# Patient Record
Sex: MALE | Race: BLACK OR AFRICAN AMERICAN | Employment: OTHER | URBAN - METROPOLITAN AREA
[De-identification: names, ages, dates, MRNs, and addresses within clinical notes are randomized per-mention and may not be internally consistent; named-entity substitution may affect disease eponyms.]

---

## 2017-03-15 ENCOUNTER — OFFICE VISIT (OUTPATIENT)
Dept: INTERNAL MEDICINE CLINIC | Age: 67
End: 2017-03-15

## 2017-03-15 VITALS
HEIGHT: 66 IN | DIASTOLIC BLOOD PRESSURE: 86 MMHG | RESPIRATION RATE: 20 BRPM | SYSTOLIC BLOOD PRESSURE: 144 MMHG | OXYGEN SATURATION: 97 % | BODY MASS INDEX: 35.36 KG/M2 | WEIGHT: 220 LBS | HEART RATE: 70 BPM | TEMPERATURE: 98.8 F

## 2017-03-15 DIAGNOSIS — Z23 ENCOUNTER FOR IMMUNIZATION: ICD-10-CM

## 2017-03-15 DIAGNOSIS — F17.200 TOBACCO DEPENDENCE: ICD-10-CM

## 2017-03-15 DIAGNOSIS — E78.00 HIGH CHOLESTEROL: ICD-10-CM

## 2017-03-15 DIAGNOSIS — Z11.59 NEED FOR HEPATITIS C SCREENING TEST: ICD-10-CM

## 2017-03-15 DIAGNOSIS — Z12.11 COLON CANCER SCREENING: ICD-10-CM

## 2017-03-15 DIAGNOSIS — Z00.00 ROUTINE GENERAL MEDICAL EXAMINATION AT A HEALTH CARE FACILITY: Primary | ICD-10-CM

## 2017-03-15 DIAGNOSIS — I10 ESSENTIAL HYPERTENSION: ICD-10-CM

## 2017-03-15 DIAGNOSIS — Z13.39 SCREENING FOR ALCOHOLISM: ICD-10-CM

## 2017-03-15 RX ORDER — PRAVASTATIN SODIUM 40 MG/1
40 TABLET ORAL
Qty: 90 TAB | Refills: 3 | Status: SHIPPED | OUTPATIENT
Start: 2017-03-15 | End: 2018-06-26 | Stop reason: SDUPTHER

## 2017-03-15 RX ORDER — PANTOPRAZOLE SODIUM 40 MG/1
40 TABLET, DELAYED RELEASE ORAL DAILY
Qty: 90 TAB | Refills: 3 | Status: SHIPPED | OUTPATIENT
Start: 2017-03-15 | End: 2018-06-26 | Stop reason: SDUPTHER

## 2017-03-15 RX ORDER — LISINOPRIL 10 MG/1
10 TABLET ORAL DAILY
Qty: 90 TAB | Refills: 3 | Status: SHIPPED | OUTPATIENT
Start: 2017-03-15 | End: 2018-06-26 | Stop reason: SDUPTHER

## 2017-03-15 NOTE — MR AVS SNAPSHOT
Visit Information Date & Time Provider Department Dept. Phone Encounter #  
 3/15/2017 10:00 AM Laverne Jung MD Internists at [facility] [id number] Follow-up Instructions Return in about 6 months (around 9/15/2017) for labs 1 week before. Upcoming Health Maintenance Date Due Hepatitis C Screening 1950 EYE EXAM RETINAL OR DILATED Q1 3/11/1960 DTaP/Tdap/Td series (1 - Tdap) 3/11/1971 FOBT Q 1 YEAR AGE 50-75 3/11/2000 GLAUCOMA SCREENING Q2Y 3/11/2015 Pneumococcal 65+ Low/Medium Risk (2 of 2 - PPSV23) 1/8/2017 MEDICARE YEARLY EXAM 1/8/2017 MICROALBUMIN Q1 5/9/2017 HEMOGLOBIN A1C Q6M 9/9/2017 FOOT EXAM Q1 2/15/2018 LIPID PANEL Q1 3/9/2018 Allergies as of 3/15/2017  Review Complete On: 3/15/2017 By: Laverne Jung MD  
  
 Severity Noted Reaction Type Reactions Latex  01/08/2016    Sneezing Neosporin [Hydrocortisone]  01/08/2016    Rash Current Immunizations  Reviewed on 11/9/2016 Name Date Influenza Vaccine 9/12/2016 Influenza Vaccine (Quad) PF 1/8/2016 Pneumococcal Conjugate (PCV-13) 1/8/2016 Zoster Vaccine, Live 9/8/2015 Not reviewed this visit You Were Diagnosed With   
  
 Codes Comments Routine general medical examination at a health care facility    -  Primary ICD-10-CM: Z00.00 ICD-9-CM: V70.0 Screening for alcoholism     ICD-10-CM: Z13.89 ICD-9-CM: V79.1 Colon cancer screening     ICD-10-CM: Z12.11 ICD-9-CM: V76.51 Uncontrolled type 2 diabetes mellitus without complication, without long-term current use of insulin ([facility]     ICD-10-CM: E11.65 ICD-9-CM: 250.02 Essential hypertension     ICD-10-CM: I10 
ICD-9-CM: 401.9 High cholesterol     ICD-10-CM: E78.00 ICD-9-CM: 272.0 Vitals BP Pulse Temp Resp Height(growth percentile) Weight(growth percentile)  144/86 (BP 1 Location: Left arm, BP Patient Position: Sitting) 70 98.8 °F (37.1 °C) (Oral) 20 5' 6\" (1.676 m) 220 lb (99.8 kg) SpO2 BMI Smoking Status 97% 35.51 kg/m2 Current Every Day Smoker Vitals History BMI and BSA Data Body Mass Index Body Surface Area 35.51 kg/m 2 2.16 m 2 Preferred Pharmacy Pharmacy Name Phone [facility] [place] - [address] [phone] Your Updated Medication List  
  
   
This list is accurate as of: 3/15/17 10:27 AM.  Always use your most recent med list.  
  
  
  
  
 alcohol swabs Padm Commonly known as:  BD Single Use Swabs Regular Use daily to check blood sugar Blood Glucose Control, Normal Soln Commonly known as:  ACCU-CHEK SMARTVIEW CONTRL SOL Test blood sugar 1 x daily  DX E11.9        1 year supply  Check controls monthy on glucose meter Blood-Glucose Meter monitoring kit Accu-check Smartview Meter  
  
 clotrimazole-betamethasone topical cream  
Commonly known as:  Oliviaon Adryan Apply dime size to affected area BID for 1 week at a time FLUZONE HIGH-DOSE 2016-17 (PF) Syrg injection Generic drug:  influenza vaccine 2016-17 (65 yr+)(PF)  
inject 0.5 milliliter intramuscularly  
  
 gabapentin 300 mg capsule Commonly known as:  NEURONTIN Take 1 Cap by mouth nightly. glucose blood VI test strips strip Commonly known as:  [address] Test blood sugar 1 times a day. DX E11.65 HYDROcodone-acetaminophen 5-325 mg per tablet Commonly known as:  NORCO  
  
 hydrOXYzine pamoate 25 mg capsule Commonly known as:  VISTARIL Take 1 Cap by mouth three (3) times daily as needed for Itching. ibuprofen 800 mg tablet Commonly known as:  MOTRIN Take 1 Tab by mouth every eight (8) hours as needed for Pain. Lancets Misc Accu-check  Fastclix Lancets                  Test blood sugar daily. DX E11.9  
  
 lisinopril 10 mg tablet Commonly known as:  Shanon Burnett  
 Take 1 Tab by mouth daily. pantoprazole 40 mg tablet Commonly known as:  PROTONIX Take 1 Tab by mouth daily. pravastatin 40 mg tablet Commonly known as:  PRAVACHOL Take 1 Tab by mouth nightly. SITagliptin-metFORMIN 50-1,000 mg Tm24 Commonly known as:  JANUMET XR Take 2 Tabs by mouth daily. tiZANidine 4 mg tablet Commonly known as:  Mely Reagin Take 1 Tab by mouth nightly. Prescriptions Sent to Pharmacy Refills SITagliptin-metFORMIN (JANUMET XR) 50-1,000 mg TM24 3 Sig: Take 2 Tabs by mouth daily. Class: Normal  
 Pharmacy: [address] [place] [address] Ph #: [phone] Route: Oral  
 lisinopril (PRINIVIL, ZESTRIL) 10 mg tablet 3 Sig: Take 1 Tab by mouth daily. Class: Normal  
 Pharmacy: [address] [place] [address] Ph #: [phone] Route: Oral  
 pravastatin (PRAVACHOL) 40 mg tablet 3 Sig: Take 1 Tab by mouth nightly. Class: Normal  
 Pharmacy: [address] [place] [address] Ph #: [phone] Route: Oral  
 pantoprazole (PROTONIX) 40 mg tablet 3 Sig: Take 1 Tab by mouth daily. Class: Normal  
 Pharmacy: [address] [place] [address] Ph #: [phone] Route: Oral  
  
We Performed the Following REFERRAL TO OPHTHALMOLOGY [REF57 Custom] Comments:  
 Refer to Dr Charlotte Ace for DM eye exam  
  
Follow-up Instructions Return in about 6 months (around 9/15/2017) for labs 1 week before. To-Do List   
 03/15/2017 Lab:  OCCULT BLOOD, IMMUNOASSAY (FIT) Referral Information Referral ID Referred By Referred To  
  
 [id number] JAMILAH, 2021 Venkat Holm MD   
   [address] [facility], [address] [facility] [address] Phone: [phone] Fax: [phone] Visits Status Start Date End Date 1 New Request 3/15/17 3/15/18 If your referral has a status of pending review or denied, additional information will be sent to support the outcome of this decision. Patient Instructions Learning About Diabetes Food Guidelines Your Care Instructions Meal planning is important to manage diabetes. It helps keep your blood sugar at a target level (which you set with your doctor). You don't have to eat special foods. You can eat what your family eats, including sweets once in a while. But you do have to pay attention to how often you eat and how much you eat of certain foods. You may want to work with a dietitian or a certified diabetes educator (CDE) to help you plan meals and snacks. A dietitian or CDE can also help you lose weight if that is one of your goals. What should you know about eating carbs? Managing the amount of carbohydrate (carbs) you eat is an important part of healthy meals when you have diabetes. Carbohydrate is found in many foods. · Learn which foods have carbs. And learn the amounts of carbs in different foods. ¨ Bread, cereal, pasta, and rice have about 15 grams of carbs in a serving. A serving is 1 slice of bread (1 ounce), ½ cup of cooked cereal, or 1/3 cup of cooked pasta or rice. ¨ Fruits have 15 grams of carbs in a serving. A serving is 1 small fresh fruit, such as an apple or orange; ½ of a banana; ½ cup of cooked or canned fruit; ½ cup of fruit juice; 1 cup of melon or raspberries; or 2 tablespoons of dried fruit. ¨ Milk and no-sugar-added yogurt have 15 grams of carbs in a serving. A serving is 1 cup of milk or 2/3 cup of no-sugar-added yogurt. ¨ Starchy vegetables have 15 grams of carbs in a serving. A serving is ½ cup of mashed potatoes or sweet potato; 1 cup winter squash; ½ of a small baked potato; ½ cup of cooked beans; or ½ cup cooked corn or green peas.  
· Learn how much carbs to eat each day and at each meal. A dietitian or CDE can teach you how to keep track of the amount of carbs you eat. This is called carbohydrate counting. · If you are not sure how to count carbohydrate grams, use the Plate Method to plan meals. It is a good, quick way to make sure that you have a balanced meal. It also helps you spread carbs throughout the day. ¨ Divide your plate by types of foods. Put non-starchy vegetables on half the plate, meat or other protein food on one-quarter of the plate, and a grain or starchy vegetable in the final quarter of the plate. To this you can add a small piece of fruit and 1 cup of milk or yogurt, depending on how many carbs you are supposed to eat at a meal. 
· Try to eat about the same amount of carbs at each meal. Do not \"save up\" your daily allowance of carbs to eat at one meal. 
· Proteins have very little or no carbs per serving. Examples of proteins are beef, chicken, turkey, fish, eggs, tofu, cheese, cottage cheese, and peanut butter. A serving size of meat is 3 ounces, which is about the size of a deck of cards. Examples of meat substitute serving sizes (equal to 1 ounce of meat) are 1/4 cup of cottage cheese, 1 egg, 1 tablespoon of peanut butter, and ½ cup of tofu. How can you eat out and still eat healthy? · Learn to estimate the serving sizes of foods that have carbohydrate. If you measure food at home, it will be easier to estimate the amount in a serving of restaurant food. · If the meal you order has too much carbohydrate (such as potatoes, corn, or baked beans), ask to have a low-carbohydrate food instead. Ask for a salad or green vegetables. · If you use insulin, check your blood sugar before and after eating out to help you plan how much to eat in the future. · If you eat more carbohydrate at a meal than you had planned, take a walk or do other exercise. This will help lower your blood sugar. What else should you know? · Limit saturated fat, such as the fat from meat and dairy products.  This is a healthy choice because people who have diabetes are at higher risk of heart disease. So choose lean cuts of meat and nonfat or low-fat dairy products. Use olive or canola oil instead of butter or shortening when cooking. · Don't skip meals. Your blood sugar may drop too low if you skip meals and take insulin or certain medicines for diabetes. · Check with your doctor before you drink alcohol. Alcohol can cause your blood sugar to drop too low. Alcohol can also cause a bad reaction if you take certain diabetes medicines. Follow-up care is a key part of your treatment and safety. Be sure to make and go to all appointments, and call your doctor if you are having problems. It's also a good idea to know your test results and keep a list of the medicines you take. Where can you learn more? Go to http://sumit-uli.info/. Enter L463 in the search box to learn more about \"Learning About Diabetes Food Guidelines. \" Current as of: May 23, 2016 Content Version: 11.1 © 5119-3857 [employer]. Care instructions adapted under license by [employer] (which disclaims liability or warranty for this information). If you have questions about a medical condition or this instruction, always ask your healthcare professional. [place] [address] any warranty or liability for your use of this information. Medicare Part B Preventive Services Limitations Recommendation Scheduled Bone Mass Measurement 
(age 72 & older, biennial) Requires diagnosis related to osteoporosis or estrogen deficiency. Biennial benefit unless patient has history of long-term glucocorticoid tx or baseline is needed because initial test was by other method  NA Cardiovascular Screening Blood Tests (every 5 years) Total cholesterol, HDL, Triglycerides Order as a panel if possible  3/2017 Colorectal Cancer Screening 
-Fecal occult blood test (annual) -Flexible sigmoidoscopy (5y) -Screening colonoscopy (10y) -Barium Enema   Order Counseling to Prevent Tobacco Use (up to 8 sessions per year) - Counseling greater than 3 and up to 10 minutes - Counseling greater than 10 minutes Patients must be asymptomatic of tobacco-related conditions to receive as preventive service  NA Diabetes Screening Tests (at least every 3 years, Medicare covers annually or at 6-month intervals for prediabetic patients) Fasting blood sugar (FBS) or glucose tolerance test (GTT) Patient must be diagnosed with one of the following: 
-Hypertension, Dyslipidemia, obesity, previous impaired FBS or GTT 
Or any two of the following: overweight, FH of diabetes, age ? 72, history of gestational diabetes, birth of baby weighing more than 9 pounds  3/2017 Diabetes Self-Management Training (DSMT) (no USPSTF recommendation) Requires referral by treating physician for patient with diabetes or renal disease. 10 hours of initial DSMT session of no less than 30 minutes each in a continuous 12-month period. 2 hours of follow-up DSMT in subsequent years. 3/2017 Glaucoma Screening (no USPSTF recommendation) Diabetes mellitus, family history, , age 48 or over,  American, age 72 or over  Ordered Human Immunodeficiency Virus (HIV) Screening (annually for increased risk patients) HIV-1 and HIV-2 by EIA, MAURICIO, rapid antibody test, or oral mucosa transudate Patient must be at increased risk for HIV infection per USPSTF guidelines or pregnant. Tests covered annually for patients at increased risk. Pregnant patients may receive up to 3 test during pregnancy. NA Medical Nutrition Therapy (MNT) (for diabetes or renal disease not recommended schedule) Requires referral by treating physician for patient with diabetes or renal disease.   Can be provided in same year as diabetes self-management training (DSMT), and CMS recommends medical nutrition therapy take place after DSMT. Up to 3 hours for initial year and 2 hours in subsequent years. NA Prostate Cancer Screening (annually up to age 76) - Digital rectal exam (MIGUEL) - Prostate specific antigen (PSA) Annually (age 48 or over), MIGUEL not paid separately when covered E/M service is provided on same date Men up to age 76 may need a screening blood test for prostate cancer at certain intervals, depending on their personal and family history. This decision is between the patient and his provider. 1/2016 Seasonal Influenza Vaccination (annually)   9/2016 Pneumococcal Vaccination (once after 65)   Prevnar 13 1/2016 Hepatitis B Vaccinations (if medium/high risk) Medium/high risk factors:  End-stage renal disease, Hemophiliacs who received Factor VIII or IX concentrates, Clients of institutions for the mentally retarded, Persons who live in the same house as a HepB virus carrier, Homosexual men, Illicit injectable drug abusers. NA Shingles Vaccination A shingles vaccine is also recommended once in a lifetime after age 61  9/2015 Ultrasound Screening for Abdominal Aortic Aneurysm (AAA) (once) Patient must be referred through IPPE and not have had a screening for abdominal aortic aneurysm before under Medicare. Limited to patients who meet one of the following criteria: 
- Men who are 73-68 years old and have smoked more than 100 cigarettes in their lifetime. 
-Anyone with a FH of AAA 
-Anyone recommended for screening by USPSTF  NA Introducing hospitals & HEALTH SERVICES! Dior Schulte introduces [employer] patient portal. Now you can access parts of your medical record, email your doctor's office, and request medication refills online. 1. In your internet browser, go to [email or web address]. [email or web address] 2. Click on the First Time User? Click Here link in the Sign In box. You will see the New Member Sign Up page. 3. Enter your [employer] Access Code exactly as it appears below.  You will not need to use this code after youve completed the sign-up process. If you do not sign up before the expiration date, you must request a new code. · [employer] Access Code: [email or web address] Expires: 6/13/2017 10:04 AM 
 
4. Enter the last four digits of your Social Security Number (xxxx) and Date of Birth (mm/dd/yyyy) as indicated and click Submit. You will be taken to the next sign-up page. 5. Create a [employer] ID. This will be your [employer] login ID and cannot be changed, so think of one that is secure and easy to remember. 6. Create a [employer] password. You can change your password at any time. 7. Enter your Password Reset Question and Answer. This can be used at a later time if you forget your password. 8. Enter your e-mail address. You will receive e-mail notification when new information is available in [address] 9. Click Sign Up. You can now view and download portions of your medical record. 10. Click the Download Summary menu link to download a portable copy of your medical information. If you have questions, please visit the Frequently Asked Questions section of the [employer] website. Remember, [employer] is NOT to be used for urgent needs. For medical emergencies, dial 911. Now available from your iPhone and Android! Please provide this summary of care documentation to your next provider. Your primary care clinician is listed as CARLOS ASKEW. If you have any questions after today's visit, please call [phone].

## 2017-03-15 NOTE — PROGRESS NOTES
Patient is in the office today for a 4  month follow up, and Medicare Wellness. Patient states Joshua Sevilla cases him to gag and wants to know if it is ok to break pill in half to swallow. Patient states he would like to be checked for Hep C. Do you have an Advance Directive yes - will bring copy    1. Have you been to the ER, urgent care clinic since your last visit? Hospitalized since your last visit? No    2. Have you seen or consulted any other health care providers outside of the [facility] since your last visit? Include any pap smears or colon screening. No        This is a Subsequent Medicare Annual Wellness Visit providing Personalized Prevention Plan Services (PPPS) (Performed 12 months after initial AWV and PPPS )    I have reviewed the patient's medical history in detail and updated the computerized patient record. History     Past Medical History:   Diagnosis Date    Diabetes ([address]     GERD (gastroesophageal reflux disease)     Hx of colonic polyps     Hx of gallstones     Hypercholesterolemia     Hypertension     Restless leg syndrome     Wears dentures       Past Surgical History:   Procedure Laterality Date    HX OTHER SURGICAL      gallstones removed    [place] COLONOSCOPY FLX DX W/COLLJ SPEC WHEN PFRMD  2-17-16    Dr. Karlis Duverney     Current Outpatient Prescriptions   Medication Sig Dispense Refill    hydrOXYzine (VISTARIL) 25 mg capsule Take 1 Cap by mouth three (3) times daily as needed for Itching. 60 Cap 3    pantoprazole (PROTONIX) 40 mg tablet Take 1 Tab by mouth daily. 90 Tab 2    lisinopril (PRINIVIL, ZESTRIL) 10 mg tablet Take 1 Tab by mouth daily. 90 Tab 2    pravastatin (PRAVACHOL) 40 mg tablet Take 1 Tab by mouth nightly. 90 Tab 2    sitaGLIPtin-metFORMIN (JANUMET XR) 50-1,000 mg TM24 Take 2 Tabs by mouth daily.  180 Tab 2    alcohol swabs (BD SINGLE USE SWABS REGULAR) padm Use daily to check blood sugar 100 Pad 4    Blood Glucose Control, Normal (ACCU-CHEK SMARTVIEW CONTRL SOL) soln Test blood sugar 1 x daily  DX E11.9        1 year supply  Check controls monthy on glucose meter 3 mL 4    Blood-Glucose Meter monitoring kit Accu-check Smartview Meter 1 Kit 0    glucose blood VI test strips (ACCU-CHEK SMARTVIEW TEST STRIP) strip Test blood sugar 1 times a day. DX E11.65 100 Strip 4    Lancets misc Accu-check  Fastclix Lancets                  Test blood sugar daily. DX E11.9 100 Each 4    FLUZONE HIGH-DOSE 2016-17, PF, syrg injection inject 0.5 milliliter intramuscularly  0    HYDROcodone-acetaminophen (NORCO) 5-325 mg per tablet   0    gabapentin (NEURONTIN) 300 mg capsule Take 1 Cap by mouth nightly. 30 Cap 1    tiZANidine (ZANAFLEX) 4 mg tablet Take 1 Tab by mouth nightly. 30 Tab 1    ibuprofen (MOTRIN) 800 mg tablet Take 1 Tab by mouth every eight (8) hours as needed for Pain. 90 Tab 1    clotrimazole-betamethasone (LOTRISONE) topical cream Apply dime size to affected area BID for 1 week at a time 45 g 0     Allergies   Allergen Reactions    Latex Sneezing    Neosporin [Hydrocortisone] Rash     Family History   Problem Relation Age of Onset    Heart Attack Mother     Prostate Cancer Father     Colon Cancer Father     Stroke Sister      Social History   Substance Use Topics    Smoking status: Current Every Day Smoker     Packs/day: 0.50    Smokeless tobacco: Never Used    Alcohol use Yes      Comment: occas. Patient Active Problem List   Diagnosis Code    Essential hypertension I10    DM (diabetes mellitus), type 2, uncontrolled ([facility] E11.65    High cholesterol E78.00    ACP (advance care planning) Z71.89       Depression Risk Factor Screening:     PHQ 2 / 9, over the last two weeks 3/15/2017   Little interest or pleasure in doing things Not at all   Feeling down, depressed or hopeless Not at all   Total Score PHQ 2 0     Alcohol Risk Factor Screening:   Patient states he drinks very little alcohol.       Functional Ability and Level of Safety:     Hearing Loss   Patient states he doses not have any hearing loss. Activities of Daily Living   Self-care. Requires assistance with: no ADLs    Fall Risk     Fall Risk Assessment, last 12 mths 3/15/2017   Able to walk? Yes   Fall in past 12 months? No     Abuse Screen   Patient is not abused    Review of Systems   A comprehensive review of systems was negative except for that written in the HPI. Physical Examination     Evaluation of Cognitive Function:  Mood/affect:  neutral  Appearance: age appropriate  Family member/caregiver input: none    Visit Vitals    /86 (BP 1 Location: Left arm, BP Patient Position: Sitting)    Pulse 70    Temp 98.8 °F (37.1 °C) (Oral)    Resp 20    Ht 5' 6\" (1.676 m)    Wt 220 lb (99.8 kg)    SpO2 97%    BMI 35.51 kg/m2       Patient Care Team:  Yudi Abrams MD as PCP - General (Internal Medicine)    Advice/Referrals/Counseling   Education and counseling provided:  Are appropriate based on today's review and evaluation  End-of-Life planning (with patient's consent)  Pneumococcal Vaccine    Glaucoma Screening- Nov 2015 [facility]  Refer today   Pneumonia Vaccine- due for pneumococcal today   Shingles Vaccine- 9/2015   Tdap Vaccine- not UTD   Colonoscopy not sure when last done. Will do FIT test   PSA- 1/2016 will repeat with next blood work  Advance Directive- Does not have. Working on it     Assessment/Plan       ICD-10-CM ICD-9-CM    1. Routine general medical examination at a health care facility Z00.00 V70.0 PROSTATE SPECIFIC AG (PSA)   2. Screening for alcoholism Z13.89 V79.1    3. Colon cancer screening Z12.11 V76.51 OCCULT BLOOD, IMMUNOASSAY (FIT)   4. Uncontrolled type 2 diabetes mellitus without complication, without long-term current use of insulin (HCC) E11.65 250.02 REFERRAL TO OPHTHALMOLOGY      SITagliptin-metFORMIN (JANUMET XR) 50-1,000 mg TM24   5. Essential hypertension I10 401.9    6. High cholesterol E78.00 272.0    7.  Tobacco dependence F17.200 305.1    8. Need for hepatitis C screening test Z11.59 V73.89 HEPATITIS C AB   9. Encounter for immunization Z23 V03.89 PNEUMOCOCCAL POLYSACCHARIDE VACCINE, 23-VALENT, ADULT OR IMMUNOSUPPRESSED PT DOSE,      ADMIN PNEUMOCOCCAL VACCINE   . A comprehensive 5 year plan for medical care and screening exams was reviewed with pt and they received a copy of it.

## 2017-03-15 NOTE — PATIENT INSTRUCTIONS
Learning About Diabetes Food Guidelines  Your Care Instructions  Meal planning is important to manage diabetes. It helps keep your blood sugar at a target level (which you set with your doctor). You don't have to eat special foods. You can eat what your family eats, including sweets once in a while. But you do have to pay attention to how often you eat and how much you eat of certain foods. You may want to work with a dietitian or a certified diabetes educator (CDE) to help you plan meals and snacks. A dietitian or CDE can also help you lose weight if that is one of your goals. What should you know about eating carbs? Managing the amount of carbohydrate (carbs) you eat is an important part of healthy meals when you have diabetes. Carbohydrate is found in many foods. · Learn which foods have carbs. And learn the amounts of carbs in different foods. ¨ Bread, cereal, pasta, and rice have about 15 grams of carbs in a serving. A serving is 1 slice of bread (1 ounce), ½ cup of cooked cereal, or 1/3 cup of cooked pasta or rice. ¨ Fruits have 15 grams of carbs in a serving. A serving is 1 small fresh fruit, such as an apple or orange; ½ of a banana; ½ cup of cooked or canned fruit; ½ cup of fruit juice; 1 cup of melon or raspberries; or 2 tablespoons of dried fruit. ¨ Milk and no-sugar-added yogurt have 15 grams of carbs in a serving. A serving is 1 cup of milk or 2/3 cup of no-sugar-added yogurt. ¨ Starchy vegetables have 15 grams of carbs in a serving. A serving is ½ cup of mashed potatoes or sweet potato; 1 cup winter squash; ½ of a small baked potato; ½ cup of cooked beans; or ½ cup cooked corn or green peas. · Learn how much carbs to eat each day and at each meal. A dietitian or CDE can teach you how to keep track of the amount of carbs you eat. This is called carbohydrate counting. · If you are not sure how to count carbohydrate grams, use the Plate Method to plan meals.  It is a good, quick way to make sure that you have a balanced meal. It also helps you spread carbs throughout the day. ¨ Divide your plate by types of foods. Put non-starchy vegetables on half the plate, meat or other protein food on one-quarter of the plate, and a grain or starchy vegetable in the final quarter of the plate. To this you can add a small piece of fruit and 1 cup of milk or yogurt, depending on how many carbs you are supposed to eat at a meal.  · Try to eat about the same amount of carbs at each meal. Do not \"save up\" your daily allowance of carbs to eat at one meal.  · Proteins have very little or no carbs per serving. Examples of proteins are beef, chicken, turkey, fish, eggs, tofu, cheese, cottage cheese, and peanut butter. A serving size of meat is 3 ounces, which is about the size of a deck of cards. Examples of meat substitute serving sizes (equal to 1 ounce of meat) are 1/4 cup of cottage cheese, 1 egg, 1 tablespoon of peanut butter, and ½ cup of tofu. How can you eat out and still eat healthy? · Learn to estimate the serving sizes of foods that have carbohydrate. If you measure food at home, it will be easier to estimate the amount in a serving of restaurant food. · If the meal you order has too much carbohydrate (such as potatoes, corn, or baked beans), ask to have a low-carbohydrate food instead. Ask for a salad or green vegetables. · If you use insulin, check your blood sugar before and after eating out to help you plan how much to eat in the future. · If you eat more carbohydrate at a meal than you had planned, take a walk or do other exercise. This will help lower your blood sugar. What else should you know? · Limit saturated fat, such as the fat from meat and dairy products. This is a healthy choice because people who have diabetes are at higher risk of heart disease. So choose lean cuts of meat and nonfat or low-fat dairy products. Use olive or canola oil instead of butter or shortening when cooking.   · Don't skip meals. Your blood sugar may drop too low if you skip meals and take insulin or certain medicines for diabetes. · Check with your doctor before you drink alcohol. Alcohol can cause your blood sugar to drop too low. Alcohol can also cause a bad reaction if you take certain diabetes medicines. Follow-up care is a key part of your treatment and safety. Be sure to make and go to all appointments, and call your doctor if you are having problems. It's also a good idea to know your test results and keep a list of the medicines you take. Where can you learn more? Go to http://sumit-uli.info/. Enter C609 in the search box to learn more about \"Learning About Diabetes Food Guidelines. \"  Current as of: May 23, 2016  Content Version: 11.1  © 9401-7341 [employer]. Care instructions adapted under license by [employer] (which disclaims liability or warranty for this information). If you have questions about a medical condition or this instruction, always ask your healthcare professional. [place] [address] any warranty or liability for your use of this information. Medicare Part B Preventive Services Limitations Recommendation Scheduled   Bone Mass Measurement  (age 72 & older, biennial) Requires diagnosis related to osteoporosis or estrogen deficiency.  Biennial benefit unless patient has history of long-term glucocorticoid tx or baseline is needed because initial test was by other method  NA   Cardiovascular Screening Blood Tests (every 5 years)  Total cholesterol, HDL, Triglycerides Order as a panel if possible  3/2017   Colorectal Cancer Screening  -Fecal occult blood test (annual)  -Flexible sigmoidoscopy (5y)  -Screening colonoscopy (10y)  -Barium Enema   Order    Counseling to Prevent Tobacco Use (up to 8 sessions per year)  - Counseling greater than 3 and up to 10 minutes  - Counseling greater than 10 minutes Patients must be asymptomatic of tobacco-related conditions to receive as preventive service  NA   Diabetes Screening Tests (at least every 3 years, Medicare covers annually or at 6-month intervals for prediabetic patients)    Fasting blood sugar (FBS) or glucose tolerance test (GTT) Patient must be diagnosed with one of the following:  -Hypertension, Dyslipidemia, obesity, previous impaired FBS or GTT  Or any two of the following: overweight, FH of diabetes, age ? 72, history of gestational diabetes, birth of baby weighing more than 9 pounds  3/2017   Diabetes Self-Management Training (DSMT) (no USPSTF recommendation) Requires referral by treating physician for patient with diabetes or renal disease. 10 hours of initial DSMT session of no less than 30 minutes each in a continuous 12-month period. 2 hours of follow-up DSMT in subsequent years. 3/2017   Glaucoma Screening (no USPSTF recommendation) Diabetes mellitus, family history, , age 48 or over,  American, age 72 or over  Ordered    Human Immunodeficiency Virus (HIV) Screening (annually for increased risk patients)  HIV-1 and HIV-2 by EIA, MAURICIO, rapid antibody test, or oral mucosa transudate Patient must be at increased risk for HIV infection per USPSTF guidelines or pregnant. Tests covered annually for patients at increased risk. Pregnant patients may receive up to 3 test during pregnancy. NA   Medical Nutrition Therapy (MNT) (for diabetes or renal disease not recommended schedule) Requires referral by treating physician for patient with diabetes or renal disease. Can be provided in same year as diabetes self-management training (DSMT), and CMS recommends medical nutrition therapy take place after DSMT. Up to 3 hours for initial year and 2 hours in subsequent years.   NA   Prostate Cancer Screening (annually up to age 76)  - Digital rectal exam (MIGUEL)  - Prostate specific antigen (PSA) Annually (age 48 or over), MIGUEL not paid separately when covered E/M service is provided on same date  Men up to age 76 may need a screening blood test for prostate cancer at certain intervals, depending on their personal and family history. This decision is between the patient and his provider. 1/2016   Seasonal Influenza Vaccination (annually)   9/2016     Pneumococcal Vaccination (once after 72)   Prevnar 13 1/2016   Hepatitis B Vaccinations (if medium/high risk) Medium/high risk factors:  End-stage renal disease,  Hemophiliacs who received Factor VIII or IX concentrates, Clients of institutions for the mentally retarded, Persons who live in the same house as a HepB virus carrier, Homosexual men, Illicit injectable drug abusers. NA   Shingles Vaccination A shingles vaccine is also recommended once in a lifetime after age 61  9/2015   Ultrasound Screening for Abdominal Aortic Aneurysm (AAA) (once) Patient must be referred through [facility] and not have had a screening for abdominal aortic aneurysm before under Medicare.   Limited to patients who meet one of the following criteria:  - Men who are 73-68 years old and have smoked more than 100 cigarettes in their lifetime.  -Anyone with a FH of AAA  -Anyone recommended for screening by USPSTF  NA

## 2017-03-15 NOTE — PROGRESS NOTES
Verbal order read back per Dr. Laura Neville Pneumococcal vaccine. Patient received Pneumococcal vaccine in left deltoid. Patient tolerated well and left without complaints. Patient received Pneumococcal VIS.

## 2017-03-15 NOTE — PROGRESS NOTES
Subjective:       Chief Complaint  The patient presents for follow up of diabetes, hypertension and high cholesterol. MIRTA Karimi is a 79 y.o. male seen for follow up of diabetes. Healnichol has hypertension and hyperlipidemia. Diabetes improving with weight loss, no significant medication side effects noted, on Janumet, hypertension well controlled, no significant medication side effects noted, on lisinopril, hyperlipidemia well controlled, no significant medication side effects noted, on Pravachol. Diet and Lifestyle: generally follows a low fat low cholesterol diet, follows a diabetic diet regularly, exercises sporadically    Home BP Monitoring: is not measured at home. Diabetic Review of Systems - home glucose monitoring: is performed regularly, 1x/day. Other symptoms and concerns: pt continues to work on trying to stop tobacco use. He is aware of options including Chantix      Current Outpatient Prescriptions   Medication Sig    SITagliptin-metFORMIN (JANUMET XR) 50-1,000 mg TM24 Take 2 Tabs by mouth daily.  lisinopril (PRINIVIL, ZESTRIL) 10 mg tablet Take 1 Tab by mouth daily.  pravastatin (PRAVACHOL) 40 mg tablet Take 1 Tab by mouth nightly.  pantoprazole (PROTONIX) 40 mg tablet Take 1 Tab by mouth daily.  hydrOXYzine (VISTARIL) 25 mg capsule Take 1 Cap by mouth three (3) times daily as needed for Itching.  alcohol swabs (BD SINGLE USE SWABS REGULAR) padm Use daily to check blood sugar    Blood Glucose Control, Normal (ACCU-CHEK SMARTVIEW CONTRL SOL) soln Test blood sugar 1 x daily  DX E11.9        1 year supply  Check controls monthy on glucose meter    Blood-Glucose Meter monitoring kit Accu-check Smartview Meter    glucose blood VI test strips (ACCU-CHEK SMARTVIEW TEST STRIP) strip Test blood sugar 1 times a day. DX E11.65    Lancets misc Accu-check  Fastclix Lancets                  Test blood sugar daily.   DX E11.9    FLUZONE HIGH-DOSE 2016-17, PF, syrg injection inject 0.5 milliliter intramuscularly    HYDROcodone-acetaminophen (NORCO) 5-325 mg per tablet     gabapentin (NEURONTIN) 300 mg capsule Take 1 Cap by mouth nightly.  tiZANidine (ZANAFLEX) 4 mg tablet Take 1 Tab by mouth nightly.  ibuprofen (MOTRIN) 800 mg tablet Take 1 Tab by mouth every eight (8) hours as needed for Pain.  clotrimazole-betamethasone (LOTRISONE) topical cream Apply dime size to affected area BID for 1 week at a time     No current facility-administered medications for this visit. Review of Systems  Respiratory: negative for dyspnea on exertion  Cardiovascular: negative for chest pain    Objective:     Visit Vitals    /86 (BP 1 Location: Left arm, BP Patient Position: Sitting)    Pulse 70    Temp 98.8 °F (37.1 °C) (Oral)    Resp 20    Ht 5' 6\" (1.676 m)    Wt 220 lb (99.8 kg)    SpO2 97%    BMI 35.51 kg/m2        General appearance - alert, well appearing, and in no distress  Chest - clear to auscultation, no wheezes, rales or rhonchi, symmetric air entry  Heart - normal rate, regular rhythm, normal S1, S2, no murmurs, rubs, clicks or gallops  Extremities - peripheral pulses normal, no pedal edema, no clubbing or cyanosis        Labs:   Lab Results   Component Value Date/Time    Hemoglobin A1c 7.4 03/09/2017 10:00 AM    Hemoglobin A1c 7.9 11/02/2016 08:32 AM    Hemoglobin A1c 7.2 05/02/2016 09:33 AM    Glucose 139 03/09/2017 10:00 AM    Glucose (POC) 158 02/17/2016 08:41 AM    LDL, calculated 62 03/09/2017 10:00 AM    Creatinine 1.07 03/09/2017 10:00 AM      Lab Results   Component Value Date/Time    Cholesterol, total 121 03/09/2017 10:00 AM    HDL Cholesterol 36 03/09/2017 10:00 AM    LDL, calculated 62 03/09/2017 10:00 AM    Triglyceride 115 03/09/2017 10:00 AM    CHOL/HDL Ratio 3.3 05/02/2016 09:33 AM       Lab Results   Component Value Date/Time    ALT (SGPT) 10 03/09/2017 10:00 AM    AST (SGOT) 10 03/09/2017 10:00 AM    Alk.  phosphatase 79 03/09/2017 10:00 AM    Bilirubin, total 0.4 03/09/2017 10:00 AM       Lab Results   Component Value Date/Time    GFR est AA 83 03/09/2017 10:00 AM    GFR est non-AA 72 03/09/2017 10:00 AM    Creatinine 1.07 03/09/2017 10:00 AM    BUN 11 03/09/2017 10:00 AM    Sodium 142 03/09/2017 10:00 AM    Potassium 4.4 03/09/2017 10:00 AM    Chloride 104 03/09/2017 10:00 AM    CO2 22 03/09/2017 10:00 AM      Lab Results   Component Value Date/Time    Prostate Specific Ag 1.6 01/08/2016 10:54 AM           Assessment / Plan     Diabetes improving on Janumet with weight loss  Hypertension well controlled, on lisinopril   Hyperlipidemia well controlled, on Pravachol . ICD-10-CM ICD-9-CM    1. Routine general medical examination at a health care facility Z00.00 V70.0 PROSTATE SPECIFIC AG (PSA)   2. Screening for alcoholism Z13.89 V79.1    3. Colon cancer screening Z12.11 V76.51 OCCULT BLOOD, IMMUNOASSAY (FIT)   4. Uncontrolled type 2 diabetes mellitus without complication, without long-term current use of insulin (HCC) E11.65 250.02 REFERRAL TO OPHTHALMOLOGY      SITagliptin-metFORMIN (JANUMET XR) 50-1,000 mg TM24   5. Essential hypertension I10 401.9    6. High cholesterol E78.00 272.0    7. Tobacco dependence F17.200 305.1 Pt encouraged to stop tobacco use. 8. Need for hepatitis C screening test Z11.59 V73.89 HEPATITIS C AB   9. Encounter for immunization Z23 V03.89 PNEUMOCOCCAL POLYSACCHARIDE VACCINE, 23-VALENT, ADULT OR IMMUNOSUPPRESSED PT DOSE,      ADMIN PNEUMOCOCCAL VACCINE              Diabetic issues reviewed with him: diabetic diet discussed in detail, and low cholesterol diet, weight control and daily exercise discussed. Follow-up Disposition:  Return in about 6 months (around 9/15/2017) for labs 1 week before. Reviewed plan of care. Patient has provided input and agrees with goals.

## 2017-03-25 LAB — HEMOCCULT STL QL IA: NEGATIVE

## 2017-09-07 ENCOUNTER — HOSPITAL ENCOUNTER (OUTPATIENT)
Dept: LAB | Age: 67
Discharge: HOME | End: 2017-09-07

## 2017-09-07 ENCOUNTER — LAB ONLY (OUTPATIENT)
Dept: INTERNAL MEDICINE CLINIC | Age: 67
End: 2017-09-07

## 2017-09-07 DIAGNOSIS — E78.00 HIGH CHOLESTEROL: ICD-10-CM

## 2017-09-07 DIAGNOSIS — I10 ESSENTIAL HYPERTENSION: ICD-10-CM

## 2017-09-07 PROCEDURE — 99001 SPECIMEN HANDLING PT-LAB: CPT | Performed by: INTERNAL MEDICINE

## 2017-09-08 LAB
ALBUMIN SERPL-MCNC: 4 G/DL (ref 3.6–4.8)
ALBUMIN/GLOB SERPL: 1.6 {RATIO} (ref 1.2–2.2)
ALP SERPL-CCNC: 75 IU/L (ref 39–117)
ALT SERPL-CCNC: 14 IU/L (ref 0–44)
AST SERPL-CCNC: 17 IU/L (ref 0–40)
BILIRUB SERPL-MCNC: 0.6 MG/DL (ref 0–1.2)
BUN SERPL-MCNC: 14 MG/DL (ref 8–27)
BUN/CREAT SERPL: 14 (ref 10–24)
CALCIUM SERPL-MCNC: 9.3 MG/DL (ref 8.6–10.2)
CHLORIDE SERPL-SCNC: 103 MMOL/L (ref 96–106)
CHOLEST SERPL-MCNC: 137 MG/DL (ref 100–199)
CO2 SERPL-SCNC: 24 MMOL/L (ref 18–29)
CREAT SERPL-MCNC: 1 MG/DL (ref 0.76–1.27)
GLOBULIN SER CALC-MCNC: 2.5 G/DL (ref 1.5–4.5)
GLUCOSE SERPL-MCNC: 136 MG/DL (ref 65–99)
HBA1C MFR BLD: 7.7 % (ref 4.8–5.6)
HCV AB S/CO SERPL IA: <0.1 S/CO RATIO (ref 0–0.9)
HDLC SERPL-MCNC: 42 MG/DL
INTERPRETATION, 910389: NORMAL
LDLC SERPL CALC-MCNC: 78 MG/DL (ref 0–99)
Lab: NORMAL
POTASSIUM SERPL-SCNC: 4.2 MMOL/L (ref 3.5–5.2)
PROT SERPL-MCNC: 6.5 G/DL (ref 6–8.5)
PSA SERPL-MCNC: 2.4 NG/ML (ref 0–4)
SODIUM SERPL-SCNC: 142 MMOL/L (ref 134–144)
TRIGL SERPL-MCNC: 86 MG/DL (ref 0–149)
VLDLC SERPL CALC-MCNC: 17 MG/DL (ref 5–40)

## 2017-10-23 RX ORDER — LANCETS
EACH MISCELLANEOUS
Qty: 100 EACH | Refills: 3 | Status: SHIPPED | OUTPATIENT
Start: 2017-10-23 | End: 2021-09-08

## 2017-11-01 ENCOUNTER — OFFICE VISIT (OUTPATIENT)
Dept: FAMILY MEDICINE CLINIC | Age: 67
End: 2017-11-01

## 2017-11-01 VITALS
HEIGHT: 66 IN | DIASTOLIC BLOOD PRESSURE: 84 MMHG | BODY MASS INDEX: 35.03 KG/M2 | OXYGEN SATURATION: 96 % | WEIGHT: 218 LBS | HEART RATE: 86 BPM | SYSTOLIC BLOOD PRESSURE: 132 MMHG | TEMPERATURE: 97.5 F | RESPIRATION RATE: 18 BRPM

## 2017-11-01 DIAGNOSIS — F17.200 TOBACCO DEPENDENCE: ICD-10-CM

## 2017-11-01 DIAGNOSIS — R05.9 COUGH: ICD-10-CM

## 2017-11-01 DIAGNOSIS — M20.41 HAMMERTOE OF RIGHT FOOT: ICD-10-CM

## 2017-11-01 DIAGNOSIS — I10 ESSENTIAL HYPERTENSION: ICD-10-CM

## 2017-11-01 DIAGNOSIS — M20.42 HAMMERTOE OF LEFT FOOT: ICD-10-CM

## 2017-11-01 DIAGNOSIS — E78.00 HIGH CHOLESTEROL: ICD-10-CM

## 2017-11-01 RX ORDER — VARENICLINE TARTRATE 25 MG
KIT ORAL
Qty: 1 DOSE PACK | Refills: 0 | Status: SHIPPED | OUTPATIENT
Start: 2017-11-01 | End: 2018-06-26

## 2017-11-01 NOTE — PROGRESS NOTES
Subjective:       Chief Complaint  The patient presents for follow up of diabetes, hypertension and high cholesterol. MIRTA Carney is a 79 y.o. male seen for follow up of diabetes. Healso has hypertension and hyperlipidemia. Diabetes borderline controlled on Janumet, pt to try and improve diet, no significant medication side effects noted, on Janumet, hypertension well controlled, no significant medication side effects noted, on lisinopril, hyperlipidemia well controlled, no significant medication side effects noted, on Pravachol. Diet and Lifestyle: generally follows a low fat low cholesterol diet, follows a diabetic diet regularly, exercises sporadically    Home BP Monitoring: is not measured at home. Diabetic Review of Systems - home glucose monitoring: is performed regularly, 1x/day. Other symptoms and concerns: pt continues to work on trying to stop tobacco use. He is aware of options including Chantix. He has occasional cough       Current Outpatient Prescriptions   Medication Sig    varenicline (CHANTIX STARTER DEEPA) 0.5 mg (11)- 1 mg (42) DsPk Take as directed    glucose blood VI test strips (ACCU-CHEK SMARTVIEW TEST STRIP) strip Test blood sugar 1 times a day. DX E11.65    Lancets misc Accu-check  Fastclix Lancets. Test blood sugar daily. DX E11.9    SITagliptin-metFORMIN (JANUMET XR) 50-1,000 mg TM24 Take 2 Tabs by mouth daily.  lisinopril (PRINIVIL, ZESTRIL) 10 mg tablet Take 1 Tab by mouth daily.  pravastatin (PRAVACHOL) 40 mg tablet Take 1 Tab by mouth nightly.  pantoprazole (PROTONIX) 40 mg tablet Take 1 Tab by mouth daily.  hydrOXYzine (VISTARIL) 25 mg capsule Take 1 Cap by mouth three (3) times daily as needed for Itching.  gabapentin (NEURONTIN) 300 mg capsule Take 1 Cap by mouth nightly.     alcohol swabs (BD SINGLE USE SWABS REGULAR) padm Use daily to check blood sugar    Blood Glucose Control, Normal (ACCU-CHEK SMARTVIEW CONTRL SOL) soln Test blood sugar 1 x daily  DX E11.9        1 year supply  Check controls monthy on glucose meter    Blood-Glucose Meter monitoring kit Accu-check Smartview Meter    FLUZONE HIGH-DOSE 2016-17, PF, syrg injection inject 0.5 milliliter intramuscularly    HYDROcodone-acetaminophen (NORCO) 5-325 mg per tablet     tiZANidine (ZANAFLEX) 4 mg tablet Take 1 Tab by mouth nightly.  ibuprofen (MOTRIN) 800 mg tablet Take 1 Tab by mouth every eight (8) hours as needed for Pain.  clotrimazole-betamethasone (LOTRISONE) topical cream Apply dime size to affected area BID for 1 week at a time     No current facility-administered medications for this visit. Review of Systems  Respiratory: negative for dyspnea on exertion  Cardiovascular: negative for chest pain    Objective:     Visit Vitals    /84 (BP 1 Location: Left arm, BP Patient Position: Sitting)    Pulse 86    Temp 97.5 °F (36.4 °C) (Oral)    Resp 18    Ht 5' 6\" (1.676 m)    Wt 218 lb (98.9 kg)    SpO2 96%    BMI 35.19 kg/m2        General appearance - alert, well appearing, and in no distress  Chest - crepitus right upper lung field  Heart - normal rate, regular rhythm, normal S1, S2, no murmurs, rubs, clicks or gallops  Extremities - peripheral pulses normal, no pedal edema, no clubbing or cyanosis. Contracture of the lesser toes is noted on the right foot and left foot.         Labs:   Lab Results   Component Value Date/Time    Hemoglobin A1c 7.7 09/07/2017 09:18 AM    Hemoglobin A1c 7.4 03/09/2017 10:00 AM    Hemoglobin A1c 7.9 11/02/2016 08:32 AM    Glucose 136 09/07/2017 09:18 AM    Glucose (POC) 158 02/17/2016 08:41 AM    LDL, calculated 78 09/07/2017 09:18 AM    Creatinine 1.00 09/07/2017 09:18 AM      Lab Results   Component Value Date/Time    Cholesterol, total 137 09/07/2017 09:18 AM    HDL Cholesterol 42 09/07/2017 09:18 AM    LDL, calculated 78 09/07/2017 09:18 AM    Triglyceride 86 09/07/2017 09:18 AM    CHOL/HDL Ratio 3.3 05/02/2016 09:33 AM       Lab Results   Component Value Date/Time    ALT (SGPT) 14 09/07/2017 09:18 AM    AST (SGOT) 17 09/07/2017 09:18 AM    Alk. phosphatase 75 09/07/2017 09:18 AM    Bilirubin, total 0.6 09/07/2017 09:18 AM       Lab Results   Component Value Date/Time    GFR est AA 90 09/07/2017 09:18 AM    GFR est non-AA 78 09/07/2017 09:18 AM    Creatinine 1.00 09/07/2017 09:18 AM    BUN 14 09/07/2017 09:18 AM    Sodium 142 09/07/2017 09:18 AM    Potassium 4.2 09/07/2017 09:18 AM    Chloride 103 09/07/2017 09:18 AM    CO2 24 09/07/2017 09:18 AM      Lab Results   Component Value Date/Time    Prostate Specific Ag 2.4 09/07/2017 09:18 AM    Prostate Specific Ag 1.6 01/08/2016 10:54 AM           Assessment / Plan     Diabetes borderline controlled on Janumet will try to improve with weight loss  Hypertension well controlled, on lisinopril   Hyperlipidemia well controlled, on Pravachol . ICD-10-CM ICD-9-CM    1. Uncontrolled type 2 diabetes mellitus without complication, without long-term current use of insulin (HCC) E11.65 250.02 MICROALBUMIN, UR, RAND W/ MICROALBUMIN/CREA RATIO      HEMOGLOBIN A1C W/O EAG   2. Essential hypertension C36 636.3 METABOLIC PANEL, COMPREHENSIVE   3. High cholesterol E78.00 272.0 LIPID PANEL   4. Tobacco dependence F17.200 305.1 Will try varenicline (CHANTIX STARTER DEEPA) 0.5 mg (11)- 1 mg (42) DsPk   5. Cough R05 786.2 XR CHEST PA LAT     6. Hammertoe of right foot M20.41 735.4 Pt would benefit from diabetic shoes. 7. Hammertoe of left foot M20.42 735.4             Diabetic issues reviewed with him: diabetic diet discussed in detail, and low cholesterol diet, weight control and daily exercise discussed. Follow-up Disposition:  Return in about 6 months (around 5/1/2018) for OV, and Medicare Wellness Visit, labs 1 week before. Reviewed plan of care. Patient has provided input and agrees with goals.

## 2017-11-01 NOTE — MR AVS SNAPSHOT
Visit Information Date & Time Provider Department Dept. Phone Encounter #  
 11/1/2017  4:30 PM Mara Lara, [address] [place] [address] [phone] [id number] Follow-up Instructions Return in about 6 months (around 5/1/2018) for OV, and Medicare Wellness Visit, labs 1 week before. Upcoming Health Maintenance Date Due DTaP/Tdap/Td series (1 - Tdap) 3/11/1971 MICROALBUMIN Q1 5/9/2017 INFLUENZA AGE 9 TO ADULT 8/1/2017 HEMOGLOBIN A1C Q6M 3/7/2018 FOBT Q 1 YEAR AGE 50-75 3/15/2018 MEDICARE YEARLY EXAM 3/16/2018 EYE EXAM RETINAL OR DILATED Q1 4/27/2018 FOOT EXAM Q1 8/14/2018 LIPID PANEL Q1 9/7/2018 GLAUCOMA SCREENING Q2Y 4/27/2019 Allergies as of 11/1/2017  Review Complete On: 11/1/2017 By: Ronaldo Doty LPN Severity Noted Reaction Type Reactions Latex  01/08/2016    Sneezing Neosporin [Hydrocortisone]  01/08/2016    Rash Current Immunizations  Reviewed on 3/15/2017 Name Date Influenza Vaccine 9/12/2016 Influenza Vaccine (Quad) PF 1/8/2016 Pneumococcal Conjugate (PCV-13) 1/8/2016 Pneumococcal Polysaccharide (PPSV-23) 3/15/2017 Zoster Vaccine, Live 9/8/2015 Not reviewed this visit You Were Diagnosed With   
  
 Codes Comments Uncontrolled type 2 diabetes mellitus without complication, without long-term current use of insulin ([facility]    -  Primary ICD-10-CM: E11.65 ICD-9-CM: 250.02 Essential hypertension     ICD-10-CM: I10 
ICD-9-CM: 401.9 High cholesterol     ICD-10-CM: E78.00 ICD-9-CM: 272.0 Tobacco dependence     ICD-10-CM: Z15.375 ICD-9-CM: 305.1 Cough     ICD-10-CM: R05 ICD-9-CM: 481. 2 Vitals BP Pulse Temp Resp Height(growth percentile) Weight(growth percentile) 132/84 (BP 1 Location: Left arm, BP Patient Position: Sitting) 86 97.5 °F (36.4 °C) (Oral) 18 5' 6\" (1.676 m) 218 lb (98.9 kg) SpO2 BMI Smoking Status 96% 35.19 kg/m2 Current Every Day Smoker BMI and BSA Data Body Mass Index Body Surface Area  
 35.19 kg/m 2 2.15 m 2 Preferred Pharmacy Pharmacy Name Phone [address] [facility] [address], [address] Your Updated Medication List  
  
   
This list is accurate as of: 11/1/17  4:48 PM.  Always use your most recent med list.  
  
  
  
  
 alcohol swabs Padm Commonly known as:  BD Single Use Swabs Regular Use daily to check blood sugar Blood Glucose Control, Normal Soln Commonly known as:  ACCU-CHEK SMARTVIEW CONTRL SOL Test blood sugar 1 x daily  DX E11.9        1 year supply  Check controls monthy on glucose meter Blood-Glucose Meter monitoring kit Accu-check Smartview Meter  
  
 clotrimazole-betamethasone topical cream  
Commonly known as:  Clemente Calender Apply dime size to affected area BID for 1 week at a time FLUZONE HIGH-DOSE 2016-17 (PF) Syrg injection Generic drug:  influenza vaccine 2016-17 (65 yr+)(PF)  
inject 0.5 milliliter intramuscularly  
  
 gabapentin 300 mg capsule Commonly known as:  NEURONTIN Take 1 Cap by mouth nightly. glucose blood VI test strips strip Commonly known as:  [address] Test blood sugar 1 times a day. DX E11.65 HYDROcodone-acetaminophen 5-325 mg per tablet Commonly known as:  NORCO  
  
 hydrOXYzine pamoate 25 mg capsule Commonly known as:  VISTARIL Take 1 Cap by mouth three (3) times daily as needed for Itching. ibuprofen 800 mg tablet Commonly known as:  MOTRIN Take 1 Tab by mouth every eight (8) hours as needed for Pain. Lancets Misc Accu-check  Fastclix Lancets. Test blood sugar daily. DX E11.9  
  
 lisinopril 10 mg tablet Commonly known as:  [place] Take 1 Tab by mouth daily. pantoprazole 40 mg tablet Commonly known as:  PROTONIX Take 1 Tab by mouth daily. pravastatin 40 mg tablet Commonly known as:  PRAVACHOL  
 Take 1 Tab by mouth nightly. SITagliptin-metFORMIN 50-1,000 mg Tm24 Commonly known as:  JANUMET XR Take 2 Tabs by mouth daily. tiZANidine 4 mg tablet Commonly known as:  Silverio Mins Take 1 Tab by mouth nightly. varenicline 0.5 mg (11)- 1 mg (42) Dspk Commonly known as:  CHANTIX STARTER DEEPA Take as directed Prescriptions Sent to Pharmacy Refills  
 varenicline (CHANTIX STARTER DEEPA) 0.5 mg (11)- 1 mg (42) DsPk 0 Sig: Take as directed Class: Normal  
 Pharmacy: [address] [place] [address] #: [phone] Follow-up Instructions Return in about 6 months (around 5/1/2018) for OV, and Medicare Wellness Visit, labs 1 week before. To-Do List   
 11/01/2017 Imaging:  XR CHEST PA LAT Patient Instructions Learning About Meal Planning for Diabetes Why plan your meals? Meal planning can be a key part of managing diabetes. Planning meals and snacks with the right balance of carbohydrate, protein, and fat can help you keep your blood sugar at the target level you set with your doctor. You don't have to eat special foods. You can eat what your family eats, including sweets once in a while. But you do have to pay attention to how often you eat and how much you eat of certain foods. You may want to work with a dietitian or a certified diabetes educator. He or she can give you tips and meal ideas and can answer your questions about meal planning. This health professional can also help you reach a healthy weight if that is one of your goals. What plan is right for you? Your dietitian or diabetes educator may suggest that you start with the plate format or carbohydrate counting. The plate format The plate format is a simple way to help you manage how you eat. You plan meals by learning how much space each food should take on a plate.  Using the plate format helps you spread carbohydrate throughout the day. It can make it easier to keep your blood sugar level within your target range. It also helps you see if you're eating healthy portion sizes. To use the plate format, you put non-starchy vegetables on half your plate. Add meat or meat substitutes on one-quarter of the plate. Put a grain or starchy vegetable (such as brown rice or a potato) on the final quarter of the plate. You can add a small piece of fruit and some low-fat or fat-free milk or yogurt, depending on your carbohydrate goal for each meal. 
Here are some tips for using the plate format: · Make sure that you are not using an oversized plate. A 9-inch plate is best. Many restaurants use larger plates. · Get used to using the plate format at home. Then you can use it when you eat out. · Write down your questions about using the plate format. Talk to your doctor, a dietitian, or a diabetes educator about your concerns. Carbohydrate counting With carbohydrate counting, you plan meals based on the amount of carbohydrate in each food. Carbohydrate raises blood sugar higher and more quickly than any other nutrient. It is found in desserts, breads and cereals, and fruit. It's also found in starchy vegetables such as potatoes and corn, grains such as rice and pasta, and milk and yogurt. Spreading carbohydrate throughout the day helps keep your blood sugar levels within your target range. Your daily amount depends on several things, including your weight, how active you are, which diabetes medicines you take, and what your goals are for your blood sugar levels. A registered dietitian or diabetes educator can help you plan how much carbohydrate to include in each meal and snack. A guideline for your daily amount of carbohydrate is: · 45 to 60 grams at each meal. That's about the same as 3 to 4 carbohydrate servings. · 15 to 20 grams at each snack.  That's about the same as 1 carbohydrate serving. The Nutrition Facts label on packaged foods tells you how much carbohydrate is in a serving of the food. First, look at the serving size on the food label. Is that the amount you eat in a serving? All of the nutrition information on a food label is based on that serving size. So if you eat more or less than that, you'll need to adjust the other numbers. Total carbohydrate is the next thing you need to look for on the label. If you count carbohydrate servings, one serving of carbohydrate is 15 grams. For foods that don't come with labels, such as fresh fruits and vegetables, you'll need a guide that lists carbohydrate in these foods. Ask your doctor, dietitian, or diabetes educator about books or other nutrition guides you can use. If you take insulin, you need to know how many grams of carbohydrate are in a meal. This lets you know how much rapid-acting insulin to take before you eat. If you use an insulin pump, you get a constant rate of insulin during the day. So the pump must be programmed at meals to give you extra insulin to cover the rise in blood sugar after meals. When you know how much carbohydrate you will eat, you can take the right amount of insulin. Or, if you always use the same amount of insulin, you need to make sure that you eat the same amount of carbohydrate at meals. If you need more help to understand carbohydrate counting and food labels, ask your doctor, dietitian, or diabetes educator. How do you get started with meal planning? Here are some tips to get started: 
· Plan your meals a week at a time. Don't forget to include snacks too. · Use cookbooks or online recipes to plan several main meals. Plan some quick meals for busy nights. You also can double some recipes that freeze well. Then you can save half for other busy nights when you don't have time to cook. · Make sure you have the ingredients you need for your recipes.  If you're running low on basic items, put these items on your shopping list too. · List foods that you use to make breakfasts, lunches, and snacks. List plenty of fruits and vegetables. · Post this list on the refrigerator. Add to it as you think of more things you need. · Take the list to the store to do your weekly shopping. Follow-up care is a key part of your treatment and safety. Be sure to make and go to all appointments, and call your doctor if you are having problems. It's also a good idea to know your test results and keep a list of the medicines you take. Where can you learn more? Go to http://sumitNimbic (formerly [employer])uli.info/. Leeann Angry in the search box to learn more about \"Learning About Meal Planning for Diabetes. \" Current as of: March 13, 2017 Content Version: 11.4 © 3147-8805 [employer]. Care instructions adapted under license by [facility] (which disclaims liability or warranty for this information). If you have questions about a medical condition or this instruction, always ask your healthcare professional. [address] any warranty or liability for your use of this information. Introducing [facility]! [employer] introduces [employer] patient portal. Now you can access parts of your medical record, email your doctor's office, and request medication refills online. 1. In your internet browser, go to [email or web address] 2. Click on the First Time User? Click Here link in the Sign In box. You will see the New Member Sign Up page. 3. Enter your [employer] Access Code exactly as it appears below. You will not need to use this code after youve completed the sign-up process. If you do not sign up before the expiration date, you must request a new code. · [employer] Access Code: [email or web address] Expires: 1/30/2018  4:48 PM 
 
4.  Enter the last four digits of your Social Security Number (xxxx) and Date of Birth (mm/dd/yyyy) as indicated and click Submit. You will be taken to the next sign-up page. 5. Create a [employer] ID. This will be your [employer] login ID and cannot be changed, so think of one that is secure and easy to remember. 6. Create a [employer] password. You can change your password at any time. 7. Enter your Password Reset Question and Answer. This can be used at a later time if you forget your password. 8. Enter your e-mail address. You will receive e-mail notification when new information is available in [address] 9. Click Sign Up. You can now view and download portions of your medical record. 10. Click the Download Summary menu link to download a portable copy of your medical information. If you have questions, please visit the Frequently Asked Questions section of the [employer] website. Remember, [employer] is NOT to be used for urgent needs. For medical emergencies, dial 911. Now available from your iPhone and Android! Please provide this summary of care documentation to your next provider. Your primary care clinician is listed as CARLOS ASKEW. If you have any questions after today's visit, please call [phone].

## 2017-11-01 NOTE — PATIENT INSTRUCTIONS

## 2017-11-09 ENCOUNTER — TELEPHONE (OUTPATIENT)
Dept: FAMILY MEDICINE CLINIC | Age: 67
End: 2017-11-09

## 2017-11-09 NOTE — TELEPHONE ENCOUNTER
Cherelle with Dr Jarret Iglesias request updated referral     appt 11/13/2017    Dr Edu Spicer [phone]  [address] [facility], [address] [place] [address] 434,[address]   [phone]  Fax [phone]  E11.65  CAAYCH [id number]

## 2017-11-13 ENCOUNTER — TELEPHONE (OUTPATIENT)
Dept: FAMILY MEDICINE CLINIC | Age: 67
End: 2017-11-13

## 2017-11-13 NOTE — TELEPHONE ENCOUNTER
Pt came in today to drop off a document from Dr. Silvia Turcios about making an addendum to 11/01/2017 office note. Document was placed at [address]. Please call and advise once addendum is created and faxed.

## 2017-12-05 ENCOUNTER — TELEPHONE (OUTPATIENT)
Dept: FAMILY MEDICINE CLINIC | Age: 67
End: 2017-12-05

## 2017-12-05 NOTE — TELEPHONE ENCOUNTER
Faxed over the diabetic shoe form this morning. Stated that the patient waited until the last minute but would need it signed and faxed back today.    Fax [phone]

## 2018-04-24 ENCOUNTER — HOSPITAL ENCOUNTER (OUTPATIENT)
Dept: LAB | Age: 68
Discharge: HOME | End: 2018-04-24
Payer: MEDICARE

## 2018-04-24 LAB
ALBUMIN SERPL-MCNC: 3.9 G/DL (ref 3.4–5)
ALBUMIN/GLOB SERPL: 1.4 {RATIO} (ref 0.8–1.7)
ALP SERPL-CCNC: 80 U/L (ref 45–117)
ALT SERPL-CCNC: 18 U/L (ref 16–61)
ANION GAP SERPL CALC-SCNC: 8 MMOL/L (ref 3–18)
AST SERPL-CCNC: 14 U/L (ref 15–37)
BILIRUB SERPL-MCNC: 0.5 MG/DL (ref 0.2–1)
BUN SERPL-MCNC: 14 MG/DL (ref 7–18)
BUN/CREAT SERPL: 13 (ref 12–20)
CALCIUM SERPL-MCNC: 8.9 MG/DL (ref 8.5–10.1)
CHLORIDE SERPL-SCNC: 106 MMOL/L (ref 100–108)
CHOLEST SERPL-MCNC: 136 MG/DL
CO2 SERPL-SCNC: 27 MMOL/L (ref 21–32)
CREAT SERPL-MCNC: 1.08 MG/DL (ref 0.6–1.3)
GLOBULIN SER CALC-MCNC: 2.8 G/DL (ref 2–4)
GLUCOSE SERPL-MCNC: 142 MG/DL (ref 74–99)
HBA1C MFR BLD: 7.7 % (ref 4.2–5.6)
HDLC SERPL-MCNC: 44 MG/DL (ref 40–60)
HDLC SERPL: 3.1 {RATIO} (ref 0–5)
LDLC SERPL CALC-MCNC: 60.2 MG/DL (ref 0–100)
LIPID PROFILE,FLP: ABNORMAL
POTASSIUM SERPL-SCNC: 4.2 MMOL/L (ref 3.5–5.5)
PROT SERPL-MCNC: 6.7 G/DL (ref 6.4–8.2)
SODIUM SERPL-SCNC: 141 MMOL/L (ref 136–145)
TRIGL SERPL-MCNC: 159 MG/DL (ref ?–150)
VLDLC SERPL CALC-MCNC: 31.8 MG/DL

## 2018-04-24 PROCEDURE — 80061 LIPID PANEL: CPT | Performed by: INTERNAL MEDICINE

## 2018-04-24 PROCEDURE — 36415 COLL VENOUS BLD VENIPUNCTURE: CPT | Performed by: INTERNAL MEDICINE

## 2018-04-24 PROCEDURE — 82043 UR ALBUMIN QUANTITATIVE: CPT | Performed by: INTERNAL MEDICINE

## 2018-04-24 PROCEDURE — 83036 HEMOGLOBIN GLYCOSYLATED A1C: CPT | Performed by: INTERNAL MEDICINE

## 2018-04-24 PROCEDURE — 80053 COMPREHEN METABOLIC PANEL: CPT | Performed by: INTERNAL MEDICINE

## 2018-04-25 LAB
CREAT UR-MCNC: 223.98 MG/DL (ref 30–125)
MICROALBUMIN UR-MCNC: 1.9 MG/DL (ref 0–3)
MICROALBUMIN/CREAT UR-RTO: 8 MG/G (ref 0–30)

## 2018-06-11 ENCOUNTER — OFFICE VISIT (OUTPATIENT)
Dept: FAMILY MEDICINE CLINIC | Age: 68
End: 2018-06-11

## 2018-06-11 VITALS
HEIGHT: 66 IN | BODY MASS INDEX: 33.14 KG/M2 | RESPIRATION RATE: 30 BRPM | SYSTOLIC BLOOD PRESSURE: 140 MMHG | TEMPERATURE: 98 F | HEART RATE: 79 BPM | DIASTOLIC BLOOD PRESSURE: 90 MMHG | OXYGEN SATURATION: 97 % | WEIGHT: 206.2 LBS

## 2018-06-11 DIAGNOSIS — Z00.00 MEDICARE ANNUAL WELLNESS VISIT, SUBSEQUENT: Primary | ICD-10-CM

## 2018-06-11 DIAGNOSIS — Z12.5 SCREENING PSA (PROSTATE SPECIFIC ANTIGEN): ICD-10-CM

## 2018-06-11 DIAGNOSIS — Z12.11 COLON CANCER SCREENING: ICD-10-CM

## 2018-06-11 DIAGNOSIS — E78.00 HIGH CHOLESTEROL: ICD-10-CM

## 2018-06-11 DIAGNOSIS — I10 ESSENTIAL HYPERTENSION: ICD-10-CM

## 2018-06-11 PROBLEM — E11.40 TYPE 2 DIABETES MELLITUS WITH DIABETIC NEUROPATHY (HCC): Status: ACTIVE | Noted: 2018-06-11

## 2018-06-11 RX ORDER — CYCLOBENZAPRINE HCL 10 MG
10 TABLET ORAL
COMMUNITY
Start: 2017-11-23 | End: 2018-06-26

## 2018-06-11 NOTE — PROGRESS NOTES
Chief Complaint   Patient presents with    Annual Wellness Visit    Hypertension    Diabetes     joselyn Lopez next time     Cholesterol Problem     1. Have you been to the ER, urgent care clinic since your last visit? Hospitalized since your last visit? No    2. Have you seen or consulted any other health care providers outside of the [address] [place] [address] since your last visit? Include any pap smears or colon screening. No    Do you have an [address]? No and received information      This is the Subsequent Medicare Annual Wellness Exam, performed 12 months or more after the Initial AWV or the last Subsequent AWV    I have reviewed the patient's medical history in detail and updated the computerized patient record. History     Past Medical History:   Diagnosis Date    Diabetes ([address]     GERD (gastroesophageal reflux disease)     Hx of colonic polyps     Hx of gallstones     Hypercholesterolemia     Hypertension     Restless leg syndrome     Wears dentures       Past Surgical History:   Procedure Laterality Date    HX OTHER SURGICAL      gallstones removed    GA COLONOSCOPY FLX DX W/COLLJ SPEC WHEN PFRMD  2-17-16    Dr. Hilary Hunter     Current Outpatient Prescriptions   Medication Sig Dispense Refill    cyclobenzaprine (FLEXERIL) 10 mg tablet 10 mg.      varenicline (CHANTIX STARTER DEEPA) 0.5 mg (11)- 1 mg (42) DsPk Take as directed 1 Dose Pack 0    glucose blood VI test strips (ACCU-CHEK SMARTVIEW TEST STRIP) strip Test blood sugar 1 times a day. DX E11.65 100 Strip 3    Lancets misc Accu-check  Fastclix Lancets. Test blood sugar daily. DX E11.9 100 Each 3    SITagliptin-metFORMIN (JANUMET XR) 50-1,000 mg TM24 Take 2 Tabs by mouth daily. 180 Tab 3    lisinopril (PRINIVIL, ZESTRIL) 10 mg tablet Take 1 Tab by mouth daily. 90 Tab 3    pravastatin (PRAVACHOL) 40 mg tablet Take 1 Tab by mouth nightly. 90 Tab 3    pantoprazole (PROTONIX) 40 mg tablet Take 1 Tab by mouth daily.  80 Tab 3    hydrOXYzine (VISTARIL) 25 mg capsule Take 1 Cap by mouth three (3) times daily as needed for Itching. 60 Cap 3    gabapentin (NEURONTIN) 300 mg capsule Take 1 Cap by mouth nightly. 30 Cap 1    ibuprofen (MOTRIN) 800 mg tablet Take 1 Tab by mouth every eight (8) hours as needed for Pain. 90 Tab 1    clotrimazole-betamethasone (LOTRISONE) topical cream Apply dime size to affected area BID for 1 week at a time 45 g 0    alcohol swabs (BD SINGLE USE SWABS REGULAR) padm Use daily to check blood sugar 100 Pad 4    Blood Glucose Control, Normal (ACCU-CHEK SMARTVIEW CONTRL SOL) soln Test blood sugar 1 x daily  DX E11.9        1 year supply  Check controls monthy on glucose meter 3 mL 4    Blood-Glucose Meter monitoring kit Accu-check Smartview Meter 1 Kit 0    FLUZONE HIGH-DOSE 2016-17, PF, syrg injection inject 0.5 milliliter intramuscularly  0    HYDROcodone-acetaminophen (NORCO) 5-325 mg per tablet   0    tiZANidine (ZANAFLEX) 4 mg tablet Take 1 Tab by mouth nightly. 30 Tab 1     Allergies   Allergen Reactions    Latex Sneezing    Benzalkonium Chloride Hives    Neosporin [Hydrocortisone] Rash     Family History   Problem Relation Age of Onset    Heart Attack Mother     Prostate Cancer Father     Colon Cancer Father     Stroke Sister      Social History   Substance Use Topics    Smoking status: Current Every Day Smoker     Packs/day: 0.50    Smokeless tobacco: Never Used    Alcohol use Yes      Comment: occas.      Patient Active Problem List   Diagnosis Code    Essential hypertension I10    DM (diabetes mellitus), type 2, uncontrolled ([facility] E11.65    High cholesterol E78.00    ACP (advance care planning) Z71.89    Type 2 diabetes mellitus with diabetic neuropathy ([facility] E11.40       Depression Risk Factor Screening:     PHQ over the last two weeks 6/11/2018   Little interest or pleasure in doing things Not at all   Feeling down, depressed or hopeless Not at all   Total Score PHQ 2 0 Alcohol Risk Factor Screening:   occastionally    Functional Ability and Level of Safety:   Hearing Loss  Hearing is good. Activities of Daily Living  The home contains: no safety equipment. Patient does total self care    Fall Risk  Fall Risk Assessment, last 12 mths 6/11/2018   Able to walk? Yes   Fall in past 12 months? No       Abuse Screen  Patient is not abused    Cognitive Screening   Evaluation of Cognitive Function:  Has your family/caregiver stated any concerns about your memory: no  Normal    Patient Care Team   Patient Care Team:  Sima Gitelman, MD as PCP - General (Internal Medicine)  Dr. Janie Rice as Physician (Ophthalmology)       Glaucoma Screening- UTD  Pneumonia Vaccine- UTD  Shingles Vaccine- 9/2015 pt aware of Shingrinx  Tdap Vaccine- not UTD   Colonoscopy not sure when last done. Will do FIT test   PSA- 9/2017 2.4  Advance Directive- Does not have. Working on it     Assessment/Plan   Education and counseling provided:  Are appropriate based on today's review and evaluation  End-of-Life planning (with patient's consent)    Diagnoses and all orders for this visit:    1. Medicare annual wellness visit, subsequent    2. Uncontrolled type 2 diabetes mellitus without complication, without long-term current use of insulin ([facility]    3. Essential hypertension    4. High cholesterol    5. Colon cancer screening  -     OCCULT BLOOD, IMMUNOASSAY (FIT); Future        Health Maintenance Due   Topic Date Due    COLONOSCOPY  03/11/1968    DTaP/Tdap/Td series (1 - Tdap) 03/11/1971    MEDICARE YEARLY EXAM  03/16/2018     A comprehensive 5 year plan for medical care and screening exams was reviewed with pt and they received a copy of it.

## 2018-06-11 NOTE — MR AVS SNAPSHOT
[address] [facility] 
 
 
 [address] [place] [address] 
[phone] Patient: Phil Whatley MRN: [id number] AMR:[id number] Visit Information Date & Time Provider Department Dept. Phone Encounter #  
 6/11/2018  9:00 AM Olayinka Wright, [address] [place] [address] [phone] [id number] Follow-up Instructions Return in about 6 months (around 12/11/2018) for labs 1 week before. Upcoming Health Maintenance Date Due COLONOSCOPY 3/11/1968 DTaP/Tdap/Td series (1 - Tdap) 3/11/1971 MEDICARE YEARLY EXAM 3/16/2018 Influenza Age 5 to Adult 8/1/2018 HEMOGLOBIN A1C Q6M 10/24/2018 FOOT EXAM Q1 2/12/2019 MICROALBUMIN Q1 4/24/2019 LIPID PANEL Q1 4/24/2019 EYE EXAM RETINAL OR DILATED Q1 4/27/2019 GLAUCOMA SCREENING Q2Y 4/27/2020 Allergies as of 6/11/2018  Review Complete On: 6/11/2018 By: Olayinka Wright MD  
  
 Severity Noted Reaction Type Reactions Latex  01/08/2016    Sneezing Benzalkonium Chloride  06/23/2016    Hives Neosporin [Hydrocortisone]  01/08/2016    Rash Current Immunizations  Reviewed on 3/15/2017 Name Date Influenza Vaccine 9/12/2016 Influenza Vaccine (Quad) PF 1/8/2016 Pneumococcal Conjugate (PCV-13) 1/8/2016 Pneumococcal Polysaccharide (PPSV-23) 3/15/2017 Zoster Vaccine, Live 9/8/2015 Not reviewed this visit You Were Diagnosed With   
  
 Codes Comments Medicare annual wellness visit, subsequent    -  Primary ICD-10-CM: Z00.00 ICD-9-CM: V70.0 Uncontrolled type 2 diabetes mellitus without complication, without long-term current use of insulin ([facility]     ICD-10-CM: E11.65 ICD-9-CM: 250.02 Essential hypertension     ICD-10-CM: I10 
ICD-9-CM: 401.9 High cholesterol     ICD-10-CM: E78.00 ICD-9-CM: 272.0 Colon cancer screening     ICD-10-CM: Z12.11 ICD-9-CM: V76.51 Vitals BP Pulse Temp Resp Height(growth percentile) Weight(growth percentile) 140/90 79 98 °F (36.7 °C) (Oral) 30 5' 6\" (1.676 m) 206 lb 3.2 oz (93.5 kg) SpO2 BMI Smoking Status 97% 33.28 kg/m2 Current Every Day Smoker Vitals History BMI and BSA Data Body Mass Index Body Surface Area  
 33.28 kg/m 2 2.09 m 2 Your Updated Medication List  
  
   
This list is accurate as of 6/11/18  9:49 AM.  Always use your most recent med list.  
  
  
  
  
 alcohol swabs Padm Commonly known as:  BD Single Use Swabs Regular Use daily to check blood sugar Blood Glucose Control, Normal Soln Commonly known as:  ACCU-CHEK SMARTVIEW CONTRL SOL Test blood sugar 1 x daily  DX E11.9        1 year supply  Check controls monthy on glucose meter Blood-Glucose Meter monitoring kit Accu-check Smartview Meter  
  
 clotrimazole-betamethasone topical cream  
Commonly known as:  Headspace Apply dime size to affected area BID for 1 week at a time  
  
 cyclobenzaprine 10 mg tablet Commonly known as:  FLEXERIL  
10 mg. FLUZONE HIGH-DOSE 2016-17 (PF) Syrg injection Generic drug:  influenza vaccine 2016-17 (65 yr+)(PF)  
inject 0.5 milliliter intramuscularly  
  
 gabapentin 300 mg capsule Commonly known as:  NEURONTIN Take 1 Cap by mouth nightly. glucose blood VI test strips strip Commonly known as:  [address] Test blood sugar 1 times a day. DX E11.65 HYDROcodone-acetaminophen 5-325 mg per tablet Commonly known as:  NORCO  
  
 hydrOXYzine pamoate 25 mg capsule Commonly known as:  VISTARIL Take 1 Cap by mouth three (3) times daily as needed for Itching. ibuprofen 800 mg tablet Commonly known as:  MOTRIN Take 1 Tab by mouth every eight (8) hours as needed for Pain. Lancets Misc Accu-check  Fastclix Lancets. Test blood sugar daily. DX E11.9  
  
 lisinopril 10 mg tablet Commonly known as:  Helon Estrin  
 Take 1 Tab by mouth daily. pantoprazole 40 mg tablet Commonly known as:  PROTONIX Take 1 Tab by mouth daily. pravastatin 40 mg tablet Commonly known as:  PRAVACHOL Take 1 Tab by mouth nightly. SITagliptin-metFORMIN 50-1,000 mg Tm24 Commonly known as:  JANUMET XR Take 2 Tabs by mouth daily. tiZANidine 4 mg tablet Commonly known as:  Christy Hush Take 1 Tab by mouth nightly. varenicline 0.5 mg (11)- 1 mg (42) Dspk Commonly known as:  CHANTIX STARTER DEEPA Take as directed Follow-up Instructions Return in about 6 months (around 12/11/2018) for labs 1 week before. To-Do List   
 06/11/2018 Lab:  OCCULT BLOOD, IMMUNOASSAY (FIT) Patient Instructions Medicare Wellness Visit, Male The best way to live healthy is to have a lifestyle where you eat a well-balanced diet, exercise regularly, limit alcohol use, and quit all forms of tobacco/nicotine, if applicable. Regular preventive services are another way to keep healthy. Preventive services (vaccines, screening tests, monitoring & exams) can help personalize your care plan, which helps you manage your own care. Screening tests can find health problems at the earliest stages, when they are easiest to treat. [facility] follows the current, evidence-based guidelines published by the [facility] Quinton Michel (USPSTF) when recommending preventive services for our patients. Because we follow these guidelines, sometimes recommendations change over time as research supports it. (For example, a prostate screening blood test is no longer routinely recommended for men with no symptoms.) Of course, you and your provider may decide to screen more often for some diseases, based on your risk and co-morbidities (chronic disease you are already diagnosed with). Preventive services for you include: - Medicare offers their members a free annual wellness visit, which is time for you and your primary care provider to discuss and plan for your preventive service needs. Take advantage of this benefit every year! 
 
-All people over age 72 should receive the recommended pneumonia vaccines. Current USPSTF guidelines recommend a series of two vaccines for the best pneumonia protection.  
 
-All adults should have a yearly flu vaccine and a tetanus vaccine every 10 years. All adults age 61 years should receive a shingles vaccine once in their lifetime.   
 
-All adults age 38-68 years who are overweight should have a diabetes screening test once every three years.  
 
-Other screening tests & preventive services for persons with diabetes include: an eye exam to screen for diabetic retinopathy, a kidney function test, a foot exam, and stricter control over your cholesterol.  
 
-Cardiovascular screening for adults with routine risk involves an electrocardiogram (ECG) at intervals determined by the provider.  
 
-Colorectal cancer screenings should be done for adults age 54-65 years with normal risk. There are a number of acceptable methods of screening for this type of cancer. Each test has its own benefits and drawbacks. Discuss with your provider what is most appropriate for you during your annual wellness visit. The different tests include: colonoscopy (considered the best screening method), a fecal occult blood test, a fecal DNA test, and sigmoidoscopy. 
 
-All adults born between [facility] should be screened once for Hepatitis C. 
 
-An Abdominal Aortic Aneurysm (AAA) Screening is recommended for men age 73-68 who has ever smoked in their lifetime. Here is a list of your current Health Maintenance items (your personalized list of preventive services) with a due date: 
Health Maintenance Due Topic Date Due  
 DTaP/Tdap/Td  (1 - Tdap) 03/11/1971  Stool testing for trace blood  03/15/2018 [facility] Annual Well Visit  03/16/2018 Medicare Part B Preventive Services Limitations Recommendation Scheduled Bone Mass Measurement 
(age 72 & older, biennial) Requires diagnosis related to osteoporosis or estrogen deficiency. Biennial benefit unless patient has history of long-term glucocorticoid tx or baseline is needed because initial test was by other method  n/a Cardiovascular Screening Blood Tests (every 5 years) Total cholesterol, HDL, Triglycerides Order as a panel if possible  4/18 Colorectal Cancer Screening 
-Fecal occult blood test (annual) -Flexible sigmoidoscopy (5y) 
-Screening colonoscopy (10y) -Barium Enema   ORDER Counseling to Prevent Tobacco Use (up to 8 sessions per year) - Counseling greater than 3 and up to 10 minutes - Counseling greater than 10 minutes Patients must be asymptomatic of tobacco-related conditions to receive as preventive service  N/A Diabetes Screening Tests (at least every 3 years, Medicare covers annually or at 6-month intervals for prediabetic patients) Fasting blood sugar (FBS) or glucose tolerance test (GTT) Patient must be diagnosed with one of the following: 
-Hypertension, Dyslipidemia, obesity, previous impaired FBS or GTT 
Or any two of the following: overweight, FH of diabetes, age ? 72, history of gestational diabetes, birth of baby weighing more than 9 pounds  4/18 Diabetes Self-Management Training (DSMT) (no USPSTF recommendation) Requires referral by treating physician for patient with diabetes or renal disease. 10 hours of initial DSMT session of no less than 30 minutes each in a continuous 12-month period. 2 hours of follow-up DSMT in subsequent years. 3/17 Glaucoma Screening (no USPSTF recommendation) Diabetes mellitus, family history, , age 48 or over,  American, age 72 or over  4/18 Human Immunodeficiency Virus (HIV) Screening (annually for increased risk patients) HIV-1 and HIV-2 by EIA, MAURICIO, rapid antibody test, or oral mucosa transudate Patient must be at increased risk for HIV infection per USPSTF guidelines or pregnant. Tests covered annually for patients at increased risk. Pregnant patients may receive up to 3 test during pregnancy. N/A Medical Nutrition Therapy (MNT) (for diabetes or renal disease not recommended schedule) Requires referral by treating physician for patient with diabetes or renal disease. Can be provided in same year as diabetes self-management training (DSMT), and CMS recommends medical nutrition therapy take place after DSMT. Up to 3 hours for initial year and 2 hours in subsequent years. N/A Prostate Cancer Screening (annually up to age 76) - Digital rectal exam (MIGUEL) - Prostate specific antigen (PSA) Annually (age 48 or over), MIGUEL not paid separately when covered E/M service is provided on same date Men up to age 76 may need a screening blood test for prostate cancer at certain intervals, depending on their personal and family history. This decision is between the patient and his provider. 9/17 Seasonal Influenza Vaccination (annually)   9/16 Pneumococcal Vaccination (once after 65)   3/17 Prevnar 13: 
1/08/16 Hepatitis B Vaccinations (if medium/high risk) Medium/high risk factors:  End-stage renal disease, Hemophiliacs who received Factor VIII or IX concentrates, Clients of institutions for the mentally retarded, Persons who live in the same house as a HepB virus carrier, Homosexual men, Illicit injectable drug abusers. N/A Shingles Vaccination A shingles vaccine is also recommended once in a lifetime after age 61 Ultrasound Screening for Abdominal Aortic Aneurysm (AAA) (once) Patient must be referred through IPPE and not have had a screening for abdominal aortic aneurysm before under Medicare.   Limited to patients who meet one of the following criteria: 
 - Men who are 73-68 years old and have smoked more than 100 cigarettes in their lifetime. 
-Anyone with a FH of AAA 
-Anyone recommended for screening by USPSTF  DONE High Blood Pressure: Care Instructions Your Care Instructions If your blood pressure is usually above 140/90, you have high blood pressure, or hypertension. That means the top number is 140 or higher or the bottom number is 90 or higher, or both. Despite what a lot of people think, high blood pressure usually doesn't cause headaches or make you feel dizzy or lightheaded. It usually has no symptoms. But it does increase your risk for heart attack, stroke, and kidney or eye damage. The higher your blood pressure, the more your risk increases. Your doctor will give you a goal for your blood pressure. Your goal will be based on your health and your age. An example of a goal is to keep your blood pressure below 140/90. Lifestyle changes, such as eating healthy and being active, are always important to help lower blood pressure. You might also take medicine to reach your blood pressure goal. 
Follow-up care is a key part of your treatment and safety. Be sure to make and go to all appointments, and call your doctor if you are having problems. It's also a good idea to know your test results and keep a list of the medicines you take. How can you care for yourself at home? Medical treatment · If you stop taking your medicine, your blood pressure will go back up. You may take one or more types of medicine to lower your blood pressure. Be safe with medicines. Take your medicine exactly as prescribed. Call your doctor if you think you are having a problem with your medicine. · Talk to your doctor before you start taking aspirin every day. Aspirin can help certain people lower their risk of a heart attack or stroke. But taking aspirin isn't right for everyone, because it can cause serious bleeding. · See your doctor regularly. You may need to see the doctor more often at first or until your blood pressure comes down. · If you are taking blood pressure medicine, talk to your doctor before you take decongestants or anti-inflammatory medicine, such as ibuprofen. Some of these medicines can raise blood pressure. · Learn how to check your blood pressure at home. Lifestyle changes · Stay at a healthy weight. This is especially important if you put on weight around the waist. Losing even 10 pounds can help you lower your blood pressure. · If your doctor recommends it, get more exercise. Walking is a good choice. Bit by bit, increase the amount you walk every day. Try for at least 30 minutes on most days of the week. You also may want to swim, bike, or do other activities. · Avoid or limit alcohol. Talk to your doctor about whether you can drink any alcohol. · Try to limit how much sodium you eat to less than 2,300 milligrams (mg) a day. Your doctor may ask you to try to eat less than 1,500 mg a day. · Eat plenty of fruits (such as bananas and oranges), vegetables, legumes, whole grains, and low-fat dairy products. · Lower the amount of saturated fat in your diet. Saturated fat is found in animal products such as milk, cheese, and meat. Limiting these foods may help you lose weight and also lower your risk for heart disease. · Do not smoke. Smoking increases your risk for heart attack and stroke. If you need help quitting, talk to your doctor about stop-smoking programs and medicines. These can increase your chances of quitting for good. When should you call for help? Call 911 anytime you think you may need emergency care. This may mean having symptoms that suggest that your blood pressure is causing a serious heart or blood vessel problem. Your blood pressure may be over 180/110. ? For example, call 911 if: 
? · You have symptoms of a heart attack. These may include: ¨ Chest pain or pressure, or a strange feeling in the chest. 
¨ Sweating. ¨ Shortness of breath. ¨ Nausea or vomiting. ¨ Pain, pressure, or a strange feeling in the back, neck, jaw, or upper belly or in one or both shoulders or arms. ¨ Lightheadedness or sudden weakness. ¨ A fast or irregular heartbeat. ? · You have symptoms of a stroke. These may include: 
¨ Sudden numbness, tingling, weakness, or loss of movement in your face, arm, or leg, especially on only one side of your body. ¨ Sudden vision changes. ¨ Sudden trouble speaking. ¨ Sudden confusion or trouble understanding simple statements. ¨ Sudden problems with walking or balance. ¨ A sudden, severe headache that is different from past headaches. ? · You have severe back or belly pain. ?Do not wait until your blood pressure comes down on its own. Get help right away. ?Call your doctor now or seek immediate care if: 
? · Your blood pressure is much higher than normal (such as 180/110 or higher), but you don't have symptoms. ? · You think high blood pressure is causing symptoms, such as: ¨ Severe headache. ¨ Blurry vision. ? Watch closely for changes in your health, and be sure to contact your doctor if: 
? · Your blood pressure measures 140/90 or higher at least 2 times. That means the top number is 140 or higher or the bottom number is 90 or higher, or both. ? · You think you may be having side effects from your blood pressure medicine. ? · Your blood pressure is usually normal, but it goes above normal at least 2 times. Where can you learn more? Go to http://sumit-uli.info/. Enter N726 in the search box to learn more about \"High Blood Pressure: Care Instructions. \" Current as of: September 21, 2016 Content Version: 11.4 © 3000-0250 [employer].  Care instructions adapted under license by [employer] (which disclaims liability or warranty for this information). If you have questions about a medical condition or this instruction, always ask your healthcare professional. [address] any warranty or liability for your use of this information. Introducing [facility] SERVICES! [employer] introduces [employer] patient portal. Now you can access parts of your medical record, email your doctor's office, and request medication refills online. 1. In your internet browser, go to [email or web address] 2. Click on the First Time User? Click Here link in the Sign In box. You will see the New Member Sign Up page. 3. Enter your [employer] Access Code exactly as it appears below. You will not need to use this code after youve completed the sign-up process. If you do not sign up before the expiration date, you must request a new code. · [employer] Access Code: [email or web address] Expires: 9/9/2018  9:11 AM 
 
4. Enter the last four digits of your Social Security Number (xxxx) and Date of Birth (mm/dd/yyyy) as indicated and click Submit. You will be taken to the next sign-up page. 5. Create a [employer] ID. This will be your [employer] login ID and cannot be changed, so think of one that is secure and easy to remember. 6. Create a [employer] password. You can change your password at any time. 7. Enter your Password Reset Question and Answer. This can be used at a later time if you forget your password. 8. Enter your e-mail address. You will receive e-mail notification when new information is available in [address] 9. Click Sign Up. You can now view and download portions of your medical record. 10. Click the Download Summary menu link to download a portable copy of your medical information. If you have questions, please visit the Frequently Asked Questions section of the [employer] website. Remember, [employer] is NOT to be used for urgent needs. For medical emergencies, dial 911. Now available from your iPhone and Android! Please provide this summary of care documentation to your next provider. Your primary care clinician is listed as CARLOS ASKEW. If you have any questions after today's visit, please call [phone].

## 2018-06-11 NOTE — PATIENT INSTRUCTIONS
Medicare Wellness Visit, Male    The best way to live healthy is to have a lifestyle where you eat a well-balanced diet, exercise regularly, limit alcohol use, and quit all forms of tobacco/nicotine, if applicable. Regular preventive services are another way to keep healthy. Preventive services (vaccines, screening tests, monitoring & exams) can help personalize your care plan, which helps you manage your own care. Screening tests can find health problems at the earliest stages, when they are easiest to treat. [facility] follows the current, evidence-based guidelines published by the [facility] ([facility]) when recommending preventive services for our patients. Because we follow these guidelines, sometimes recommendations change over time as research supports it. (For example, a prostate screening blood test is no longer routinely recommended for men with no symptoms.)    Of course, you and your provider may decide to screen more often for some diseases, based on your risk and co-morbidities (chronic disease you are already diagnosed with). Preventive services for you include:    - Medicare offers their members a free annual wellness visit, which is time for you and your primary care provider to discuss and plan for your preventive service needs. Take advantage of this benefit every year!    -All people over age 72 should receive the recommended pneumonia vaccines. Current USPSTF guidelines recommend a series of two vaccines for the best pneumonia protection.     -All adults should have a yearly flu vaccine and a tetanus vaccine every 10 years.  All adults age 61 years should receive a shingles vaccine once in their lifetime.      -All adults age 38-68 years who are overweight should have a diabetes screening test once every three years.     -Other screening tests & preventive services for persons with diabetes include: an eye exam to screen for diabetic retinopathy, a kidney function test, a foot exam, and stricter control over your cholesterol.     -Cardiovascular screening for adults with routine risk involves an electrocardiogram (ECG) at intervals determined by the provider.     -Colorectal cancer screenings should be done for adults age 54-65 years with normal risk. There are a number of acceptable methods of screening for this type of cancer. Each test has its own benefits and drawbacks. Discuss with your provider what is most appropriate for you during your annual wellness visit. The different tests include: colonoscopy (considered the best screening method), a fecal occult blood test, a fecal DNA test, and sigmoidoscopy.    -All adults born between [facility] should be screened once for Hepatitis C.    -An Abdominal Aortic Aneurysm (AAA) Screening is recommended for men age 73-68 who has ever smoked in their lifetime. Here is a list of your current Health Maintenance items (your personalized list of preventive services) with a due date:  Health Maintenance Due   Topic Date Due    DTaP/Tdap/Td  (1 - Tdap) [birth year]/1971    Stool testing for trace blood  03/15/2018    Annual Well Visit  03/16/2018     Medicare Part B Preventive Services Limitations Recommendation Scheduled   Bone Mass Measurement  (age 72 & older, biennial) Requires diagnosis related to osteoporosis or estrogen deficiency.  Biennial benefit unless patient has history of long-term glucocorticoid tx or baseline is needed because initial test was by other method  n/a   Cardiovascular Screening Blood Tests (every 5 years)  Total cholesterol, HDL, Triglycerides Order as a panel if possible  4/18   Colorectal Cancer Screening  -Fecal occult blood test (annual)  -Flexible sigmoidoscopy (5y)  -Screening colonoscopy (10y)  -Barium Enema   ORDER   Counseling to Prevent Tobacco Use (up to 8 sessions per year)  - Counseling greater than 3 and up to 10 minutes  - Counseling greater than 10 minutes Patients must be asymptomatic of tobacco-related conditions to receive as preventive service  N/A   Diabetes Screening Tests (at least every 3 years, Medicare covers annually or at 6-month intervals for prediabetic patients)    Fasting blood sugar (FBS) or glucose tolerance test (GTT) Patient must be diagnosed with one of the following:  -Hypertension, Dyslipidemia, obesity, previous impaired FBS or GTT  Or any two of the following: overweight, FH of diabetes, age ? 72, history of gestational diabetes, birth of baby weighing more than 9 pounds  4/18   Diabetes Self-Management Training (DSMT) (no USPSTF recommendation) Requires referral by treating physician for patient with diabetes or renal disease. 10 hours of initial DSMT session of no less than 30 minutes each in a continuous 12-month period. 2 hours of follow-up DSMT in subsequent years. 3/17   Glaucoma Screening (no USPSTF recommendation) Diabetes mellitus, family history, , age 48 or over,  American, age 72 or over  4/18   Human Immunodeficiency Virus (HIV) Screening (annually for increased risk patients)  HIV-1 and HIV-2 by EIA, MAURICIO, rapid antibody test, or oral mucosa transudate Patient must be at increased risk for HIV infection per USPSTF guidelines or pregnant. Tests covered annually for patients at increased risk. Pregnant patients may receive up to 3 test during pregnancy. N/A   Medical Nutrition Therapy (MNT) (for diabetes or renal disease not recommended schedule) Requires referral by treating physician for patient with diabetes or renal disease. Can be provided in same year as diabetes self-management training (DSMT), and CMS recommends medical nutrition therapy take place after DSMT. Up to 3 hours for initial year and 2 hours in subsequent years.   N/A   Prostate Cancer Screening (annually up to age 76)  - Digital rectal exam (MIGUEL)  - Prostate specific antigen (PSA) Annually (age 48 or over), MIGUEL not paid separately when covered E/M service is provided on same date  Men up to age 76 may need a screening blood test for prostate cancer at certain intervals, depending on their personal and family history. This decision is between the patient and his provider. 9/17   Seasonal Influenza Vaccination (annually)   9/16     Pneumococcal Vaccination (once after 72)   3/17  Prevnar 13:  1/08/16   Hepatitis B Vaccinations (if medium/high risk) Medium/high risk factors:  End-stage renal disease,  Hemophiliacs who received Factor VIII or IX concentrates, Clients of institutions for the mentally retarded, Persons who live in the same house as a HepB virus carrier, Homosexual men, Illicit injectable drug abusers. N/A   Shingles Vaccination A shingles vaccine is also recommended once in a lifetime after age 61     Ultrasound Screening for Abdominal Aortic Aneurysm (AAA) (once) Patient must be referred through [facility] and not have had a screening for abdominal aortic aneurysm before under Medicare. Limited to patients who meet one of the following criteria:  - Men who are 73-68 years old and have smoked more than 100 cigarettes in their lifetime.  -Anyone with a FH of AAA  -Anyone recommended for screening by USPSTF  DONE          High Blood Pressure: Care Instructions  Your Care Instructions    If your blood pressure is usually above 140/90, you have high blood pressure, or hypertension. That means the top number is 140 or higher or the bottom number is 90 or higher, or both. Despite what a lot of people think, high blood pressure usually doesn't cause headaches or make you feel dizzy or lightheaded. It usually has no symptoms. But it does increase your risk for heart attack, stroke, and kidney or eye damage. The higher your blood pressure, the more your risk increases. Your doctor will give you a goal for your blood pressure. Your goal will be based on your health and your age.  An example of a goal is to keep your blood pressure below 140/90. Lifestyle changes, such as eating healthy and being active, are always important to help lower blood pressure. You might also take medicine to reach your blood pressure goal.  Follow-up care is a key part of your treatment and safety. Be sure to make and go to all appointments, and call your doctor if you are having problems. It's also a good idea to know your test results and keep a list of the medicines you take. How can you care for yourself at home? Medical treatment  · If you stop taking your medicine, your blood pressure will go back up. You may take one or more types of medicine to lower your blood pressure. Be safe with medicines. Take your medicine exactly as prescribed. Call your doctor if you think you are having a problem with your medicine. · Talk to your doctor before you start taking aspirin every day. Aspirin can help certain people lower their risk of a heart attack or stroke. But taking aspirin isn't right for everyone, because it can cause serious bleeding. · See your doctor regularly. You may need to see the doctor more often at first or until your blood pressure comes down. · If you are taking blood pressure medicine, talk to your doctor before you take decongestants or anti-inflammatory medicine, such as ibuprofen. Some of these medicines can raise blood pressure. · Learn how to check your blood pressure at home. Lifestyle changes  · Stay at a healthy weight. This is especially important if you put on weight around the waist. Losing even 10 pounds can help you lower your blood pressure. · If your doctor recommends it, get more exercise. Walking is a good choice. Bit by bit, increase the amount you walk every day. Try for at least 30 minutes on most days of the week. You also may want to swim, bike, or do other activities. · Avoid or limit alcohol. Talk to your doctor about whether you can drink any alcohol.   · Try to limit how much sodium you eat to less than 2,300 milligrams (mg) a day. Your doctor may ask you to try to eat less than 1,500 mg a day. · Eat plenty of fruits (such as bananas and oranges), vegetables, legumes, whole grains, and low-fat dairy products. · Lower the amount of saturated fat in your diet. Saturated fat is found in animal products such as milk, cheese, and meat. Limiting these foods may help you lose weight and also lower your risk for heart disease. · Do not smoke. Smoking increases your risk for heart attack and stroke. If you need help quitting, talk to your doctor about stop-smoking programs and medicines. These can increase your chances of quitting for good. When should you call for help? Call 911 anytime you think you may need emergency care. This may mean having symptoms that suggest that your blood pressure is causing a serious heart or blood vessel problem. Your blood pressure may be over 180/110. ? For example, call 911 if:  ? · You have symptoms of a heart attack. These may include:  ¨ Chest pain or pressure, or a strange feeling in the chest.  ¨ Sweating. ¨ Shortness of breath. ¨ Nausea or vomiting. ¨ Pain, pressure, or a strange feeling in the back, neck, jaw, or upper belly or in one or both shoulders or arms. ¨ Lightheadedness or sudden weakness. ¨ A fast or irregular heartbeat. ? · You have symptoms of a stroke. These may include:  ¨ Sudden numbness, tingling, weakness, or loss of movement in your face, arm, or leg, especially on only one side of your body. ¨ Sudden vision changes. ¨ Sudden trouble speaking. ¨ Sudden confusion or trouble understanding simple statements. ¨ Sudden problems with walking or balance. ¨ A sudden, severe headache that is different from past headaches. ? · You have severe back or belly pain. ?Do not wait until your blood pressure comes down on its own. Get help right away.   ?Call your doctor now or seek immediate care if:  ? · Your blood pressure is much higher than normal (such as 180/110 or higher), but you don't have symptoms. ? · You think high blood pressure is causing symptoms, such as:  ¨ Severe headache. ¨ Blurry vision. ? Watch closely for changes in your health, and be sure to contact your doctor if:  ? · Your blood pressure measures 140/90 or higher at least 2 times. That means the top number is 140 or higher or the bottom number is 90 or higher, or both. ? · You think you may be having side effects from your blood pressure medicine. ? · Your blood pressure is usually normal, but it goes above normal at least 2 times. Where can you learn more? Go to http://sumit-uli.info/. Enter A565 in the search box to learn more about \"High Blood Pressure: Care Instructions. \"  Current as of: September 21, 2016  Content Version: 11.4  © 5000-7946 [employer]. Care instructions adapted under license by [employer] (which disclaims liability or warranty for this information). If you have questions about a medical condition or this instruction, always ask your healthcare professional. [address] any warranty or liability for your use of this information. Body Mass Index: Care Instructions  Your Care Instructions    Body mass index (BMI) can help you see if your weight is raising your risk for health problems. It uses a formula to compare how much you weigh with how tall you are. · A BMI lower than 18.5 is considered underweight. · A BMI between 18.5 and 24.9 is considered healthy. · A BMI between 25 and 29.9 is considered overweight. A BMI of 30 or higher is considered obese. If your BMI is in the normal range, it means that you have a lower risk for weight-related health problems. If your BMI is in the overweight or obese range, you may be at increased risk for weight-related health problems, such as high blood pressure, heart disease, stroke, arthritis or joint pain, and diabetes.  If your BMI is in the underweight range, you may be at increased risk for health problems such as fatigue, lower protection (immunity) against illness, muscle loss, bone loss, hair loss, and hormone problems. BMI is just one measure of your risk for weight-related health problems. You may be at higher risk for health problems if you are not active, you eat an unhealthy diet, or you drink too much alcohol or use tobacco products. Follow-up care is a key part of your treatment and safety. Be sure to make and go to all appointments, and call your doctor if you are having problems. It's also a good idea to know your test results and keep a list of the medicines you take. How can you care for yourself at home? · Practice healthy eating habits. This includes eating plenty of fruits, vegetables, whole grains, lean protein, and low-fat dairy. · If your doctor recommends it, get more exercise. Walking is a good choice. Bit by bit, increase the amount you walk every day. Try for at least 30 minutes on most days of the week. · Do not smoke. Smoking can increase your risk for health problems. If you need help quitting, talk to your doctor about stop-smoking programs and medicines. These can increase your chances of quitting for good. · Limit alcohol to 2 drinks a day for men and 1 drink a day for women. Too much alcohol can cause health problems. If you have a BMI higher than 25  · Your doctor may do other tests to check your risk for weight-related health problems. This may include measuring the distance around your waist. A waist measurement of more than 40 inches in men or 35 inches in women can increase the risk of weight-related health problems. · Talk with your doctor about steps you can take to stay healthy or improve your health. You may need to make lifestyle changes to lose weight and stay healthy, such as changing your diet and getting regular exercise.   If you have a BMI lower than 18.5  · Your doctor may do other tests to check your risk for health problems. · Talk with your doctor about steps you can take to stay healthy or improve your health. You may need to make lifestyle changes to gain or maintain weight and stay healthy, such as getting more healthy foods in your diet and doing exercises to build muscle. Where can you learn more? Go to http://sumit-uli.info/. Enter S176 in the search box to learn more about \"Body Mass Index: Care Instructions. \"  Current as of: October 13, 2016  Content Version: 11.4  © 0304-6700 [employer]. Care instructions adapted under license by [employer] (which disclaims liability or warranty for this information). If you have questions about a medical condition or this instruction, always ask your healthcare professional. [address] any warranty or liability for your use of this information.

## 2018-06-11 NOTE — PROGRESS NOTES
Subjective:       Chief Complaint  The patient presents for follow up of diabetes, hypertension and high cholesterol. MIRTA Gipson is a 76 y.o. male seen for follow up of diabetes. Jonn has hypertension and hyperlipidemia. Diabetes borderline controlled on Janumet, pt to try and improve diet, no significant medication side effects noted, on Janumet, hypertension well controlled, no significant medication side effects noted, on lisinopril, hyperlipidemia well controlled, no significant medication side effects noted, on Pravachol. Diet and Lifestyle: generally follows a low fat low cholesterol diet, follows a diabetic diet regularly, exercises sporadically    Home BP Monitoring: is not measured at home. Diabetic Review of Systems - home glucose monitoring: is performed regularly, 1x/day. -150 mostly but never over 200. Other symptoms and concerns: pt continues to work on diet and exercise to lose weight    Current Outpatient Prescriptions   Medication Sig    cyclobenzaprine (FLEXERIL) 10 mg tablet 10 mg.    varenicline (CHANTIX STARTER DEEPA) 0.5 mg (11)- 1 mg (42) DsPk Take as directed    glucose blood VI test strips (ACCU-CHEK SMARTVIEW TEST STRIP) strip Test blood sugar 1 times a day. DX E11.65    Lancets misc Accu-check  Fastclix Lancets. Test blood sugar daily. DX E11.9    SITagliptin-metFORMIN (JANUMET XR) 50-1,000 mg TM24 Take 2 Tabs by mouth daily.  lisinopril (PRINIVIL, ZESTRIL) 10 mg tablet Take 1 Tab by mouth daily.  pravastatin (PRAVACHOL) 40 mg tablet Take 1 Tab by mouth nightly.  pantoprazole (PROTONIX) 40 mg tablet Take 1 Tab by mouth daily.  hydrOXYzine (VISTARIL) 25 mg capsule Take 1 Cap by mouth three (3) times daily as needed for Itching.  gabapentin (NEURONTIN) 300 mg capsule Take 1 Cap by mouth nightly.  ibuprofen (MOTRIN) 800 mg tablet Take 1 Tab by mouth every eight (8) hours as needed for Pain.     clotrimazole-betamethasone (LOTRISONE) topical cream Apply dime size to affected area BID for 1 week at a time    alcohol swabs (BD SINGLE USE SWABS REGULAR) padm Use daily to check blood sugar    Blood Glucose Control, Normal (ACCU-CHEK SMARTVIEW CONTRL SOL) soln Test blood sugar 1 x daily  DX E11.9        1 year supply  Check controls monthy on glucose meter    Blood-Glucose Meter monitoring kit Accu-check Smartview Meter    FLUZONE HIGH-DOSE 2016-17, PF, syrg injection inject 0.5 milliliter intramuscularly    HYDROcodone-acetaminophen (NORCO) 5-325 mg per tablet     tiZANidine (ZANAFLEX) 4 mg tablet Take 1 Tab by mouth nightly. No current facility-administered medications for this visit.               Review of Systems  Respiratory: negative for dyspnea on exertion  Cardiovascular: negative for chest pain    Objective:     Visit Vitals    /90    Pulse 79    Temp 98 °F (36.7 °C) (Oral)    Resp 30    Ht 5' 6\" (1.676 m)    Wt 206 lb 3.2 oz (93.5 kg)    SpO2 97%    BMI 33.28 kg/m2        General appearance - alert, well appearing, and in no distress  Chest - crepitus right upper lung field  Heart - normal rate, regular rhythm, normal S1, S2, no murmurs, rubs, clicks or gallops  Extremities - no edema         Labs:   Lab Results   Component Value Date/Time    Hemoglobin A1c 7.7 (H) 04/24/2018 07:35 AM    Hemoglobin A1c 7.7 (H) 09/07/2017 09:18 AM    Hemoglobin A1c 7.4 (H) 03/09/2017 10:00 AM    Glucose 142 (H) 04/24/2018 07:35 AM    Glucose (POC) 158 (H) 02/17/2016 08:41 AM    Microalbumin/Creat ratio (mg/g creat) 8 04/24/2018 07:35 AM    Microalbumin,urine random 1.90 04/24/2018 07:35 AM    LDL, calculated 60.2 04/24/2018 07:35 AM    Creatinine 1.08 04/24/2018 07:35 AM      Lab Results   Component Value Date/Time    Cholesterol, total 136 04/24/2018 07:35 AM    HDL Cholesterol 44 04/24/2018 07:35 AM    LDL, calculated 60.2 04/24/2018 07:35 AM    Triglyceride 159 (H) 04/24/2018 07:35 AM    CHOL/HDL Ratio 3.1 04/24/2018 07:35 AM       Lab Results   Component Value Date/Time    ALT (SGPT) 18 04/24/2018 07:35 AM    AST (SGOT) 14 (L) 04/24/2018 07:35 AM    Alk. phosphatase 80 04/24/2018 07:35 AM    Bilirubin, total 0.5 04/24/2018 07:35 AM       Lab Results   Component Value Date/Time    GFR est AA >60 04/24/2018 07:35 AM    GFR est non-AA >60 04/24/2018 07:35 AM    Creatinine 1.08 04/24/2018 07:35 AM    BUN 14 04/24/2018 07:35 AM    Sodium 141 04/24/2018 07:35 AM    Potassium 4.2 04/24/2018 07:35 AM    Chloride 106 04/24/2018 07:35 AM    CO2 27 04/24/2018 07:35 AM      Lab Results   Component Value Date/Time    Prostate Specific Ag 2.4 09/07/2017 09:18 AM    Prostate Specific Ag 1.6 01/08/2016 10:54 AM           Assessment / Plan     Diabetes borderline controlled on Janumet will try to improve with weight loss. If not improving will start on Jardiance. Hypertension borderline controlled, on lisinopril. Patient will try to increase exercise to help improve blood pressure  Hyperlipidemia well controlled, on Pravachol . ICD-10-CM ICD-9-CM    1. Medicare annual wellness visit, subsequent Z00.00 V70.0    2. Uncontrolled type 2 diabetes mellitus without complication, without long-term current use of insulin (HCC) E11.65 250.02 HEMOGLOBIN A1C W/O EAG   3. Essential hypertension Q07 884.3 METABOLIC PANEL, COMPREHENSIVE   4. High cholesterol E78.00 272.0 LIPID PANEL   5. Colon cancer screening Z12.11 V76.51 OCCULT BLOOD, IMMUNOASSAY (FIT)   6. Screening PSA (prostate specific antigen) Z12.5 V76.44 PSA SCREENING (SCREENING)                Diabetic issues reviewed with him: diabetic diet discussed in detail, and low cholesterol diet, weight control and daily exercise discussed. Follow-up Disposition:  Return in about 6 months (around 12/11/2018) for labs 1 week before. Reviewed plan of care. Patient has provided input and agrees with goals. Discussed the patient's BMI with him.   The BMI follow up plan is as follows: dietary management education, guidance, and counseling  encourage exercise  monitor weight  prescribed dietary intake    An After Visit Summary was printed and given to the patient.

## 2018-06-22 LAB — HEMOCCULT STL QL IA: NEGATIVE

## 2018-06-26 ENCOUNTER — OFFICE VISIT (OUTPATIENT)
Dept: FAMILY MEDICINE CLINIC | Age: 68
End: 2018-06-26

## 2018-06-26 VITALS
HEART RATE: 82 BPM | HEIGHT: 66 IN | TEMPERATURE: 98.4 F | BODY MASS INDEX: 34.55 KG/M2 | OXYGEN SATURATION: 95 % | WEIGHT: 215 LBS | RESPIRATION RATE: 20 BRPM | SYSTOLIC BLOOD PRESSURE: 159 MMHG | DIASTOLIC BLOOD PRESSURE: 81 MMHG

## 2018-06-26 DIAGNOSIS — I10 ELEVATED BLOOD PRESSURE READING WITH DIAGNOSIS OF HYPERTENSION: ICD-10-CM

## 2018-06-26 DIAGNOSIS — Z72.0 TOBACCO ABUSE: ICD-10-CM

## 2018-06-26 DIAGNOSIS — L50.9 URTICARIA: ICD-10-CM

## 2018-06-26 DIAGNOSIS — H01.113 CONTACT DERMATITIS OF RIGHT EYELID: Primary | ICD-10-CM

## 2018-06-26 RX ORDER — HYDROXYZINE PAMOATE 25 MG/1
25 CAPSULE ORAL
Qty: 60 CAP | Refills: 3 | Status: SHIPPED | OUTPATIENT
Start: 2018-06-26

## 2018-06-26 RX ORDER — PANTOPRAZOLE SODIUM 40 MG/1
40 TABLET, DELAYED RELEASE ORAL DAILY
Qty: 90 TAB | Refills: 3 | Status: SHIPPED | OUTPATIENT
Start: 2018-06-26 | End: 2019-05-21 | Stop reason: SDUPTHER

## 2018-06-26 RX ORDER — PRAVASTATIN SODIUM 40 MG/1
40 TABLET ORAL
Qty: 90 TAB | Refills: 3 | Status: SHIPPED | OUTPATIENT
Start: 2018-06-26 | End: 2019-05-21 | Stop reason: SDUPTHER

## 2018-06-26 RX ORDER — LISINOPRIL 10 MG/1
10 TABLET ORAL DAILY
Qty: 90 TAB | Refills: 3 | Status: SHIPPED | OUTPATIENT
Start: 2018-06-26 | End: 2019-03-30 | Stop reason: DRUGHIGH

## 2018-06-26 RX ORDER — METHYLPREDNISOLONE 4 MG/1
TABLET ORAL
Qty: 1 DOSE PACK | Refills: 0 | Status: SHIPPED | OUTPATIENT
Start: 2018-06-26 | End: 2018-08-14 | Stop reason: ALTCHOICE

## 2018-06-26 RX ORDER — TRIAMCINOLONE ACETONIDE 1 MG/G
CREAM TOPICAL
Qty: 15 G | Refills: 0 | Status: SHIPPED | OUTPATIENT
Start: 2018-06-26 | End: 2018-12-12 | Stop reason: ALTCHOICE

## 2018-06-26 NOTE — PROGRESS NOTES
Patient is in the office today for right eye swelling. 1. Have you been to the ER, urgent care clinic since your last visit? Hospitalized since your last visit? No    2. Have you seen or consulted any other health care providers outside of the [address] [place] [address] since your last visit? Include any pap smears or colon screening.  No

## 2018-06-26 NOTE — MR AVS SNAPSHOT
[address] [facility] [address] 
 
 
 [address], [place] [address] [address] 
[phone] Patient: Konrad Steele MRN: [id number] AIT:5/04/[birth year]28 Visit Information Date & Time Provider Department Dept. Phone Encounter #  
 6/26/2018  4:45 PM Miky Holt PA-C [facility] Primary Care [phone] [id number] Follow-up Instructions Return if symptoms worsen or fail to improve. Your Appointments 12/5/2018  7:10 AM  
LAB with [facility] (HYUN Lafleur) Appt Note: lab 129 [facility] [address] [address]  
[phone]  
  
   
 [address], [facility]  
  
    
 12/12/2018  8:00 AM  
Office Visit with Darlene Mancini MD  
[address] [id number] [facility]) Appt Note: 6 m fu  
 [address], [facility] [address] [address]  
[phone]  
  
   
 [address],  [address] Upcoming Health Maintenance Date Due COLONOSCOPY 3/11/1968 DTaP/Tdap/Td series (1 - Tdap) 3/11/1971 Influenza Age 5 to Adult 8/1/2018 HEMOGLOBIN A1C Q6M 10/24/2018 FOOT EXAM Q1 2/12/2019 MICROALBUMIN Q1 4/24/2019 LIPID PANEL Q1 4/24/2019 EYE EXAM RETINAL OR DILATED Q1 4/27/2019 GLAUCOMA SCREENING Q2Y 4/27/2020 Allergies as of 6/26/2018  Review Complete On: 6/26/2018 By: Miky Holt PA-C Severity Noted Reaction Type Reactions Latex  01/08/2016    Sneezing Benzalkonium Chloride  06/23/2016    Hives Neosporin [Hydrocortisone]  01/08/2016    Rash Current Immunizations  Reviewed on 3/15/2017 Name Date Influenza Vaccine 9/12/2016 Influenza Vaccine (Quad) PF 1/8/2016 Pneumococcal Conjugate (PCV-13) 1/8/2016 Pneumococcal Polysaccharide (PPSV-23) 3/15/2017 Zoster Vaccine, Live 9/8/2015 Not reviewed this visit You Were Diagnosed With   
  
 Codes Comments Contact dermatitis of right eyelid    -  Primary ICD-10-CM: B40.586 ICD-9-CM: 373.32 Uncontrolled type 2 diabetes mellitus without complication, without long-term current use of insulin ([facility]     ICD-10-CM: E11.65 ICD-9-CM: 250.02 Elevated blood pressure reading with diagnosis of hypertension     ICD-10-CM: I10 
ICD-9-CM: 401.9 Cervical radiculopathy     ICD-10-CM: M54.12 
ICD-9-CM: 723.4 Urticaria     ICD-10-CM: L50.9 ICD-9-CM: 708. 9 Vitals BP Pulse Temp Resp Height(growth percentile) Weight(growth percentile) 159/81 (BP 1 Location: Left arm, BP Patient Position: Sitting) 82 98.4 °F (36.9 °C) (Oral) 20 5' 6\" (1.676 m) 215 lb (97.5 kg) SpO2 BMI Smoking Status 95% 34.7 kg/m2 Current Every Day Smoker BMI and BSA Data Body Mass Index Body Surface Area 34.7 kg/m 2 2.13 m 2 Preferred Pharmacy Pharmacy Name Phone [id number] [place], [place] - [address] @ [facility] [address] AT [facility] [phone] Your Updated Medication List  
  
   
This list is accurate as of 6/26/18  5:52 PM.  Always use your most recent med list.  
  
  
  
  
 alcohol swabs Padm Commonly known as:  BD Single Use Swabs Regular Use daily to check blood sugar Blood Glucose Control, Normal Soln Commonly known as:  ACCU-CHEK SMARTVIEW CONTRL SOL Test blood sugar 1 x daily  DX E11.9        1 year supply  Check controls monthy on glucose meter Blood-Glucose Meter monitoring kit Accu-check Smartview Meter  
  
 glucose blood VI test strips strip Commonly known as:  [address] Test blood sugar 1 times a day. DX E11.65  
  
 hydrOXYzine pamoate 25 mg capsule Commonly known as:  VISTARIL Take 1 Cap by mouth three (3) times daily as needed for Itching. Lancets Misc Accu-check  Fastclix Lancets. Test blood sugar daily. DX E11.9  
  
 lisinopril 10 mg tablet Commonly known as:  Preeti [place] Take 1 Tab by mouth daily. methylPREDNISolone 4 mg tablet Commonly known as:  Allayne Babinski Take as directed. pantoprazole 40 mg tablet Commonly known as:  PROTONIX Take 1 Tab by mouth daily. pravastatin 40 mg tablet Commonly known as:  PRAVACHOL Take 1 Tab by mouth nightly. SITagliptin-metFORMIN 50-1,000 mg Tm24 Commonly known as:  JANUMET XR Take 2 Tabs by mouth daily. triamcinolone acetonide 0.1 % topical cream  
Commonly known as:  KENALOG  
use thin layer on skin around eye Prescriptions Printed Refills  
 glucose blood VI test strips (ACCU-CHEK SMARTVIEW TEST STRIP) strip 3 Sig: Test blood sugar 1 times a day. DX E11.65 Class: Print  
 lisinopril (PRINIVIL, ZESTRIL) 10 mg tablet 3 Sig: Take 1 Tab by mouth daily. Class: Print Route: Oral  
 pravastatin (PRAVACHOL) 40 mg tablet 3 Sig: Take 1 Tab by mouth nightly. Class: Print Route: Oral  
 pantoprazole (PROTONIX) 40 mg tablet 3 Sig: Take 1 Tab by mouth daily. Class: Print Route: Oral  
 SITagliptin-metFORMIN (JANUMET XR) 50-1,000 mg TM24 3 Sig: Take 2 Tabs by mouth daily. Class: Print Route: Oral  
 triamcinolone acetonide (KENALOG) 0.1 % topical cream 0 Sig: use thin layer on skin around eye Class: Print  
 methylPREDNISolone (MEDROL DOSEPACK) 4 mg tablet 0 Sig: Take as directed. Class: Print  
 hydrOXYzine pamoate (VISTARIL) 25 mg capsule 3 Sig: Take 1 Cap by mouth three (3) times daily as needed for Itching. Class: Print Route: Oral  
  
Follow-up Instructions Return if symptoms worsen or fail to improve. Patient Instructions Rash: Care Instructions Your Care Instructions A rash is any irritation or inflammation of the skin. Rashes have many possible causes, including allergy, infection, illness, heat, and emotional stress. Follow-up care is a key part of your treatment and safety. Be sure to make and go to all appointments, and call your doctor if you are having problems. It's also a good idea to know your test results and keep a list of the medicines you take. How can you care for yourself at home? · Wash the area with water only. Soap can make dryness and itching worse. Pat dry. · Put cold, wet cloths on the rash to reduce itching. · Keep cool, and stay out of the sun. · Leave the rash open to the air as much of the time as possible. · Sometimes petroleum jelly (Vaseline) can help relieve the discomfort caused by a rash. A moisturizing lotion, such as Cetaphil, also may help. Calamine lotion may help for rashes caused by contact with something (such as a plant or soap) that irritated the skin. Use it 3 or 4 times a day. · If your doctor prescribed a cream, use it as directed. If your doctor prescribed medicine, take it exactly as directed. · If your rash itches so badly that it interferes with your normal activities, take an over-the-counter antihistamine, such as diphenhydramine (Benadryl) or loratadine (Claritin). Read and follow all instructions on the label. When should you call for help? Call your doctor now or seek immediate medical care if: 
? · You have signs of infection, such as: 
¨ Increased pain, swelling, warmth, or redness. ¨ Red streaks leading from the area. ¨ Pus draining from the area. ¨ A fever. ? · You have joint pain along with the rash. ? Watch closely for changes in your health, and be sure to contact your doctor if: 
? · Your rash is changing or getting worse. For example, call if you have pain along with the rash, the rash is spreading, or you have new blisters. ? · You do not get better after 1 week. Where can you learn more? Go to http://sumit-uli.info/. Enter D292 in the search box to learn more about \"Rash: Care Instructions. \" Current as of: October 13, 2016 Content Version: 11.4 © 9181-4391 Healthwise, [employer]. Care instructions adapted under license by [employer] (which disclaims liability or warranty for this information). If you have questions about a medical condition or this instruction, always ask your healthcare professional. [address] any warranty or liability for your use of this information. Introducing hospitals & HEALTH SERVICES! Kehinde Hoffman introduces "[employer]" patient portal. Now you can access parts of your medical record, email your doctor's office, and request medication refills online. 1. In your internet browser, go to [email or web address] 2. Click on the First Time User? Click Here link in the Sign In box. You will see the New Member Sign Up page. 3. Enter your "[employer]" Access Code exactly as it appears below. You will not need to use this code after youve completed the sign-up process. If you do not sign up before the expiration date, you must request a new code. · "[employer] Access Code: [email or web address] Expires: 9/9/2018  9:11 AM 
 
4. Enter the last four digits of your Social Security Number (xxxx) and Date of Birth (mm/dd/yyyy) as indicated and click Submit. You will be taken to the next sign-up page. 5. Create a "[employer]" ID. This will be your "[employer]" login ID and cannot be changed, so think of one that is secure and easy to remember. 6. Create a "[employer]" password. You can change your password at any time. 7. Enter your Password Reset Question and Answer. This can be used at a later time if you forget your password. 8. Enter your e-mail address. You will receive e-mail notification when new information is available in [address] 9. Click Sign Up. You can now view and download portions of your medical record. 10. Click the Download Summary menu link to download a portable copy of your medical information. If you have questions, please visit the Frequently Asked Questions section of the [employer] website. Remember, [employer] is NOT to be used for urgent needs. For medical emergencies, dial 911. Now available from your iPhone and Android! Please provide this summary of care documentation to your next provider. Your primary care clinician is listed as CARLOS ASKEW. If you have any questions after today's visit, please call [phone].

## 2018-06-26 NOTE — PROGRESS NOTES
HPI:    Geneva Butler  is a 76 y.o.  male  patient who comes in today for acute concern. He went to see eye doctor last week due to an injury of eye when a light fell and hit his eye. His eye became blurry so he went to eye doctor. He was given eye drops that day for Refresh and another medication that the patient is unsure of. Patient states he was told that he had \"dirt in his eye\". He was supposed to use the eyedrops once daily. The next day after starting the medication, he developed a rash on the skin around the right eye. The more he scratched, the more irritated the skin became. Then yesterday, he put hydrogen peroxide on it to help relieve the itching. He woke up today and the eye was swollen shut and the pruritis is severe. He is still using the eyedrops. Patient denies SOB, CP, dizziness, headache. He denies fevers. Current Outpatient Prescriptions   Medication Sig Dispense Refill    glucose blood VI test strips (ACCU-CHEK SMARTVIEW TEST STRIP) strip Test blood sugar 1 times a day. DX E11.65 100 Strip 3    lisinopril (PRINIVIL, ZESTRIL) 10 mg tablet Take 1 Tab by mouth daily. 90 Tab 3    pravastatin (PRAVACHOL) 40 mg tablet Take 1 Tab by mouth nightly. 90 Tab 3    pantoprazole (PROTONIX) 40 mg tablet Take 1 Tab by mouth daily. 90 Tab 3    SITagliptin-metFORMIN (JANUMET XR) 50-1,000 mg TM24 Take 2 Tabs by mouth daily. 180 Tab 3    triamcinolone acetonide (KENALOG) 0.1 % topical cream use thin layer on skin around eye 15 g 0    methylPREDNISolone (MEDROL DOSEPACK) 4 mg tablet Take as directed. 1 Dose Pack 0    hydrOXYzine pamoate (VISTARIL) 25 mg capsule Take 1 Cap by mouth three (3) times daily as needed for Itching. 60 Cap 3    Lancets misc Accu-check  Fastclix Lancets. Test blood sugar daily.   DX E11.9 100 Each 3    alcohol swabs (BD SINGLE USE SWABS REGULAR) padm Use daily to check blood sugar 100 Pad 4    Blood Glucose Control, Normal (ACCU-CHEK SMARTVIEW CONTRL SOL) soln Test blood sugar 1 x daily  DX E11.9        1 year supply  Check controls monthy on glucose meter 3 mL 4    Blood-Glucose Meter monitoring kit Accu-check Smartview Meter 1 Kit 0      Allergies   Allergen Reactions    Latex Sneezing    Benzalkonium Chloride Hives    Neosporin [Hydrocortisone] Rash      Past Medical History:   Diagnosis Date    Diabetes ([facility]     GERD (gastroesophageal reflux disease)     Hx of colonic polyps     Hx of gallstones     Hypercholesterolemia     Hypertension     Restless leg syndrome     Wears dentures       Family History   Problem Relation Age of Onset    Heart Attack Mother     Prostate Cancer Father     Colon Cancer Father     Stroke Sister       Patient Active Problem List   Diagnosis Code    Essential hypertension I10    DM (diabetes mellitus), type 2, uncontrolled ([facility] E11.65    High cholesterol E78.00    ACP (advance care planning) Z71.89    Type 2 diabetes mellitus with diabetic neuropathy ([facility] E11.40            ROS as pertinent in HPI    Visit Vitals    /81 (BP 1 Location: Left arm, BP Patient Position: Sitting)    Pulse 82    Temp 98.4 °F (36.9 °C) (Oral)    Resp 20    Ht 5' 6\" (1.676 m)    Wt 215 lb (97.5 kg)    SpO2 95%    BMI 34.7 kg/m2        Physical Exam   Constitutional: He is oriented to person, place, and time and well-developed, well-nourished, and in no distress. HENT:   Head: Normocephalic and atraumatic. Eyes: Conjunctivae are normal. Pupils are equal, round, and reactive to light. Swelling and maculopapular plaque dermatitis   Cardiovascular: Normal rate and regular rhythm. Pulmonary/Chest: Effort normal and breath sounds normal.   Neurological: He is alert and oriented to person, place, and time. Skin: Skin is warm and dry. Rash noted. Vitals reviewed.       Lab Results   Component Value Date/Time    Hemoglobin A1c 7.7 (H) 04/24/2018 07:35 AM         Assessment/Plan:    Diagnoses and all orders for this visit:    1. Contact dermatitis of right eyelid  -     hydrOXYzine pamoate (VISTARIL) 25 mg capsule; Take 1 Cap by mouth three (3) times daily as needed for Itching.  -     methylPREDNISolone (MEDROL DOSEPACK) 4 mg tablet; Take as directed - watch carb intake  Patient to take pepcid OTC  May use hydroxizine    2. Uncontrolled type 2 diabetes mellitus without complication, without long-term current use of insulin (HCC)  -     SITagliptin-metFORMIN (JANUMET XR) 50-1,000 mg TM24; Take 2 Tabs by mouth daily. 3. Elevated blood pressure reading with diagnosis of hypertension - patient aware of goal blood pressure, educated to take medication in the morning. 4. Urticaria    5. Tobacco abuse - recommend cessation, discussed strategies    Other orders  -     glucose blood VI test strips (ACCU-CHEK SMARTVIEW TEST STRIP) strip; Test blood sugar 1 times a day. DX E11.65  -     lisinopril (PRINIVIL, ZESTRIL) 10 mg tablet; Take 1 Tab by mouth daily. -     pravastatin (PRAVACHOL) 40 mg tablet; Take 1 Tab by mouth nightly. -     pantoprazole (PROTONIX) 40 mg tablet; Take 1 Tab by mouth daily. -     triamcinolone acetonide (KENALOG) 0.1 % topical cream; use thin layer on skin around eye       Follow-up Disposition:  Return if symptoms worsen or fail to improve. Additional Notes: Discussed today's diagnosis, treatment plans. Discussed medication indications and side effects. After Visit Summary: Provided and discussed printed patient instructions. Answered all questions and patient acknowledged understanding. Mr. Kayli Kapoor has a reminder for a \"due or due soon\" health maintenance. I have asked that he contact his primary care provider for follow-up on this health maintenance. Jeison Osborne PA-C     I reviewed the patient's medical history, the physician assistant's findings on physical examination, the patient's diagnoses, and treatment plan as documented in the progress note.  I concur with the treatment plan as documented. There are no additional recommendations at this time.     Henrik Fraire MD

## 2018-06-26 NOTE — PATIENT INSTRUCTIONS
Rash: Care Instructions  Your Care Instructions  A rash is any irritation or inflammation of the skin. Rashes have many possible causes, including allergy, infection, illness, heat, and emotional stress. Follow-up care is a key part of your treatment and safety. Be sure to make and go to all appointments, and call your doctor if you are having problems. It's also a good idea to know your test results and keep a list of the medicines you take. How can you care for yourself at home? · Wash the area with water only. Soap can make dryness and itching worse. Pat dry. · Put cold, wet cloths on the rash to reduce itching. · Keep cool, and stay out of the sun. · Leave the rash open to the air as much of the time as possible. · Sometimes petroleum jelly (Vaseline) can help relieve the discomfort caused by a rash. A moisturizing lotion, such as Cetaphil, also may help. Calamine lotion may help for rashes caused by contact with something (such as a plant or soap) that irritated the skin. Use it 3 or 4 times a day. · If your doctor prescribed a cream, use it as directed. If your doctor prescribed medicine, take it exactly as directed. · If your rash itches so badly that it interferes with your normal activities, take an over-the-counter antihistamine, such as diphenhydramine (Benadryl) or loratadine (Claritin). Read and follow all instructions on the label. When should you call for help? Call your doctor now or seek immediate medical care if:  ? · You have signs of infection, such as:  ¨ Increased pain, swelling, warmth, or redness. ¨ Red streaks leading from the area. ¨ Pus draining from the area. ¨ A fever. ? · You have joint pain along with the rash. ? Watch closely for changes in your health, and be sure to contact your doctor if:  ? · Your rash is changing or getting worse. For example, call if you have pain along with the rash, the rash is spreading, or you have new blisters.    ? · You do not get better after 1 week. Where can you learn more? Go to http://sumit-uli.info/. Enter A604 in the search box to learn more about \"Rash: Care Instructions. \"  Current as of: October 13, 2016  Content Version: 11.4  © 8902-3687 Healthwise, [employer]. Care instructions adapted under license by [employer] (which disclaims liability or warranty for this information). If you have questions about a medical condition or this instruction, always ask your healthcare professional. [address] any warranty or liability for your use of this information.

## 2018-06-27 ENCOUNTER — TELEPHONE (OUTPATIENT)
Dept: FAMILY MEDICINE CLINIC | Age: 68
End: 2018-06-27

## 2018-06-27 NOTE — TELEPHONE ENCOUNTER
[facility] calling patient was in yesterday and they needed to give Patients Eye drops name  Pazeo 1 drop a day for right eye   Also stated patient still has [employer] [facility] still has it

## 2018-07-23 ENCOUNTER — TELEPHONE (OUTPATIENT)
Dept: FAMILY MEDICINE CLINIC | Age: 68
End: 2018-07-23

## 2018-07-23 NOTE — TELEPHONE ENCOUNTER
Patient has a rash on his body that is spreading. He has had it for about 2 weeks. He was given an appointment for 07/24.  He is requesting a call back at [phone]

## 2018-07-23 NOTE — TELEPHONE ENCOUNTER
Left message for patient per Dr. Jeniffer Soliz use Benadryl( follow package instructions) until seen by Dr. Jeniffer Soliz tomorrow. Any questions call the office.

## 2018-07-24 ENCOUNTER — OFFICE VISIT (OUTPATIENT)
Dept: FAMILY MEDICINE CLINIC | Age: 68
End: 2018-07-24

## 2018-07-24 VITALS
HEIGHT: 66 IN | HEART RATE: 84 BPM | SYSTOLIC BLOOD PRESSURE: 147 MMHG | BODY MASS INDEX: 33.75 KG/M2 | TEMPERATURE: 98.9 F | DIASTOLIC BLOOD PRESSURE: 91 MMHG | WEIGHT: 210 LBS | RESPIRATION RATE: 20 BRPM | OXYGEN SATURATION: 95 %

## 2018-07-24 DIAGNOSIS — L20.9 ATOPIC DERMATITIS, UNSPECIFIED TYPE: Primary | ICD-10-CM

## 2018-07-24 DIAGNOSIS — L01.00 IMPETIGO: ICD-10-CM

## 2018-07-24 DIAGNOSIS — L08.89 SECONDARY INFECTION OF SKIN: ICD-10-CM

## 2018-07-24 DIAGNOSIS — L73.0 NUCHAL FOLLICULITIS: ICD-10-CM

## 2018-07-24 DIAGNOSIS — L29.9 ITCHING: ICD-10-CM

## 2018-07-24 RX ORDER — MUPIROCIN 20 MG/G
OINTMENT TOPICAL
Qty: 22 G | Refills: 0 | Status: SHIPPED | OUTPATIENT
Start: 2018-07-24 | End: 2018-07-25

## 2018-07-24 RX ORDER — TRIAMCINOLONE ACETONIDE 0.25 MG/G
CREAM TOPICAL 2 TIMES DAILY
Qty: 15 G | Refills: 0 | Status: CANCELLED | OUTPATIENT
Start: 2018-07-24

## 2018-07-24 NOTE — PATIENT INSTRUCTIONS
1.   Keflex 500 mg twice a day by mouth for ? Secondary infection/impetigo/add probiotic  2. Steroid will be called in to pharmacy  - triamcinolone . 025% apply thin layer to affected areas on arms for one week (do not apply to neck)  3. Follow up in 5-7 days or sooner if needed   4. Follow instructions on keeping area hydrated and moisturized  5. Remember to be careful when using steroid cream-can thin skin especially around neck and other areas  6. Hydroxyzine for itching if needed, may cause drowsiness     High Blood Pressure: Care Instructions  Your Care Instructions    If your blood pressure is usually above 130/80, you have high blood pressure, or hypertension. That means the top number is 130 or higher or the bottom number is 80 or higher, or both. Despite what a lot of people think, high blood pressure usually doesn't cause headaches or make you feel dizzy or lightheaded. It usually has no symptoms. But it does increase your risk for heart attack, stroke, and kidney or eye damage. The higher your blood pressure, the more your risk increases. Your doctor will give you a goal for your blood pressure. Your goal will be based on your health and your age. Lifestyle changes, such as eating healthy and being active, are always important to help lower blood pressure. You might also take medicine to reach your blood pressure goal.  Follow-up care is a key part of your treatment and safety. Be sure to make and go to all appointments, and call your doctor if you are having problems. It's also a good idea to know your test results and keep a list of the medicines you take. How can you care for yourself at home? Medical treatment  · If you stop taking your medicine, your blood pressure will go back up. You may take one or more types of medicine to lower your blood pressure. Be safe with medicines. Take your medicine exactly as prescribed.  Call your doctor if you think you are having a problem with your medicine. · Talk to your doctor before you start taking aspirin every day. Aspirin can help certain people lower their risk of a heart attack or stroke. But taking aspirin isn't right for everyone, because it can cause serious bleeding. · See your doctor regularly. You may need to see the doctor more often at first or until your blood pressure comes down. · If you are taking blood pressure medicine, talk to your doctor before you take decongestants or anti-inflammatory medicine, such as ibuprofen. Some of these medicines can raise blood pressure. · Learn how to check your blood pressure at home. Lifestyle changes  · Stay at a healthy weight. This is especially important if you put on weight around the waist. Losing even 10 pounds can help you lower your blood pressure. · If your doctor recommends it, get more exercise. Walking is a good choice. Bit by bit, increase the amount you walk every day. Try for at least 30 minutes on most days of the week. You also may want to swim, bike, or do other activities. · Avoid or limit alcohol. Talk to your doctor about whether you can drink any alcohol. · Try to limit how much sodium you eat to less than 2,300 milligrams (mg) a day. Your doctor may ask you to try to eat less than 1,500 mg a day. · Eat plenty of fruits (such as bananas and oranges), vegetables, legumes, whole grains, and low-fat dairy products. · Lower the amount of saturated fat in your diet. Saturated fat is found in animal products such as milk, cheese, and meat. Limiting these foods may help you lose weight and also lower your risk for heart disease. · Do not smoke. Smoking increases your risk for heart attack and stroke. If you need help quitting, talk to your doctor about stop-smoking programs and medicines. These can increase your chances of quitting for good. When should you call for help? Call 911 anytime you think you may need emergency care.  This may mean having symptoms that suggest that your blood pressure is causing a serious heart or blood vessel problem. Your blood pressure may be over 180/110.   For example, call 911 if:    · You have symptoms of a heart attack. These may include:  ¨ Chest pain or pressure, or a strange feeling in the chest.  ¨ Sweating. ¨ Shortness of breath. ¨ Nausea or vomiting. ¨ Pain, pressure, or a strange feeling in the back, neck, jaw, or upper belly or in one or both shoulders or arms. ¨ Lightheadedness or sudden weakness. ¨ A fast or irregular heartbeat.     · You have symptoms of a stroke. These may include:  ¨ Sudden numbness, tingling, weakness, or loss of movement in your face, arm, or leg, especially on only one side of your body. ¨ Sudden vision changes. ¨ Sudden trouble speaking. ¨ Sudden confusion or trouble understanding simple statements. ¨ Sudden problems with walking or balance. ¨ A sudden, severe headache that is different from past headaches.     · You have severe back or belly pain.    Do not wait until your blood pressure comes down on its own. Get help right away.   Call your doctor now or seek immediate care if:    · Your blood pressure is much higher than normal (such as 180/110 or higher), but you don't have symptoms.     · You think high blood pressure is causing symptoms, such as:  ¨ Severe headache. ¨ Blurry vision.    Watch closely for changes in your health, and be sure to contact your doctor if:    · Your blood pressure measures 140/90 or higher at least 2 times. That means the top number is 140 or higher or the bottom number is 90 or higher, or both.     · You think you may be having side effects from your blood pressure medicine.     · Your blood pressure is usually normal, but it goes above normal at least 2 times. Where can you learn more? Go to http://sumit-uli.info/. Enter U909 in the search box to learn more about \"High Blood Pressure: Care Instructions. \"  Current as of: December 6, 2017  Content Version: 11.7  © 8745-3526 [employer]. Care instructions adapted under license by [employer] (which disclaims liability or warranty for this information). If you have questions about a medical condition or this instruction, always ask your healthcare professional. [address] any warranty or liability for your use of this information. Atopic Dermatitis: Care Instructions  Your Care Instructions  Atopic dermatitis (also called eczema) is a skin problem that causes intense itching and a red, raised rash. In severe cases, the rash develops clear fluid-filled blisters. The rash is not contagious. People with this condition seem to have very sensitive immune systems that are likely to react to things that cause allergies. The immune system is the body's way of fighting infection. There is no cure for atopic dermatitis, but you may be able to control it with care at home. Follow-up care is a key part of your treatment and safety. Be sure to make and go to all appointments, and call your doctor if you are having problems. It's also a good idea to know your test results and keep a list of the medicines you take. How can you care for yourself at home? · Use moisturizer at least twice a day. · If your doctor prescribes a cream, use it as directed. If your doctor prescribes other medicine, take it exactly as directed. · Wash the affected area with water only. Soap can make dryness and itching worse. Pat dry. · Apply a moisturizer after bathing. Use a cream such as Lubriderm, Moisturel, or Cetaphil that does not irritate the skin or cause a rash. Apply the cream while your skin is still damp after lightly drying with a towel. · Use cold, wet cloths to reduce itching. · Keep cool, and stay out of the sun. · If itching affects your normal activities, an over-the-counter antihistamine, such as diphenhydramine (Benadryl) or loratadine (Claritin) may help.  Read and follow all instructions on the label. When should you call for help? Call your doctor now or seek immediate medical care if:    · Your rash gets worse and you have a fever.     · You have new blisters or bruises, or the rash spreads and looks like a sunburn.     · You have signs of infection, such as:  ¨ Increased pain, swelling, warmth, or redness. ¨ Red streaks leading from the rash. ¨ Pus draining from the rash. ¨ A fever.     · You have crusting or oozing sores.     · You have joint aches or body aches along with your rash.    Watch closely for changes in your health, and be sure to contact your doctor if:    · Your rash does not clear up after 2 to 3 weeks of home treatment.     · Itching interferes with your sleep or daily activities. Where can you learn more? Go to http://sumit-uli.info/. Enter Q651 in the search box to learn more about \"Atopic Dermatitis: Care Instructions. \"  Current as of: October 5, 2017  Content Version: 11.7  © 7280-1806 [employer]. Care instructions adapted under license by [employer] (which disclaims liability or warranty for this information). If you have questions about a medical condition or this instruction, always ask your healthcare professional. [address] any warranty or liability for your use of this information. Impetigo: Care Instructions  Your Care Instructions  Impetigo (say \"cq-jeb-AO-go\") is a skin infection caused by bacteria. It causes blisters that break and become oozing, yellow, crusty sores. Impetigo can be anywhere on the body. Scratching the sores may spread the infection to other parts of the body. You can also spread it to others through close contact or when you share towels, clothing, and other items. Prescription antibiotic ointment or pills can usually cure impetigo.  (After a day of antibiotics, the infection should not spread.)  Follow-up care is a key part of your treatment and safety. Be sure to make and go to all appointments, and call your doctor if you are having problems. It's also a good idea to know your test results and keep a list of the medicines you take. How can you care for yourself at home? · Apply antibiotic ointment exactly as instructed. · If your doctor prescribed antibiotic pills, take them as directed. Do not stop using them just because you feel better. You need to take the full course of antibiotics. · Gently wash the sores with soap and water each day. If crusts form, your doctor may advise you to soften or remove the crusts. You can do this by soaking them in warm water and patting them dry. This can help the cream or ointment treat impetigo. · After you touch the area, wash your hands with soap and water. Or you can use an alcohol-based hand . · Don't share items such as towels, sheets, and clothing until the infection is gone. · Wash anything that may have touched the infected area. · Try to avoid scratching the area. When should you call for help? Call your doctor now or seek immediate medical care if:    · You have symptoms of a worse infection, such as:  ¨ Increased pain, swelling, warmth, or redness. ¨ Red streaks leading from the area. ¨ Pus draining from the area. ¨ A fever.     · Impetigo gets worse or spreads to other areas.    Watch closely for changes in your health, and be sure to contact your doctor if:    · You do not get better as expected. Where can you learn more? Go to http://sumit-uli.info/. Enter I556 in the search box to learn more about \"Impetigo: Care Instructions. \"  Current as of: May 12, 2017  Content Version: 11.7  © 4037-8392 [employer]. Care instructions adapted under license by [employer] (which disclaims liability or warranty for this information).  If you have questions about a medical condition or this instruction, always ask your healthcare professional. [address] any warranty or liability for your use of this information.

## 2018-07-24 NOTE — MR AVS SNAPSHOT
[address] [facility] 
 
 
 [address] [facility] [place], [place] [address] [address] 
[phone] Patient: Jerry De Souza MRN: [id number] NOF:[birth year] Visit Information Date & Time Provider Department Dept. Phone Encounter #  
 7/24/2018  2:00 PM Tee Chou 50 [address] [facility] [id number] Follow-up Instructions Return in about 5 days (around 7/29/2018). Routing History Your Appointments 7/31/2018  8:45 AM  
Office Visit with Gene Phelps MD  
[address] [place] [address]) Appt Note: 1 wk fu rash 129 [facility] [address] [address]  
[phone]  
  
   
 [address] [facility]  
  
    
 12/5/2018  7:10 AM  
LAB with [facility] (HYUN Lafleur) Appt Note: lab [address] [place] [address] [address]  
[phone]  
  
   
 [address] [facility]  
  
    
 12/12/2018  8:00 AM  
Office Visit with Gene Phelps MD  
[address] [place] [address]) Appt Note: 6 m fu  
 [address] [facility] [address] [address]  
[phone] Upcoming Health Maintenance Date Due COLONOSCOPY 3/11/1968 DTaP/Tdap/Td series (1 - Tdap) 3/11/1971 Influenza Age 5 to Adult 8/1/2018 HEMOGLOBIN A1C Q6M 10/24/2018 FOOT EXAM Q1 2/12/2019 MICROALBUMIN Q1 4/24/2019 LIPID PANEL Q1 4/24/2019 EYE EXAM RETINAL OR DILATED Q1 4/27/2019 MEDICARE YEARLY EXAM 6/12/2019 GLAUCOMA SCREENING Q2Y 4/27/2020 Allergies as of 7/24/2018  Review Complete On: 7/24/2018 By: AMY Chou Severity Noted Reaction Type Reactions Latex  01/08/2016    Sneezing Benzalkonium Chloride  06/23/2016    Hives Neosporin [Hydrocortisone]  01/08/2016    Rash Current Immunizations  Reviewed on 3/15/2017 Name Date Influenza Vaccine 9/12/2016 Influenza Vaccine (Quad) PF 1/8/2016 Pneumococcal Conjugate (PCV-13) 1/8/2016 Pneumococcal Polysaccharide (PPSV-23) 3/15/2017 Zoster Vaccine, Live 9/8/2015 Not reviewed this visit You Were Diagnosed With   
  
 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  
 33.89 kg/m 2 2.11 m 2 Preferred Pharmacy Pharmacy Name Phone [id number] [address] Your Updated Medication List  
  
   
This list is accurate as of 7/24/18 11:59 PM.  Always use your most recent med list.  
  
  
  
  
 alcohol swabs Padm Commonly known as:  BD Single Use Swabs Regular Use daily to check blood sugar Blood Glucose Control, Normal Soln Commonly known as:  ACCU-CHEK SMARTVIEW CONTRL SOL Test blood sugar 1 x daily  DX E11.9        1 year supply  Check controls monthy on glucose meter Blood-Glucose Meter monitoring kit Accu-check Smartview Meter  
  
 cephALEXin 500 mg capsule Commonly known as:  Icelandic Rota Take 1 Cap by mouth two (2) times a day for 7 days. Indications: Skin and Skin Structure Infection  
  
 glucose blood VI test strips strip Commonly known as:  [address] Test blood sugar 1 times a day. DX E11.65  
  
 hydrOXYzine HCl 25 mg tablet Commonly known as:  ATARAX Take 1 Tab by mouth three (3) times daily as needed for Itching for up to 10 days. May cause drowsiness  
  
 hydrOXYzine pamoate 25 mg capsule Commonly known as:  VISTARIL Take 1 Cap by mouth three (3) times daily as needed for Itching. Lancets Misc Accu-check  Fastclix Lancets. Test blood sugar daily. DX E11.9  
  
 lisinopril 10 mg tablet Commonly known as:  Arnold Files Take 1 Tab by mouth daily. methylPREDNISolone 4 mg tablet Commonly known as:  Robb Balta Take as directed. pantoprazole 40 mg tablet Commonly known as:  PROTONIX Take 1 Tab by mouth daily. pravastatin 40 mg tablet Commonly known as:  PRAVACHOL Take 1 Tab by mouth nightly. SITagliptin-metFORMIN 50-1,000 mg Tm24 Commonly known as:  JANUMET XR Take 2 Tabs by mouth daily. * triamcinolone acetonide 0.1 % topical cream  
Commonly known as:  KENALOG  
use thin layer on skin around eye * triamcinolone acetonide 0.025 % topical cream  
Commonly known as:  KENALOG Apply  to affected area two (2) times a day. use thin layer for one week to areas on arms * Notice: This list has 2 medication(s) that are the same as other medications prescribed for you. Read the directions carefully, and ask your doctor or other care provider to review them with you. Prescriptions Sent to Pharmacy Refills  
 hydrOXYzine HCl (ATARAX) 25 mg tablet 1 Sig: Take 1 Tab by mouth three (3) times daily as needed for Itching for up to 10 days. May cause drowsiness Class: Normal  
 Pharmacy: [facility] #[id number]- [address] Ph #: [phone] Route: Oral  
 triamcinolone acetonide (KENALOG) 0.025 % topical cream 0 Sig: Apply  to affected area two (2) times a day. use thin layer for one week to areas on arms Class: Normal  
 Pharmacy: [facility]/pharmacy #[id number]- [address] Ph #: [phone]  Route: Topical  
 cephALEXin (KEFLEX) 500 mg capsule 0 Sig: Take 1 Cap by mouth two (2) times a day for 7 days. Indications: Skin and Skin Structure Infection Class: Normal  
 Pharmacy: [facility]/pharmacy #[id number]- Milagros Mady, [address] [facility] #: [phone] Route: Oral  
  
Follow-up Instructions Return in about 5 days (around 7/29/2018). Patient Instructions 1. Keflex 500 mg twice a day by mouth for ? Secondary infection/impetigo/add probiotic 2. Steroid will be called in to pharmacy  - triamcinolone . 025% apply thin layer to affected areas on arms for one week (do not apply to neck) 3. Follow up in 5-7 days or sooner if needed 4. Follow instructions on keeping area hydrated and moisturized 5. Remember to be careful when using steroid cream-can thin skin especially around neck and other areas 6. Hydroxyzine for itching if needed, may cause drowsiness High Blood Pressure: Care Instructions Your Care Instructions If your blood pressure is usually above 130/80, you have high blood pressure, or hypertension. That means the top number is 130 or higher or the bottom number is 80 or higher, or both. Despite what a lot of people think, high blood pressure usually doesn't cause headaches or make you feel dizzy or lightheaded. It usually has no symptoms. But it does increase your risk for heart attack, stroke, and kidney or eye damage. The higher your blood pressure, the more your risk increases. Your doctor will give you a goal for your blood pressure. Your goal will be based on your health and your age. Lifestyle changes, such as eating healthy and being active, are always important to help lower blood pressure. You might also take medicine to reach your blood pressure goal. 
Follow-up care is a key part of your treatment and safety. Be sure to make and go to all appointments, and call your doctor if you are having problems.  It's also a good idea to know your test results and keep a list of the medicines you take. How can you care for yourself at home? Medical treatment · If you stop taking your medicine, your blood pressure will go back up. You may take one or more types of medicine to lower your blood pressure. Be safe with medicines. Take your medicine exactly as prescribed. Call your doctor if you think you are having a problem with your medicine. · Talk to your doctor before you start taking aspirin every day. Aspirin can help certain people lower their risk of a heart attack or stroke. But taking aspirin isn't right for everyone, because it can cause serious bleeding. · See your doctor regularly. You may need to see the doctor more often at first or until your blood pressure comes down. · If you are taking blood pressure medicine, talk to your doctor before you take decongestants or anti-inflammatory medicine, such as ibuprofen. Some of these medicines can raise blood pressure. · Learn how to check your blood pressure at home. Lifestyle changes · Stay at a healthy weight. This is especially important if you put on weight around the waist. Losing even 10 pounds can help you lower your blood pressure. · If your doctor recommends it, get more exercise. Walking is a good choice. Bit by bit, increase the amount you walk every day. Try for at least 30 minutes on most days of the week. You also may want to swim, bike, or do other activities. · Avoid or limit alcohol. Talk to your doctor about whether you can drink any alcohol. · Try to limit how much sodium you eat to less than 2,300 milligrams (mg) a day. Your doctor may ask you to try to eat less than 1,500 mg a day. · Eat plenty of fruits (such as bananas and oranges), vegetables, legumes, whole grains, and low-fat dairy products. · Lower the amount of saturated fat in your diet. Saturated fat is found in animal products such as milk, cheese, and meat.  Limiting these foods may help you lose weight and also lower your risk for heart disease. · Do not smoke. Smoking increases your risk for heart attack and stroke. If you need help quitting, talk to your doctor about stop-smoking programs and medicines. These can increase your chances of quitting for good. When should you call for help? Call 911 anytime you think you may need emergency care. This may mean having symptoms that suggest that your blood pressure is causing a serious heart or blood vessel problem. Your blood pressure may be over 180/110. 
 For example, call 911 if: 
  · You have symptoms of a heart attack. These may include: ¨ Chest pain or pressure, or a strange feeling in the chest. 
¨ Sweating. ¨ Shortness of breath. ¨ Nausea or vomiting. ¨ Pain, pressure, or a strange feeling in the back, neck, jaw, or upper belly or in one or both shoulders or arms. ¨ Lightheadedness or sudden weakness. ¨ A fast or irregular heartbeat.  
  · You have symptoms of a stroke. These may include: 
¨ Sudden numbness, tingling, weakness, or loss of movement in your face, arm, or leg, especially on only one side of your body. ¨ Sudden vision changes. ¨ Sudden trouble speaking. ¨ Sudden confusion or trouble understanding simple statements. ¨ Sudden problems with walking or balance. ¨ A sudden, severe headache that is different from past headaches.  
  · You have severe back or belly pain.  
 Do not wait until your blood pressure comes down on its own. Get help right away. 
 Call your doctor now or seek immediate care if: 
  · Your blood pressure is much higher than normal (such as 180/110 or higher), but you don't have symptoms.  
  · You think high blood pressure is causing symptoms, such as: ¨ Severe headache. ¨ Blurry vision.  
 Watch closely for changes in your health, and be sure to contact your doctor if: 
  · Your blood pressure measures 140/90 or higher at least 2 times.  That means the top number is 140 or higher or the bottom number is 90 or higher, or both.  
  · You think you may be having side effects from your blood pressure medicine.  
  · Your blood pressure is usually normal, but it goes above normal at least 2 times. Where can you learn more? Go to http://sumit-uli.info/. Enter C006 in the search box to learn more about \"High Blood Pressure: Care Instructions. \" Current as of: December 6, 2017 Content Version: 11.7 © 3095-9391 [employer]. Care instructions adapted under license by [employer] (which disclaims liability or warranty for this information). If you have questions about a medical condition or this instruction, always ask your healthcare professional. [address] any warranty or liability for your use of this information. Atopic Dermatitis: Care Instructions Your Care Instructions Atopic dermatitis (also called eczema) is a skin problem that causes intense itching and a red, raised rash. In severe cases, the rash develops clear fluid-filled blisters. The rash is not contagious. People with this condition seem to have very sensitive immune systems that are likely to react to things that cause allergies. The immune system is the body's way of fighting infection. There is no cure for atopic dermatitis, but you may be able to control it with care at home. Follow-up care is a key part of your treatment and safety. Be sure to make and go to all appointments, and call your doctor if you are having problems. It's also a good idea to know your test results and keep a list of the medicines you take. How can you care for yourself at home? · Use moisturizer at least twice a day. · If your doctor prescribes a cream, use it as directed. If your doctor prescribes other medicine, take it exactly as directed. · Wash the affected area with water only.  Soap can make dryness and itching worse. Pat dry. · Apply a moisturizer after bathing. Use a cream such as Lubriderm, Moisturel, or Cetaphil that does not irritate the skin or cause a rash. Apply the cream while your skin is still damp after lightly drying with a towel. · Use cold, wet cloths to reduce itching. · Keep cool, and stay out of the sun. · If itching affects your normal activities, an over-the-counter antihistamine, such as diphenhydramine (Benadryl) or loratadine (Claritin) may help. Read and follow all instructions on the label. When should you call for help? Call your doctor now or seek immediate medical care if: 
  · Your rash gets worse and you have a fever.  
  · You have new blisters or bruises, or the rash spreads and looks like a sunburn.  
  · You have signs of infection, such as: 
¨ Increased pain, swelling, warmth, or redness. ¨ Red streaks leading from the rash. ¨ Pus draining from the rash. ¨ A fever.  
  · You have crusting or oozing sores.  
  · You have joint aches or body aches along with your rash.  
 Watch closely for changes in your health, and be sure to contact your doctor if: 
  · Your rash does not clear up after 2 to 3 weeks of home treatment.  
  · Itching interferes with your sleep or daily activities. Where can you learn more? Go to http://sumit-uli.info/. Enter A127 in the search box to learn more about \"Atopic Dermatitis: Care Instructions. \" Current as of: October 5, 2017 Content Version: 11.7 © 6018-6820 [employer]. Care instructions adapted under license by [employer] (which disclaims liability or warranty for this information). If you have questions about a medical condition or this instruction, always ask your healthcare professional. [address] any warranty or liability for your use of this information. Impetigo: Care Instructions Your Care Instructions Impetigo (say \"rv-mal-YT-go\") is a skin infection caused by bacteria. It causes blisters that break and become oozing, yellow, crusty sores. Impetigo can be anywhere on the body. Scratching the sores may spread the infection to other parts of the body. You can also spread it to others through close contact or when you share towels, clothing, and other items. Prescription antibiotic ointment or pills can usually cure impetigo. (After a day of antibiotics, the infection should not spread.) Follow-up care is a key part of your treatment and safety. Be sure to make and go to all appointments, and call your doctor if you are having problems. It's also a good idea to know your test results and keep a list of the medicines you take. How can you care for yourself at home? · Apply antibiotic ointment exactly as instructed. · If your doctor prescribed antibiotic pills, take them as directed. Do not stop using them just because you feel better. You need to take the full course of antibiotics. · Gently wash the sores with soap and water each day. If crusts form, your doctor may advise you to soften or remove the crusts. You can do this by soaking them in warm water and patting them dry. This can help the cream or ointment treat impetigo. · After you touch the area, wash your hands with soap and water. Or you can use an alcohol-based hand . · Don't share items such as towels, sheets, and clothing until the infection is gone. · Wash anything that may have touched the infected area. · Try to avoid scratching the area. When should you call for help? Call your doctor now or seek immediate medical care if: 
  · You have symptoms of a worse infection, such as: 
¨ Increased pain, swelling, warmth, or redness. ¨ Red streaks leading from the area. ¨ Pus draining from the area.  
¨ A fever.  
  · Impetigo gets worse or spreads to other areas.  
 Watch closely for changes in your health, and be sure to contact your doctor if: 
  · You do not get better as expected. Where can you learn more? Go to http://sumit-uli.info/. Enter V855 in the search box to learn more about \"Impetigo: Care Instructions. \" Current as of: May 12, 2017 Content Version: 11.7 © 9175-2064 [employer]. Care instructions adapted under license by [employer] (which disclaims liability or warranty for this information). If you have questions about a medical condition or this instruction, always ask your healthcare professional. [address] any warranty or liability for your use of this information. Introducing [facility]! Nini Ramirez introduces Hypercontext patient portal. Now you can access parts of your medical record, email your doctor's office, and request medication refills online. 1. In your internet browser, go to [email or web address] 2. Click on the First Time User? Click Here link in the Sign In box. You will see the New Member Sign Up page. 3. Enter your Hypercontext Access Code exactly as it appears below. You will not need to use this code after youve completed the sign-up process. If you do not sign up before the expiration date, you must request a new code. · Hypercontext Access Code: [email or web address] Expires: 9/9/2018  9:11 AM 
 
4. Enter the last four digits of your Social Security Number (xxxx) and Date of Birth (mm/dd/yyyy) as indicated and click Submit. You will be taken to the next sign-up page. 5. Create a Hotelementst ID. This will be your Hypercontext login ID and cannot be changed, so think of one that is secure and easy to remember. 6. Create a Hypercontext password. You can change your password at any time. 7. Enter your Password Reset Question and Answer. This can be used at a later time if you forget your password. 8. Enter your e-mail address. You will receive e-mail notification when new information is available in [address] 9. Click Sign Up. You can now view and download portions of your medical record. 10. Click the Download Summary menu link to download a portable copy of your medical information. If you have questions, please visit the Frequently Asked Questions section of the [employer] website. Remember, [employer] is NOT to be used for urgent needs. For medical emergencies, dial 911. Now available from your iPhone and Android! Please provide this summary of care documentation to your next provider. Your primary care clinician is listed as CARLOS ASKEW. If you have any questions after today's visit, please call [phone].

## 2018-07-24 NOTE — PROGRESS NOTES
Chief Complaint   Patient presents with    Rash         HPI:    Shandra Lucero is a 76 y.o.  male and presents with acute rash. Shandra Lucero is a 76 y.o. male who presents for evaluation of rash. Rash started 7 days ago. Lesions are clear and pink in color, are of flat, raised textrure, and vary in size. Initial distribution: armsleft and central, neck. Rash has changed over time. Discomfort associated with rash: pruritic. Associated symptoms: no associated symptoms. Denies: no associated symptoms. Patient has not had previous evaluation of rash. Patient has had previous treatment. He has a history of eczema. He generally has a couple of episodes per year of eczema. He lived in [place] and moved here after 40 years and that seemed to exacerbate his symptoms moving back. Patient has not had contacts with similar rash. Patient has not identified precipitant. Patient has not had new exposures (soaps, lotions, laundry detergents, foods, medications, plants, insects or animals.)          Past Medical History:   Diagnosis Date    Diabetes ([address]     GERD (gastroesophageal reflux disease)     Hx of colonic polyps     Hx of gallstones     Hypercholesterolemia     Hypertension     Restless leg syndrome     Wears dentures      Past Surgical History:   Procedure Laterality Date    HX OTHER SURGICAL      gallstones removed    VT COLONOSCOPY FLX DX W/COLLJ SPEC WHEN PFRMD  2-17-16    Dr. Liliane Mcburney     Allergies   Allergen Reactions    Latex Sneezing    Benzalkonium Chloride Hives    Neosporin [Hydrocortisone] Rash     Current Outpatient Prescriptions   Medication Sig Dispense Refill    glucose blood VI test strips (ACCU-CHEK SMARTVIEW TEST STRIP) strip Test blood sugar 1 times a day. DX E11.65 100 Strip 3    lisinopril (PRINIVIL, ZESTRIL) 10 mg tablet Take 1 Tab by mouth daily. 90 Tab 3    pravastatin (PRAVACHOL) 40 mg tablet Take 1 Tab by mouth nightly.  90 Tab 3    pantoprazole (PROTONIX) 40 mg tablet Take 1 Tab by mouth daily. 90 Tab 3    SITagliptin-metFORMIN (JANUMET XR) 50-1,000 mg TM24 Take 2 Tabs by mouth daily. 180 Tab 3    triamcinolone acetonide (KENALOG) 0.1 % topical cream use thin layer on skin around eye 15 g 0    methylPREDNISolone (MEDROL DOSEPACK) 4 mg tablet Take as directed. 1 Dose Pack 0    hydrOXYzine pamoate (VISTARIL) 25 mg capsule Take 1 Cap by mouth three (3) times daily as needed for Itching. 60 Cap 3    Lancets misc Accu-check  Fastclix Lancets. Test blood sugar daily. DX E11.9 100 Each 3    alcohol swabs (BD SINGLE USE SWABS REGULAR) padm Use daily to check blood sugar 100 Pad 4    Blood Glucose Control, Normal (ACCU-CHEK SMARTVIEW CONTRL SOL) soln Test blood sugar 1 x daily  DX E11.9        1 year supply  Check controls monthy on glucose meter 3 mL 4    Blood-Glucose Meter monitoring kit Accu-check Smartview Meter 1 Kit 0     Family History   Problem Relation Age of Onset    Heart Attack Mother     Prostate Cancer Father     Colon Cancer Father     Stroke Sister      Social History     Social History    Marital status: LEGALLY      Spouse name: N/A    Number of children: N/A    Years of education: N/A     Social History Main Topics    Smoking status: Current Every Day Smoker     Packs/day: 0.50    Smokeless tobacco: Never Used    Alcohol use Yes      Comment: occas.     Drug use: No    Sexual activity: Not Asked     Other Topics Concern    None     Social History Narrative       ROS   Pertinent in HPI  Physical Exam     Nursing notes and vitals reviewed  Visit Vitals    BP (!) 147/91 (BP 1 Location: Left arm, BP Patient Position: Sitting)    Pulse 84    Temp 98.9 °F (37.2 °C) (Oral)    Resp 20    Ht 5' 6\" (1.676 m)    Wt 210 lb (95.3 kg)    SpO2 95%    BMI 33.89 kg/m2     General:  alert, cooperative, no distress, appears stated age   Primary findings: macules, papules, with left arm area of crust, drainage and excoriation, questionable folliculitis neck   Lesion Size: Various sizing. Color of lesions:  flesh-colored, pink, erythematous, brown, erythematous base   Blanching: not applicable   Configuration: eczematoid   Secondary Features:  crusts: honey-colored and clear, some excoriations left arm ?impetigo, secondary infection   Distribution:  Mostly left forearm and in fold of elbow   Lymph Nodes:  Not noted       LABS   Results for orders placed or performed in visit on 06/11/18   OCCULT BLOOD, IMMUNOASSAY (FIT)   Result Value Ref Range    Occult blood fecal, by IA Negative Negative           Assessment/Plan:      ICD-10-CM ICD-9-CM    1. Atopic dermatitis, unspecified type L20.9 691.8    2. Itching L29.9 698.9 hydrOXYzine HCl (ATARAX) 25 mg tablet   3. Impetigo L01.00 684    4. Secondary infection of skin L08.89 686.8    5. Nuchal folliculitis V81.9 015.8        1. Keflex 500 mg twice a day by mouth for ? Secondary infection/impetigo  2. Steroid will be called in to pharmacy  - triamcinolone . 025% apply thin layer to affected areas on arms for one week (do not apply to neck)  3. Follow up in 5-7 days or sooner if needed   4. Follow instructions on keeping area hydrated and moisturized  5. Remember to be careful when using steroid cream-can thin skin especially around neck and other areas  6. Hydroxyzine for itching if needed, may cause drowsiness    I have discussed the diagnosis with the patient and the intended plan as seen in the above orders. The patient has received an after-visit summary and questions were answered concerning future plans. I have discussed medication side effects and warnings with the patient as well. I have reviewed the plan of care with the patient, accepted their input and they are in agreement with the treatment goals. Follow-up Disposition: follow up in 5-7 days      Mr. Bria Beasley has a reminder for a \"due or due soon\" health maintenance.  I have asked that he contact his primary care provider for follow-up on this health maintenance. AMY Moran PA-C  [facility]      Attempted to call patient with further instructions, but was unable to reach him. I will try again tomorrow. There is also some confusion with his pharmacy information which states [place]. Treatment plan discussed with Dr. Betty Feliciano  Nurse attempted to call patient this morning, no answer, message left. 7/25/18 phoned patient at 7pm  and reported treatment plan with him. He will discontinue bactroban because he already picked it up. He will  AVS tomorrow. I reviewed the patient's medical history, the physician assistant's findings on physical examination, the patient's diagnoses, and treatment plan as documented in the progress note. I concur with the treatment plan as documented. There are no additional recommendations at this time.     Yu Heath MD

## 2018-07-24 NOTE — PROGRESS NOTES
Patient is in the office today for rash x 2 weeks. 1. Have you been to the ER, urgent care clinic since your last visit? Hospitalized since your last visit? No    2. Have you seen or consulted any other health care providers outside of the [address] [place] [address] since your last visit? Include any pap smears or colon screening.  No

## 2018-07-25 ENCOUNTER — TELEPHONE (OUTPATIENT)
Dept: FAMILY MEDICINE CLINIC | Age: 68
End: 2018-07-25

## 2018-07-25 RX ORDER — HYDROXYZINE 25 MG/1
25 TABLET, FILM COATED ORAL
Qty: 30 TAB | Refills: 1 | Status: SHIPPED | OUTPATIENT
Start: 2018-07-25 | End: 2018-08-04

## 2018-07-25 RX ORDER — CEPHALEXIN 500 MG/1
500 CAPSULE ORAL 2 TIMES DAILY
Qty: 14 CAP | Refills: 0 | Status: SHIPPED | OUTPATIENT
Start: 2018-07-25 | End: 2018-08-01

## 2018-07-25 RX ORDER — TRIAMCINOLONE ACETONIDE 0.25 MG/G
CREAM TOPICAL 2 TIMES DAILY
Qty: 15 G | Refills: 0 | Status: SHIPPED | OUTPATIENT
Start: 2018-07-25 | End: 2018-12-12 | Stop reason: ALTCHOICE

## 2018-07-25 NOTE — TELEPHONE ENCOUNTER
LM for Pt that [address] will be contacting Pt sometime today to discuss options for treatment. Also request update on [facility] [place], [place].

## 2018-08-14 ENCOUNTER — OFFICE VISIT (OUTPATIENT)
Dept: FAMILY MEDICINE CLINIC | Age: 68
End: 2018-08-14

## 2018-08-14 VITALS
HEIGHT: 66 IN | BODY MASS INDEX: 33.27 KG/M2 | OXYGEN SATURATION: 98 % | HEART RATE: 83 BPM | DIASTOLIC BLOOD PRESSURE: 78 MMHG | SYSTOLIC BLOOD PRESSURE: 118 MMHG | RESPIRATION RATE: 18 BRPM | WEIGHT: 207 LBS | TEMPERATURE: 98.3 F

## 2018-08-14 DIAGNOSIS — L20.89 FLEXURAL ATOPIC DERMATITIS: Primary | ICD-10-CM

## 2018-08-14 RX ORDER — MUPIROCIN 20 MG/G
OINTMENT TOPICAL
Refills: 0 | COMMUNITY
Start: 2018-07-24 | End: 2018-08-14

## 2018-08-14 RX ORDER — HYDROXYZINE 25 MG/1
TABLET, FILM COATED ORAL
Refills: 1 | COMMUNITY
Start: 2018-07-25 | End: 2019-05-21 | Stop reason: SDUPTHER

## 2018-08-14 RX ORDER — PREDNISONE 10 MG/1
TABLET ORAL
Qty: 21 TAB | Refills: 0 | Status: SHIPPED | OUTPATIENT
Start: 2018-08-14 | End: 2018-12-12 | Stop reason: ALTCHOICE

## 2018-08-14 NOTE — PROGRESS NOTES
Pacheco Karimi is a 76 y.o.  male and presents with Rash (f/u)      SUBJECTIVE:  Pt has h/o skin problems over the years especially in the summer. He has been to [facility] before  about 2 years ago. Pt is currently having a generalized skin rash with cracking and draining of the skin initially on the left forearm. Atarax does not help the itching and Bactroban made rash worst. Rash has been improving with steroid crm. Respiratory ROS: negative for - shortness of breath  Cardiovascular ROS: negative for - chest pain    Current Outpatient Prescriptions   Medication Sig    predniSONE (STERAPRED DS) 10 mg dose pack See administration instruction per 10mg dose pack    glucose blood VI test strips (ACCU-CHEK SMARTVIEW TEST STRIP) strip Test blood sugar 1 times a day. DX E11.65    lisinopril (PRINIVIL, ZESTRIL) 10 mg tablet Take 1 Tab by mouth daily.  pravastatin (PRAVACHOL) 40 mg tablet Take 1 Tab by mouth nightly.  pantoprazole (PROTONIX) 40 mg tablet Take 1 Tab by mouth daily.  SITagliptin-metFORMIN (JANUMET XR) 50-1,000 mg TM24 Take 2 Tabs by mouth daily.  Lancets misc Accu-check  Fastclix Lancets. Test blood sugar daily. DX E11.9    alcohol swabs (BD SINGLE USE SWABS REGULAR) padm Use daily to check blood sugar    Blood Glucose Control, Normal (ACCU-CHEK SMARTVIEW CONTRL SOL) soln Test blood sugar 1 x daily  DX E11.9        1 year supply  Check controls monthy on glucose meter    Blood-Glucose Meter monitoring kit Accu-check Smartview Meter    hydrOXYzine HCl (ATARAX) 25 mg tablet TAKE 1 TABLET BY MOUTH 3 TIMES A DAY AS NEEDED FOR ITCHING FOR UP TO 10 DAYS. MAY CAUSE DROWSINESS    triamcinolone acetonide (KENALOG) 0.025 % topical cream Apply  to affected area two (2) times a day.  use thin layer for one week to areas on arms    triamcinolone acetonide (KENALOG) 0.1 % topical cream use thin layer on skin around eye    hydrOXYzine pamoate (VISTARIL) 25 mg capsule Take 1 Cap by mouth three (3) times daily as needed for Itching. No current facility-administered medications for this visit. OBJECTIVE:  alert, well appearing, and in no distress  Visit Vitals    /78 (BP 1 Location: Left arm, BP Patient Position: Sitting)    Pulse 83    Temp 98.3 °F (36.8 °C) (Oral)    Resp 18    Ht 5' 6\" (1.676 m)    Wt 207 lb (93.9 kg)    SpO2 98%    BMI 33.41 kg/m2      well developed and well nourished  Skin - healing cracked skin on left forearm. Generalized rash on [place]        Assessment/Plan      ICD-10-CM ICD-9-CM    1. Flexural atopic dermatitis L20.89 691.8 REFERRAL TO DERMATOLOGY      Will treat with predniSONE (STERAPRED DS) 10 mg dose pack which should help itching also. Pt to monitor BS while on prednisone and call if they become elevated. ie >250. Lance Crain Follow-up Disposition:  Return if symptoms worsen or fail to improve. Reviewed plan of care. Patient has provided input and agrees with goals.

## 2018-08-14 NOTE — PROGRESS NOTES
Patient is in the office today for a 1 week follow up. Patient states when he uses atarax it makes his itch worse. 1. Have you been to the ER, urgent care clinic since your last visit? Hospitalized since your last visit? No    2. Have you seen or consulted any other health care providers outside of the [address] [place] [address] since your last visit? Include any pap smears or colon screening.  No

## 2018-08-14 NOTE — PATIENT INSTRUCTIONS

## 2018-08-14 NOTE — MR AVS SNAPSHOT
Lucio Leslie 
 
 
 [address], [place] [address] [address] 
[phone] Patient: Ann Armstrong MRN: [id number] XYR:[id number] Visit Information Date & Time Provider Department Dept. Phone Encounter #  
 8/14/2018  4:15 PM Juan C Alexandre, [address] [place] [address] [phone] [id number] Follow-up Instructions Return if symptoms worsen or fail to improve. Your Appointments 12/5/2018  7:10 AM  
LAB with [facility] (HYUN Lafleur) Appt Note: lab [address] [facility] [address] [address]  
[phone]  
  
   
 [address], [facility]  
  
    
 12/12/2018  8:00 AM  
Office Visit with Juan C Alexandre MD  
[address] [id number] [facility]) Appt Note: 6 m fu  
 [address], [facility] [address] [address]  
[phone]  
  
   
 [address],  [address] Upcoming Health Maintenance Date Due COLONOSCOPY 3/11/1968 DTaP/Tdap/Td series (1 - Tdap) 3/11/1971 Influenza Age 5 to Adult 8/1/2018 HEMOGLOBIN A1C Q6M 10/24/2018 FOOT EXAM Q1 2/12/2019 MICROALBUMIN Q1 4/24/2019 LIPID PANEL Q1 4/24/2019 EYE EXAM RETINAL OR DILATED Q1 4/27/2019 MEDICARE YEARLY EXAM 6/12/2019 GLAUCOMA SCREENING Q2Y 4/27/2020 Allergies as of 8/14/2018  Review Complete On: 8/14/2018 By: Juan C Alexandre MD  
  
 Severity Noted Reaction Type Reactions Latex  01/08/2016    Sneezing Benzalkonium Chloride  06/23/2016    Hives Neosporin [Hydrocortisone]  01/08/2016    Rash Current Immunizations  Reviewed on 3/15/2017 Name Date Influenza Vaccine 9/12/2016 Influenza Vaccine (Quad) PF 1/8/2016 Pneumococcal Conjugate (PCV-13) 1/8/2016 Pneumococcal Polysaccharide (PPSV-23) 3/15/2017 Zoster Vaccine, Live 9/8/2015 Not reviewed this visit You Were Diagnosed With   
  
 Codes Comments Flexural atopic dermatitis    -  Primary ICD-10-CM: L20.89 ICD-9-CM: 691.8 Vitals BP Pulse Temp Resp Height(growth percentile) Weight(growth percentile) 118/78 (BP 1 Location: Left arm, BP Patient Position: Sitting) 83 98.3 °F (36.8 °C) (Oral) 18 5' 6\" (1.676 m) 207 lb (93.9 kg) SpO2 BMI Smoking Status 98% 33.41 kg/m2 Current Every Day Smoker BMI and BSA Data Body Mass Index Body Surface Area  
 33.41 kg/m 2 2.09 m 2 Preferred Pharmacy Pharmacy Name Phone [id number] [place], [address] [place] [address] Your Updated Medication List  
  
   
This list is accurate as of 8/14/18  4:51 PM.  Always use your most recent med list.  
  
  
  
  
 alcohol swabs Padm Commonly known as:  BD Single Use Swabs Regular Use daily to check blood sugar Blood Glucose Control, Normal Soln Commonly known as:  ACCU-CHEK SMARTVIEW CONTRL SOL Test blood sugar 1 x daily  DX E11.9        1 year supply  Check controls monthy on glucose meter Blood-Glucose Meter monitoring kit Accu-check Smartview Meter  
  
 glucose blood VI test strips strip Commonly known as:  [address] Test blood sugar 1 times a day. DX E11.65  
  
 hydrOXYzine HCl 25 mg tablet Commonly known as:  ATARAX TAKE 1 TABLET BY MOUTH 3 TIMES A DAY AS NEEDED FOR ITCHING FOR UP TO 10 DAYS. MAY CAUSE DROWSINESS  
  
 hydrOXYzine pamoate 25 mg capsule Commonly known as:  VISTARIL Take 1 Cap by mouth three (3) times daily as needed for Itching. Lancets Misc Accu-check  Fastclix Lancets. Test blood sugar daily. DX E11.9  
  
 lisinopril 10 mg tablet Commonly known as:  Najera Edman Take 1 Tab by mouth daily. mupirocin 2 % ointment Commonly known as:  BACTROBAN  
APPLY TO AFFECTED AREA ON LEFT ELBOW 3 TIMES A DAY pantoprazole 40 mg tablet Commonly known as:  PROTONIX Take 1 Tab by mouth daily. pravastatin 40 mg tablet Commonly known as:  PRAVACHOL Take 1 Tab by mouth nightly. predniSONE 10 mg dose pack Commonly known as:  STERAPRED DS See administration instruction per 10mg dose pack SITagliptin-metFORMIN 50-1,000 mg Tm24 Commonly known as:  JANUMET XR Take 2 Tabs by mouth daily. * triamcinolone acetonide 0.1 % topical cream  
Commonly known as:  KENALOG  
use thin layer on skin around eye * triamcinolone acetonide 0.025 % topical cream  
Commonly known as:  KENALOG Apply  to affected area two (2) times a day. use thin layer for one week to areas on arms * Notice: This list has 2 medication(s) that are the same as other medications prescribed for you. Read the directions carefully, and ask your doctor or other care provider to review them with you. Prescriptions Sent to Pharmacy Refills  
 predniSONE (STERAPRED DS) 10 mg dose pack 0 Sig: See administration instruction per 10mg dose pack Class: Normal  
 Pharmacy: [id number]- [place], [facility] #: [phone] We Performed the Following REFERRAL TO DERMATOLOGY [REF19 Custom] Comments:  
 Refer to Dr Oliverio Love for dermatitis Follow-up Instructions Return if symptoms worsen or fail to improve. Referral Information Referral ID Referred By Referred To  
  
 [id number] JAMILAH [address] [place] [address], MD   
   [address] [place] [address] Phone: [phone] Fax: [phone] Visits Status Start Date End Date 1 New Request 8/14/18 8/14/19 If your referral has a status of pending review or denied, additional information will be sent to support the outcome of this decision. Patient Instructions Rash: Care Instructions Your Care Instructions A rash is any irritation or inflammation of the skin.  Rashes have many possible causes, including allergy, infection, illness, heat, and emotional stress. Follow-up care is a key part of your treatment and safety. Be sure to make and go to all appointments, and call your doctor if you are having problems. It's also a good idea to know your test results and keep a list of the medicines you take. How can you care for yourself at home? · Wash the area with water only. Soap can make dryness and itching worse. Pat dry. · Put cold, wet cloths on the rash to reduce itching. · Keep cool, and stay out of the sun. · Leave the rash open to the air as much of the time as possible. · Sometimes petroleum jelly (Vaseline) can help relieve the discomfort caused by a rash. A moisturizing lotion, such as Cetaphil, also may help. Calamine lotion may help for rashes caused by contact with something (such as a plant or soap) that irritated the skin. Use it 3 or 4 times a day. · If your doctor prescribed a cream, use it as directed. If your doctor prescribed medicine, take it exactly as directed. · If your rash itches so badly that it interferes with your normal activities, take an over-the-counter antihistamine, such as diphenhydramine (Benadryl) or loratadine (Claritin). Read and follow all instructions on the label. When should you call for help? Call your doctor now or seek immediate medical care if: 
  · You have signs of infection, such as: 
¨ Increased pain, swelling, warmth, or redness. ¨ Red streaks leading from the area. ¨ Pus draining from the area. ¨ A fever.  
  · You have joint pain along with the rash.  
 Watch closely for changes in your health, and be sure to contact your doctor if: 
  · Your rash is changing or getting worse. For example, call if you have pain along with the rash, the rash is spreading, or you have new blisters.  
  · You do not get better after 1 week. Where can you learn more? Go to http://avery.info/. Enter Q440 in the search box to learn more about \"Rash: Care Instructions. \" Current as of: October 5, 2017 Content Version: 11.7 © 2929-6762 [employer], [employer]. Care instructions adapted under license by [employer] (which disclaims liability or warranty for this information). If you have questions about a medical condition or this instruction, always ask your healthcare professional. [address] any warranty or liability for your use of this information. Introducing [facility]! [facility] introduces [employer] patient portal. Now you can access parts of your medical record, email your doctor's office, and request medication refills online. 1. In your internet browser, go to [email or web address] 2. Click on the First Time User? Click Here link in the Sign In box. You will see the New Member Sign Up page. 3. Enter your [employer] Access Code exactly as it appears below. You will not need to use this code after youve completed the sign-up process. If you do not sign up before the expiration date, you must request a new code. · [employer] Access Code: [email or web address] Expires: 9/9/2018  9:11 AM 
 
4. Enter the last four digits of your Social Security Number (xxxx) and Date of Birth (mm/dd/yyyy) as indicated and click Submit. You will be taken to the next sign-up page. 5. Create a [employer] ID. This will be your [employer] login ID and cannot be changed, so think of one that is secure and easy to remember. 6. Create a [employer] password. You can change your password at any time. 7. Enter your Password Reset Question and Answer. This can be used at a later time if you forget your password. 8. Enter your e-mail address. You will receive e-mail notification when new information is available in [address] 9. Click Sign Up. You can now view and download portions of your medical record. 10. Click the Download Summary menu link to download a portable copy of your medical information. If you have questions, please visit the Frequently Asked Questions section of the [employer] website. Remember, [employer] is NOT to be used for urgent needs. For medical emergencies, dial 911. Now available from your iPhone and Android! Please provide this summary of care documentation to your next provider. Your primary care clinician is listed as CARLOS ASKEW. If you have any questions after today's visit, please call [phone].

## 2018-12-05 ENCOUNTER — HOSPITAL ENCOUNTER (OUTPATIENT)
Dept: LAB | Age: 68
Discharge: HOME | End: 2018-12-05

## 2018-12-05 PROCEDURE — 99001 SPECIMEN HANDLING PT-LAB: CPT

## 2018-12-06 LAB
ALBUMIN SERPL-MCNC: 4.3 G/DL (ref 3.6–4.8)
ALBUMIN/GLOB SERPL: 1.9 {RATIO} (ref 1.2–2.2)
ALP SERPL-CCNC: 85 IU/L (ref 39–117)
ALT SERPL-CCNC: 8 IU/L (ref 0–44)
AST SERPL-CCNC: 12 IU/L (ref 0–40)
BILIRUB SERPL-MCNC: 0.4 MG/DL (ref 0–1.2)
BUN SERPL-MCNC: 9 MG/DL (ref 8–27)
BUN/CREAT SERPL: 8 (ref 10–24)
CALCIUM SERPL-MCNC: 9.7 MG/DL (ref 8.6–10.2)
CHLORIDE SERPL-SCNC: 104 MMOL/L (ref 96–106)
CHOLEST SERPL-MCNC: 140 MG/DL (ref 100–199)
CO2 SERPL-SCNC: 26 MMOL/L (ref 20–29)
CREAT SERPL-MCNC: 1.11 MG/DL (ref 0.76–1.27)
GLOBULIN SER CALC-MCNC: 2.3 G/DL (ref 1.5–4.5)
GLUCOSE SERPL-MCNC: 150 MG/DL (ref 65–99)
HBA1C MFR BLD: 7.5 % (ref 4.8–5.6)
HDLC SERPL-MCNC: 49 MG/DL
LDLC SERPL CALC-MCNC: 71 MG/DL (ref 0–99)
POTASSIUM SERPL-SCNC: 4.3 MMOL/L (ref 3.5–5.2)
PROT SERPL-MCNC: 6.6 G/DL (ref 6–8.5)
PSA SERPL-MCNC: 4.3 NG/ML (ref 0–4)
SODIUM SERPL-SCNC: 141 MMOL/L (ref 134–144)
TRIGL SERPL-MCNC: 99 MG/DL (ref 0–149)
VLDLC SERPL CALC-MCNC: 20 MG/DL (ref 5–40)

## 2018-12-09 DIAGNOSIS — E78.00 HIGH CHOLESTEROL: ICD-10-CM

## 2018-12-09 DIAGNOSIS — I10 ESSENTIAL HYPERTENSION: ICD-10-CM

## 2018-12-12 ENCOUNTER — OFFICE VISIT (OUTPATIENT)
Dept: FAMILY MEDICINE CLINIC | Age: 68
End: 2018-12-12

## 2018-12-12 VITALS
HEART RATE: 87 BPM | WEIGHT: 208.8 LBS | BODY MASS INDEX: 33.56 KG/M2 | HEIGHT: 66 IN | SYSTOLIC BLOOD PRESSURE: 144 MMHG | TEMPERATURE: 97.8 F | RESPIRATION RATE: 20 BRPM | DIASTOLIC BLOOD PRESSURE: 90 MMHG | OXYGEN SATURATION: 97 %

## 2018-12-12 DIAGNOSIS — I10 ESSENTIAL HYPERTENSION: ICD-10-CM

## 2018-12-12 DIAGNOSIS — E78.00 HIGH CHOLESTEROL: ICD-10-CM

## 2018-12-12 DIAGNOSIS — J40 BRONCHITIS: ICD-10-CM

## 2018-12-12 DIAGNOSIS — M25.521 RIGHT ELBOW PAIN: ICD-10-CM

## 2018-12-12 DIAGNOSIS — Z12.11 COLON CANCER SCREENING: ICD-10-CM

## 2018-12-12 DIAGNOSIS — G44.311 INTRACTABLE ACUTE POST-TRAUMATIC HEADACHE: ICD-10-CM

## 2018-12-12 DIAGNOSIS — M54.9 UPPER BACK PAIN: ICD-10-CM

## 2018-12-12 DIAGNOSIS — R97.20 PSA ELEVATION: ICD-10-CM

## 2018-12-12 DIAGNOSIS — R05.9 COUGH: ICD-10-CM

## 2018-12-12 RX ORDER — HALOBETASOL PROPIONATE 0.5 MG/G
CREAM TOPICAL
Refills: 1 | COMMUNITY
Start: 2018-10-22

## 2018-12-12 RX ORDER — TOBRAMYCIN / DEXAMETHASONE 3; .5 MG/ML; MG/ML
SUSPENSION/ DROPS OPHTHALMIC
Refills: 0 | COMMUNITY
Start: 2018-11-30 | End: 2020-05-19 | Stop reason: ALTCHOICE

## 2018-12-12 RX ORDER — PREDNISONE 10 MG/1
TABLET ORAL
Qty: 25 TAB | Refills: 0 | Status: SHIPPED | OUTPATIENT
Start: 2018-12-12 | End: 2019-04-19 | Stop reason: ALTCHOICE

## 2018-12-12 RX ORDER — AZITHROMYCIN 250 MG/1
TABLET, FILM COATED ORAL
Qty: 6 TAB | Refills: 0 | Status: SHIPPED | OUTPATIENT
Start: 2018-12-12 | End: 2018-12-17

## 2018-12-12 NOTE — PROGRESS NOTES
Patient is in the office today for a 6 month follow up. Patient states he fell last Monday 12/3/2018 on the job and hit his head. Patient states he has a nagging headache, right shoulder and right elbow pain. Patient states he did not go to the hospital because he smokes [place] [employer] and would have gotten fired. Patient states he signed a waiver at work. Do you have an Advance Directive no  Do you want more information :information given    1. Have you been to the ER, urgent care clinic since your last visit? Hospitalized since your last visit? No    2. Have you seen or consulted any other health care providers outside of the [address] [place] [address] since your last visit? Include any pap smears or colon screening. Yes, [address]. Dr. Marc Salvage.

## 2018-12-12 NOTE — PROGRESS NOTES
Subjective:       Chief Complaint  The patient presents for follow up of diabetes, hypertension and high cholesterol. MIRTA Bae is a 76 y.o. male seen for follow up of diabetes. Healso has hypertension and hyperlipidemia. Diabetes borderline controlled on Janumet, pt to try and improve diet and continue to lose weight, no significant medication side effects noted, on Janumet, hypertension well controlled, no significant medication side effects noted, on lisinopril, hyperlipidemia well controlled, no significant medication side effects noted, on Pravachol. Diet and Lifestyle: generally follows a low fat low cholesterol diet, follows a diabetic diet regularly, exercises sporadically    Home BP Monitoring: is not measured at home. Diabetic Review of Systems - home glucose monitoring: is performed regularly, 1x/day. -150      Other symptoms and concerns: pt continues to work on diet and exercise to lose weight    Patient's PSA has risen about 2 points from 6 months ago. We will recheck it in 6 months and see if it continues to trend upwards. At that time will refer to urology if it has not decreased from current value of 4.3. 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 Patient slipped on the ice and hit his right leg elbow and his head at that time. He initially had some numbness tingling in his legs which has improved with taking Motrin over-the-counter. He continues to have some right elbow discomfort as well as right shoulder discomfort especially with sleeping on it at night. Patient also has had a headache that has slowly improved with using Motrin. He is cut back the dose of Motrin that he uses. He denies any loss of consciousness or difficulty with word finding.   Patient also has had a cough which started a few days ago and has been associated with some chest congestion as well as wheezing which his wife has noticed. The patient continues to smoke cigarettes. Of note he also smokes marijuana and this is the reason he did not go to the emergency room after the fall 2 days ago. Current Outpatient Medications   Medication Sig    halobetasol (ULTRAVATE) 0.05 % topical cream APPLY TO AFFECTED AREA TWICE A DAY AS NEEDED    TOBRADEX ST drps ophthalmix suspension INSTILL 1 DROP INTO LEFT EYE TWICE A DAY    azithromycin (ZITHROMAX) 250 mg tablet Take 2 tablets today, then take 1 tablet daily    predniSONE (DELTASONE) 10 mg tablet 5 tabs daily for 5 days    hydrOXYzine HCl (ATARAX) 25 mg tablet TAKE 1 TABLET BY MOUTH 3 TIMES A DAY AS NEEDED FOR ITCHING FOR UP TO 10 DAYS. MAY CAUSE DROWSINESS    glucose blood VI test strips (ACCU-CHEK SMARTVIEW TEST STRIP) strip Test blood sugar 1 times a day. DX E11.65    lisinopril (PRINIVIL, ZESTRIL) 10 mg tablet Take 1 Tab by mouth daily.  pravastatin (PRAVACHOL) 40 mg tablet Take 1 Tab by mouth nightly.  pantoprazole (PROTONIX) 40 mg tablet Take 1 Tab by mouth daily.  SITagliptin-metFORMIN (JANUMET XR) 50-1,000 mg TM24 Take 2 Tabs by mouth daily.  Lancets misc Accu-check  Fastclix Lancets. Test blood sugar daily. DX E11.9    alcohol swabs (BD SINGLE USE SWABS REGULAR) padm Use daily to check blood sugar    Blood Glucose Control, Normal (ACCU-CHEK SMARTVIEW CONTRL SOL) soln Test blood sugar 1 x daily  DX E11.9        1 year supply  Check controls monthy on glucose meter    Blood-Glucose Meter monitoring kit Accu-check Smartview Meter    hydrOXYzine pamoate (VISTARIL) 25 mg capsule Take 1 Cap by mouth three (3) times daily as needed for Itching. No current facility-administered medications for this visit.               Review of Systems  Respiratory: negative for dyspnea on exertion  Cardiovascular: negative for chest pain    Objective:     Visit Vitals  /90 (BP 1 Location: Left arm, BP Patient Position: Sitting)   Pulse 87   Temp 97.8 °F (36.6 °C) (Oral)   Resp 20   Ht 5' 6\" (1.676 m)   Wt 208 lb 12.8 oz (94.7 kg)   SpO2 97%   BMI 33.70 kg/m²        General appearance - alert, well appearing, and in no distress   HEENT tympanic membranes clear bilaterally, pupils equal round reactive to light, extra ocular muscles intact, oropharynx clear, tongue midline. Chest - congestion/crepitus right upper lung field  Heart - normal rate, regular rhythm, normal S1, S2, no murmurs, rubs, clicks or gallops  Extremities - no edema   MS: Right elbow with minimal tenderness to palpation, right shoulder with good range of motion minimal discomfort with internal and external rotation. Labs:   Lab Results   Component Value Date/Time    Hemoglobin A1c 7.5 (H) 12/05/2018 07:34 AM    Hemoglobin A1c 7.7 (H) 04/24/2018 07:35 AM    Hemoglobin A1c 7.7 (H) 09/07/2017 09:18 AM    Glucose 150 (H) 12/05/2018 07:34 AM    Glucose (POC) 158 (H) 02/17/2016 08:41 AM    Microalbumin/Creat ratio (mg/g creat) 8 04/24/2018 07:35 AM    Microalbumin,urine random 1.90 04/24/2018 07:35 AM    LDL, calculated 71 12/05/2018 07:34 AM    Creatinine 1.11 12/05/2018 07:34 AM      Lab Results   Component Value Date/Time    Cholesterol, total 140 12/05/2018 07:34 AM    HDL Cholesterol 49 12/05/2018 07:34 AM    LDL, calculated 71 12/05/2018 07:34 AM    Triglyceride 99 12/05/2018 07:34 AM    CHOL/HDL Ratio 3.1 04/24/2018 07:35 AM       Lab Results   Component Value Date/Time    ALT (SGPT) 8 12/05/2018 07:34 AM    AST (SGOT) 12 12/05/2018 07:34 AM    Alk.  phosphatase 85 12/05/2018 07:34 AM    Bilirubin, total 0.4 12/05/2018 07:34 AM       Lab Results   Component Value Date/Time    GFR est AA 78 12/05/2018 07:34 AM    GFR est non-AA 68 12/05/2018 07:34 AM    Creatinine 1.11 12/05/2018 07:34 AM    BUN 9 12/05/2018 07:34 AM    Sodium 141 12/05/2018 07:34 AM    Potassium 4.3 12/05/2018 07:34 AM    Chloride 104 12/05/2018 07:34 AM    CO2 26 12/05/2018 07:34 AM      Lab Results   Component Value Date/Time    Prostate Specific Ag 4.3 (H) 12/05/2018 07:34 AM    Prostate Specific Ag 2.4 09/07/2017 09:18 AM    Prostate Specific Ag 1.6 01/08/2016 10:54 AM           Assessment / Plan     Diabetes borderline controlled on Janumet will try to improve with weight loss. If not improving will start on Jardiance. Hypertension borderline controlled, on lisinopril. Patient will try to increase exercise to help improve blood pressure  Hyperlipidemia well controlled, on Pravachol . ICD-10-CM ICD-9-CM    1. Uncontrolled type 2 diabetes mellitus without complication, without long-term current use of insulin (HCC) E11.65 250.02 HEMOGLOBIN A1C W/O EAG      MICROALBUMIN, UR, RAND W/ MICROALB/CREAT RATIO   2. Essential hypertension X64 386.8 METABOLIC PANEL, COMPREHENSIVE   3. High cholesterol E78.00 272.0 LIPID PANEL   4. PSA elevation R97.20 790.93 Will recheck PSA, DIAGNOSTIC (PROSTATE SPECIFIC AG) in 6 months    5. Colon cancer screening Z12.11 V76.51 REFERRAL TO GASTROENTEROLOGY   6. Intractable acute post-traumatic headache G44.311 339.21 No signs of concussion or significant head injury. Will hold on CT head at this time  If not improving pt to f/u for further evaluation. 7. Right elbow pain M25.521 719.42  we will continue Motrin and patient is to follow-up if pain not improving within the next week or so   8. Upper back pain M54.9 724.5 Will treat with predniSONE (DELTASONE) 10 mg tablet and refer to a chiropractor   9. Bronchitis J40 490 Will treat with azithromycin (ZITHROMAX) 250 mg tablet      And predniSONE (DELTASONE) 10 mg tablet   10. Cough R05 786.2 XR CHEST PA LAT                Diabetic issues reviewed with him: diabetic diet discussed in detail, and low cholesterol diet, weight control and daily exercise discussed. Follow-up Disposition:  Return in about 6 months (around 6/12/2019) for labs 1 week before. Reviewed plan of care.  Patient has provided input and agrees with goals.        Discussed the patient's BMI with him. The BMI follow up plan is as follows:     dietary management education, guidance, and counseling  encourage exercise  monitor weight  prescribed dietary intake    An After Visit Summary was printed and given to the patient.

## 2018-12-12 NOTE — PATIENT INSTRUCTIONS
Advance Directives: Care Instructions  Your Care Instructions  An advance directive is a legal way to state your wishes at the end of your life. It tells your family and your doctor what to do if you can no longer say what you want. There are two main types of advance directives. You can change them any time that your wishes change. · A living will tells your family and your doctor your wishes about life support and other treatment. · A durable power of  for health care lets you name a person to make treatment decisions for you when you can't speak for yourself. This person is called a health care agent. If you do not have an advance directive, decisions about your medical care may be made by a doctor or a  who doesn't know you. It may help to think of an advance directive as a gift to the people who care for you. If you have one, they won't have to make tough decisions by themselves. Follow-up care is a key part of your treatment and safety. Be sure to make and go to all appointments, and call your doctor if you are having problems. It's also a good idea to know your test results and keep a list of the medicines you take. How can you care for yourself at home? · Discuss your wishes with your loved ones and your doctor. This way, there are no surprises. · Many states have a unique form. Or you might use a universal form that has been approved by many states. This kind of form can sometimes be completed and stored online. Your electronic copy will then be available wherever you have a connection to the Internet. In most cases, doctors will respect your wishes even if you have a form from a different state. · You don't need a  to do an advance directive. But you may want to get legal advice. · Think about these questions when you prepare an advance directive:  ? Who do you want to make decisions about your medical care if you are not able to?  Many people choose a family member or close friend. ? Do you know enough about life support methods that might be used? If not, talk to your doctor so you understand. ? What are you most afraid of that might happen? You might be afraid of having pain, losing your independence, or being kept alive by machines. ? Where would you prefer to die? Choices include your home, a hospital, or a nursing home. ? Would you like to have information about hospice care to support you and your family? ? Do you want to donate organs when you die? ? Do you want certain [employer] practices performed before you die? If so, put your wishes in the advance directive. · Read your advance directive every year, and make changes as needed. When should you call for help? Be sure to contact your doctor if you have any questions. Where can you learn more? Go to http://sumit-uli.info/. Enter R264 in the search box to learn more about \"Advance Directives: Care Instructions. \"  Current as of: April 19, 2018  Content Version: 11.8  © 1607-4714 [employer]. Care instructions adapted under license by [employer] (which disclaims liability or warranty for this information). If you have questions about a medical condition or this instruction, always ask your healthcare professional. [place] [address] any warranty or liability for your use of this information. High Blood Pressure: Care Instructions  Your Care Instructions    If your blood pressure is usually above 130/80, you have high blood pressure, or hypertension. That means the top number is 130 or higher or the bottom number is 80 or higher, or both. Despite what a lot of people think, high blood pressure usually doesn't cause headaches or make you feel dizzy or lightheaded. It usually has no symptoms. But it does increase your risk for heart attack, stroke, and kidney or eye damage. The higher your blood pressure, the more your risk increases.   Your doctor will give you a goal for your blood pressure. Your goal will be based on your health and your age. Lifestyle changes, such as eating healthy and being active, are always important to help lower blood pressure. You might also take medicine to reach your blood pressure goal.  Follow-up care is a key part of your treatment and safety. Be sure to make and go to all appointments, and call your doctor if you are having problems. It's also a good idea to know your test results and keep a list of the medicines you take. How can you care for yourself at home? Medical treatment  · If you stop taking your medicine, your blood pressure will go back up. You may take one or more types of medicine to lower your blood pressure. Be safe with medicines. Take your medicine exactly as prescribed. Call your doctor if you think you are having a problem with your medicine. · Talk to your doctor before you start taking aspirin every day. Aspirin can help certain people lower their risk of a heart attack or stroke. But taking aspirin isn't right for everyone, because it can cause serious bleeding. · See your doctor regularly. You may need to see the doctor more often at first or until your blood pressure comes down. · If you are taking blood pressure medicine, talk to your doctor before you take decongestants or anti-inflammatory medicine, such as ibuprofen. Some of these medicines can raise blood pressure. · Learn how to check your blood pressure at home. Lifestyle changes  · Stay at a healthy weight. This is especially important if you put on weight around the waist. Losing even 10 pounds can help you lower your blood pressure. · If your doctor recommends it, get more exercise. Walking is a good choice. Bit by bit, increase the amount you walk every day. Try for at least 30 minutes on most days of the week. You also may want to swim, bike, or do other activities. · Avoid or limit alcohol.  Talk to your doctor about whether you can drink any alcohol. · Try to limit how much sodium you eat to less than 2,300 milligrams (mg) a day. Your doctor may ask you to try to eat less than 1,500 mg a day. · Eat plenty of fruits (such as bananas and oranges), vegetables, legumes, whole grains, and low-fat dairy products. · Lower the amount of saturated fat in your diet. Saturated fat is found in animal products such as milk, cheese, and meat. Limiting these foods may help you lose weight and also lower your risk for heart disease. · Do not smoke. Smoking increases your risk for heart attack and stroke. If you need help quitting, talk to your doctor about stop-smoking programs and medicines. These can increase your chances of quitting for good. When should you call for help? Call 911 anytime you think you may need emergency care. This may mean having symptoms that suggest that your blood pressure is causing a serious heart or blood vessel problem. Your blood pressure may be over 180/120.   For example, call 911 if:    · You have symptoms of a heart attack. These may include:  ? Chest pain or pressure, or a strange feeling in the chest.  ? Sweating. ? Shortness of breath. ? Nausea or vomiting. ? Pain, pressure, or a strange feeling in the back, neck, jaw, or upper belly or in one or both shoulders or arms. ? Lightheadedness or sudden weakness. ? A fast or irregular heartbeat.     · You have symptoms of a stroke. These may include:  ? Sudden numbness, tingling, weakness, or loss of movement in your face, arm, or leg, especially on only one side of your body. ? Sudden vision changes. ? Sudden trouble speaking. ? Sudden confusion or trouble understanding simple statements. ? Sudden problems with walking or balance. ? A sudden, severe headache that is different from past headaches.     · You have severe back or belly pain.    Do not wait until your blood pressure comes down on its own.  Get help right away.   Call your doctor now or seek immediate care if:    · Your blood pressure is much higher than normal (such as 180/120 or higher), but you don't have symptoms.     · You think high blood pressure is causing symptoms, such as:  ? Severe headache.  ? Blurry vision.    Watch closely for changes in your health, and be sure to contact your doctor if:    · Your blood pressure measures higher than your doctor recommends at least 2 times. That means the top number is higher or the bottom number is higher, or both.     · You think you may be having side effects from your blood pressure medicine. Where can you learn more? Go to http://sumit-uli.info/. Enter T889 in the search box to learn more about \"High Blood Pressure: Care Instructions. \"  Current as of: December 6, 2017  Content Version: 11.8  © 7675-5607 [employer]. Care instructions adapted under license by [employer] (which disclaims liability or warranty for this information). If you have questions about a medical condition or this instruction, always ask your healthcare professional. [place] [address] any warranty or liability for your use of this information.

## 2019-01-02 ENCOUNTER — HOSPITAL ENCOUNTER (OUTPATIENT)
Dept: GENERAL RADIOLOGY | Age: 69
Discharge: HOME | End: 2019-01-02
Payer: MEDICARE

## 2019-01-02 DIAGNOSIS — R05.9 COUGH: ICD-10-CM

## 2019-01-02 PROCEDURE — 71046 X-RAY EXAM CHEST 2 VIEWS: CPT

## 2019-01-09 ENCOUNTER — OFFICE VISIT (OUTPATIENT)
Dept: FAMILY MEDICINE CLINIC | Age: 69
End: 2019-01-09

## 2019-01-09 VITALS
WEIGHT: 208 LBS | SYSTOLIC BLOOD PRESSURE: 158 MMHG | HEART RATE: 89 BPM | OXYGEN SATURATION: 96 % | BODY MASS INDEX: 33.43 KG/M2 | RESPIRATION RATE: 20 BRPM | HEIGHT: 66 IN | TEMPERATURE: 98.2 F | DIASTOLIC BLOOD PRESSURE: 102 MMHG

## 2019-01-09 DIAGNOSIS — G44.311 INTRACTABLE ACUTE POST-TRAUMATIC HEADACHE: Primary | ICD-10-CM

## 2019-01-09 DIAGNOSIS — M54.2 NECK PAIN: ICD-10-CM

## 2019-01-09 DIAGNOSIS — M54.9 ACUTE RIGHT-SIDED BACK PAIN, UNSPECIFIED BACK LOCATION: ICD-10-CM

## 2019-01-09 RX ORDER — BUTALBITAL, ACETAMINOPHEN AND CAFFEINE 50; 325; 40 MG/1; MG/1; MG/1
1 TABLET ORAL
Qty: 60 TAB | Refills: 0 | Status: SHIPPED | OUTPATIENT
Start: 2019-01-09 | End: 2019-07-23

## 2019-01-09 NOTE — PATIENT INSTRUCTIONS

## 2019-01-09 NOTE — PROGRESS NOTES
Patient is in the office today for headache. 1. Have you been to the ER, urgent care clinic since your last visit? Hospitalized since your last visit? No    2. Have you seen or consulted any other health care providers outside of the [address] [place] [address] since your last visit? Include any pap smears or colon screening.  No

## 2019-01-10 ENCOUNTER — HOSPITAL ENCOUNTER (OUTPATIENT)
Dept: CT IMAGING | Age: 69
Discharge: HOME | End: 2019-01-10
Attending: INTERNAL MEDICINE
Payer: MEDICARE

## 2019-01-10 DIAGNOSIS — G44.311 INTRACTABLE ACUTE POST-TRAUMATIC HEADACHE: ICD-10-CM

## 2019-01-10 PROCEDURE — 70450 CT HEAD/BRAIN W/O DYE: CPT

## 2019-01-11 ENCOUNTER — HOSPITAL ENCOUNTER (OUTPATIENT)
Dept: PHYSICAL THERAPY | Age: 69
Discharge: HOME | End: 2019-01-11
Payer: MEDICARE

## 2019-01-11 ENCOUNTER — TELEPHONE (OUTPATIENT)
Dept: FAMILY MEDICINE CLINIC | Age: 69
End: 2019-01-11

## 2019-01-11 PROCEDURE — 97162 PT EVAL MOD COMPLEX 30 MIN: CPT

## 2019-01-11 PROCEDURE — 97110 THERAPEUTIC EXERCISES: CPT

## 2019-01-11 NOTE — PROGRESS NOTES
[address]  [place] [address] [place], [address] 
[phone] fax Plan of Care/ Statement of Necessity for Physical Therapy Services Patient name: Alber Robert Start of Care: 2019 Referral source: Colleen Bergman MD : 1950 Medical Diagnosis: Cervicalgia [M54.2] Dorsalgia, unspecified [M54.9] Acute post-traumatic headache, intractable [G44.311] Payor: [employer] MEDICARE / Plan: [address] [place] [address] HMO / Product Type: Managed Care Medicare /  Onset Date: 2018 Treatment Diagnosis: Lumbar/Cervical pain with HAs. Prior Hospitalization: see medical history Provider#: [id number] Medications: Verified on Patient summary List  
 Comorbidities: Depression, Diabetes, HTN, Visual impairment, Latex Allergy, Tobacco use, left glass eye prosthetic Prior Level of Function: The patient states he was able to perform all job functions without limitations prior to injury. The Plan of Care and following information is based on the information from the initial evaluation. Assessment/ key information: The patient is a 76year old male that suffered a fall while at work at the [employer] when walking and slipped on some ice near the side of the rink, falling backwards striking the posterior head, butt, and elbows. He indicates that he was issued an anti-inflammatory and steroid by mouth which did help with his elbows which does not have pain, but his neck, lumbar, and headaches which have persisted since that time. He denies light sensitivity, room spinning, or increases in headaches with exertion and describes pain through the back of his head which can extend behind his right eye. He does have point tenderness through his posterior cervical spine which provocates some of his chief complaint regarding this head ache. He had a CT scan of his brain taken yesterday.  The patient has signs and symptoms that do appear consistent with mechanical lumbar and cervical pain as well as headaches which may have concussion contribution as well. He has impairments consisting of pain, decreased ROM, decreased flexibility, decreased strength, limited ADL ease, and decreased quality of life. The patient will benefit from skilled PT in order to address the aforementioned impairments. Evaluation Complexity History HIGH Complexity :3+ comorbidities / personal factors will impact the outcome/ POC ; Examination HIGH Complexity : 4+ Standardized tests and measures addressing body structure, function, activity limitation and / or participation in recreation  ;Presentation MEDIUM Complexity : Evolving with changing characteristics  ; Clinical Decision Making MEDIUM Complexity : FOTO score of 26-74 Overall Complexity Rating: MEDIUM Problem List: pain affecting function, decrease ROM, decrease strength, decrease ADL/ functional abilitiies, decrease activity tolerance and decrease flexibility/ joint mobility Treatment Plan may include any combination of the following: Therapeutic exercise, Therapeutic activities, Neuromuscular re-education, Physical agent/modality, Manual therapy, Patient education and Functional mobility training Patient / Family readiness to learn indicated by: asking questions, trying to perform skills and interest 
Persons(s) to be included in education: patient (P) Barriers to Learning/Limitations: None Patient Goal (s): complete relief Patient Self Reported Health Status: fair Rehabilitation Potential: fair Short Term Goals: To be accomplished in 2 weeks: 1. The patient will be independent and compliant with HEP to maximize therapeutic benefit. 2. The patient will improve cervical rotation to 55 to improve ease of driving. Long Term Goals: To be accomplished in 4 weeks: 1. The patient will improve FOTO score to 49 to maximize quality of life. 2. The patient will improve lumbar AROM pain free WNL to improve efficiency of occupational ease. 3. The patient will improve hip ABD/EXT to 4/5 MMT to maximize stability during work related duties. 4. The patient will report 75% improvement in HAs to improve ease of work related tasks. Frequency / Duration: Patient to be seen 2 times per week for 4 weeks. Patient/ Caregiver education and instruction: Diagnosis, prognosis, self care, activity modification and exercises 
 [x]  Plan of care has been reviewed with PTA Certification Period: 1/11/2019 - 3/11/2019 Lizette Mar, PT 1/11/2019 10:50 AM 
 
________________________________________________________________________ I certify that the above Therapy Services are being furnished while the patient is under my care. I agree with the treatment plan and certify that this therapy is necessary. [de-identified] Signature:____________________  Date:____________Time: _________ Please sign and return to In [address] [place] [address] [place], [address] 
[phone] fax

## 2019-01-11 NOTE — PROGRESS NOTES
PT DAILY TREATMENT NOTE 10-18 Patient Name: Devyn De La Rosa Date:2019 : 1950 [x]  Patient  Verified Payor: Claire Galarzain / Plan: [address] [place] [address] HMO / Product Type: Managed Care Medicare / In time:9:35  Out time:10:25 Total Treatment Time (min): 50 Visit #: 1 of 8 Medicare/BCBS Only Total Timed Codes (min):  15 1:1 Treatment Time:  15  
 
 
Treatment Area: Cervicalgia [M54.2] Dorsalgia, unspecified [M54.9] Acute post-traumatic headache, intractable [G44.311] SUBJECTIVE Pain Level (0-10 scale): -8/10 Any medication changes, allergies to medications, adverse drug reactions, diagnosis change, or new procedure performed?: [x] No    [] Yes (see summary sheet for update) Subjective functional status/changes:   [] No changes reported The patient has a chief complaint of cervical, lumbar and HAs since a fall at work when he feel on ice and hit the back of his head, elbows and butt, per patient on 2018. OBJECTIVE 15 min Therapeutic Exercise:  [x] See flow sheet :  
Rationale: increase ROM and increase strength to improve the patients ability to improve ADL ease. With 
 [] TE 
 [] TA 
 [] neuro 
 [] other: Patient Education: [x] Review HEP [] Progressed/Changed HEP based on:  
[] positioning   [] body mechanics   [] transfers   [] heat/ice application   
[] other:   
 
Other Objective/Functional Measures: See IE Pain Level (0-10 scale) post treatment: 7/10 ASSESSMENT/Changes in Function: See POC. Patient will continue to benefit from skilled PT services to modify and progress therapeutic interventions, address functional mobility deficits, address ROM deficits, address strength deficits, analyze and address soft tissue restrictions, analyze and cue movement patterns, analyze and modify body mechanics/ergonomics, assess and modify postural abnormalities and instruct in home and community integration to attain remaining goals. [x]  See Plan of Care 
[]  See progress note/recertification 
[]  See Discharge Summary Progress towards goals / Updated goals: 
Short Term Goals: To be accomplished in 2 weeks: 1. The patient will be independent and compliant with HEP to maximize therapeutic benefit. 2. The patient will improve cervical rotation to 55 to improve ease of driving. Long Term Goals: To be accomplished in 4 weeks: 1. The patient will improve FOTO score to 49 to maximize quality of life. 2. The patient will improve lumbar AROM pain free WNL to improve efficiency of occupational ease. 3. The patient will improve hip ABD/EXT to 4/5 MMT to maximize stability during work related duties. 4. The patient will report 75% improvement in HAs to improve ease of work related tasks. PLAN 
[]  Upgrade activities as tolerated     [x]  Continue plan of care 
[]  Update interventions per flow sheet      
[]  Discharge due to:_ 
[]  Other:_   
 
Evan Lopez, PT 1/11/2019  11:09 AM 
 
Future Appointments Date Time Provider [facility] 2/6/2019  9:15 AM Elda August MD 11 [facility]  
6/5/2019  8:00 AM [facility]  
6/12/2019  8:00 AM Dre Doran MD 11 [facility]

## 2019-01-11 NOTE — TELEPHONE ENCOUNTER
Per pt per [facility] [place] needed for Fioricet    Pt suffering with migraine, please advise

## 2019-01-12 NOTE — PROGRESS NOTES
Krystian Edward is a 76 y.o.  male and presents with Headache; Hypertension; Neck Pain; and Back Pain      SUBJECTIVE:  Pt after falling and hitting at work around 12/12/18 continues to have headaches in the occipital area associated with neck pain and stiffness. He has been using  OTC NSAIDs without any significant improvement. He also has been having low back pain since the fall. His blood pressure remains elevated which could be due to his pain. However will increase lisinopril to 20 mg to see if any improvement in blood pressure as well as headaches. Respiratory ROS: negative for - shortness of breath  Cardiovascular ROS: negative for - chest pain    Current Outpatient Medications   Medication Sig    butalbital-acetaminophen-caffeine (FIORICET, ESGIC) -40 mg per tablet Take 1 Tab by mouth every four (4) hours as needed for Headache.  halobetasol (ULTRAVATE) 0.05 % topical cream APPLY TO AFFECTED AREA TWICE A DAY AS NEEDED    TOBRADEX ST drps ophthalmix suspension INSTILL 1 DROP INTO LEFT EYE TWICE A DAY    predniSONE (DELTASONE) 10 mg tablet 5 tabs daily for 5 days    glucose blood VI test strips (ACCU-CHEK SMARTVIEW TEST STRIP) strip Test blood sugar 1 times a day. DX E11.65    lisinopril (PRINIVIL, ZESTRIL) 10 mg tablet Take 1 Tab by mouth daily.  pravastatin (PRAVACHOL) 40 mg tablet Take 1 Tab by mouth nightly.  SITagliptin-metFORMIN (JANUMET XR) 50-1,000 mg TM24 Take 2 Tabs by mouth daily.  Lancets misc Accu-check  Fastclix Lancets. Test blood sugar daily.   DX E11.9    alcohol swabs (BD SINGLE USE SWABS REGULAR) padm Use daily to check blood sugar    Blood Glucose Control, Normal (ACCU-CHEK SMARTVIEW CONTRL SOL) soln Test blood sugar 1 x daily  DX E11.9        1 year supply  Check controls monthy on glucose meter    Blood-Glucose Meter monitoring kit Accu-check Smartview Meter    hydrOXYzine HCl (ATARAX) 25 mg tablet TAKE 1 TABLET BY MOUTH 3 TIMES A DAY AS NEEDED FOR ITCHING FOR UP TO 10 DAYS. MAY CAUSE DROWSINESS    pantoprazole (PROTONIX) 40 mg tablet Take 1 Tab by mouth daily.  hydrOXYzine pamoate (VISTARIL) 25 mg capsule Take 1 Cap by mouth three (3) times daily as needed for Itching. No current facility-administered medications for this visit. OBJECTIVE:  alert, well appearing, and in no distress  Visit Vitals  BP (!) 158/102 (BP 1 Location: Left arm, BP Patient Position: Sitting)   Pulse 89   Temp 98.2 °F (36.8 °C) (Oral)   Resp 20   Ht 5' 6\" (1.676 m)   Wt 208 lb (94.3 kg)   SpO2 96%   BMI 33.57 kg/m²      well developed and well nourished  Back exam - tenderness noted in the paraspinal muscles in the lumbar area, negative straight-leg raise bilaterally   Musculoskeletal -neck with decreased range of motion with soreness of muscles as they insert into the base of the skull        Assessment/Plan      ICD-10-CM ICD-9-CM    1. Intractable acute post-traumatic headache G44.311 339.21 Will check CT HEAD WO CONT to evaluate for chronic subdural hematoma      REFERRAL TO PHYSICAL THERAPY      Will use butalbital-acetaminophen-caffeine (FIORICET, ESGIC) -40 mg per tablet until patient can connect with physical therapy   2. Acute right-sided back pain, unspecified back location M54.9 724.5 REFERRAL TO PHYSICAL THERAPY   3. Neck pain M54.2 723.1 REFERRAL TO PHYSICAL THERAPY   4 HTN     poorly controlled will increase lisinopril to 20 mg daily     Follow-up Disposition:  Return in about 4 weeks (around 2/6/2019) for headaches . Reviewed plan of care. Patient has provided input and agrees with goals.

## 2019-01-14 ENCOUNTER — TELEPHONE (OUTPATIENT)
Dept: FAMILY MEDICINE CLINIC | Age: 69
End: 2019-01-14

## 2019-01-14 NOTE — LETTER
1/15/2019 7:04 AM 
 
Mr. Juan R Barajas [address] To Whom It May Concern: Mr. Jon Lucero fell at work around 12/12/18 and hit his head. He continues to have headaches, neck pain and back pain for which he is undergoing physical therapy. He will not be able to return to work at least until 2/4/2019 Sincerely, Toshia Kirby MD

## 2019-01-14 NOTE — TELEPHONE ENCOUNTER
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 He stated he has been going to therapy since last week but he still isn't feeling to well to return just yet.  Please advise [phone]

## 2019-01-17 ENCOUNTER — HOSPITAL ENCOUNTER (OUTPATIENT)
Dept: PHYSICAL THERAPY | Age: 69
Discharge: HOME | End: 2019-01-17
Payer: MEDICARE

## 2019-01-17 PROCEDURE — 97110 THERAPEUTIC EXERCISES: CPT

## 2019-01-17 NOTE — PROGRESS NOTES
PT DAILY TREATMENT NOTE 10-18 Patient Name: Tab Trent Date:2019 : 1950 [x]  Patient  Verified Payor: Haris Gunn / Plan: [address] [place], [place] [address] HMO / Product Type: Managed Care Medicare / In time:10:30  Out time:11:14 Total Treatment Time (min): 44 Visit #: 2 of 8 Medicare/BCBS Only Total Timed Codes (min):  44 1:1 Treatment Time:  34 Treatment Area: Cervicalgia [M54.2] Dorsalgia, unspecified [M54.9] Acute post-traumatic headache, intractable [G44.311] SUBJECTIVE Pain Level (0-10 scale): 4-5 back, 0 neck Any medication changes, allergies to medications, adverse drug reactions, diagnosis change, or new procedure performed?: [x] No    [] Yes (see summary sheet for update) Subjective functional status/changes:   [] No changes reported Pt reports not sleeping well because of pain and is very tired today OBJECTIVE 44 min Therapeutic Exercise:  [x] See flow sheet :  
Rationale: increase ROM and increase strength to improve the patients ability to perform ADLs With 
 [] TE 
 [] TA 
 [] neuro 
 [] other: Patient Education: [x] Review HEP [] Progressed/Changed HEP based on:  
[] positioning   [] body mechanics   [] transfers   [] heat/ice application   
[] other:   
 
Other Objective/Functional Measures:  
Initiated treatment program per flow sheet Pain Level (0-10 scale) post treatment: 0 
 
ASSESSMENT/Changes in Function: Initiated treatment program per flow sheet and reviewed HEP for understanding and compliance. Pt reports HA is constant but changes in frequency, CT scan results were normal. Decr'd pain level following treatment today.   
 
Patient will continue to benefit from skilled PT services to modify and progress therapeutic interventions, address functional mobility deficits, address ROM deficits, address strength deficits, analyze and address soft tissue restrictions, analyze and cue movement patterns, analyze and modify body mechanics/ergonomics and assess and modify postural abnormalities to attain remaining goals. []  See Plan of Care 
[]  See progress note/recertification 
[]  See Discharge Summary Progress towards goals / Updated goals: 
Short Term Goals: To be accomplished in 2 weeks: 
            9. The patient will be independent and compliant with HEP to maximize therapeutic benefit. Met- Pt reports compliance with HEP 1/17/19 
            2. The patient will improve cervical rotation to 55 to improve ease of driving. Long Term Goals: To be accomplished in 4 weeks: 
            1. The patient will improve FOTO score to 49 to maximize quality of life. 
            2. The patient will improve lumbar AROM pain free WNL to improve efficiency of occupational ease. 
            3. The patient will improve hip ABD/EXT to 4/5 MMT to maximize stability during work related duties.             4. The patient will report 75% improvement in HAs to improve ease of work related tasks. PLAN [x]  Upgrade activities as tolerated     [x]  Continue plan of care 
[]  Update interventions per flow sheet      
[]  Discharge due to:_ 
[]  Other:_ Dena Valdivia PTA 1/17/2019  10:45 AM 
 
Future Appointments Date Time Provider [facility] 1/22/2019  9:00 AM Clovis Beck PTA [facility]  
2/6/2019  9:15 AM Shabbir Vogel MD 11 [facility]  
6/5/2019  8:00 AM [facility] NURSE [facility]  
6/12/2019  8:00 AM Kimberlyn Doran MD 11 [facility]

## 2019-01-22 ENCOUNTER — HOSPITAL ENCOUNTER (OUTPATIENT)
Dept: PHYSICAL THERAPY | Age: 69
Discharge: HOME | End: 2019-01-22
Payer: MEDICARE

## 2019-01-22 PROCEDURE — 97110 THERAPEUTIC EXERCISES: CPT

## 2019-01-22 PROCEDURE — 97112 NEUROMUSCULAR REEDUCATION: CPT

## 2019-01-22 NOTE — PROGRESS NOTES
PT DAILY TREATMENT NOTE 10-18 Patient Name: Konrad Steele Date:2019 : 1950 [x]  Patient  Verified Payor: Magdalenomagda Garcia / Plan: [address] [place] [address] HMO / Product Type: Managed Care Medicare / In time:9:00  Out time:9:45 Total Treatment Time (min): 45 Visit #: 3 of 8 Medicare/BCBS Only Total Timed Codes (min):  45 1:1 Treatment Time:  45  
 
 
Treatment Area: Cervicalgia [M54.2] Dorsalgia, unspecified [M54.9] Acute post-traumatic headache, intractable [G44.311] SUBJECTIVE Pain Level (0-10 scale): 7-8 head, neck 10 Any medication changes, allergies to medications, adverse drug reactions, diagnosis change, or new procedure performed?: [x] No    [] Yes (see summary sheet for update) Subjective functional status/changes:   [] No changes reported Pt reports sleeping for two days following last appointment and pain level has not improved OBJECTIVE 35 min Therapeutic Exercise:  [x] See flow sheet :  
Rationale: increase ROM and increase strength to improve the patients ability to perform daily tasks 10 min Neuromuscular Re-education:  [x]  See flow sheet :  
Rationale: increase ROM, increase strength and increase proprioception  to improve the patients ability to increase ease with ADLs With 
 [] TE 
 [] TA 
 [] neuro 
 [] other: Patient Education: [x] Review HEP [] Progressed/Changed HEP based on:  
[] positioning   [] body mechanics   [] transfers   [] heat/ice application   
[] other:   
 
Other Objective/Functional Measures:  
vc's for seated posture Pain Level (0-10 scale) post treatment: 8-9 ASSESSMENT/Changes in Function: Pt was challenged with therex due to reports of neck pain. Pt req's vc's for technique and posture throughout treatment. Cont's with reports of constant HA's and occasional dizziness.   
 
Patient will continue to benefit from skilled PT services to modify and progress therapeutic interventions, address functional mobility deficits, address ROM deficits, address strength deficits, analyze and address soft tissue restrictions, analyze and cue movement patterns, analyze and modify body mechanics/ergonomics and assess and modify postural abnormalities to attain remaining goals. []  See Plan of Care 
[]  See progress note/recertification 
[]  See Discharge Summary Progress towards goals / Updated goals: 
Short Term Goals: To be accomplished in 2 weeks: 
            2. The patient will be independent and compliant with HEP to maximize therapeutic benefit. Met- Pt reports compliance with HEP 1/17/19 
            2. The patient will improve cervical rotation to 55 to improve ease of driving. Long Term Goals: To be accomplished in 4 weeks: 
            1. The patient will improve FOTO score to 49 to maximize quality of life. 
            2. The patient will improve lumbar AROM pain free WNL to improve efficiency of occupational ease. 
            3. The patient will improve hip ABD/EXT to 4/5 MMT to maximize stability during work related duties.             4. The patient will report 75% improvement in HAs to improve ease of work related tasks. PLAN 
[]  Upgrade activities as tolerated     []  Continue plan of care 
[]  Update interventions per flow sheet      
[]  Discharge due to:_ 
[]  Other:_ Fred Nunez PTA 1/22/2019  9:14 AM 
 
Future Appointments Date Time Provider Yandel Wakefield 2/6/2019  9:15 AM Amador Garay MD [address] [place] [address]  
6/5/2019  8:00 AM [facility] NURSE [facility]  
6/12/2019  8:00 AM Sylwia Doran MD [address] [place] [address]

## 2019-01-24 ENCOUNTER — HOSPITAL ENCOUNTER (OUTPATIENT)
Dept: PHYSICAL THERAPY | Age: 69
Discharge: HOME | End: 2019-01-24
Payer: MEDICARE

## 2019-01-24 PROCEDURE — 97112 NEUROMUSCULAR REEDUCATION: CPT

## 2019-01-24 PROCEDURE — 97110 THERAPEUTIC EXERCISES: CPT

## 2019-01-24 NOTE — PROGRESS NOTES
PT DAILY TREATMENT NOTE 10-18 Patient Name: Aliyah Lopez Date:2019 : 1950 [x]  Patient  Verified Payor: Reina Vega / Plan: [address] [place] [address] HMO / Product Type: Managed Care Medicare / In time:10:30  Out time:1114 Total Treatment Time (min): 44 Visit #: 4 of 8 Medicare/BCBS Only Total Timed Codes (min):  44 1:1 Treatment Time:  34 Treatment Area: Cervicalgia [M54.2] Dorsalgia, unspecified [M54.9] Acute post-traumatic headache, intractable [G44.311] SUBJECTIVE Pain Level (0-10 scale): 5/10 Any medication changes, allergies to medications, adverse drug reactions, diagnosis change, or new procedure performed?: [x] No    [] Yes (see summary sheet for update) Subjective functional status/changes:   [] No changes reported Pt reports he feels PT is helping. He reports feel pretty good today. He states he continues to have headaches. OBJECTIVE 34 min Therapeutic Exercise:  [x] See flow sheet :  
Rationale: increase ROM and increase strength to improve the patients ability to perform daily tasks 10 min Neuromuscular Re-education:  [x]  See flow sheet :  
Rationale: increase ROM, increase strength and increase proprioception  to improve the patients ability to increase ease with ADLs With 
 [] TE 
 [] TA 
 [] neuro 
 [] other: Patient Education: [x] Review HEP [] Progressed/Changed HEP based on:  
[] positioning   [] body mechanics   [] transfers   [] heat/ice application   
[] other:   
 
Other Objective/Functional Measures: pt had to leave abruptly due to upset stomach Pain Level (0-10 scale) post treatment: 5 
 
ASSESSMENT/Changes in Function: pt tolerated therex well, diminished pain level reported today. Progressing but slowly with PT Patient will continue to benefit from skilled PT services to modify and progress therapeutic interventions, address functional mobility deficits, address ROM deficits, address strength deficits, analyze and address soft tissue restrictions, analyze and cue movement patterns, analyze and modify body mechanics/ergonomics and assess and modify postural abnormalities to attain remaining goals. []  See Plan of Care 
[]  See progress note/recertification 
[]  See Discharge Summary Progress towards goals / Updated goals: 
Short Term Goals: To be accomplished in 2 weeks: 
            4. The patient will be independent and compliant with HEP to maximize therapeutic benefit. Met- Pt reports compliance with HEP 1/17/19 
            2. The patient will improve cervical rotation to 55 to improve ease of driving. Long Term Goals: To be accomplished in 4 weeks: 
            1. The patient will improve FOTO score to 49 to maximize quality of life. 
            2. The patient will improve lumbar AROM pain free WNL to improve efficiency of occupational ease. 
            3. The patient will improve hip ABD/EXT to 4/5 MMT to maximize stability during work related duties.             4. The patient will report 75% improvement in HAs to improve ease of work related tasks. - no change 1-24-19 PLAN 
[]  Upgrade activities as tolerated     []  Continue plan of care 
[]  Update interventions per flow sheet      
[]  Discharge due to:_ 
[]  Other:_ Clarita Martínez, PT 1/24/2019  10:14 AM 
 
Future Appointments Date Time Provider Yandel [facility] 1/29/2019  8:30 AM [facility]  
1/31/2019 10:00 AM [facility]  
2/5/2019 10:00 AM [facility]  
2/6/2019  9:15 AM Larry Theodore MD 11 [facility]  
6/5/2019  8:00 AM [facility] NURSE [facility]  
6/12/2019  8:00 AM Katherine Doran MD 11 [facility]

## 2019-01-29 ENCOUNTER — HOSPITAL ENCOUNTER (OUTPATIENT)
Dept: PHYSICAL THERAPY | Age: 69
Discharge: HOME | End: 2019-01-29
Payer: MEDICARE

## 2019-01-29 PROCEDURE — 97530 THERAPEUTIC ACTIVITIES: CPT

## 2019-01-29 PROCEDURE — 97110 THERAPEUTIC EXERCISES: CPT

## 2019-01-29 NOTE — PROGRESS NOTES
PT DAILY TREATMENT NOTE 10-18 Patient Name: Alber Robert Date:2019 : 1950 [x]  Patient  Verified Payor: [employer] / Plan: [address] [place] [address] HMO / Product Type: Managed Care Medicare / In time: 8:30   Out time: 9:27 Total Treatment Time (min): 57 Visit #: 5 of 8 Medicare/BCBS Only Total Timed Codes (min): 57 1:1 Treatment Time:  28 Treatment Area: Cervicalgia [M54.2] Dorsalgia, unspecified [M54.9] Acute post-traumatic headache, intractable [G44.311] SUBJECTIVE Pain Level (0-10 scale): neck 5/10 Any medication changes, allergies to medications, adverse drug reactions, diagnosis change, or new procedure performed?: [x] No    [] Yes (see summary sheet for update) Subjective functional status/changes:   [] No changes reported Pt reports having a bad day yesterday with pain and states he is getting frustrated with his pain and HAs. Pt states that he is getting depressed and angry at times secondary to pt performing less activity since the fall and he can't do as much as he used to. OBJECTIVE 27 min Therapeutic Exercise:  [x] See flow sheet :  
Rationale: increase ROM and increase strength to improve the patients ability to perform daily tasks 15 min Therapeutic Activity:  [x]  See flow sheet : discussion on pt's subjective comments (see assessment below) Rationale: improve pain  to improve the patients ability to tolerate functional tasks 15 min Neuromuscular Re-education:  [x]  See flow sheet :  
Rationale: increase ROM, increase strength and increase proprioception  to improve the patients ability to increase ease with ADLs With 
 [] TE 
 [x] TA 
 [] neuro 
 [] other: Patient Education: [x] Review HEP [] Progressed/Changed HEP based on:  
[] positioning   [] body mechanics   [] transfers   [] heat/ice application   
[] other:   
 
Other Objective/Functional Measures:  Added QP UE/LE exercise to improve strength and stability. Pain Level (0-10 scale) post treatment: back 5/10, neck 9/10 ASSESSMENT/Changes in Function: Reported increased neck pain post session. While in therapy today pt reported that he was feeling more depressed at times since his injury and was getting frustrated/angry secondary to his pain limiting him from performing the activities he was used to doing without any problems. Educated pt that these feelings may happen after an injury and educated pt to slowly return to PLOF activities when his pain and HAs subside. Educated pt that stress can negatively affect his pain/symptoms as well and educated pt to monitor his stress levels at home. We will continue to monitor the pt's pain and symptoms in the clinic secondary to his subjective comments. Continue POC as tolerated. Patient will continue to benefit from skilled PT services to modify and progress therapeutic interventions, address functional mobility deficits, address ROM deficits, address strength deficits, analyze and address soft tissue restrictions, analyze and cue movement patterns, analyze and modify body mechanics/ergonomics and assess and modify postural abnormalities to attain remaining goals. []  See Plan of Care 
[]  See progress note/recertification 
[]  See Discharge Summary Progress towards goals / Updated goals: 
Short Term Goals: To be accomplished in 2 weeks: 
            3. The patient will be independent and compliant with HEP to maximize therapeutic benefit. Met- Pt reports compliance with HEP 1/17/19 
            2. The patient will improve cervical rotation to 55 to improve ease of driving. Long Term Goals: To be accomplished in 4 weeks: 
            1. The patient will improve FOTO score to 49 to maximize quality of life. 
            2. The patient will improve lumbar AROM pain free WNL to improve efficiency of occupational ease.             3. The patient will improve hip ABD/EXT to 4/5 MMT to maximize stability during work related duties.             4. The patient will report 75% improvement in HAs to improve ease of work related tasks. - no change 1-24-19 PLAN [x]  Upgrade activities as tolerated     [x]  Continue plan of care [x]  Update interventions per flow sheet      
[]  Discharge due to:_ 
[]  Other:_ Neo Jeff, PT 1/29/2019  8:50 AM 
 
Future Appointments Date Time Provider Yandel Wakefield 1/29/2019  8:30 AM [facility]  
1/31/2019 10:00 AM [facility]  
2/5/2019 10:00 AM [facility]  
2/6/2019  9:15 AM Shabbir Vogel MD [address] [place] [address]  
6/5/2019  8:00 AM [facility] NURSE [facility]  
6/12/2019  8:00 AM Kimberlyn Doran MD [address] [place] [address]

## 2019-01-31 ENCOUNTER — HOSPITAL ENCOUNTER (OUTPATIENT)
Dept: PHYSICAL THERAPY | Age: 69
Discharge: HOME | End: 2019-01-31
Payer: MEDICARE

## 2019-01-31 PROCEDURE — 97112 NEUROMUSCULAR REEDUCATION: CPT

## 2019-01-31 PROCEDURE — 97110 THERAPEUTIC EXERCISES: CPT

## 2019-01-31 NOTE — PROGRESS NOTES
PT DAILY TREATMENT NOTE 10-18 Patient Name: Juan R Barajas Date:2019 : 1950 [x]  Patient  Verified Payor: Johnathan Seonegro / Plan: [address] [place] [address] HMO / Product Type: Managed Care Medicare / In time: 957   Out time: 103 Total Treatment Time (min): 42 Visit #: 6 of 8 Medicare/BCBS Only Total Timed Codes (min): 42 1:1 Treatment Time:  42 Treatment Area: Cervicalgia [M54.2] Dorsalgia, unspecified [M54.9] Acute post-traumatic headache, intractable [G44.311] SUBJECTIVE Pain Level (0-10 scale): neck, right UT 5/10 Any medication changes, allergies to medications, adverse drug reactions, diagnosis change, or new procedure performed?: [x] No    [] Yes (see summary sheet for update) Subjective functional status/changes:   [] No changes reported Pt reports he feels like \"everything tightens up at night\". He reports no significant change in headaches. OBJECTIVE 32 min Therapeutic Exercise:  [x] See flow sheet :  
Rationale: increase ROM and increase strength to improve the patients ability to perform daily tasks 10 min Neuromuscular Re-education:  [x]  See flow sheet :  
Rationale: increase ROM, increase strength and increase proprioception  to improve the patients ability to increase ease with ADLs With 
 [] TE 
 [x] TA 
 [] neuro 
 [] other: Patient Education: [x] Review HEP [] Progressed/Changed HEP based on:  
[] positioning   [] body mechanics   [] transfers   [] heat/ice application   
[] other:   
 
Other Objective/Functional Measures: no changes to therex Pain Level (0-10 scale) post treatment: 6 
 
ASSESSMENT/Changes in Function:  Pt with slow progress with PT. He seems to be making progress with LBP but limited change with C/S and headaches.   
 
Patient will continue to benefit from skilled PT services to modify and progress therapeutic interventions, address functional mobility deficits, address ROM deficits, address strength deficits, analyze and address soft tissue restrictions, analyze and cue movement patterns, analyze and modify body mechanics/ergonomics and assess and modify postural abnormalities to attain remaining goals. []  See Plan of Care 
[]  See progress note/recertification 
[]  See Discharge Summary Progress towards goals / Updated goals: 
Short Term Goals: To be accomplished in 2 weeks: 
            4. The patient will be independent and compliant with HEP to maximize therapeutic benefit. - reports compliance 1-31-19 Met- Pt reports compliance with HEP 1/17/19 
            2. The patient will improve cervical rotation to 55 to improve ease of driving. Long Term Goals: To be accomplished in 4 weeks: 
            1. The patient will improve FOTO score to 49 to maximize quality of life. 
            2. The patient will improve lumbar AROM pain free WNL to improve efficiency of occupational ease. 
            3. The patient will improve hip ABD/EXT to 4/5 MMT to maximize stability during work related duties.             4. The patient will report 75% improvement in HAs to improve ease of work related tasks. - no change 1-24-19 PLAN [x]  Upgrade activities as tolerated     [x]  Continue plan of care [x]  Update interventions per flow sheet      
[]  Discharge due to:_ 
[]  Other:_ Jenniffer Walter, PT 1/31/2019  10:02 AM 
 
Future Appointments Date Time Provider Yandel Wakefield 2/5/2019 10:00 AM El Tai PT [id number]  
2/6/2019  9:15 AM Amy Chatterjee MD [address] [place] [address]  
6/5/2019  8:00 AM [facility] NURSE WBJEAN PURVIS [facility]  
6/12/2019  8:00 AM Darrin Doran MD [address] [place] [address]

## 2019-02-05 ENCOUNTER — HOSPITAL ENCOUNTER (OUTPATIENT)
Dept: PHYSICAL THERAPY | Age: 69
Discharge: HOME | End: 2019-02-05
Payer: MEDICAID

## 2019-02-05 PROCEDURE — 97112 NEUROMUSCULAR REEDUCATION: CPT

## 2019-02-05 PROCEDURE — 97110 THERAPEUTIC EXERCISES: CPT

## 2019-02-05 NOTE — PROGRESS NOTES
PT DAILY TREATMENT NOTE 10-18 Patient Name: Juan R Barajas Date:2019 : 1950 [x]  Patient  Verified Payor: Johnathan Olivas / Plan: [address] [place] [address] HMO / Product Type: Managed Care Medicare / In time: 1000   Out time: 6409 Total Treatment Time (min): 42 Visit #: 7 of 8 Medicare/BCBS Only Total Timed Codes (min): 42 1:1 Treatment Time:  40 Treatment Area: Cervicalgia [M54.2] Dorsalgia, unspecified [M54.9] Acute post-traumatic headache, intractable [G44.311] SUBJECTIVE Pain Level (0-10 scale): neck, right UT 6/10, lumbar 5/10 Any medication changes, allergies to medications, adverse drug reactions, diagnosis change, or new procedure performed?: [x] No    [] Yes (see summary sheet for update) Subjective functional status/changes:   [] No changes reported Pt reports no change in headaches. He reports progress with LBP. He states he seems like things are not too bad in the day but get worse at night. He reports he has noticed improved ability for perform functional tasks. OBJECTIVE 32 min Therapeutic Exercise:  [x] See flow sheet :  
Rationale: increase ROM and increase strength to improve the patients ability to perform daily tasks 10 min Neuromuscular Re-education:  [x]  See flow sheet :  
Rationale: increase ROM, increase strength and increase proprioception  to improve the patients ability to increase ease with ADLs With 
 [] TE 
 [x] TA 
 [] neuro 
 [] other: Patient Education: [x] Review HEP [] Progressed/Changed HEP based on:  
[] positioning   [] body mechanics   [] transfers   [] heat/ice application   
[] other:   
 
Other Objective/Functional Measures: FOTO: 49 
 
Pain Level (0-10 scale) post treatment: 6 right UT/rhomboid region, 5 lumbar. ASSESSMENT/Changes in Function:  Pt demonstrates improved ROM and improved FOTO score. He also reports improved ability for daily activities.  His headaches have not improved since start of care, otherwise he is progressing with PT and will benefit from continuation. Patient will continue to benefit from skilled PT services to modify and progress therapeutic interventions, address functional mobility deficits, address ROM deficits, address strength deficits, analyze and address soft tissue restrictions, analyze and cue movement patterns, analyze and modify body mechanics/ergonomics and assess and modify postural abnormalities to attain remaining goals. []  See Plan of Care [x]  See progress note/recertification 
[]  See Discharge Summary Progress towards goals / Updated goals: 
Short Term Goals: To be accomplished in 2 weeks: 
            6. The patient will be independent and compliant with HEP to maximize therapeutic benefit. - reports compliance 1-31-19 Met- Pt reports compliance with HEP 1/17/19 
            2. The patient will improve cervical rotation to 55 to improve ease of driving.- left MET 55 degrees, right progressing 50 degrees Long Term Goals: To be accomplished in 4 weeks: 
            1. The patient will improve FOTO score to 49 to maximize quality of life. - MET 2-5-19 
            2. The patient will improve lumbar AROM pain free WNL to improve efficiency of occupational ease. Progressing limited in left SB and extension 2-5-19 
            3. The patient will improve hip ABD/EXT to 4/5 MMT to maximize stability during work related duties. - Not yet met 4/5 on 2-5-19 
            4. The patient will report 75% improvement in HAs to improve ease of work related tasks. - no change 2-5-19 PLAN 
[]  Upgrade activities as tolerated     [x]  Continue plan of care 
[]  Update interventions per flow sheet      
[]  Discharge due to:_ 
[]  Other:_ Xiomara Maria, PT 2/5/2019  10:02 AM 
 
Future Appointments Date Time Provider Yandel Wakefield 2/5/2019 10:00 AM Jc Gonzales Mag Stairs, PT [facility]  
 2/6/2019  9:15 AM Golden Ortiz MD 11 [facility]  
6/5/2019  8:00 AM [facility] NURSE [facility] HYUN [facility]  
6/12/2019  8:00 AM Minal Doran MD 11 [facility]

## 2019-02-05 NOTE — PROGRESS NOTES
In [address]  L[address] [place], [address] 
[phone] fax Continued Plan of Care/ Re-certification for Physical Therapy Services Patient name: Russel Oliva Start of Care: 2019 Referral source: Colleen Flores MD : 1950 Medical Diagnosis: Cervicalgia [M54.2] Dorsalgia, unspecified [M54.9] Acute post-traumatic headache, intractable [G44.311] Payor: [employer] / Plan: [address] [place] [address] HMO / Product Type: Managed Care Medicare /  Onset Date: 2018 Treatment Diagnosis: Lumbar/Cervical pain with HAs. Prior Hospitalization: see medical history Provider#: [id number] Medications: Verified on Patient summary List  
 Comorbidities: Depression, Diabetes, HTN, Visual impairment, Latex Allergy, Tobacco use, left glass eye prosthetic Prior Level of Function: The patient states he was able to perform all job functions without limitations prior to injury. Visits from Start of Care: 7    Missed Visits: - The Plan of Care and following information is based on the patient's current status: 
 
 
      
Progress towards goals: 
Short Term Goals: To be accomplished in 2 weeks: 
            8. The patient will be independent and compliant with HEP to maximize therapeutic benefit. - reports compliance 19 Met- Pt reports compliance with HEP 19 
            2. The patient will improve cervical rotation to 55 to improve ease of driving.- left MET 55 degrees, right progressing 50 degrees Long Term Goals: To be accomplished in 4 weeks: 
            1. The patient will improve FOTO score to 49 to maximize quality of life. - MET 19 
            2. The patient will improve lumbar AROM pain free WNL to improve efficiency of occupational ease. Progressing limited in left SB and extension 19 
            3.  The patient will improve hip ABD/EXT to 4/5 MMT to maximize stability during work related duties. - Not yet met 4/5 on 2-5-19 
            4. The patient will report 75% improvement in HAs to improve ease of work related tasks. - no change 2-5-19 Key functional changes: Pt reports improved ability for daily activities and show improved function per FOTO increase. Problems/ barriers to goal attainment: none Problem List: pain affecting function, decrease ROM, decrease strength, decrease ADL/ functional abilitiies, decrease activity tolerance and decrease flexibility/ joint mobility Treatment Plan: Therapeutic exercise, Therapeutic activities, Neuromuscular re-education, Physical agent/modality, Manual therapy, Patient education and Self Care training Patient Goal (s) has been updated and includes: \"To help the pain\" Goals for this certification period to be accomplished in 4 weeks: 
    1. The patient will improve cervical rotation to 55 to improve ease of driving.- left MET 55 degrees, right progressing 50 degrees             2. The patient will improve lumbar AROM pain free WNL to improve efficiency of occupational ease. Progressing limited in left SB and extension 2-5-19 
            3. The patient will improve hip ABD/EXT to 4/5 MMT to maximize stability during work related duties. - Not yet met 4/5 on 2-5-19 Frequency / Duration: Patient to be seen 2 times per week for 4 weeks: 
Assessment / Recommendations: Pt demonstrates improved ROM and improved FOTO score. He also reports improved ability for daily activities. His headaches have not improved since start of care, otherwise he is progressing with PT and will benefit from continuation. Certification Period:  2-5-19 to 3-7-19 Brittany Mora, PT 2/5/2019 10:49 AM 
 
________________________________________________________________________ I certify that the above Therapy Services are being furnished while the patient is under my care.  I agree with the treatment plan and certify that this therapy is necessary. [] I have read the above and request that my patient continue as recommended. [] I have read the above report and request that my patient continue therapy with the following changes/special instructions: _____________________________________________ [] I have read the above report and request that my patient be discharged from therapy [de-identified] Signature:____________Date:_________TIME:________ 
 
[employer], Date and Time must be completed for valid certification ** Please sign and return to In [address] [place], [address] 
[phone] fax

## 2019-02-06 ENCOUNTER — OFFICE VISIT (OUTPATIENT)
Dept: FAMILY MEDICINE CLINIC | Age: 69
End: 2019-02-06

## 2019-02-06 VITALS
HEIGHT: 66 IN | HEART RATE: 68 BPM | WEIGHT: 206 LBS | OXYGEN SATURATION: 95 % | SYSTOLIC BLOOD PRESSURE: 169 MMHG | TEMPERATURE: 98.1 F | BODY MASS INDEX: 33.11 KG/M2 | RESPIRATION RATE: 20 BRPM | DIASTOLIC BLOOD PRESSURE: 94 MMHG

## 2019-02-06 DIAGNOSIS — I10 ESSENTIAL HYPERTENSION: ICD-10-CM

## 2019-02-06 DIAGNOSIS — M54.2 NECK PAIN: ICD-10-CM

## 2019-02-06 DIAGNOSIS — G44.311 INTRACTABLE ACUTE POST-TRAUMATIC HEADACHE: Primary | ICD-10-CM

## 2019-02-06 RX ORDER — DOXYCYCLINE 100 MG/1
CAPSULE ORAL
Refills: 0 | COMMUNITY
Start: 2019-01-16 | End: 2019-06-25 | Stop reason: ALTCHOICE

## 2019-02-06 NOTE — PROGRESS NOTES
Patient is in the office today for 1 month follow up for headache. 1. Have you been to the ER, urgent care clinic since your last visit? Hospitalized since your last visit? No    2. Have you seen or consulted any other health care providers outside of the [address] [place] [address] since your last visit? Include any pap smears or colon screening.  No

## 2019-02-06 NOTE — LETTER
1/15/2019 7:04 AM 
 
Mr. Bala Azul [address] To Whom It May Concern: Mr. Lew Metzger fell at work around 12/12/18 and hit his head. He continues to have headaches, neck pain and back pain for which he is undergoing physical therapy. He will not be able to return to work at least until 3/4/19. Sincerely, Feliciano Richards MD

## 2019-02-06 NOTE — PATIENT INSTRUCTIONS

## 2019-02-08 ENCOUNTER — APPOINTMENT (OUTPATIENT)
Dept: PHYSICAL THERAPY | Age: 69
End: 2019-02-08
Payer: MEDICAID

## 2019-02-09 NOTE — PROGRESS NOTES
Jolene Tse is a 76 y.o.  male and presents with Headache (f/u improving); Hypertension; and Neck Pain      SUBJECTIVE:  Pt after falling and hitting head at work around 12/12/18 continues to have headaches in the occipital area associated with neck pain and stiffness. He has been using occasional Fioricet for the headaches. He has not been using any NSAIDs recently for back pain. He is doing PT which has helped to improve some of his back and neck pain. His blood pressure remains elevated on lisinopril 20 mg which could be due to his pain. Will recheck in a few weeks to see if any better. Respiratory ROS: negative for - shortness of breath  Cardiovascular ROS: negative for - chest pain    Current Outpatient Medications   Medication Sig    doxycycline (MONODOX) 100 mg capsule TAKE 1 CAPSULE BY MOUTH EVERY DAY    butalbital-acetaminophen-caffeine (FIORICET, ESGIC) -40 mg per tablet Take 1 Tab by mouth every four (4) hours as needed for Headache.  halobetasol (ULTRAVATE) 0.05 % topical cream APPLY TO AFFECTED AREA TWICE A DAY AS NEEDED    TOBRADEX ST drps ophthalmix suspension INSTILL 1 DROP INTO LEFT EYE TWICE A DAY    predniSONE (DELTASONE) 10 mg tablet 5 tabs daily for 5 days    hydrOXYzine HCl (ATARAX) 25 mg tablet TAKE 1 TABLET BY MOUTH 3 TIMES A DAY AS NEEDED FOR ITCHING FOR UP TO 10 DAYS. MAY CAUSE DROWSINESS    glucose blood VI test strips (ACCU-CHEK SMARTVIEW TEST STRIP) strip Test blood sugar 1 times a day. DX E11.65    lisinopril (PRINIVIL, ZESTRIL) 10 mg tablet Take 1 Tab by mouth daily.  pravastatin (PRAVACHOL) 40 mg tablet Take 1 Tab by mouth nightly.  pantoprazole (PROTONIX) 40 mg tablet Take 1 Tab by mouth daily.  SITagliptin-metFORMIN (JANUMET XR) 50-1,000 mg TM24 Take 2 Tabs by mouth daily.  hydrOXYzine pamoate (VISTARIL) 25 mg capsule Take 1 Cap by mouth three (3) times daily as needed for Itching.  Lancets misc Accu-check  Fastclix Lancets.  Test blood sugar daily. DX E11.9    alcohol swabs (BD SINGLE USE SWABS REGULAR) padm Use daily to check blood sugar    Blood Glucose Control, Normal (ACCU-CHEK SMARTVIEW CONTRL SOL) soln Test blood sugar 1 x daily  DX E11.9        1 year supply  Check controls monthy on glucose meter    Blood-Glucose Meter monitoring kit Accu-check Smartview Meter     No current facility-administered medications for this visit. OBJECTIVE:  alert, well appearing, and in no distress  Visit Vitals  BP (!) 169/94 (BP 1 Location: Left arm, BP Patient Position: Sitting)   Pulse 68   Temp 98.1 °F (36.7 °C) (Oral)   Resp 20   Ht 5' 6\" (1.676 m)   Wt 206 lb (93.4 kg)   SpO2 95%   BMI 33.25 kg/m²      well developed and well nourished  Back exam - tenderness noted in the paraspinal muscles in the lumbar area, negative straight-leg raise bilaterally   Musculoskeletal -neck with decreased range of motion with soreness of muscles as they insert into the base of the skull improved from last OV. Assessment/Plan    ICD-10-CM ICD-9-CM    1. Intractable acute post-traumatic headache G44.311 339.21 Will continue PT for now and Fioricet for sever pain. CT of head was negative. Will consider chiropractor if headache not improve by next OV. 2. Neck pain M54.2 723.1 Improving with PT. Pt can use OTC NSAID with dinner to reduce pain at night. 3. Essential hypertension I10 401.9 Uncontrolled on lisinopril 20 mg. We will recheck at next office visit and if not improving will adjust blood pressure medicines. Follow-up Disposition:  Return in about 3 weeks (around 2/27/2019) for BP check, neck pain . Reviewed plan of care. Patient has provided input and agrees with goals.

## 2019-02-13 ENCOUNTER — HOSPITAL ENCOUNTER (OUTPATIENT)
Dept: PHYSICAL THERAPY | Age: 69
Discharge: HOME | End: 2019-02-13
Payer: MEDICAID

## 2019-02-13 PROCEDURE — 97112 NEUROMUSCULAR REEDUCATION: CPT

## 2019-02-13 PROCEDURE — 97110 THERAPEUTIC EXERCISES: CPT

## 2019-02-13 NOTE — PROGRESS NOTES
PT DAILY TREATMENT NOTE 10-18 Patient Name: Russel Oliva Date:2019 : 1950 [x]  Patient  Verified Payor: Lizbeth Paresh / Plan: [address] [place] [address] HMO / Product Type: Managed Care Medicare / In time:11:30  Out time:12:24 Total Treatment Time (min): 54 Visit #: 1 of 8 Medicare/BCBS Only Total Timed Codes (min):  54 1:1 Treatment Time:  50 Treatment Area: Cervicalgia [M54.2] Dorsalgia, unspecified [M54.9] Acute post-traumatic headache, intractable [G44.311] SUBJECTIVE Pain Level (0-10 scale): 5/10 Any medication changes, allergies to medications, adverse drug reactions, diagnosis change, or new procedure performed?: [x] No    [] Yes (see summary sheet for update) Subjective functional status/changes:   [] No changes reported \"Constantly have a HA. The back and neck pain are the worse at night, during the day it isn't as bad. \" OBJECTIVE 46 min Therapeutic Exercise:  [x] See flow sheet :  
Rationale: increase ROM and increase strength to improve the patients ability to perform ADL's. 
 
8 min Neuromuscular Re-education:  [x]  See flow sheet :  
Rationale: increase strength and increase proprioception  to improve the patients ability to perform functional activities. With 
 [x] TE 
 [] TA 
 [] neuro 
 [] other: Patient Education: [x] Review HEP [] Progressed/Changed HEP based on:  
[] positioning   [] body mechanics   [] transfers   [] heat/ice application   
[] other:   
 
Other Objective/Functional Measures: No change in there ex. Pain Level (0-10 scale) post treatment: C/S 5/10, L/S 2/10 ASSESSMENT/Changes in Function: Performed exercises well without signs of distress. Pt reported a slight decrease in symptoms post-treatment. Recommend continue PT to further improve ease of performing functional activities.  
 
Patient will continue to benefit from skilled PT services to modify and progress therapeutic interventions, address functional mobility deficits, address ROM deficits, address strength deficits, analyze and cue movement patterns and analyze and modify body mechanics/ergonomics to attain remaining goals. [x]  See Plan of Care 
[]  See progress note/recertification 
[]  See Discharge Summary Progress towards goals / Updated goals: 
Goals for this certification period to be accomplished in 4 weeks: 
             1. The patient will improve cervical rotation to 55 to improve ease of driving.- left MET 55 degrees, right progressing 50 degrees             2. The patient will improve lumbar AROM pain free WNL to improve efficiency of occupational ease. Progressing limited in left SB and extension 2-5-19 
            3. The patient will improve hip ABD/EXT to 4/5 MMT to maximize stability during work related duties. - Not yet met 4/5 on 2-5-19 PLAN 
[]  Upgrade activities as tolerated     [x]  Continue plan of care 
[]  Update interventions per flow sheet      
[]  Discharge due to:_ 
[]  Other:_   
 
Farzana Jiménez PTA 2/13/2019  11:43 AM 
 
Future Appointments Date Time Provider Yandel Wakefield 2/19/2019  2:00 PM Ofelia Alicia PTA [facility]  
2/21/2019 10:30 AM [facility]  
2/25/2019 11:00 AM Drew De La Paz[facility]  
2/27/2019 10:15 AM Vijay Muñoz MD 11 [facility]  
6/5/2019  8:00 AM [facility] NURSE [facility] HYUN [facility]  
6/12/2019  8:00 AM Jamil Doran MD 11 [facility]

## 2019-02-18 ENCOUNTER — HOSPITAL ENCOUNTER (EMERGENCY)
Age: 69
Discharge: HOME | End: 2019-02-18
Attending: EMERGENCY MEDICINE
Payer: MEDICAID

## 2019-02-18 VITALS
DIASTOLIC BLOOD PRESSURE: 78 MMHG | HEART RATE: 87 BPM | TEMPERATURE: 98.2 F | OXYGEN SATURATION: 97 % | RESPIRATION RATE: 18 BRPM | SYSTOLIC BLOOD PRESSURE: 125 MMHG

## 2019-02-18 DIAGNOSIS — R21 RASH: Primary | ICD-10-CM

## 2019-02-18 DIAGNOSIS — S05.01XA ABRASION OF RIGHT CORNEA, INITIAL ENCOUNTER: ICD-10-CM

## 2019-02-18 PROCEDURE — 74011000250 HC RX REV CODE- 250: Performed by: EMERGENCY MEDICINE

## 2019-02-18 PROCEDURE — 99283 EMERGENCY DEPT VISIT LOW MDM: CPT

## 2019-02-18 PROCEDURE — 74011250637 HC RX REV CODE- 250/637: Performed by: EMERGENCY MEDICINE

## 2019-02-18 RX ORDER — PROPARACAINE HYDROCHLORIDE 5 MG/ML
1 SOLUTION/ DROPS OPHTHALMIC
Status: COMPLETED | OUTPATIENT
Start: 2019-02-18 | End: 2019-02-18

## 2019-02-18 RX ORDER — LEVOFLOXACIN 5 MG/ML
2 SOLUTION/ DROPS TOPICAL EVERY 6 HOURS
Qty: 5 ML | Refills: 0 | Status: SHIPPED | OUTPATIENT
Start: 2019-02-18 | End: 2020-05-19 | Stop reason: ALTCHOICE

## 2019-02-18 RX ORDER — DIPHENHYDRAMINE HCL 50 MG
50 CAPSULE ORAL
Status: COMPLETED | OUTPATIENT
Start: 2019-02-18 | End: 2019-02-18

## 2019-02-18 RX ORDER — TRIAMCINOLONE ACETONIDE 0.25 MG/G
OINTMENT TOPICAL 2 TIMES DAILY
Qty: 30 G | Refills: 0 | Status: SHIPPED | OUTPATIENT
Start: 2019-02-18

## 2019-02-18 RX ADMIN — PROPARACAINE HYDROCHLORIDE 1 DROP: 5 SOLUTION/ DROPS OPHTHALMIC at 05:46

## 2019-02-18 RX ADMIN — FLUORESCEIN SODIUM 1 STRIP: 0.6 STRIP OPHTHALMIC at 05:46

## 2019-02-18 RX ADMIN — DIPHENHYDRAMINE HYDROCHLORIDE 50 MG: 50 CAPSULE ORAL at 05:45

## 2019-02-18 NOTE — DISCHARGE INSTRUCTIONS
Patient Education        Corneal Scratches: Care Instructions  Your Care Instructions    The cornea is the clear surface that covers the front of the eye. When a speck of dirt, a wood chip, an insect, or another object flies into your eye, it can cause a painful scratch on the cornea. Wearing contact lenses too long or rubbing your eyes can also scratch the cornea. Small scratches usually heal in a day or two. Deeper scratches may take longer. If you have had a foreign object removed from your eye or you have a corneal scratch, you will need to watch for infection and vision problems while your eye heals. Follow-up care is a key part of your treatment and safety. Be sure to make and go to all appointments, and call your doctor if you are having problems. It's also a good idea to know your test results and keep a list of the medicines you take. How can you care for yourself at home? · The doctor probably used a medicine during your exam to numb your eye. When it wears off in 30 to 60 minutes, your eye pain may come back. Take pain medicines exactly as directed. ? If the doctor gave you a prescription medicine for pain, take it as prescribed. ? If you are not taking a prescription pain medicine, ask your doctor if you can take an over-the-counter medicine. ? Do not take two or more pain medicines at the same time unless the doctor told you to. Many pain medicines have acetaminophen, which is Tylenol. Too much acetaminophen (Tylenol) can be harmful. · Do not rub your injured eye. Rubbing can make it worse. · Use the prescribed eyedrops or ointment as directed. Be sure the dropper or bottle tip is clean. To put in eyedrops or ointment:  ? Tilt your head back, and pull your lower eyelid down with one finger. ? Drop or squirt the medicine inside the lower lid. ? Close your eye for 30 to 60 seconds to let the drops or ointment move around.   ? Do not touch the ointment or dropper tip to your eyelashes or any other surface. · Do not use your contact lens in your hurt eye until your doctor says you can. Also, do not wear eye makeup until your eye has healed. · Do not drive if you have blurred vision. · Bright light may hurt. Sunglasses can help. · To prevent eye injuries in the future, wear safety glasses or goggles when you work with machines or tools, mow the lawn, or ride a bike or motorcycle. When should you call for help? Call your doctor now or seek immediate medical care if:    · You have signs of an eye infection, such as:  ? Pus or thick discharge coming from the eye.  ? Redness or swelling around the eye.  ? A fever.     · You have new or worse eye pain.     · You have vision changes.     · It feels like there is something in your eye.     · Light hurts your eye.    Watch closely for changes in your health, and be sure to contact your doctor if:    · You do not get better as expected. Where can you learn more? Go to http://sumit-uli.info/. Enter P092 in the search box to learn more about \"Corneal Scratches: Care Instructions. \"  Current as of: July 17, 2018  Content Version: 11.9  © 8460-4720 [employer]. Care instructions adapted under license by [employer] (which disclaims liability or warranty for this information). If you have questions about a medical condition or this instruction, always ask your healthcare professional. [place] [address] any warranty or liability for your use of this information. Patient Education        Rash: Care Instructions  Your Care Instructions  A rash is any irritation or inflammation of the skin. Rashes have many possible causes, including allergy, infection, illness, heat, and emotional stress. Follow-up care is a key part of your treatment and safety. Be sure to make and go to all appointments, and call your doctor if you are having problems.  It's also a good idea to know your test results and keep a list of the medicines you take. How can you care for yourself at home? · Wash the area with water only. Soap can make dryness and itching worse. Pat dry. · Put cold, wet cloths on the rash to reduce itching. · Keep cool, and stay out of the sun. · Leave the rash open to the air as much of the time as possible. · Sometimes petroleum jelly (Vaseline) can help relieve the discomfort caused by a rash. A moisturizing lotion, such as Cetaphil, also may help. Calamine lotion may help for rashes caused by contact with something (such as a plant or soap) that irritated the skin. Use it 3 or 4 times a day. · If your doctor prescribed a cream, use it as directed. If your doctor prescribed medicine, take it exactly as directed. · If your rash itches so badly that it interferes with your normal activities, take an over-the-counter antihistamine, such as diphenhydramine (Benadryl) or loratadine (Claritin). Read and follow all instructions on the label. When should you call for help? Call your doctor now or seek immediate medical care if:    · You have signs of infection, such as:  ? Increased pain, swelling, warmth, or redness. ? Red streaks leading from the area. ? Pus draining from the area. ? A fever.     · You have joint pain along with the rash.    Watch closely for changes in your health, and be sure to contact your doctor if:    · Your rash is changing or getting worse. For example, call if you have pain along with the rash, the rash is spreading, or you have new blisters.     · You do not get better after 1 week. Where can you learn more? Go to http://sumit-uli.info/. Enter S048 in the search box to learn more about \"Rash: Care Instructions. \"  Current as of: April 17, 2018  Content Version: 11.9  © 4362-5725 [employer]. Care instructions adapted under license by [employer] (which disclaims liability or warranty for this information).  If you have questions about a medical condition or this instruction, always ask your healthcare professional. [place] [address] any warranty or liability for your use of this information.

## 2019-02-19 ENCOUNTER — APPOINTMENT (OUTPATIENT)
Dept: PHYSICAL THERAPY | Age: 69
End: 2019-02-19
Payer: MEDICAID

## 2019-02-21 ENCOUNTER — APPOINTMENT (OUTPATIENT)
Dept: PHYSICAL THERAPY | Age: 69
End: 2019-02-21
Payer: MEDICAID

## 2019-02-25 ENCOUNTER — HOSPITAL ENCOUNTER (OUTPATIENT)
Dept: PHYSICAL THERAPY | Age: 69
Discharge: HOME | End: 2019-02-25
Payer: MEDICAID

## 2019-02-27 ENCOUNTER — OFFICE VISIT (OUTPATIENT)
Dept: FAMILY MEDICINE CLINIC | Age: 69
End: 2019-02-27

## 2019-02-27 VITALS
WEIGHT: 212 LBS | BODY MASS INDEX: 34.07 KG/M2 | TEMPERATURE: 98.1 F | SYSTOLIC BLOOD PRESSURE: 127 MMHG | RESPIRATION RATE: 20 BRPM | HEIGHT: 66 IN | OXYGEN SATURATION: 95 % | DIASTOLIC BLOOD PRESSURE: 77 MMHG | HEART RATE: 81 BPM

## 2019-02-27 DIAGNOSIS — R53.82 CHRONIC FATIGUE: ICD-10-CM

## 2019-02-27 DIAGNOSIS — I10 ESSENTIAL HYPERTENSION: ICD-10-CM

## 2019-02-27 DIAGNOSIS — M54.2 NECK PAIN: Primary | ICD-10-CM

## 2019-02-27 NOTE — PATIENT INSTRUCTIONS
Back Pain: Care Instructions  Your Care Instructions    Back pain has many possible causes. It is often related to problems with muscles and ligaments of the back. It may also be related to problems with the nerves, discs, or bones of the back. Moving, lifting, standing, sitting, or sleeping in an awkward way can strain the back. Sometimes you don't notice the injury until later. Arthritis is another common cause of back pain. Although it may hurt a lot, back pain usually improves on its own within several weeks. Most people recover in 12 weeks or less. Using good home treatment and being careful not to stress your back can help you feel better sooner. Follow-up care is a key part of your treatment and safety. Be sure to make and go to all appointments, and call your doctor if you are having problems. It's also a good idea to know your test results and keep a list of the medicines you take. How can you care for yourself at home? · Sit or lie in positions that are most comfortable and reduce your pain. Try one of these positions when you lie down:  ? Lie on your back with your knees bent and supported by large pillows. ? Lie on the floor with your legs on the seat of a sofa or chair. ? Lie on your side with your knees and hips bent and a pillow between your legs. ? Lie on your stomach if it does not make pain worse. · Do not sit up in bed, and avoid soft couches and twisted positions. Bed rest can help relieve pain at first, but it delays healing. Avoid bed rest after the first day of back pain. · Change positions every 30 minutes. If you must sit for long periods of time, take breaks from sitting. Get up and walk around, or lie in a comfortable position. · Try using a heating pad on a low or medium setting for 15 to 20 minutes every 2 or 3 hours. Try a warm shower in place of one session with the heating pad. · You can also try an ice pack for 10 to 15 minutes every 2 to 3 hours.  Put a thin cloth between the ice pack and your skin. · Take pain medicines exactly as directed. ? If the doctor gave you a prescription medicine for pain, take it as prescribed. ? If you are not taking a prescription pain medicine, ask your doctor if you can take an over-the-counter medicine. · Take short walks several times a day. You can start with 5 to 10 minutes, 3 or 4 times a day, and work up to longer walks. Walk on level surfaces and avoid hills and stairs until your back is better. · Return to work and other activities as soon as you can. Continued rest without activity is usually not good for your back. · To prevent future back pain, do exercises to stretch and strengthen your back and stomach. Learn how to use good posture, safe lifting techniques, and proper body mechanics. When should you call for help? Call your doctor now or seek immediate medical care if:    · You have new or worsening numbness in your legs.     · You have new or worsening weakness in your legs. (This could make it hard to stand up.)     · You lose control of your bladder or bowels.    Watch closely for changes in your health, and be sure to contact your doctor if:    · You have a fever, lose weight, or don't feel well.     · You do not get better as expected. Where can you learn more? Go to http://suimt-uli.info/. Enter H857 in the search box to learn more about \"Back Pain: Care Instructions. \"  Current as of: September 20, 2018  Content Version: 11.9  © 0177-0986 [employer]. Care instructions adapted under license by [employer] (which disclaims liability or warranty for this information). If you have questions about a medical condition or this instruction, always ask your healthcare professional. [place] [address] any warranty or liability for your use of this information.

## 2019-02-27 NOTE — PROGRESS NOTES
Patient is in the office today for back pain and rash follow up. 1. Have you been to the ER, urgent care clinic since your last visit? Hospitalized since your last visit? MV rash 02/18/19    2. Have you seen or consulted any other health care providers outside of the [address] [place] [address] since your last visit? Include any pap smears or colon screening.  No

## 2019-02-28 LAB
ALBUMIN SERPL-MCNC: 4.3 G/DL (ref 3.6–4.8)
ALBUMIN/GLOB SERPL: 1.7 {RATIO} (ref 1.2–2.2)
ALP SERPL-CCNC: 95 IU/L (ref 39–117)
ALT SERPL-CCNC: 17 IU/L (ref 0–44)
AST SERPL-CCNC: 16 IU/L (ref 0–40)
BASOPHILS # BLD AUTO: 0 X10E3/UL (ref 0–0.2)
BASOPHILS NFR BLD AUTO: 0 %
BILIRUB SERPL-MCNC: 0.4 MG/DL (ref 0–1.2)
BUN SERPL-MCNC: 15 MG/DL (ref 8–27)
BUN/CREAT SERPL: 15 (ref 10–24)
CALCIUM SERPL-MCNC: 9.7 MG/DL (ref 8.6–10.2)
CHLORIDE SERPL-SCNC: 102 MMOL/L (ref 96–106)
CO2 SERPL-SCNC: 20 MMOL/L (ref 20–29)
CREAT SERPL-MCNC: 0.97 MG/DL (ref 0.76–1.27)
EOSINOPHIL # BLD AUTO: 0.6 X10E3/UL (ref 0–0.4)
EOSINOPHIL NFR BLD AUTO: 7 %
ERYTHROCYTE [DISTWIDTH] IN BLOOD BY AUTOMATED COUNT: 15.7 % (ref 12.3–15.4)
GLOBULIN SER CALC-MCNC: 2.6 G/DL (ref 1.5–4.5)
GLUCOSE SERPL-MCNC: 146 MG/DL (ref 65–99)
HCT VFR BLD AUTO: 39.3 % (ref 37.5–51)
HGB BLD-MCNC: 12.6 G/DL (ref 13–17.7)
IMM GRANULOCYTES # BLD AUTO: 0 X10E3/UL (ref 0–0.1)
IMM GRANULOCYTES NFR BLD AUTO: 0 %
LYMPHOCYTES # BLD AUTO: 2.2 X10E3/UL (ref 0.7–3.1)
LYMPHOCYTES NFR BLD AUTO: 27 %
MCH RBC QN AUTO: 24.9 PG (ref 26.6–33)
MCHC RBC AUTO-ENTMCNC: 32.1 G/DL (ref 31.5–35.7)
MCV RBC AUTO: 78 FL (ref 79–97)
MONOCYTES # BLD AUTO: 0.5 X10E3/UL (ref 0.1–0.9)
MONOCYTES NFR BLD AUTO: 6 %
NEUTROPHILS # BLD AUTO: 4.9 X10E3/UL (ref 1.4–7)
NEUTROPHILS NFR BLD AUTO: 60 %
PLATELET # BLD AUTO: 296 X10E3/UL (ref 150–379)
POTASSIUM SERPL-SCNC: 4.4 MMOL/L (ref 3.5–5.2)
PROT SERPL-MCNC: 6.9 G/DL (ref 6–8.5)
RBC # BLD AUTO: 5.07 X10E6/UL (ref 4.14–5.8)
SODIUM SERPL-SCNC: 139 MMOL/L (ref 134–144)
TSH SERPL DL<=0.005 MIU/L-ACNC: 3.49 UIU/ML (ref 0.45–4.5)
WBC # BLD AUTO: 8.2 X10E3/UL (ref 3.4–10.8)

## 2019-02-28 NOTE — PROGRESS NOTES
Phil Whatley is a 76 y.o.  male and presents with Back Pain; Rash; Hip Pain (left ); Neck Pain; and Fatigue      SUBJECTIVE:  Pt after falling and hitting head at work around 12/12/18 continues to have headaches in the occipital area associated with neck pain and stiffness. He has been using occasional Fioricet for the headaches. He has not been using any NSAIDs recently for back pain. He is doing PT which has helped to improve some of his back  pain. For unclear reasons his pain worsens at night especially in his neck and right shoulder and patient's unable to sleep at night because of this. The pain is better during the day and he sleeps on his stomach during the day. Patient also within the last couple of weeks has had a rash that broke out on his arms and torso. He has seen dermatology and is being treated with steroid cream.  He will return to dermatology to see if a biopsy is needed. Tells me the rash started after he went to physical therapy 2/13 and then he went to the emergency room on 2/18/2019. His blood pressure is better controlled lisinopril 20 mg. Patient complaining of increased fatigue over the last month. May be related to his multiple issues above but will check for metabolic causes with blood work. Respiratory ROS: negative for - shortness of breath  Cardiovascular ROS: negative for - chest pain    Current Outpatient Medications   Medication Sig    levoFLOXacin (QUIXIN) 0.5 % ophthalmic solution Administer 2 Drops to right eye every six (6) hours. use in affected eye(s)    triamcinolone acetonide (KENALOG) 0.025 % ointment Apply  to affected area two (2) times a day. use thin layer    doxycycline (MONODOX) 100 mg capsule TAKE 1 CAPSULE BY MOUTH EVERY DAY    butalbital-acetaminophen-caffeine (FIORICET, ESGIC) -40 mg per tablet Take 1 Tab by mouth every four (4) hours as needed for Headache.     TOBRADEX ST drps ophthalmix suspension INSTILL 1 DROP INTO LEFT EYE TWICE A DAY    predniSONE (DELTASONE) 10 mg tablet 5 tabs daily for 5 days    hydrOXYzine HCl (ATARAX) 25 mg tablet TAKE 1 TABLET BY MOUTH 3 TIMES A DAY AS NEEDED FOR ITCHING FOR UP TO 10 DAYS. MAY CAUSE DROWSINESS    glucose blood VI test strips (ACCU-CHEK SMARTVIEW TEST STRIP) strip Test blood sugar 1 times a day. DX E11.65    lisinopril (PRINIVIL, ZESTRIL) 10 mg tablet Take 1 Tab by mouth daily.  pravastatin (PRAVACHOL) 40 mg tablet Take 1 Tab by mouth nightly.  pantoprazole (PROTONIX) 40 mg tablet Take 1 Tab by mouth daily.  SITagliptin-metFORMIN (JANUMET XR) 50-1,000 mg TM24 Take 2 Tabs by mouth daily.  hydrOXYzine pamoate (VISTARIL) 25 mg capsule Take 1 Cap by mouth three (3) times daily as needed for Itching.  alcohol swabs (BD SINGLE USE SWABS REGULAR) padm Use daily to check blood sugar    Blood Glucose Control, Normal (ACCU-CHEK SMARTVIEW CONTRL SOL) soln Test blood sugar 1 x daily  DX E11.9        1 year supply  Check controls monthy on glucose meter    Blood-Glucose Meter monitoring kit Accu-check Smartview Meter    halobetasol (ULTRAVATE) 0.05 % topical cream APPLY TO AFFECTED AREA TWICE A DAY AS NEEDED    Lancets misc Accu-check  Fastclix Lancets. Test blood sugar daily. DX E11.9     No current facility-administered medications for this visit. OBJECTIVE:  alert, well appearing, and in no distress  Visit Vitals  /77 (BP 1 Location: Left arm, BP Patient Position: Sitting)   Pulse 81   Temp 98.1 °F (36.7 °C) (Oral)   Resp 20   Ht 5' 6\" (1.676 m)   Wt 212 lb (96.2 kg)   SpO2 95%   BMI 34.22 kg/m²      well developed and well nourished  Back exam - tenderness noted in the paraspinal muscles in the lumbar area, negative straight-leg raise bilaterally   Musculoskeletal -neck with decreased range of motion with soreness of muscles as they insert into the base of the skull improved from last OV. Assessment/Plan        ICD-10-CM ICD-9-CM    1.  Neck pain M54.2 723.1 MRI CERV SPINE WO CONT      REFERRAL TO PHYSICIAL MEDICINE REHAB pt also given the name of  Chiropractor to consider evaluation for the headaches. He will continue PT. 2. Chronic fatigue R53.82 780.79 Will check METABOLIC PANEL, COMPREHENSIVE      CBC WITH AUTOMATED DIFF      TSH 3RD GENERATION   3. Essential hypertension I10 401.9 Improved control on lisinopril 20 mg. Every day           Follow-up Disposition:  Return in about 4 weeks (around 3/27/2019) for neck pain, headaches . Reviewed plan of care. Patient has provided input and agrees with goals.

## 2019-03-07 ENCOUNTER — HOSPITAL ENCOUNTER (OUTPATIENT)
Dept: PHYSICAL THERAPY | Age: 69
Discharge: HOME | End: 2019-03-07
Payer: MEDICARE

## 2019-03-07 PROCEDURE — 97110 THERAPEUTIC EXERCISES: CPT

## 2019-03-07 NOTE — PROGRESS NOTES
[facility]  [address]  [place], [address]  [phone] fax    Continued Plan of Care/ Re-certification for Physical Therapy Services    Patient name: Jim Lim Start of Care: 2019   Referral source: Tommy Doran MD : [id number]               Medical Diagnosis: Cervicalgia [M54.2]  Dorsalgia, unspecified [M54.9]  Acute post-traumatic headache, intractable [Y34.988]  Payor: [employer] MEDICARE / Plan: [address] [place] [address] [place] HMO / Product Type: Managed Care Medicare /  Onset Date: 2018               Treatment Diagnosis: Lumbar/Cervical pain with HAs. Prior Hospitalization: see medical history Provider#: [id number]   Medications: Verified on Patient summary List    Comorbidities: Depression, Diabetes, HTN, Visual impairment, Latex Allergy, Tobacco use, left glass eye prosthetic   Prior Level of Function: The patient states he was able to perform all job functions without limitations prior to injury. Visits from Start of Care: 9    Missed Visits: 2    The Plan of Care and following information is based on the patient's current status:  Goals for this certification period to be accomplished in 4 weeks:               1. The patient will improve cervical rotation to 55 to improve ease of driving.- left MET 55 degrees, right progressing 50 degrees; Not met C/S rot left 65* right 42* 3/7/19               2. The patient will improve lumbar AROM pain free WNL to improve efficiency of occupational ease. Progressing limited in left SB and extension 19; MET 3/7/19              3. The patient will improve hip ABD/EXT to 4/5 MMT to maximize stability during work related duties. - Not yet met 4/5 on 19;  Not met  MMT hip abd left 4-/5 right 3+/5 3/7/19     Key functional changes:   C/S rot left 65 right 42            MMT hip abd left 4-/5 right 3+/5  HA is constant but worse in intensity at night with intermittent dizziness  Cervical endurance 11 sec  Right shoulder pain inf to acromion with sleeping, AROM WNL and void of pain  Right sided UT/LS ms tension   FOTO 54    Problems/ barriers to goal attainment: Recent illness causing a lack of attendance. Problem List: pain affecting function, decrease ROM, decrease strength, decrease ADL/ functional abilitiies, decrease activity tolerance, decrease flexibility/ joint mobility and decrease transfer abilities    Treatment Plan: Therapeutic exercise, Therapeutic activities, Neuromuscular re-education, Physical agent/modality, Gait/balance training, Manual therapy, Patient education, Self Care training, Functional mobility training and Home safety training     Patient Goal (s) has been updated and includes: Decreased headaches and neck pain. Goals for this certification period to be accomplished in 4 weeks:               1. The patient will improve cervical rotation to 55 to improve ease of driving.- left MET 55 degrees, right progressing 50 degrees; Not met C/S rot left 65* right 42* 3/7/19               2. The patient will improve lumbar AROM pain free WNL to improve efficiency of occupational ease. Progressing limited in left SB and extension 2-5-19; MET 3/7/19              3. The patient will improve hip ABD/EXT to 4/5 MMT to maximize stability during work related duties. - Not yet met 4/5 on 2-5-19; Not met  MMT hip abd left 4-/5 right 3+/5 3/7/19    Frequency / Duration: Patient to be seen 2 times per week for 4 weeks:    Assessment / Recommendations:Pt has made limited progress toward decr'd c/s symptoms due to lack of attendance related to illness. Pt does reports improved L/S symptoms and demo's lumbar AROM WNL and void of pain. Discussed cont'd PT to improve cervical AROM and decr'd c/s and right shoulder pain. Pt reports decr'd activity tolerance including household chores due to pain and HA's.  Pt also reports inability to sleep through the night because of right shoulder pain and HA. The patient will continue to benefit from skilled PT in order to address the aforementioned impairments. Certification Period: 3/07/2019 - 4/07/2019    Eileen Cyr PT 3/7/2019 4:17 PM    ________________________________________________________________________  I certify that the above Therapy Services are being furnished while the patient is under my care. I agree with the treatment plan and certify that this therapy is necessary. [] I have read the above and request that my patient continue as recommended.   [] I have read the above report and request that my patient continue therapy with the following changes/special instructions: _____________________________________________  [] I have read the above report and request that my patient be discharged from therapy    Physician's Signature:____________Date:_________TIME:________    ** Signature, Date and Time must be completed for valid certification **    Please sign and return to [facility] [address] [place] [address] [address]  [phone] fax

## 2019-03-07 NOTE — PROGRESS NOTES
PT DAILY TREATMENT NOTE 10-18    Patient Name: Chino Contreras  Date:3/7/2019  : 1950  [x]  Patient  Verified  Payor: [facility] MEDICAID / Plan: [facility] / Product Type: Managed Care Medicaid /    In time:2:00  Out time:3:02  Total Treatment Time (min): 62  Visit #: 2 of 8    Medicare/BCBS Only   Total Timed Codes (min):  52 1:1 Treatment Time:  30       Treatment Area: Cervicalgia [M54.2]  Dorsalgia, unspecified [M54.9]  Acute post-traumatic headache, intractable [G44.311]    SUBJECTIVE  Pain Level (0-10 scale): 6-7 neck; 0 back  Any medication changes, allergies to medications, adverse drug reactions, diagnosis change, or new procedure performed?: [x] No    [] Yes (see summary sheet for update)  Subjective functional status/changes:   [] No changes reported  Pt reports not being able to come to PT recently because he was home sick, pt states he back feels a lot better and has been doing exercises at home but still has constant HA's that keep him awake at night as well as neck and shoulder pain.  \"I feel like a spirit came out of me when I fell\" Pt states he has mood swings now and tries to keep to himself    OBJECTIVE    Modality rationale: decrease pain to improve the patients ability to perform ADLs   Min Type Additional Details    [] Estim:  []Unatt       []IFC  []Premod                        []Other:  []w/ice   []w/heat  Position:  Location:    [] Estim: []Att    []TENS instruct  []NMES                    []Other:  []w/US   []w/ice   []w/heat  Position:  Location:    []  Traction: [] Cervical       []Lumbar                       [] Prone          []Supine                       []Intermittent   []Continuous Lbs:  [] before manual  [] after manual    []  Ultrasound: []Continuous   [] Pulsed                           []1MHz   []3MHz W/cm2:  Location:    []  Iontophoresis with dexamethasone         Location: [] Take home patch   [] In clinic   10 []  Ice     [x]  heat  []  Ice massage  []  Laser   []  Anodyne Position: seated  Location: neck    []  Laser with stim  []  Other:  Position:  Location:    []  Vasopneumatic Device Pressure:       [] lo [] med [] hi   Temperature: [] lo [] med [] hi   [] Skin assessment post-treatment:  []intact []redness- no adverse reaction    []redness  adverse reaction:       52 min Therapeutic Exercise:  [x] See flow sheet :   Rationale: increase ROM and increase strength to improve the patients ability to perform ADLs        With   [] TE   [] TA   [] neuro   [] other: Patient Education: [x] Review HEP    [] Progressed/Changed HEP based on:   [] positioning   [] body mechanics   [] transfers   [] heat/ice application    [] other:      Other Objective/Functional Measures:   C/S rot left 65 right 42   MMT hip abd left 4-/5 right 3+/5  HA is constant but worse in intensity at night with intermittent dizziness  Cervical endurance 11 sec  Right shoulder pain inf to acromion with sleeping, AROM WNL and void of pain  Right sided UT/LS ms tension   FOTO 54    Pain Level (0-10 scale) post treatment: 0    ASSESSMENT/Changes in Function: Pt has made limited progress toward decr'd c/s symptoms due to lack of attendance related to illness. Pt does reports improved L/S symptoms and demo's lumbar AROM WNL and void of pain. Discussed cont'd PT to improve cervical AROM and decr'd c/s and right shoulder pain. Pt reports decr'd activity tolerance including household chores due to pain and HA's. Pt also reports inability to sleep through the night because of right shoulder pain and HA. Patient will continue to benefit from skilled PT services to modify and progress therapeutic interventions, address functional mobility deficits, address ROM deficits, address strength deficits, analyze and address soft tissue restrictions, analyze and cue movement patterns and assess and modify postural abnormalities to attain remaining goals.      []  See Plan of Care  []  See progress note/recertification  []  See Discharge Summary         Progress towards goals / Updated goals:  Goals for this certification period to be accomplished in 4 weeks:               1. The patient will improve cervical rotation to 55 to improve ease of driving.- left MET 55 degrees, right progressing 50 degrees; Not met C/S rot left 65* right 42* 3/7/19               2. The patient will improve lumbar AROM pain free WNL to improve efficiency of occupational ease. Progressing limited in left SB and extension 2-5-19; MET 3/7/19              3. The patient will improve hip ABD/EXT to 4/5 MMT to maximize stability during work related duties. - Not yet met 4/5 on 2-5-19;  Not met  MMT hip abd left 4-/5 right 3+/5 3/7/19    PLAN  []  Upgrade activities as tolerated     []  Continue plan of care  []  Update interventions per flow sheet       []  Discharge due to:_  []  Other:_      Andrey Boyd PTA 3/7/2019  2:08 PM    Future Appointments   Date Time Provider Yandel Haywoodi   3/12/2019 10:00 AM [facility]   3/14/2019 10:30 AM Felisa Hinojosa PTA [facility]   3/27/2019  9:15 AM Jonna Aceves MD 11 [facility]   6/5/2019  8:00 AM [facility] NURSE WBJEAN PEARSON   6/12/2019  8:00 AM Naz Doran MD 11 [facility]

## 2019-03-12 ENCOUNTER — APPOINTMENT (OUTPATIENT)
Dept: PHYSICAL THERAPY | Age: 69
End: 2019-03-12
Payer: MEDICARE

## 2019-03-14 ENCOUNTER — HOSPITAL ENCOUNTER (OUTPATIENT)
Dept: PHYSICAL THERAPY | Age: 69
Discharge: HOME | End: 2019-03-14
Payer: MEDICARE

## 2019-03-14 PROCEDURE — 97110 THERAPEUTIC EXERCISES: CPT

## 2019-03-14 NOTE — PROGRESS NOTES
PT DAILY TREATMENT NOTE 10-18    Patient Name: Juana Wade  Date:3/14/2019  : 1950  [x]  Patient  Verified  Payor: BLUE CROSS MEDICARE / Plan: [address] [place], [place] [address] HMO / Product Type: Managed Care Medicare /    In time:10:30  Out time:11:00  Total Treatment Time (min): 30  Visit #: 1 of 8    Medicare/BCBS Only   Total Timed Codes (min):  30 1:1 Treatment Time:  30       Treatment Area: Cervicalgia [M54.2]  Dorsalgia, unspecified [M54.9]  Acute post-traumatic headache, intractable [G44.311]    SUBJECTIVE  Pain Level (0-10 scale): 4, neck and back  Any medication changes, allergies to medications, adverse drug reactions, diagnosis change, or new procedure performed?: [x] No    [] Yes (see summary sheet for update)  Subjective functional status/changes:   [] No changes reported  \"I feel good today\"    OBJECTIVE    30 min Therapeutic Exercise:  [x] See flow sheet :   Rationale: increase ROM and increase strength to improve the patients ability to perform ADLs        With   [] TE   [] TA   [] neuro   [] other: Patient Education: [x] Review HEP    [] Progressed/Changed HEP based on:   [] positioning   [] body mechanics   [] transfers   [] heat/ice application    [] other:      Other Objective/Functional Measures:   No change in therex today     Pain Level (0-10 scale) post treatment: 0 back, 2 neck    ASSESSMENT/Changes in Function: Pt performed well with therex today, reports no hermosillo ein HA intensity throughout treatment and decr'd neck and back pain. Cont progressing towards improved CROM and decr'd pain level to aid with sleeping and ADLs.      Patient will continue to benefit from skilled PT services to modify and progress therapeutic interventions, address functional mobility deficits, address ROM deficits, address strength deficits, analyze and address soft tissue restrictions, analyze and cue movement patterns, analyze and modify body mechanics/ergonomics and assess and modify postural abnormalities to attain remaining goals. []  See Plan of Care  []  See progress note/recertification  []  See Discharge Summary         Progress towards goals / Updated goals:     1. The patient will improve cervical rotation to 55 to improve ease of driving.- left MET 55 degrees, right progressing 50 degrees; Not met C/S rot left 65* right 42* 3/7/19               2. The patient will improve lumbar AROM pain free WNL to improve efficiency of occupational ease. Progressing limited in left SB and extension 2-5-19; MET 3/7/19              3. The patient will improve hip ABD/EXT to 4/5 MMT to maximize stability during work related duties. - Not yet met 4/5 on 2-5-19;  Not met  MMT hip abd left 4-/5 right 3+/5 3/7/19        PLAN  [x]  Upgrade activities as tolerated     [x]  Continue plan of care  []  Update interventions per flow sheet       []  Discharge due to:_  []  Other:_      Delfina Hinojosa, LEIA 3/14/2019  10:38 AM    Future Appointments   Date Time Provider Yandel [place]   3/28/2019 11:30 AM Durel Sandhoff, MD [address] [place] [address]   6/18/2019  7:00 AM [facility] NURSE [facility] HYUN PEARSON   6/25/2019 10:00 AM Georgette Doran MD [address] [place] [address]

## 2019-03-18 ENCOUNTER — HOSPITAL ENCOUNTER (OUTPATIENT)
Dept: PHYSICAL THERAPY | Age: 69
Discharge: HOME | End: 2019-03-18
Payer: MEDICARE

## 2019-03-18 PROCEDURE — 97110 THERAPEUTIC EXERCISES: CPT

## 2019-03-18 NOTE — PROGRESS NOTES
PT DAILY TREATMENT NOTE 10-18    Patient Name: Reji Luis  Date:3/18/2019  : 1950  [x]  Patient  Verified  Payor: Arianna Ku / Plan: [address] [place] [address] [place] HMO / Product Type: Managed Care Medicare /    In time:2:30  Out time:3:10  Total Treatment Time (min): 40  Visit #: 2 of 8    Medicare/BCBS Only   Total Timed Codes (min):  40 1:1 Treatment Time:  30       Treatment Area: Cervicalgia [M54.2]  Dorsalgia, unspecified [M54.9]  Acute post-traumatic headache, intractable [G44.311]    SUBJECTIVE  Pain Level (0-10 scale): back 3-4, neck 7-8  Any medication changes, allergies to medications, adverse drug reactions, diagnosis change, or new procedure performed?: [x] No    [] Yes (see summary sheet for update)  Subjective functional status/changes:   [] No changes reported  Pt states he received a worker's comp case number and will be following up with case manage soon. Pt states pain has been inconsistent, he also reports sleeping difficulties as well as \"mood changes\"  With constant HA's since fall at work     OBJECTIVE     40 min Therapeutic Exercise:  [x] See flow sheet :   Rationale: increase ROM and increase strength to improve the patients ability to perform ADLs            With   [] TE   [] TA   [] neuro   [] other: Patient Education: [x] Review HEP    [] Progressed/Changed HEP based on:   [] positioning   [] body mechanics   [] transfers   [] heat/ice application    [] other:      Other Objective/Functional Measures:   C/s rot to right 55*, left 65*  dea hip abd 4-/5     Pain Level (0-10 scale) post treatment: neck 8, back 4    ASSESSMENT/Changes in Function: Pt has made progress in PT towards improved CROM and lumbar mobility, pain levels remain elevated and inconsistent. Pt also reports constant HA that fluctuates in intensity but is always present and causes him to stay in bed, however, sleep is also inconsistent due to pain.  D/C from PT today due to reaching insurance visit limit. Pt's care will transition to worker's comp. []  See Plan of Care  []  See progress note/recertification  [x]  See Discharge Summary         Progress towards goals / Updated goals:     1. The patient will improve cervical rotation to 55 to improve ease of driving.- left MET 55 degrees, right progressing 50 degrees; Not met C/S rot left 65* right 42* 3/7/19; MET C/s rot to right 55*, left 65* (3/18/19)               2. The patient will improve lumbar AROM pain free WNL to improve efficiency of occupational ease. Progressing limited in left SB and extension 2-5-19; MET 3/7/19              3. The patient will improve hip ABD/EXT to 4/5 MMT to maximize stability during work related duties. - Not yet met 4/5 on 2-5-19;  Not met  MMT hip abd left 4-/5 right 3+/5 3/7/19; Not met dea hip abd 4-/5 (3/18/19)       PLAN  []  Upgrade activities as tolerated     []  Continue plan of care  []  Update interventions per flow sheet       [x]  Discharge due to:_insurance visit limit met  []  Other:_      Pedro Hayley, PTA 3/18/2019  2:42 PM    Future Appointments   Date Time Provider Yandel [place]   3/28/2019 11:30 AM Lily Tello MD 11 [facility]   6/18/2019  7:00 AM [facility] NURSE [facility] HYUN PEARSON   6/25/2019 10:00 AM Larissa Doran MD 11 [facility]

## 2019-03-28 ENCOUNTER — OFFICE VISIT (OUTPATIENT)
Dept: FAMILY MEDICINE CLINIC | Age: 69
End: 2019-03-28

## 2019-03-28 VITALS
HEIGHT: 66 IN | WEIGHT: 213 LBS | BODY MASS INDEX: 34.23 KG/M2 | TEMPERATURE: 97.4 F | DIASTOLIC BLOOD PRESSURE: 72 MMHG | OXYGEN SATURATION: 97 % | SYSTOLIC BLOOD PRESSURE: 120 MMHG | HEART RATE: 76 BPM | RESPIRATION RATE: 20 BRPM

## 2019-03-28 DIAGNOSIS — M25.511 ACUTE PAIN OF RIGHT SHOULDER: ICD-10-CM

## 2019-03-28 DIAGNOSIS — G44.321 INTRACTABLE CHRONIC POST-TRAUMATIC HEADACHE: ICD-10-CM

## 2019-03-28 DIAGNOSIS — F41.9 ANXIETY: ICD-10-CM

## 2019-03-28 DIAGNOSIS — R97.20 RISING PSA LEVEL: ICD-10-CM

## 2019-03-28 DIAGNOSIS — M54.2 NECK PAIN: Primary | ICD-10-CM

## 2019-03-28 LAB — GLUCOSE DOSE-GTT, POCT, GLDSPOCT: 128

## 2019-03-28 RX ORDER — DIAZEPAM 5 MG/1
TABLET ORAL
Qty: 2 TAB | Refills: 0 | Status: SHIPPED | OUTPATIENT
Start: 2019-03-28 | End: 2019-04-19 | Stop reason: ALTCHOICE

## 2019-03-28 NOTE — PROGRESS NOTES
Patient is in the office today for 1 month follow up. 1. Have you been to the ER, urgent care clinic since your last visit? Hospitalized since your last visit? No 
 
2. Have you seen or consulted any other health care providers outside of the [address] [place] [address] since your last visit? Include any pap smears or colon screening.  No

## 2019-03-28 NOTE — PATIENT INSTRUCTIONS

## 2019-03-29 ENCOUNTER — HOSPITAL ENCOUNTER (OUTPATIENT)
Age: 69
Discharge: HOME | End: 2019-03-29
Attending: INTERNAL MEDICINE
Payer: MEDICARE

## 2019-03-29 DIAGNOSIS — M54.2 NECK PAIN: ICD-10-CM

## 2019-03-29 PROCEDURE — 72141 MRI NECK SPINE W/O DYE: CPT

## 2019-03-30 RX ORDER — LISINOPRIL 20 MG/1
20 TABLET ORAL DAILY
Qty: 90 TAB | Refills: 1 | COMMUNITY
Start: 2019-03-30 | End: 2019-05-21 | Stop reason: DRUGHIGH

## 2019-03-30 NOTE — PROGRESS NOTES
Tab Trent is a 71 y.o.  male and presents with Neck Pain; Headache; and Shoulder Pain (right ) SUBJECTIVE: 
Pt after falling and hitting head at work around 12/12/18 continues to have headaches in the occipital area associated with neck pain and stiffness. He has been using occasional Fioricet for the headaches. He has not been using any NSAIDs recently for back pain. PT improved his low back pain but not his headaches, right shoulder pain or neck pain. Pt pain is worst at night and he has difficulty sleeping. The pain is better during the day and he sleeps on his stomach during the day. His blood pressure remains better controlled on lisinopril 20 mg. Patient complaining of increased fatigue over the last month. May be related to his multiple issues above but will check for metabolic causes with blood work. Respiratory ROS: negative for - shortness of breath Cardiovascular ROS: negative for - chest pain Current Outpatient Medications Medication Sig  
 lisinopril (PRINIVIL, ZESTRIL) 20 mg tablet Take 1 Tab by mouth daily.  diazePAM (VALIUM) 5 mg tablet 1 tab 1 hour before MRI  levoFLOXacin (QUIXIN) 0.5 % ophthalmic solution Administer 2 Drops to right eye every six (6) hours. use in affected eye(s)  triamcinolone acetonide (KENALOG) 0.025 % ointment Apply  to affected area two (2) times a day. use thin layer  doxycycline (MONODOX) 100 mg capsule TAKE 1 CAPSULE BY MOUTH EVERY DAY  butalbital-acetaminophen-caffeine (FIORICET, ESGIC) -40 mg per tablet Take 1 Tab by mouth every four (4) hours as needed for Headache.  halobetasol (ULTRAVATE) 0.05 % topical cream APPLY TO AFFECTED AREA TWICE A DAY AS NEEDED  
 TOBRADEX ST drps ophthalmix suspension INSTILL 1 DROP INTO LEFT EYE TWICE A DAY  predniSONE (DELTASONE) 10 mg tablet 5 tabs daily for 5 days  hydrOXYzine HCl (ATARAX) 25 mg tablet TAKE 1 TABLET BY MOUTH 3 TIMES A DAY AS NEEDED FOR ITCHING FOR UP TO 10 DAYS. MAY CAUSE DROWSINESS  glucose blood VI test strips (ACCU-CHEK SMARTVIEW TEST STRIP) strip Test blood sugar 1 times a day. DX E11.65  
 pravastatin (PRAVACHOL) 40 mg tablet Take 1 Tab by mouth nightly.  pantoprazole (PROTONIX) 40 mg tablet Take 1 Tab by mouth daily.  SITagliptin-metFORMIN (JANUMET XR) 50-1,000 mg TM24 Take 2 Tabs by mouth daily.  hydrOXYzine pamoate (VISTARIL) 25 mg capsule Take 1 Cap by mouth three (3) times daily as needed for Itching.  Lancets misc Accu-check  Fastclix Lancets. Test blood sugar daily. DX E11.9  
 alcohol swabs (BD SINGLE USE SWABS REGULAR) padm Use daily to check blood sugar  Blood Glucose Control, Normal (ACCU-CHEK SMARTVIEW CONTRL SOL) soln Test blood sugar 1 x daily  DX E11.9        1 year supply  Check controls monthy on glucose meter  Blood-Glucose Meter monitoring kit Accu-check Smartview Meter No current facility-administered medications for this visit. OBJECTIVE: 
alert, well appearing, and in no distress Visit Vitals /72 (BP 1 Location: Left arm, BP Patient Position: Sitting) Pulse 76 Temp 97.4 °F (36.3 °C) (Oral) Resp 20 Ht 5' 6\" (1.676 m) Wt 213 lb (96.6 kg) SpO2 97% BMI 34.38 kg/m²  
  
well developed and well nourished Musculoskeletal -neck with decreased range of motion with soreness of muscles as they insert into the base of the skull improved from last OV. Assessment/Plan ICD-10-CM ICD-9-CM 1. Neck pain M54.2 723.1 Pt to have MRI cervical spine tomorrow and [facility]. He was given information. 2. Intractable chronic post-traumatic headache G44.321 339.22 probably related to #1 3. Acute pain of right shoulder M25.511 719.41 May be related to referred pain from neck. Will check MRI of C spine 4.  Uncontrolled type 2 diabetes mellitus without complication, without long-term current use of insulin (HCC) E11.65 250.02 AMB POC GLUCOSE TEST  
   LIPID PANEL  
   METABOLIC PANEL, COMPREHENSIVE  
   HEMOGLOBIN A1C W/O EAG  
   MICROALBUMIN, UR, RAND W/ MICROALB/CREAT RATIO 5. Anxiety F41.9 300.00 diazePAM (VALIUM) 5 mg tablet for him to do MRI 6. Rising PSA level R97.20 790.93 PSA, DIAGNOSTIC (PROSTATE SPECIFIC AG) Follow-up and Dispositions · Return if symptoms worsen or fail to improve. Reviewed plan of care. Patient has provided input and agrees with goals.

## 2019-04-09 LAB — COLONOSCOPY, EXTERNAL: NORMAL

## 2019-04-15 ENCOUNTER — TELEPHONE (OUTPATIENT)
Dept: FAMILY MEDICINE CLINIC | Age: 69
End: 2019-04-15

## 2019-04-15 NOTE — TELEPHONE ENCOUNTER
Patient came in and dropped off Attending Physicians Report to be filled out and signed by dr Huy Weathers. Form for form completed.  Paperwork will be given to [place]

## 2019-04-16 NOTE — PROGRESS NOTES
In [address]  Linnette Hartley [address] [place], [address] 
[phone] fax Physical Therapy Discharge Summary Patient name: Jim CHANGAbhinavALPHONSEAbhinav JeanieShira of Care: 2019 Referral source: Larissa Doran MD : 1950              
Medical Diagnosis: Cervicalgia [M54.2] Dorsalgia, unspecified [M54.9] Acute post-traumatic headache, intractable [G44.311] Payor: BLUE CROSS MEDICARE / Plan: [address] [place] [address] HMO / Product Type: Managed Care Medicare /  Onset Date: 2018              
Treatment Diagnosis: Lumbar/Cervical pain with HAs. Prior Hospitalization: see medical history Provider#: [id number] Medications: Verified on Patient summary List  
 Comorbidities: Depression, Diabetes, HTN, Visual impairment, Latex Allergy, Tobacco use, left glass eye prosthetic 
 Prior Level of Function: The patient states he was able to perform all job functions without limitations prior to injury. Visits from Start of Care: 11    Missed Visits: 3 Reporting Period : 3/07/2019 to 3/18/2019 Summary of Care: 
   1. The patient will improve cervical rotation to 55 to improve ease of driving.- left MET 55 degrees, right progressing 50 degrees; Not met C/S rot left 65* right 42* 3/7/19; MET C/s rot to right 55*, left 65*     (3/18/19)  
            2. The patient will improve lumbar AROM pain free WNL to improve efficiency of occupational ease. Progressing limited in left SB and extension 19; MET 3/7/19 
            3. The patient will improve hip ABD/EXT to 4/5 MMT to maximize stability during work related duties. - Not yet met 4/5 on 19; Not met  MMT hip abd left 4-/5 right 3+/5 3/7/19; Not met dea hip abd 4-/5 (3/18/19) ASSESSMENT/RECOMMENDATIONS: Pt has made progress in PT towards improved CROM and lumbar mobility, pain levels remain elevated and inconsistent.  Pt also reports constant HA that fluctuates in intensity but is always present and causes him to stay in bed, however, sleep is also inconsistent due to pain. D/C from PT today due to reaching insurance visit limit. Pt's care will transition to worker's comp. [x]Discontinue therapy: [x]Patient has reached or is progressing toward set goals []Patient is non-compliant or has abdicated 
    []Due to lack of appreciable progress towards set goals Michelle Yuan, PT 4/16/2019 11:02 AM

## 2019-04-19 ENCOUNTER — OFFICE VISIT (OUTPATIENT)
Dept: ORTHOPEDIC SURGERY | Age: 69
End: 2019-04-19

## 2019-04-19 VITALS
HEIGHT: 66 IN | BODY MASS INDEX: 34.72 KG/M2 | WEIGHT: 216 LBS | DIASTOLIC BLOOD PRESSURE: 84 MMHG | HEART RATE: 85 BPM | RESPIRATION RATE: 20 BRPM | TEMPERATURE: 97.9 F | OXYGEN SATURATION: 96 % | SYSTOLIC BLOOD PRESSURE: 145 MMHG

## 2019-04-19 DIAGNOSIS — M54.81 BILATERAL OCCIPITAL NEURALGIA: ICD-10-CM

## 2019-04-19 DIAGNOSIS — S06.0X1A CONCUSSION WITH BRIEF LOC: ICD-10-CM

## 2019-04-19 DIAGNOSIS — M47.12 OSTEOARTHRITIS OF CERVICAL SPINE WITH MYELOPATHY: Primary | ICD-10-CM

## 2019-04-19 RX ORDER — METHYLPREDNISOLONE 4 MG/1
TABLET ORAL
Qty: 1 DOSE PACK | Refills: 0 | Status: SHIPPED | OUTPATIENT
Start: 2019-04-19 | End: 2019-05-21 | Stop reason: ALTCHOICE

## 2019-04-19 RX ORDER — GABAPENTIN 300 MG/1
300 CAPSULE ORAL 3 TIMES DAILY
Qty: 90 CAP | Refills: 0 | Status: SHIPPED | OUTPATIENT
Start: 2019-04-19 | End: 2019-05-16 | Stop reason: SDUPTHER

## 2019-04-19 NOTE — PROGRESS NOTES
[address] 
[address], [address] Phone: [phone] Fax: [phone] INITIAL CONSULTATION Patient: Pacheco Karimi                MRN: [id number]       SSN: [id number] YOB: 1950        AGE: 71 y.o. SEX: male Body mass index is 34.86 kg/m². PCP: Pk Trent MD 
04/19/19 Chief Complaint Patient presents with  Neck Pain New Patient  Back Pain  Hand Pain HISTORY OF PRESENT ILLNESS, RADIOGRAPHS, and PLAN:  
 
 
 
HISTORY OF PRESENT ILLNESS:  Mr. Evon Chandler is seen today at the request of Dr. Tee Soto. Mr. Evon Chandler is a pleasant, 69-year-old, gentleman who was working as a  on December 2, 2018, when he slipped on an area around an ice rink around [place]. He struck the back of his head. It sounds like he actually was knocked out momentarily. Since that time, he has had severe headache, neck pain, and right shoulder pain. He has had a thickness and fullness to his hands and some numbness and tingling with some loss of balance and some degree of dexterity. He denies bowel or bladder dysfunction, fever, chills, or night sweats, weight loss or weight gain. He was treated by his primary care doctor without much improvement. He has been quite miserable. He rates his symptoms between a 4/10 and 8/10. PHYSICAL EXAMINATION:  His physical exam demonstrates mildly diminished range of cervical motion, good strength of his deltoids, biceps, and triceps. He has a little intrinsic weakness, loss of two-point discrimination in his hands worse than left, and poor tandem gait. He has no clonus, Talley's, or Babinski.   
 
RADIOGRAPHS:  MRI of his cervical spine demonstrates severe cervical spondylosis, C2-3 arthritis with global stenosis, anterior and posterior cord compression due to disc and uncinate spurring anteriorly, and ligament invagination posteriorly causing cord compression and no clear gliosis. There is stenosis at C5-6 and C6-7 due to disc protrusion and uncinate spurring. There is no gross instability. ASSESSMENT/PLAN: We have a patient who likely had pre-existing advanced degenerative changes in the cervical spine but did not appear to have any symptoms of such. He had a slip-and-fall while working. It sounds like he had a concussive episode striking the back of his head after a slip-and-fall around an ice rink. Since that time, he has had ongoing headache and subtle myelopathic symptoms. I think his headache may be due to occipital neuralgia and post-concussive headache. For the occipital neuralgia, I am going to place him in a soft collar and get him a Medrol Dosepak and some occipital nerve blocks. I would like him to see Neurology to evaluate his after effects of concussion and see if they have any medication recommendations. For his myelopathic symptoms, we are going to observe and see how he does. He does have very significant stenosis at C2-3 and more moderate stenosis at C2-3 and more moderate stenosis at C5-6 and C6-7. His exam findings are subtle. If his symptoms do not resolve, his C2-3 symptoms would require a posterior cervical decompression and fusion at C2-3, C5-6, and C6-7 and anterior cervical decompression and fusion at those levels. Obviously, we would try to avoid surgery if possible, but we would have to observe him over time to make sure he does not have progression of myelopathy. I am most concerned about the C2-3 stenosis evident. So, at this point, we are going to obtain for him a soft collar, place him on a Medrol Dosepak, and provide him some neuropathic medication, Gabapentin. We will have him seen by Interventional Pain for an occipital nerve block, as well as by Neurology for evaluation of his headache and post-concussive symptoms.   
 
cc: Alexander Tafoya M.D.  
 
 
 
 Past Medical History:  
Diagnosis Date  Diabetes ([facility] [address]  GERD (gastroesophageal reflux disease)  Hx of colonic polyps  Hx of gallstones  Hypercholesterolemia  Hypertension  Restless leg syndrome  Wears dentures Family History Problem Relation Age of Onset  Heart Attack Mother  Prostate Cancer Father  Colon Cancer Father  Stroke Sister Current Outpatient Medications Medication Sig Dispense Refill  lisinopril (PRINIVIL, ZESTRIL) 20 mg tablet Take 1 Tab by mouth daily. 90 Tab 1  
 levoFLOXacin (QUIXIN) 0.5 % ophthalmic solution Administer 2 Drops to right eye every six (6) hours. use in affected eye(s) 5 mL 0  
 triamcinolone acetonide (KENALOG) 0.025 % ointment Apply  to affected area two (2) times a day. use thin layer 30 g 0  
 doxycycline (MONODOX) 100 mg capsule TAKE 1 CAPSULE BY MOUTH EVERY DAY  0  
 butalbital-acetaminophen-caffeine (FIORICET, ESGIC) -40 mg per tablet Take 1 Tab by mouth every four (4) hours as needed for Headache. 60 Tab 0  
 halobetasol (ULTRAVATE) 0.05 % topical cream APPLY TO AFFECTED AREA TWICE A DAY AS NEEDED  1  
 TOBRADEX ST drps ophthalmix suspension INSTILL 1 DROP INTO LEFT EYE TWICE A DAY  0  
 hydrOXYzine HCl (ATARAX) 25 mg tablet TAKE 1 TABLET BY MOUTH 3 TIMES A DAY AS NEEDED FOR ITCHING FOR UP TO 10 DAYS. MAY CAUSE DROWSINESS  1  
 pravastatin (PRAVACHOL) 40 mg tablet Take 1 Tab by mouth nightly. 90 Tab 3  pantoprazole (PROTONIX) 40 mg tablet Take 1 Tab by mouth daily. 90 Tab 3  
 SITagliptin-metFORMIN (JANUMET XR) 50-1,000 mg TM24 Take 2 Tabs by mouth daily. 180 Tab 3  
 hydrOXYzine pamoate (VISTARIL) 25 mg capsule Take 1 Cap by mouth three (3) times daily as needed for Itching. 60 Cap 3  Lancets misc Accu-check  Fastclix Lancets. Test blood sugar daily.   DX E11.9 100 Each 3  
 alcohol swabs (BD SINGLE USE SWABS REGULAR) padm Use daily to check blood sugar 100 Pad 4  
  Blood Glucose Control, Normal (ACCU-CHEK SMARTVIEW CONTRL SOL) soln Test blood sugar 1 x daily  DX E11.9        1 year supply  Check controls monthy on glucose meter 3 mL 4  Blood-Glucose Meter monitoring kit Accu-check Smartview Meter 1 Kit 0 Allergies Allergen Reactions  Latex Sneezing  Benzalkonium Chloride Hives  Neosporin [Hydrocortisone] Rash Past Surgical History:  
Procedure Laterality Date  HX OTHER SURGICAL    
 gallstones removed  SD COLONOSCOPY FLX DX W/COLLJ SPEC WHEN PFRMD  2-17-16 Dr. Deborah Lopez Past Medical History:  
Diagnosis Date  Diabetes ([facility] [address]  GERD (gastroesophageal reflux disease)  Hx of colonic polyps  Hx of gallstones  Hypercholesterolemia  Hypertension  Restless leg syndrome  Wears dentures Social History Socioeconomic History  Marital status: LEGALLY  Spouse name: Not on file  Number of children: Not on file  Years of education: Not on file  Highest education level: Not on file Occupational History  Not on file Social Needs  Financial resource strain: Not on file  Food insecurity:  
  Worry: Not on file Inability: Not on file  Transportation needs:  
  Medical: Not on file Non-medical: Not on file Tobacco Use  Smoking status: Current Every Day Smoker Packs/day: 0.50  Smokeless tobacco: Never Used Substance and Sexual Activity  Alcohol use: Yes Comment: occas.  Drug use: No  
 Sexual activity: Not on file Lifestyle  Physical activity:  
  Days per week: Not on file Minutes per session: Not on file  Stress: Not on file Relationships  Social connections:  
  Talks on phone: Not on file Gets together: Not on file Attends Yazidi service: Not on file Active member of club or organization: Not on file Attends meetings of clubs or organizations: Not on file Relationship status: Not on file  Intimate partner violence:  
  Fear of current or ex partner: Not on file Emotionally abused: Not on file Physically abused: Not on file Forced sexual activity: Not on file Other Topics Concern  Not on file Social History Narrative  Not on file REVIEW OF SYSTEMS: 
 CONSTITUTIONAL SYMPTOMS:  Negative. EYES:  Negative. EARS, NOSE, THROAT AND MOUTH:  Negative. CARDIOVASCULAR:  Negative. RESPIRATORY:  Negative. GENITOURINARY: Per HPI. GASTROINTESTINAL:  Per HPI. INTEGUMENTARY (SKIN AND/OR BREAST):  Negative. MUSCULOSKELETAL: Per HPI. ENDOCRINE/RHEUMATOLOGIC:  Negative. NEUROLOGICAL:  Per HPI. HEMATOLOGIC/LYMPHATIC:  Negative. ALLERGIC/IMMUNOLOGIC:  Negative. PSYCHIATRIC:  Negative. PHYSICAL EXAMINATION:  
Visit Vitals /84 Pulse 85 Temp 97.9 °F (36.6 °C) (Oral) Resp 20 Ht 5' 6\" (1.676 m) Wt 216 lb (98 kg) SpO2 96% BMI 34.86 kg/m² PAIN SCALE: 4/10 CONSTITUTIONAL: The patient is in no apparent distress and is alert and oriented x 3. HEENT: Normocephalic. Hearing grossly intact. NECK: Supple and symmetric. no tenderness, or masses were felt. RESPIRATORY: No labored breathing. CARDIOVASCULAR: The carotid pulses were normal. Peripheral pulses were 2+. CHEST: Normal AP diameter and normal contour without any kyphoscoliosis. LYMPHATIC: No lymphadenopathy was appreciated in the neck, axillae or groin. SKIN:  Negative for scars, rashes, lesions, or ulcers on the right upper, right lower, left upper, left lower and trunk. NEUROLOGICAL: Alert and oriented x 3. Ambulation without assistive device. FWB. Imbalance. EXTREMITIES:  See musculoskeletal. 
MUSCULOSKELETAL: 
? Head and Neck: Neck pain. Constant headache. Rt shoulder pain. Negative for misalignment, asymmetry, crepitation, defects, tenderness masses or effusions. ? Left Upper Extremity: Loss of dexterity. Numbness.  Inspection, percussion and palpation performed. Talleys sign is negative. ? Right Upper Extremity: Loss of dexterity. Numbness. Inspection, percussion and palpation performed. Talleys sign is negative. ? Spine, Ribs and Pelvis: Back pain. Inspection, percussion and palpation performed. Negative for misalignment, asymmetry, crepitation, defects, tenderness masses or effusions. ? Left Lower Extremity: Inspection, percussion and palpation performed. Negative straight leg raise. ? Right Lower Extremity: Inspection, percussion and palpation performed. Negative straight leg raise. SPINE EXAM:  
 
Cervical spine: Neck is midline. Normal muscle tone. No focal atrophy is noted. Lumbar spine: No rash, ecchymosis, or gross obliquity. No focal atrophy is noted. ASSESSMENT 
  ICD-10-CM ICD-9-CM 1. Osteoarthritis of cervical spine with myelopathy M47.12 721.1 AMB SUPPLY ORDER  
2. Bilateral occipital neuralgia M54.81 723.8 AMB SUPPLY ORDER  
   REFERRAL TO PAIN MANAGEMENT  
   REFERRAL TO NEUROLOGY 3. Concussion with brief LOC S06. 0X9A 850.5 REFERRAL TO NEUROLOGY Written by Kady Garza, as dictated by Jennifer Negron MD. 
 
I, Dr. Jennifer Negron MD, confirm that all documentation is accurate.

## 2019-04-19 NOTE — PATIENT INSTRUCTIONS
Cervical Spondylosis: Care Instructions Your Care Instructions Cervical spondylosis is a type of arthritis of the neck. It can happen as people get older. It may be caused by bone spurs or other problems. You may have neck pain and stiffness. Sometimes the space around the spinal cord narrows. When this happens, it is called spinal stenosis. Spinal stenosis can cause pain, numbness, or weakness in the arms, legs, feet, and rear end (buttocks). It can also cause loss of bowel and bladder control. You can treat some of your symptoms with over-the-counter pain medicine. But if you have spinal stenosis with severe symptoms, you may need surgery. Follow-up care is a key part of your treatment and safety. Be sure to make and go to all appointments, and call your doctor if you are having problems. It's also a good idea to know your test results and keep a list of the medicines you take. How can you care for yourself at home? · Take anti-inflammatory medicines to reduce neck pain. These include ibuprofen (Advil, Motrin) and naproxen (Aleve). Be safe with medicines. Read and follow all instructions on the label. · Follow your doctor's recommendation about activity. He or she may tell you not to do sports or activities that could injure your neck. When should you call for help? Call 911 anytime you think you may need emergency care. For example, call if: 
  · You are unable to move an arm or a leg at all.  
[facility] your doctor now or seek immediate medical care if: 
  · You have new or worse symptoms in your arms, legs, belly, or buttocks. Symptoms may include: 
? Numbness or tingling. ? Weakness. ? Pain.  
  · You lose bladder or bowel control.  
 Watch closely for changes in your health, and be sure to contact your doctor if: 
  · You do not get better as expected. Where can you learn more? Go to http://sumit-uli.info/. Enter D843 in the search box to learn more about \"Cervical Spondylosis: Care Instructions. \" Current as of: September 20, 2018 Content Version: 11.9 © 6478-6794 [employer]. Care instructions adapted under license by [employer] (which disclaims liability or warranty for this information). If you have questions about a medical condition or this instruction, always ask your healthcare professional. [place] [address] any warranty or liability for your use of this information.

## 2019-05-03 ENCOUNTER — TELEPHONE (OUTPATIENT)
Dept: FAMILY MEDICINE CLINIC | Age: 69
End: 2019-05-03

## 2019-05-16 DIAGNOSIS — M54.81 BILATERAL OCCIPITAL NEURALGIA: ICD-10-CM

## 2019-05-16 DIAGNOSIS — S06.0X1A CONCUSSION WITH BRIEF LOC: ICD-10-CM

## 2019-05-16 DIAGNOSIS — M47.12 OSTEOARTHRITIS OF CERVICAL SPINE WITH MYELOPATHY: ICD-10-CM

## 2019-05-16 RX ORDER — GABAPENTIN 300 MG/1
CAPSULE ORAL
Qty: 90 CAP | Refills: 0 | Status: SHIPPED | OUTPATIENT
Start: 2019-05-16 | End: 2020-05-19 | Stop reason: ALTCHOICE

## 2019-05-17 ENCOUNTER — OFFICE VISIT (OUTPATIENT)
Dept: ORTHOPEDIC SURGERY | Age: 69
End: 2019-05-17

## 2019-05-17 VITALS
HEIGHT: 66 IN | WEIGHT: 219.8 LBS | RESPIRATION RATE: 16 BRPM | HEART RATE: 84 BPM | SYSTOLIC BLOOD PRESSURE: 159 MMHG | DIASTOLIC BLOOD PRESSURE: 101 MMHG | BODY MASS INDEX: 35.32 KG/M2

## 2019-05-17 DIAGNOSIS — M54.50 LUMBAR PAIN: ICD-10-CM

## 2019-05-17 DIAGNOSIS — S06.0X9D CONCUSSION WITH LOSS OF CONSCIOUSNESS, SUBSEQUENT ENCOUNTER: ICD-10-CM

## 2019-05-17 DIAGNOSIS — M54.81 BILATERAL OCCIPITAL NEURALGIA: ICD-10-CM

## 2019-05-17 DIAGNOSIS — M48.02 CERVICAL SPINAL STENOSIS: Primary | ICD-10-CM

## 2019-05-17 PROBLEM — E66.01 SEVERE OBESITY (HCC): Status: ACTIVE | Noted: 2019-05-17

## 2019-05-17 RX ORDER — TOPIRAMATE 25 MG/1
25 TABLET ORAL 2 TIMES DAILY
Qty: 60 TAB | Refills: 1 | Status: SHIPPED | OUTPATIENT
Start: 2019-05-17 | End: 2019-08-18 | Stop reason: SDUPTHER

## 2019-05-17 NOTE — PROGRESS NOTES
[address]  [address], [address]  [place], [facility] [address]  Phone: [phone]  Fax: [phone]  PROGRESS NOTE  Patient: Brigida Parada                MRN: [id number]       SSN: [id number]  YOB: 1950        AGE: 71 y.o. SEX: male  Body mass index is 35.48 kg/m². PCP: Richar Chaparro MD  05/17/19    Chief Complaint   Patient presents with    Neck Pain     fu       HISTORY OF PRESENT ILLNESS, RADIOGRAPHS, and PLAN:     HISTORY:  Mr. Jean-Paul Lacy returns today. He continues to have significant pain, but the pain is now primarily at night. He says he does okay during the day, and as long as he is taking it easy and not doing much in the way of activity, daytime is okay. When he does activity, it can flare his occipital headache, and he also gets a back pain syndrome with back pain and what sounds like a radicular left leg pain and low lumbar radiculopathy. He is scheduled coming up next week to get an occipital nerve block at [facility]. He is yet to be scheduled to see Neurology, which we asked for the last time. His exam is benign. He finds his soft collar helps. His main complaints now are the occipital neuralgia, a right-sided cervical radiculopathy into his right arm, and this all gets bad at night. He is on Neurontin, but he finds it gives him heartburn and makes him feel groggy, and he is not tolerating it. ASSESSMENT/PLAN: We are going to stop the Neurontin and try him on Topamax and see if that is more effective for him in terms of relief of these symptoms and try ramping him up on his Topamax. I am going to get an MRI of his lumbar spine, as it would sound like he may have a stenotic lesion that is causing this left-sided radiculopathy. I am hoping that the occipital nerve block will help him with his headache and some of his cervicalgia. The Topamax can calm things, and we can still avoid a decompressive procedure on his neck.   As I had outlined previously, he would require quiet an extensive cervical surgery given his C2-3 stenosis and C5-6 and C6-7 stenosis present. From our discussions today, he may also have significant lumbar disease. I will see him back after his occipital blocks and see how he is doing. cc: Giuseppe Pompa M.D.         Ana Edward Lumbar XR Next Visit    Past Medical History:   Diagnosis Date    Diabetes ([address]     GERD (gastroesophageal reflux disease)     Hx of colonic polyps     Hx of gallstones     Hypercholesterolemia     Hypertension     Restless leg syndrome     Wears dentures        Family History   Problem Relation Age of Onset    Heart Attack Mother     Prostate Cancer Father     Colon Cancer Father     Stroke Sister        Current Outpatient Medications   Medication Sig Dispense Refill    lisinopril (PRINIVIL, ZESTRIL) 20 mg tablet Take 1 Tab by mouth daily. 90 Tab 1    levoFLOXacin (QUIXIN) 0.5 % ophthalmic solution Administer 2 Drops to right eye every six (6) hours. use in affected eye(s) 5 mL 0    triamcinolone acetonide (KENALOG) 0.025 % ointment Apply  to affected area two (2) times a day. use thin layer 30 g 0    doxycycline (MONODOX) 100 mg capsule TAKE 1 CAPSULE BY MOUTH EVERY DAY  0    butalbital-acetaminophen-caffeine (FIORICET, ESGIC) -40 mg per tablet Take 1 Tab by mouth every four (4) hours as needed for Headache. 60 Tab 0    halobetasol (ULTRAVATE) 0.05 % topical cream APPLY TO AFFECTED AREA TWICE A DAY AS NEEDED  1    TOBRADEX ST drps ophthalmix suspension INSTILL 1 DROP INTO LEFT EYE TWICE A DAY  0    hydrOXYzine HCl (ATARAX) 25 mg tablet TAKE 1 TABLET BY MOUTH 3 TIMES A DAY AS NEEDED FOR ITCHING FOR UP TO 10 DAYS. MAY CAUSE DROWSINESS  1    pravastatin (PRAVACHOL) 40 mg tablet Take 1 Tab by mouth nightly. 90 Tab 3    pantoprazole (PROTONIX) 40 mg tablet Take 1 Tab by mouth daily.  90 Tab 3    SITagliptin-metFORMIN (JANUMET XR) 50-1,000 mg TM24 Take 2 Tabs by mouth daily. 180 Tab 3    hydrOXYzine pamoate (VISTARIL) 25 mg capsule Take 1 Cap by mouth three (3) times daily as needed for Itching. 60 Cap 3    Lancets misc Accu-check  Fastclix Lancets. Test blood sugar daily. DX E11.9 100 Each 3    alcohol swabs (BD SINGLE USE SWABS REGULAR) padm Use daily to check blood sugar 100 Pad 4    Blood Glucose Control, Normal (ACCU-CHEK SMARTVIEW CONTRL SOL) soln Test blood sugar 1 x daily  DX E11.9        1 year supply  Check controls monthy on glucose meter 3 mL 4    Blood-Glucose Meter monitoring kit Accu-check Smartview Meter 1 Kit 0    gabapentin (NEURONTIN) 300 mg capsule TAKE 1 CAPSULE BY MOUTH THREE TIMES A DAY 90 Cap 0    methylPREDNISolone (MEDROL DOSEPACK) 4 mg tablet Take as directed 1 Dose Pack 0       Allergies   Allergen Reactions    Latex Sneezing    Benzalkonium Chloride Hives    Neosporin [Hydrocortisone] Rash       Past Surgical History:   Procedure Laterality Date    HX OTHER SURGICAL      gallstones removed    [place] COLONOSCOPY FLX DX W/COLLJ SPEC WHEN PFRMD  2-17-16    [place]. Garcia [facility]       Past Medical History:   Diagnosis Date    Diabetes ([facility] [address]     GERD (gastroesophageal reflux disease)     Hx of colonic polyps     Hx of gallstones     Hypercholesterolemia     Hypertension     Restless leg syndrome     Wears dentures        Social History     Socioeconomic History    Marital status: LEGALLY      Spouse name: Not on file    Number of children: Not on file    Years of education: Not on file    Highest education level: Not on file   Occupational History    Not on file   Social Needs    Financial resource strain: Not on file    Food insecurity:     Worry: Not on file     Inability: Not on file    Transportation needs:     Medical: Not on file     Non-medical: Not on file   Tobacco Use    Smoking status: Current Every Day Smoker     Packs/day: 0.50    Smokeless tobacco: Never Used   Substance and Sexual Activity    Alcohol use:  Yes Comment: occas.  Drug use: No    Sexual activity: Not on file   Lifestyle    Physical activity:     Days per week: Not on file     Minutes per session: Not on file    Stress: Not on file   Relationships    Social connections:     Talks on phone: Not on file     Gets together: Not on file     Attends Sabianism service: Not on file     Active member of club or organization: Not on file     Attends meetings of clubs or organizations: Not on file     Relationship status: Not on file    Intimate partner violence:     Fear of current or ex partner: Not on file     Emotionally abused: Not on file     Physically abused: Not on file     Forced sexual activity: Not on file   Other Topics Concern    Not on file   Social History Narrative    Not on file         REVIEW OF SYSTEMS:   CONSTITUTIONAL SYMPTOMS:  Negative. EYES:  Negative. EARS, NOSE, THROAT AND MOUTH:  Negative. CARDIOVASCULAR:  Negative. RESPIRATORY:  Negative. GENITOURINARY: Per HPI. GASTROINTESTINAL:  Per HPI. INTEGUMENTARY (SKIN AND/OR BREAST):  Negative. MUSCULOSKELETAL: Per HPI.   ENDOCRINE/RHEUMATOLOGIC:  Negative. NEUROLOGICAL:  Per HPI. HEMATOLOGIC/LYMPHATIC:  Negative. ALLERGIC/IMMUNOLOGIC:  Negative. PSYCHIATRIC:  Negative. PHYSICAL EXAMINATION:   Visit Vitals  BP (!) 159/101 (BP 1 Location: Left arm, BP Patient Position: Sitting)   Pulse 84   Resp 16   Ht 5' 6\" (1.676 m)   Wt 219 lb 12.8 oz (99.7 kg)   BMI 35.48 kg/m²    PAIN SCALE: 7/10    CONSTITUTIONAL: The patient is in no apparent distress and is alert and oriented x 3. HEENT: Normocephalic. Hearing grossly intact. NECK: Supple and symmetric. no tenderness, or masses were felt. RESPIRATORY: No labored breathing. CARDIOVASCULAR: The carotid pulses were normal. Peripheral pulses were 2+. CHEST: Normal AP diameter and normal contour without any kyphoscoliosis. LYMPHATIC: No lymphadenopathy was appreciated in the neck, axillae or groin.   SKIN:  Incision healing well, no drainage, no erythema, no hernia, no seroma, no swelling, no dehiscence, incision well approximated. Negative for scars, rashes, lesions, or ulcers on the right upper, right lower, left upper, left lower and trunk. NEUROLOGICAL: Alert and oriented x 3. Ambulation without assistive device. FWB. Some imbalance. EXTREMITIES: See musculoskeletal.  MUSCULOSKELETAL:   Head and Neck: Neck pain. Occipital headaches. Negative for misalignment, asymmetry, crepitation, defects, tenderness masses or effusions.  Left Upper Extremity: Inspection, percussion and palpation performed. Talleys sign is negative.  Right Upper Extremity: Inspection, percussion and palpation performed. Talleys sign is negative.  Spine, Ribs and Pelvis: Low back pain. Inspection, percussion and palpation performed. Negative for misalignment, asymmetry, crepitation, defects, tenderness masses or effusions.  Left Lower Extremity:  Pain. Inspection, percussion and palpation performed. Negative straight leg raise.  Right Lower Extremity: Inspection, percussion and palpation performed. Negative straight leg raise. SPINE EXAM:     Cervical spine: Neck is midline. Normal muscle tone. No focal atrophy is noted. Lumbar spine: No rash, ecchymosis, or gross obliquity. No focal atrophy is noted. ASSESSMENT    ICD-10-CM ICD-9-CM    1. Cervical spinal stenosis M48.02 723.0 topiramate (TOPAMAX) 25 mg tablet   2. Bilateral occipital neuralgia M54.81 723.8 topiramate (TOPAMAX) 25 mg tablet   3. Lumbar pain M54.5 724.2 MRI LUMB SPINE WO CONT       Written by Caroline Roth, as dictated by Jared Smith MD.    I, Dr. Jared Smith MD, confirm that all documentation is accurate.

## 2019-05-21 ENCOUNTER — OFFICE VISIT (OUTPATIENT)
Dept: FAMILY MEDICINE CLINIC | Age: 69
End: 2019-05-21

## 2019-05-21 VITALS
WEIGHT: 215.6 LBS | SYSTOLIC BLOOD PRESSURE: 143 MMHG | RESPIRATION RATE: 20 BRPM | DIASTOLIC BLOOD PRESSURE: 83 MMHG | TEMPERATURE: 97.7 F | HEIGHT: 66 IN | OXYGEN SATURATION: 97 % | BODY MASS INDEX: 34.65 KG/M2 | HEART RATE: 87 BPM

## 2019-05-21 DIAGNOSIS — I10 ESSENTIAL HYPERTENSION: Primary | ICD-10-CM

## 2019-05-21 RX ORDER — LISINOPRIL 40 MG/1
40 TABLET ORAL DAILY
Qty: 90 TAB | Refills: 3 | Status: SHIPPED | OUTPATIENT
Start: 2019-05-21 | End: 2019-07-25 | Stop reason: SDUPTHER

## 2019-05-21 RX ORDER — PANTOPRAZOLE SODIUM 40 MG/1
40 TABLET, DELAYED RELEASE ORAL DAILY
Qty: 90 TAB | Refills: 3 | Status: SHIPPED | OUTPATIENT
Start: 2019-05-21 | End: 2019-07-25 | Stop reason: SDUPTHER

## 2019-05-21 RX ORDER — PRAVASTATIN SODIUM 40 MG/1
40 TABLET ORAL
Qty: 90 TAB | Refills: 3 | Status: SHIPPED | OUTPATIENT
Start: 2019-05-21 | End: 2019-07-25 | Stop reason: SDUPTHER

## 2019-05-21 NOTE — PATIENT INSTRUCTIONS
High Blood Pressure: Care Instructions  Overview    It's normal for blood pressure to go up and down throughout the day. But if it stays up, you have high blood pressure. Another name for high blood pressure is hypertension. Despite what a lot of people think, high blood pressure usually doesn't cause headaches or make you feel dizzy or lightheaded. It usually has no symptoms. But it does increase your risk of stroke, heart attack, and other problems. You and your doctor will talk about your risks of these problems based on your blood pressure. Your doctor will give you a goal for your blood pressure. Your goal will be based on your health and your age. Lifestyle changes, such as eating healthy and being active, are always important to help lower blood pressure. You might also take medicine to reach your blood pressure goal.  Follow-up care is a key part of your treatment and safety. Be sure to make and go to all appointments, and call your doctor if you are having problems. It's also a good idea to know your test results and keep a list of the medicines you take. How can you care for yourself at home? Medical treatment  · If you stop taking your medicine, your blood pressure will go back up. You may take one or more types of medicine to lower your blood pressure. Be safe with medicines. Take your medicine exactly as prescribed. Call your doctor if you think you are having a problem with your medicine. · Talk to your doctor before you start taking aspirin every day. Aspirin can help certain people lower their risk of a heart attack or stroke. But taking aspirin isn't right for everyone, because it can cause serious bleeding. · See your doctor regularly. You may need to see the doctor more often at first or until your blood pressure comes down. · If you are taking blood pressure medicine, talk to your doctor before you take decongestants or anti-inflammatory medicine, such as ibuprofen.  Some of these medicines can raise blood pressure. · Learn how to check your blood pressure at home. Lifestyle changes  · Stay at a healthy weight. This is especially important if you put on weight around the waist. Losing even 10 pounds can help you lower your blood pressure. · If your doctor recommends it, get more exercise. Walking is a good choice. Bit by bit, increase the amount you walk every day. Try for at least 30 minutes on most days of the week. You also may want to swim, bike, or do other activities. · Avoid or limit alcohol. Talk to your doctor about whether you can drink any alcohol. · Try to limit how much sodium you eat to less than 2,300 milligrams (mg) a day. Your doctor may ask you to try to eat less than 1,500 mg a day. · Eat plenty of fruits (such as bananas and oranges), vegetables, legumes, whole grains, and low-fat dairy products. · Lower the amount of saturated fat in your diet. Saturated fat is found in animal products such as milk, cheese, and meat. Limiting these foods may help you lose weight and also lower your risk for heart disease. · Do not smoke. Smoking increases your risk for heart attack and stroke. If you need help quitting, talk to your doctor about stop-smoking programs and medicines. These can increase your chances of quitting for good. When should you call for help? Call 911 anytime you think you may need emergency care. This may mean having symptoms that suggest that your blood pressure is causing a serious heart or blood vessel problem. Your blood pressure may be over 180/120.   For example, call 911 if:    · You have symptoms of a heart attack. These may include:  ? Chest pain or pressure, or a strange feeling in the chest.  ? Sweating. ? Shortness of breath. ? Nausea or vomiting. ? Pain, pressure, or a strange feeling in the back, neck, jaw, or upper belly or in one or both shoulders or arms. ? Lightheadedness or sudden weakness.   ? A fast or irregular heartbeat.     · You have symptoms of a stroke. These may include:  ? Sudden numbness, tingling, weakness, or loss of movement in your face, arm, or leg, especially on only one side of your body. ? Sudden vision changes. ? Sudden trouble speaking. ? Sudden confusion or trouble understanding simple statements. ? Sudden problems with walking or balance. ? A sudden, severe headache that is different from past headaches.     · You have severe back or belly pain.    Do not wait until your blood pressure comes down on its own. Get help right away.   Call your doctor now or seek immediate care if:    · Your blood pressure is much higher than normal (such as 180/120 or higher), but you don't have symptoms.     · You think high blood pressure is causing symptoms, such as:  ? Severe headache.  ? Blurry vision.    Watch closely for changes in your health, and be sure to contact your doctor if:    · Your blood pressure measures higher than your doctor recommends at least 2 times. That means the top number is higher or the bottom number is higher, or both.     · You think you may be having side effects from your blood pressure medicine. Where can you learn more? Go to http://sumit-uli.info/. Enter S475 in the search box to learn more about \"High Blood Pressure: Care Instructions. \"  Current as of: July 22, 2018  Content Version: 11.9  © 1829-3117 [employer]. Care instructions adapted under license by [employer] (which disclaims liability or warranty for this information). If you have questions about a medical condition or this instruction, always ask your healthcare professional. [place] [address] any warranty or liability for your use of this information.

## 2019-05-21 NOTE — PROGRESS NOTES
Patient is in the office today for elevated blood pressure. Patient states he has head,neck and back pain 8/10.      1. Have you been to the ER, urgent care clinic since your last visit? Hospitalized since your last visit? No    2. Have you seen or consulted any other health care providers outside of the [address] [place] [address] since your last visit? Include any pap smears or colon screening.  No

## 2019-05-23 NOTE — PROGRESS NOTES
Simeon Singh is a 71 y.o.  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 His increased pain levels may be a factor. Respiratory ROS: negative for - shortness of breath  Cardiovascular ROS: negative for - chest pain    Current Outpatient Medications   Medication Sig    lisinopril (PRINIVIL, ZESTRIL) 40 mg tablet Take 1 Tab by mouth daily.  pravastatin (PRAVACHOL) 40 mg tablet Take 1 Tab by mouth nightly.  pantoprazole (PROTONIX) 40 mg tablet Take 1 Tab by mouth daily.  SITagliptin-metFORMIN (JANUMET XR) 50-1,000 mg TM24 Take 2 Tabs by mouth daily.  topiramate (TOPAMAX) 25 mg tablet Take 1 Tab by mouth two (2) times a day.  levoFLOXacin (QUIXIN) 0.5 % ophthalmic solution Administer 2 Drops to right eye every six (6) hours. use in affected eye(s)    triamcinolone acetonide (KENALOG) 0.025 % ointment Apply  to affected area two (2) times a day. use thin layer    butalbital-acetaminophen-caffeine (FIORICET, ESGIC) -40 mg per tablet Take 1 Tab by mouth every four (4) hours as needed for Headache.  halobetasol (ULTRAVATE) 0.05 % topical cream APPLY TO AFFECTED AREA TWICE A DAY AS NEEDED    TOBRADEX ST drps ophthalmix suspension INSTILL 1 DROP INTO LEFT EYE TWICE A DAY    hydrOXYzine pamoate (VISTARIL) 25 mg capsule Take 1 Cap by mouth three (3) times daily as needed for Itching.  Lancets misc Accu-check  Fastclix Lancets. Test blood sugar daily.   DX E11.9    alcohol swabs (BD SINGLE USE SWABS REGULAR) padm Use daily to check blood sugar    Blood Glucose Control, Normal (ACCU-CHEK SMARTVIEW CONTRL SOL) soln Test blood sugar 1 x daily  DX E11.9        1 year supply  Check controls monthy on glucose meter    Blood-Glucose Meter monitoring kit Accu-check Smartview Meter    gabapentin (NEURONTIN) 300 mg capsule TAKE 1 CAPSULE BY MOUTH THREE TIMES A DAY    doxycycline (MONODOX) 100 mg capsule TAKE 1 CAPSULE BY MOUTH EVERY DAY     No current facility-administered medications for this visit. OBJECTIVE:  alert, well appearing, and in no distress  Visit Vitals  /83 (BP 1 Location: Left arm, BP Patient Position: Sitting)   Pulse 87   Temp 97.7 °F (36.5 °C) (Oral)   Resp 20   Ht 5' 6\" (1.676 m)   Wt 215 lb 9.6 oz (97.8 kg)   SpO2 97%   BMI 34.80 kg/m²      well developed and well nourished      Labs:   Lab Results   Component Value Date/Time    Sodium 139 02/27/2019 11:03 AM    Potassium 4.4 02/27/2019 11:03 AM    Chloride 102 02/27/2019 11:03 AM    CO2 20 02/27/2019 11:03 AM    Anion gap 8 04/24/2018 07:35 AM    Glucose 146 (H) 02/27/2019 11:03 AM    BUN 15 02/27/2019 11:03 AM    Creatinine 0.97 02/27/2019 11:03 AM    BUN/Creatinine ratio 15 02/27/2019 11:03 AM    GFR est AA 92 02/27/2019 11:03 AM    GFR est non-AA 80 02/27/2019 11:03 AM    Calcium 9.7 02/27/2019 11:03 AM    Bilirubin, total 0.4 02/27/2019 11:03 AM    ALT (SGPT) 17 02/27/2019 11:03 AM    AST (SGOT) 16 02/27/2019 11:03 AM    Alk. phosphatase 95 02/27/2019 11:03 AM    Protein, total 6.9 02/27/2019 11:03 AM    Albumin 4.3 02/27/2019 11:03 AM    Globulin 2.8 04/24/2018 07:35 AM    A-G Ratio 1.7 02/27/2019 11:03 AM        Discussed the patient's BMI with him. The BMI follow up plan is as follows: I have counseled this patient on diet and exercise regimens. Assessment/Plan      ICD-10-CM ICD-9-CM    1. Essential hypertension I10 401.9 Uncontrolled. Will increase to lisinopril (PRINIVIL, ZESTRIL) 40 mg tablet every day and pt will monitor BP at home and let me know if his SBP >140. 2. Uncontrolled type 2 diabetes mellitus without complication, without long-term current use of insulin (HCC) E11.65 250.02 SITagliptin-metFORMIN (JANUMET XR) 50-1,000 mg TM24     Follow-up and Dispositions    · Return if symptoms worsen or fail to improve. Reviewed plan of care. Patient has provided input and agrees with goals.

## 2019-06-05 ENCOUNTER — TELEPHONE (OUTPATIENT)
Dept: ORTHOPEDIC SURGERY | Age: 69
End: 2019-06-05

## 2019-06-05 NOTE — TELEPHONE ENCOUNTER
Patient called to cancel appointment for 6/7/19 stating he was under the impression the MRI tech was going to contact us to cancel his appointment due to insurance denying the order for his MRI. I did inform him I did not see where Radiology contacted us and he was a little confused as to why they would tell him that they were going to call and cancel the appointment. He is wondering if there is something that needs to be done to get this scheduled again. he states he is still in pain and wants to get this figured out as soon as possible. He also asked to be kept informed as to the status of his MRI. Please advise patient at [phone].

## 2019-06-05 NOTE — TELEPHONE ENCOUNTER
I called [facility] scheduling and they said study has now been approved and she will call patient to get it scheduled

## 2019-06-14 ENCOUNTER — DOCUMENTATION ONLY (OUTPATIENT)
Dept: ORTHOPEDIC SURGERY | Age: 69
End: 2019-06-14

## 2019-06-17 ENCOUNTER — HOSPITAL ENCOUNTER (OUTPATIENT)
Age: 69
Discharge: HOME | End: 2019-06-17
Attending: ORTHOPAEDIC SURGERY
Payer: MEDICARE

## 2019-06-17 DIAGNOSIS — M54.50 LUMBAR PAIN: ICD-10-CM

## 2019-06-17 PROCEDURE — 72148 MRI LUMBAR SPINE W/O DYE: CPT

## 2019-06-18 ENCOUNTER — LAB ONLY (OUTPATIENT)
Dept: FAMILY MEDICINE CLINIC | Age: 69
End: 2019-06-18

## 2019-06-18 DIAGNOSIS — R97.20 RISING PSA LEVEL: ICD-10-CM

## 2019-06-18 DIAGNOSIS — E78.00 HIGH CHOLESTEROL: Primary | ICD-10-CM

## 2019-06-18 DIAGNOSIS — E11.65 UNCONTROLLED TYPE 2 DIABETES MELLITUS WITH HYPERGLYCEMIA (HCC): ICD-10-CM

## 2019-06-18 DIAGNOSIS — I10 ESSENTIAL HYPERTENSION: ICD-10-CM

## 2019-06-19 LAB
ALBUMIN SERPL-MCNC: 4.3 G/DL (ref 3.6–4.8)
ALBUMIN/CREAT UR: 7.2 MG/G CREAT (ref 0–30)
ALBUMIN/GLOB SERPL: 1.8 {RATIO} (ref 1.2–2.2)
ALP SERPL-CCNC: 83 IU/L (ref 39–117)
ALT SERPL-CCNC: 28 IU/L (ref 0–44)
AST SERPL-CCNC: 23 IU/L (ref 0–40)
BILIRUB SERPL-MCNC: 0.5 MG/DL (ref 0–1.2)
BUN SERPL-MCNC: 13 MG/DL (ref 8–27)
BUN/CREAT SERPL: 10 (ref 10–24)
CALCIUM SERPL-MCNC: 9.8 MG/DL (ref 8.6–10.2)
CHLORIDE SERPL-SCNC: 105 MMOL/L (ref 96–106)
CHOLEST SERPL-MCNC: 142 MG/DL (ref 100–199)
CO2 SERPL-SCNC: 19 MMOL/L (ref 20–29)
CREAT SERPL-MCNC: 1.24 MG/DL (ref 0.76–1.27)
CREAT UR-MCNC: 248 MG/DL
GLOBULIN SER CALC-MCNC: 2.4 G/DL (ref 1.5–4.5)
GLUCOSE SERPL-MCNC: 122 MG/DL (ref 65–99)
HBA1C MFR BLD: 8.7 % (ref 4.8–5.6)
HDLC SERPL-MCNC: 37 MG/DL
LDLC SERPL CALC-MCNC: 68 MG/DL (ref 0–99)
MICROALBUMIN UR-MCNC: 17.9 UG/ML
POTASSIUM SERPL-SCNC: 4.2 MMOL/L (ref 3.5–5.2)
PROT SERPL-MCNC: 6.7 G/DL (ref 6–8.5)
PSA SERPL-MCNC: 1.9 NG/ML (ref 0–4)
SODIUM SERPL-SCNC: 139 MMOL/L (ref 134–144)
TRIGL SERPL-MCNC: 186 MG/DL (ref 0–149)
VLDLC SERPL CALC-MCNC: 37 MG/DL (ref 5–40)

## 2019-06-25 ENCOUNTER — OFFICE VISIT (OUTPATIENT)
Dept: FAMILY MEDICINE CLINIC | Age: 69
End: 2019-06-25

## 2019-06-25 ENCOUNTER — OFFICE VISIT (OUTPATIENT)
Dept: ORTHOPEDIC SURGERY | Age: 69
End: 2019-06-25

## 2019-06-25 VITALS
RESPIRATION RATE: 20 BRPM | HEART RATE: 71 BPM | WEIGHT: 220 LBS | SYSTOLIC BLOOD PRESSURE: 153 MMHG | BODY MASS INDEX: 36.65 KG/M2 | OXYGEN SATURATION: 98 % | TEMPERATURE: 97.7 F | HEIGHT: 65 IN | DIASTOLIC BLOOD PRESSURE: 94 MMHG

## 2019-06-25 VITALS
BODY MASS INDEX: 36.79 KG/M2 | RESPIRATION RATE: 17 BRPM | DIASTOLIC BLOOD PRESSURE: 99 MMHG | OXYGEN SATURATION: 100 % | HEART RATE: 71 BPM | HEIGHT: 65 IN | SYSTOLIC BLOOD PRESSURE: 160 MMHG | WEIGHT: 220.8 LBS

## 2019-06-25 DIAGNOSIS — M48.02 CERVICAL SPINAL STENOSIS: ICD-10-CM

## 2019-06-25 DIAGNOSIS — M48.02 CERVICAL SPINAL STENOSIS: Primary | ICD-10-CM

## 2019-06-25 DIAGNOSIS — I10 ESSENTIAL HYPERTENSION: ICD-10-CM

## 2019-06-25 DIAGNOSIS — E11.65 UNCONTROLLED TYPE 2 DIABETES MELLITUS WITH HYPERGLYCEMIA (HCC): ICD-10-CM

## 2019-06-25 DIAGNOSIS — G95.9 CERVICAL MYELOPATHY (HCC): ICD-10-CM

## 2019-06-25 DIAGNOSIS — Z00.00 MEDICARE ANNUAL WELLNESS VISIT, SUBSEQUENT: Primary | ICD-10-CM

## 2019-06-25 DIAGNOSIS — M54.50 NONSPECIFIC PAIN IN THE LUMBAR REGION: ICD-10-CM

## 2019-06-25 DIAGNOSIS — E78.00 HIGH CHOLESTEROL: ICD-10-CM

## 2019-06-25 PROBLEM — E11.21 TYPE 2 DIABETES WITH NEPHROPATHY (HCC): Status: ACTIVE | Noted: 2019-06-25

## 2019-06-25 NOTE — PATIENT INSTRUCTIONS
High Blood Pressure: Care Instructions Overview It's normal for blood pressure to go up and down throughout the day. But if it stays up, you have high blood pressure. Another name for high blood pressure is hypertension. Despite what a lot of people think, high blood pressure usually doesn't cause headaches or make you feel dizzy or lightheaded. It usually has no symptoms. But it does increase your risk of stroke, heart attack, and other problems. You and your doctor will talk about your risks of these problems based on your blood pressure. Your doctor will give you a goal for your blood pressure. Your goal will be based on your health and your age. Lifestyle changes, such as eating healthy and being active, are always important to help lower blood pressure. You might also take medicine to reach your blood pressure goal. 
Follow-up care is a key part of your treatment and safety. Be sure to make and go to all appointments, and call your doctor if you are having problems. It's also a good idea to know your test results and keep a list of the medicines you take. How can you care for yourself at home? Medical treatment · If you stop taking your medicine, your blood pressure will go back up. You may take one or more types of medicine to lower your blood pressure. Be safe with medicines. Take your medicine exactly as prescribed. Call your doctor if you think you are having a problem with your medicine. · Talk to your doctor before you start taking aspirin every day. Aspirin can help certain people lower their risk of a heart attack or stroke. But taking aspirin isn't right for everyone, because it can cause serious bleeding. · See your doctor regularly. You may need to see the doctor more often at first or until your blood pressure comes down. · If you are taking blood pressure medicine, talk to your doctor before you take decongestants or anti-inflammatory medicine, such as ibuprofen. Some of these medicines can raise blood pressure. · Learn how to check your blood pressure at home. Lifestyle changes · Stay at a healthy weight. This is especially important if you put on weight around the waist. Losing even 10 pounds can help you lower your blood pressure. · If your doctor recommends it, get more exercise. Walking is a good choice. Bit by bit, increase the amount you walk every day. Try for at least 30 minutes on most days of the week. You also may want to swim, bike, or do other activities. · Avoid or limit alcohol. Talk to your doctor about whether you can drink any alcohol. · Try to limit how much sodium you eat to less than 2,300 milligrams (mg) a day. Your doctor may ask you to try to eat less than 1,500 mg a day. · Eat plenty of fruits (such as bananas and oranges), vegetables, legumes, whole grains, and low-fat dairy products. · Lower the amount of saturated fat in your diet. Saturated fat is found in animal products such as milk, cheese, and meat. Limiting these foods may help you lose weight and also lower your risk for heart disease. · Do not smoke. Smoking increases your risk for heart attack and stroke. If you need help quitting, talk to your doctor about stop-smoking programs and medicines. These can increase your chances of quitting for good. When should you call for help? Call 911 anytime you think you may need emergency care. This may mean having symptoms that suggest that your blood pressure is causing a serious heart or blood vessel problem. Your blood pressure may be over 180/120. 
 For example, call 911 if: 
  · You have symptoms of a heart attack. These may include: 
? Chest pain or pressure, or a strange feeling in the chest. 
? Sweating. ? Shortness of breath. ? Nausea or vomiting. ? Pain, pressure, or a strange feeling in the back, neck, jaw, or upper belly or in one or both shoulders or arms. ? Lightheadedness or sudden weakness. ? A fast or irregular heartbeat.  
  · You have symptoms of a stroke. These may include: 
? Sudden numbness, tingling, weakness, or loss of movement in your face, arm, or leg, especially on only one side of your body. ? Sudden vision changes. ? Sudden trouble speaking. ? Sudden confusion or trouble understanding simple statements. ? Sudden problems with walking or balance. ? A sudden, severe headache that is different from past headaches.  
  · You have severe back or belly pain.  
 Do not wait until your blood pressure comes down on its own. Get help right away. 
 Call your doctor now or seek immediate care if: 
  · Your blood pressure is much higher than normal (such as 180/120 or higher), but you don't have symptoms.  
  · You think high blood pressure is causing symptoms, such as: 
? Severe headache. 
? Blurry vision.  
 Watch closely for changes in your health, and be sure to contact your doctor if: 
  · Your blood pressure measures higher than your doctor recommends at least 2 times. That means the top number is higher or the bottom number is higher, or both.  
  · You think you may be having side effects from your blood pressure medicine. Where can you learn more? Go to http://sumit-uli.info/. Enter C887 in the search box to learn more about \"High Blood Pressure: Care Instructions. \" Current as of: July 22, 2018 Content Version: 11.9 © 1101-6177 [employer]. Care instructions adapted under license by [employer] (which disclaims liability or warranty for this information). If you have questions about a medical condition or this instruction, always ask your healthcare professional. [place] [address] any warranty or liability for your use of this information. Medicare Wellness Visit, Male The best way to live healthy is to have a lifestyle where you eat a well-balanced diet, exercise regularly, limit alcohol use, and quit all forms of tobacco/nicotine, if applicable. Regular preventive services are another way to keep healthy. Preventive services (vaccines, screening tests, monitoring & exams) can help personalize your care plan, which helps you manage your own care. Screening tests can find health problems at the earliest stages, when they are easiest to treat. Jose AntonioIlsa Cait Paredes follows the current, evidence-based guidelines published by the [facility] Quinton Michel ([facility]) when recommending preventive services for our patients. Because we follow these guidelines, sometimes recommendations change over time as research supports it. (For example, a prostate screening blood test is no longer routinely recommended for men with no symptoms.) Of course, you and your doctor may decide to screen more often for some diseases, based on your risk and co-morbidities (chronic disease you are already diagnosed with). Preventive services for you include: - Medicare offers their members a free annual wellness visit, which is time for you and your primary care provider to discuss and plan for your preventive service needs. Take advantage of this benefit every year! 
-All adults over age 72 should receive the recommended pneumonia vaccines. Current USPSTF guidelines recommend a series of two vaccines for the best pneumonia protection.  
-All adults should have a flu vaccine yearly and an ECG.  All adults age 61 and older should receive a shingles vaccine once in their lifetime.   
-All adults age 38-68 who are overweight should have a diabetes screening test once every three years.  
-Other screening tests & preventive services for persons with diabetes include: an eye exam to screen for diabetic retinopathy, a kidney function test, a foot exam, and stricter control over your cholesterol.  
-Cardiovascular screening for adults with routine risk involves an electrocardiogram (ECG) at intervals determined by the provider.  
-Colorectal cancer screening should be done for adults age 54-65 with no increased risk factors for colorectal cancer. There are a number of acceptable methods of screening for this type of cancer. Each test has its own benefits and drawbacks. Discuss with your provider what is most appropriate for you during your annual wellness visit. The different tests include: colonoscopy (considered the best screening method), a fecal occult blood test, a fecal DNA test, and sigmoidoscopy. 
-All adults born between [facility] should be screened once for Hepatitis C. 
-An Abdominal Aortic Aneurysm (AAA) Screening is recommended for men age 73-68 who has ever smoked in their lifetime. Here is a list of your current Health Maintenance items (your personalized list of preventive services) with a due date: 
Health Maintenance Due Topic Date Due  
 DTaP/Tdap/Td  (1 - Tdap) 03/11/1971  Shingles Vaccine (1 of 2) 03/11/2000  Stool testing for trace blood  06/16/2019 [facility] Annual Well Visit  06/12/2019 Medicare Part B Preventive Services Limitations Recommendation Scheduled Bone Mass Measurement 
(age 72 & older, biennial) Requires diagnosis related to osteoporosis or estrogen deficiency. Biennial benefit unless patient has history of long-term glucocorticoid tx or baseline is needed because initial test was by other method  NA Cardiovascular Screening Blood Tests (every 5 years) Total cholesterol, HDL, Triglycerides and ECG Order blood work  as a panel if possible and  for adults with routine risk  an electrocardiogram (ECG) at intervals determined by the provider. 6/2019 Colorectal Cancer Screening 
-Fecal occult blood test (annual) -Flexible sigmoidoscopy (5y) 
-Screening colonoscopy (10y) -Barium Enema Colorectal cancer screening should be done for adults age 54-65 with no increased risk factors for colorectal cancer.   There are a number of acceptable methods of screening for this type of cancer. Each test has its own benefits and drawbacks. Discuss with your provider what is most appropriate for you during your annual wellness visit. The different tests include: colonoscopy (considered the best screening method), a fecal occult blood test, a fecal DNA test, and sigmoidoscopy  4/2019 Counseling to Prevent Tobacco Use (up to 8 sessions per year) - Counseling greater than 3 and up to 10 minutes - Counseling greater than 10 minutes Patients must be asymptomatic of tobacco-related conditions to receive as preventive service Diabetes Screening Tests (at least every 3 years, Medicare covers annually or at 6-month intervals for prediabetic patients) Fasting blood sugar (FBS) or glucose tolerance test (GTT) All adults age 38-68 who are overweight should have a diabetes screening test once every three years.  
-Other screening tests & preventive services for persons with diabetes include: an eye exam to screen for diabetic retinopathy, a kidney function test, a foot exam, and stricter control over your cholesterol. 6/2019 Diabetes Self-Management Training (DSMT) (no USPSTF recommendation) Requires referral by treating physician for patient with diabetes or renal disease. 10 hours of initial DSMT session of no less than 30 minutes each in a continuous 12-month period. 2 hours of follow-up DSMT in subsequent years. Glaucoma Screening (no USPSTF recommendation) Diabetes mellitus, family history, , age 48 or over,  American, age 72 or over  5/2019 Human Immunodeficiency Virus (HIV) Screening (annually for increased risk patients) HIV-1 and HIV-2 by EIA, MAURICIO, rapid antibody test, or oral mucosa transudate Patient must be at increased risk for HIV infection per USPSTF guidelines or pregnant.   Tests covered annually for patients at increased risk.  Pregnant patients may receive up to 3 test during pregnancy. NA Medical Nutrition Therapy (MNT) (for diabetes or renal disease not recommended schedule) Requires referral by treating physician for patient with diabetes or renal disease. Can be provided in same year as diabetes self-management training (DSMT), and CMS recommends medical nutrition therapy take place after DSMT. Up to 3 hours for initial year and 2 hours in subsequent years. NA Prostate Cancer Screening (annually up to age 76) - Digital rectal exam (MIGUEL) - Prostate specific antigen (PSA) Annually (age 48 or over), MIGUEL not paid separately when covered E/M service is provided on same date Men up to age 76 may need a screening blood test for prostate cancer at certain intervals, depending on their personal and family history. This decision is between the patient and his provider. 6/2019 Seasonal Influenza Vaccination (annually) All adults should have a flu vaccine yearly   10/2018 Pneumococcal Vaccination (once after 72) All adults  over age 72 should receive the recommended pneumonia vaccines. Current USPSTF guidelines recommend a series of two vaccines for the best pneumonia protection. PPSV-2019 10/2018, 3/2017 and PCV-12 1/2016 Hepatitis B Vaccinations (if medium/high risk) Medium/high risk factors:  End-stage renal disease, Hemophiliacs who received Factor VIII or IX concentrates, Clients of institutions for the mentally retarded, Persons who live in the same house as a HepB virus carrier, Homosexual men, Illicit injectable drug abusers. Shingles Vaccination A shingles vaccine is also recommended once in a lifetime after age 61  Shingles 9/2015 Ultrasound Screening for Abdominal Aortic Aneurysm (AAA) (once) An Abdominal Aortic Aneurysm (AAA) Screening is recommended for men age 73-68 who has ever smoked in their lifetime.   of the following criteria: 
 - Men who are 73-68 years old and have smoked more than 100 cigarettes in their lifetime. 
-Anyone with a FH of AAA 
-Anyone recommended for screening by USPSTF  Ordered Hep C All adults born between [birth year] and 1965 should be screened once for Hepatitis C Tetanus  All adults should have a tetanus vaccine every 10 years

## 2019-06-25 NOTE — PROGRESS NOTES
[facility]   Pre-Surgical Worksheet    Patient: Edmundo Rainey                         MRN: [id number]     Age:  71 y.o.,      Sex: male    YOB: 1950           ARI: June 25, 2019  PCP: Flores Barrett MD    Allergies   Allergen Reactions    Latex Sneezing    Benzalkonium Chloride Hives    Neosporin [Hydrocortisone] Rash         ICD-10-CM ICD-9-CM    1. Cervical spinal stenosis M48.02 723.0    2. Cervical myelopathy (HCC) G95.9 721.1    3. Nonspecific pain in the lumbar region M54.5 724.2 AMB POC XRAY, SPINE, LUMBOSACRAL; 4+       Surgery: C3 Laminectomy; C2/3 Fusion DTRAX; C4/5 C5/6 C6/7 Laminoforaminotomy; C4/5 C5/6 C6/7 Posterior Fusion DTRAX    Pain Assessment   Pain Assessment  6/25/2019   Location of Pain Back;Neck;Arm;Leg   Location Modifiers Left;Right   Severity of Pain 8   Quality of Pain Aching   Quality of Pain Comment numbness, tingling   Duration of Pain Persistent   Frequency of Pain Constant   Aggravating Factors Standing;Walking;Straightening   Aggravating Factors Comment -   Relieving Factors Nothing       Visit Vitals  BP (!) 160/99 (BP 1 Location: Left arm, BP Patient Position: Sitting)   Pulse 71   Resp 17   Ht 5' 5\" (1.651 m)   Wt 220 lb 12.8 oz (100.2 kg)   SpO2 100%   BMI 36.74 kg/m²       ADL Limits: Patient states spouse has to help him with dressing, and has picked up his load with housework. Its difficult working or driving to places due to the pain he has. Spine Surgery?: No:  When ? [facility] Where? .    Spinal Injections?: No:   When ? [facility] Where? .    Physical Therapy?: Yes  When ? Ethe[facility] Gauss Where In [place]. NSAID's?: Yes    Pain Medications?: No:   Type: ? [facility] In Pain Management: NO, Where: ?    Current Outpatient Medications   Medication Sig    lisinopril (PRINIVIL, ZESTRIL) 40 mg tablet Take 1 Tab by mouth daily.  pravastatin (PRAVACHOL) 40 mg tablet Take 1 Tab by mouth nightly.     pantoprazole (PROTONIX) 40 mg tablet Take 1 Tab by mouth daily.    SITagliptin-metFORMIN (JANUMET XR) 50-1,000 mg TM24 Take 2 Tabs by mouth daily.  topiramate (TOPAMAX) 25 mg tablet Take 1 Tab by mouth two (2) times a day.  gabapentin (NEURONTIN) 300 mg capsule TAKE 1 CAPSULE BY MOUTH THREE TIMES A DAY    levoFLOXacin (QUIXIN) 0.5 % ophthalmic solution Administer 2 Drops to right eye every six (6) hours. use in affected eye(s)    triamcinolone acetonide (KENALOG) 0.025 % ointment Apply  to affected area two (2) times a day. use thin layer    doxycycline (MONODOX) 100 mg capsule TAKE 1 CAPSULE BY MOUTH EVERY DAY    butalbital-acetaminophen-caffeine (FIORICET, ESGIC) -40 mg per tablet Take 1 Tab by mouth every four (4) hours as needed for Headache.  halobetasol (ULTRAVATE) 0.05 % topical cream APPLY TO AFFECTED AREA TWICE A DAY AS NEEDED    TOBRADEX ST drps ophthalmix suspension INSTILL 1 DROP INTO LEFT EYE TWICE A DAY    hydrOXYzine pamoate (VISTARIL) 25 mg capsule Take 1 Cap by mouth three (3) times daily as needed for Itching.  Lancets misc Accu-check  Fastclix Lancets. Test blood sugar daily. DX E11.9    alcohol swabs (BD SINGLE USE SWABS REGULAR) padm Use daily to check blood sugar    Blood Glucose Control, Normal (ACCU-CHEK SMARTVIEW CONTRL SOL) soln Test blood sugar 1 x daily  DX E11.9        1 year supply  Check controls monthy on glucose meter    Blood-Glucose Meter monitoring kit Accu-check Smartview Meter     No current facility-administered medications for this visit.         Past Medical History:   Diagnosis Date    Diabetes ([address]     GERD (gastroesophageal reflux disease)     Hx of colonic polyps     Hx of gallstones     Hypercholesterolemia     Hypertension     Restless leg syndrome     Wears dentures        Past Surgical History:   Procedure Laterality Date    HX OTHER SURGICAL      gallstones removed    [place] COLONOSCOPY FLX DX W/COLLJ SPEC WHEN PFRMD  2-17-16    Dr. Wilian Plascencia History     Socioeconomic History  Marital status: LEGALLY      Spouse name: Not on file    Number of children: Not on file    Years of education: Not on file    Highest education level: Not on file   Tobacco Use    Smoking status: Current Every Day Smoker     Packs/day: 0.50    Smokeless tobacco: Never Used   Substance and Sexual Activity    Alcohol use: Yes     Comment: occas.  Drug use:  No

## 2019-06-25 NOTE — PROGRESS NOTES
Subjective:       Chief Complaint  The patient presents for follow up of diabetes, hypertension and high cholesterol. preop        HPI  Viral Laguna is a 71 y.o. male seen for follow up of diabetes. Healso has hypertension and hyperlipidemia. Diabetes poorly controlled on Janumet due to poor compliance with taking the medication, pt to try and improve diet and try to lose weight, no significant medication side effects noted, on Janumet, hypertension poorly controlled, no significant medication side effects noted, on lisinopril 40mg, pt admits he has not been compliant with taking the medication daily,  hyperlipidemia well controlled, no significant medication side effects noted, on Pravachol. Diet and Lifestyle: generally follows a low fat low cholesterol diet, does not rigorously follow a diabetic diet, sedentary    Home BP Monitoring: is not measured at home. Diabetic Review of Systems - home glucose monitoring: is performed regularly, 1x/day. 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 Patient slipped on the ice and hit his right leg elbow and his head at that time. Pt has continued to have continued neck and head pain. He has seen multiple specialists and done PT. He had  occipital nerve block which has decreased the intensity and persistence of his headaches but he has seen Dr Amy Ambriz surgeon and needs cervical spine surgery for cervical stenosis. Pt is cleared from medical standpoint for the surgery if preop testing is within normal limits. Pt knows to take his BP meds on a regular basis and take lisinopril AM of surgery with sip of water. Pt has had recent problems with severe GERD in AM due to taking Motrin at night. He will stop the Motrin and continue Protonix 40 mg BID for short period.      Current Outpatient Medications   Medication Sig    lisinopril (PRINIVIL, ZESTRIL) 40 mg tablet Take 1 Tab by mouth daily.  pravastatin (PRAVACHOL) 40 mg tablet Take 1 Tab by mouth nightly.  pantoprazole (PROTONIX) 40 mg tablet Take 1 Tab by mouth daily.  SITagliptin-metFORMIN (JANUMET XR) 50-1,000 mg TM24 Take 2 Tabs by mouth daily.  topiramate (TOPAMAX) 25 mg tablet Take 1 Tab by mouth two (2) times a day.  gabapentin (NEURONTIN) 300 mg capsule TAKE 1 CAPSULE BY MOUTH THREE TIMES A DAY    levoFLOXacin (QUIXIN) 0.5 % ophthalmic solution Administer 2 Drops to right eye every six (6) hours. use in affected eye(s)    triamcinolone acetonide (KENALOG) 0.025 % ointment Apply  to affected area two (2) times a day. use thin layer    butalbital-acetaminophen-caffeine (FIORICET, ESGIC) -40 mg per tablet Take 1 Tab by mouth every four (4) hours as needed for Headache.  halobetasol (ULTRAVATE) 0.05 % topical cream APPLY TO AFFECTED AREA TWICE A DAY AS NEEDED    TOBRADEX ST drps ophthalmix suspension INSTILL 1 DROP INTO LEFT EYE TWICE A DAY    hydrOXYzine pamoate (VISTARIL) 25 mg capsule Take 1 Cap by mouth three (3) times daily as needed for Itching.  Lancets misc Accu-check  Fastclix Lancets. Test blood sugar daily. DX E11.9    alcohol swabs (BD SINGLE USE SWABS REGULAR) padm Use daily to check blood sugar    Blood Glucose Control, Normal (ACCU-CHEK SMARTVIEW CONTRL SOL) soln Test blood sugar 1 x daily  DX E11.9        1 year supply  Check controls monthy on glucose meter    Blood-Glucose Meter monitoring kit Accu-check Smartview Meter     No current facility-administered medications for this visit.               Review of Systems  Respiratory: negative for dyspnea on exertion  Cardiovascular: negative for chest pain    Objective:     Visit Vitals  BP (!) 153/94 (BP 1 Location: Left arm, BP Patient Position: Sitting)   Pulse 71   Temp 97.7 °F (36.5 °C) (Oral)   Resp 20   Ht 5' 5\" (1.651 m)   Wt 220 lb (99.8 kg)   SpO2 98%   BMI 36.61 kg/m²        General appearance - alert, well appearing, and in no distress   Neck: pt with soft neck collar  Chest - clear to auscultation, no wheezes, rales or rhonchi, symmetric air entry  Heart - normal rate, regular rhythm, normal S1, S2, no murmurs, rubs, clicks or gallops  Extremities - no edema         Labs:   Lab Results   Component Value Date/Time    Hemoglobin A1c 8.7 (H) 06/18/2019 10:59 AM    Hemoglobin A1c 7.5 (H) 12/05/2018 07:34 AM    Hemoglobin A1c 7.7 (H) 04/24/2018 07:35 AM    Glucose 122 (H) 06/18/2019 10:59 AM    Glucose (POC) 158 (H) 02/17/2016 08:41 AM    Microalbumin/Creat ratio (mg/g creat) 8 04/24/2018 07:35 AM    Microalb/Creat ratio (ug/mg creat.) 7.2 06/18/2019 10:59 AM    Microalbumin,urine random 1.90 04/24/2018 07:35 AM    LDL, calculated 68 06/18/2019 10:59 AM    Creatinine 1.24 06/18/2019 10:59 AM      Lab Results   Component Value Date/Time    Cholesterol, total 142 06/18/2019 10:59 AM    HDL Cholesterol 37 (L) 06/18/2019 10:59 AM    LDL, calculated 68 06/18/2019 10:59 AM    Triglyceride 186 (H) 06/18/2019 10:59 AM    CHOL/HDL Ratio 3.1 04/24/2018 07:35 AM       Lab Results   Component Value Date/Time    ALT (SGPT) 28 06/18/2019 10:59 AM    AST (SGOT) 23 06/18/2019 10:59 AM    Alk.  phosphatase 83 06/18/2019 10:59 AM    Bilirubin, total 0.5 06/18/2019 10:59 AM       Lab Results   Component Value Date/Time    GFR est AA 68 06/18/2019 10:59 AM    GFR est non-AA 59 (L) 06/18/2019 10:59 AM    Creatinine 1.24 06/18/2019 10:59 AM    BUN 13 06/18/2019 10:59 AM    Sodium 139 06/18/2019 10:59 AM    Potassium 4.2 06/18/2019 10:59 AM    Chloride 105 06/18/2019 10:59 AM    CO2 19 (L) 06/18/2019 10:59 AM      Lab Results   Component Value Date/Time    Prostate Specific Ag 1.9 06/18/2019 10:59 AM    Prostate Specific Ag 4.3 (H) 12/05/2018 07:34 AM    Prostate Specific Ag 2.4 09/07/2017 09:18 AM    Prostate Specific Ag 1.6 01/08/2016 10:54 AM Assessment / Plan     Diabetes borderline controlled on Janumet will try to improve with weight loss and better compliance with Janumet  Hypertension borderline controlled, on lisinopril. Patient will try to be more compliant and take lisinopril AM of surgery with sip of water. Hyperlipidemia well controlled, on Pravachol . ICD-10-CM ICD-9-CM    1. Medicare annual wellness visit, subsequent Z00.00 V70.0    2. Uncontrolled type 2 diabetes mellitus with hyperglycemia (HCC) E11.65 250.02 HEMOGLOBIN A1C W/O EAG   3. High cholesterol E78.00 272.0 LIPID PANEL   4. Essential hypertension Z39 815.1 METABOLIC PANEL, COMPREHENSIVE   5. Cervical spinal stenosis M48.02 723.0 Pt medically cleared for spine surgery if preop testing within normal limits                 Diabetic issues reviewed with him: diabetic diet discussed in detail, and low cholesterol diet, weight control and daily exercise discussed. Follow-up and Dispositions    · Return in about 4 months (around 10/25/2019) for labs 1 week before. Reviewed plan of care. Patient has provided input and agrees with goals. Discussed the patient's BMI with him. The BMI follow up plan is as follows:     dietary management education, guidance, and counseling  encourage exercise  monitor weight  prescribed dietary intake    An After Visit Summary was printed and given to the patient.

## 2019-06-25 NOTE — PROGRESS NOTES
[address]  [address], [address]  [facility], [address]  Phone: [phone]  Fax: [phone]  PROGRESS NOTE  Patient: Leonides Carney                MRN: [id number]       SSN: [id number]  YOB: 1950        AGE: 71 y.o. SEX: male  Body mass index is 36.74 kg/m². PCP: Julia Lanza MD  06/25/19    Chief Complaint   Patient presents with    Neck Pain     MRI fu    Back Pain       HISTORY OF PRESENT ILLNESS, RADIOGRAPHS, and PLAN:     HISTORY OF PRESENT ILLNESS:   Mr. Jacqui Mcduffie returns today. He is still having severe pain more in the day as well as severely at night. He has neck pain as well as right arm pain. He is dropping objects, more myelopathic symptoms though his gait seems to be preserved. He has severe stenosis at 2-3 as well as severe foraminal stenosis at 5-6, 6-7. I discussed the matter at length with him, reviewed his lumbar MRI which demonstrates degenerative changes but no real stenotic condition. I do not see any real surgical disease in his back. The most concerning area is just facet arthropathy at L5-S1 but that should be able to addressed conservatively. ASSESSMENT/PLAN:   Given the severity of his cervical stenosis with a 5 mm canal at 2-3 with posterior ligamentous invagination, I would recommend a C3 laminectomy with a 2-3 decompression and possibly a 2-3 posterior cervical fusion, a DTRAX device or use of a lateral mass screw option would be reasonable. Also, a laminal foraminotomy on the right at 5-6, 6-7 with a fusion of those segments I think would be appropriate in line as well to address the foraminal stenosis and provide stabilization. I discussed sadly this is an intervention that will require posterior cervical exposure.   Risks, benefits, complications and alternatives to surgery have been discussed and the patient consents to proceed with surgery once the appropriate approvals and clearances take place.      cc:  Dr. Daniel Avila           Past Medical History:   Diagnosis Date    Diabetes ([address]     GERD (gastroesophageal reflux disease)     Hx of colonic polyps     Hx of gallstones     Hypercholesterolemia     Hypertension     Restless leg syndrome     Wears dentures        Family History   Problem Relation Age of Onset    Heart Attack Mother     Prostate Cancer Father     Colon Cancer Father     Stroke Sister        Current Outpatient Medications   Medication Sig Dispense Refill    lisinopril (PRINIVIL, ZESTRIL) 40 mg tablet Take 1 Tab by mouth daily. 90 Tab 3    pravastatin (PRAVACHOL) 40 mg tablet Take 1 Tab by mouth nightly. 90 Tab 3    pantoprazole (PROTONIX) 40 mg tablet Take 1 Tab by mouth daily. 90 Tab 3    SITagliptin-metFORMIN (JANUMET XR) 50-1,000 mg TM24 Take 2 Tabs by mouth daily. 180 Tab 3    topiramate (TOPAMAX) 25 mg tablet Take 1 Tab by mouth two (2) times a day. 60 Tab 1    gabapentin (NEURONTIN) 300 mg capsule TAKE 1 CAPSULE BY MOUTH THREE TIMES A DAY 90 Cap 0    levoFLOXacin (QUIXIN) 0.5 % ophthalmic solution Administer 2 Drops to right eye every six (6) hours. use in affected eye(s) 5 mL 0    triamcinolone acetonide (KENALOG) 0.025 % ointment Apply  to affected area two (2) times a day. use thin layer 30 g 0    doxycycline (MONODOX) 100 mg capsule TAKE 1 CAPSULE BY MOUTH EVERY DAY  0    butalbital-acetaminophen-caffeine (FIORICET, ESGIC) -40 mg per tablet Take 1 Tab by mouth every four (4) hours as needed for Headache. 60 Tab 0    halobetasol (ULTRAVATE) 0.05 % topical cream APPLY TO AFFECTED AREA TWICE A DAY AS NEEDED  1    TOBRADEX ST drps ophthalmix suspension INSTILL 1 DROP INTO LEFT EYE TWICE A DAY  0    hydrOXYzine pamoate (VISTARIL) 25 mg capsule Take 1 Cap by mouth three (3) times daily as needed for Itching. 60 Cap 3    Lancets misc Accu-check  Fastclix Lancets. Test blood sugar daily.   DX E11.9 100 Each 3    alcohol swabs (BD SINGLE USE SWABS REGULAR) padm Use daily to check blood sugar 100 Pad 4    Blood Glucose Control, Normal (ACCU-CHEK SMARTVIEW CONTRL SOL) soln Test blood sugar 1 x daily  DX E11.9        1 year supply  Check controls monthy on glucose meter 3 mL 4    Blood-Glucose Meter monitoring kit Accu-check Smartview Meter 1 Kit 0       Allergies   Allergen Reactions    Latex Sneezing    Benzalkonium Chloride Hives    Neosporin [Hydrocortisone] Rash       Past Surgical History:   Procedure Laterality Date    HX OTHER SURGICAL      gallstones removed    AL COLONOSCOPY FLX DX W/COLLJ SPEC WHEN PFRMD  2-17-16    Dr. Abdirahman Tenorio       Past Medical History:   Diagnosis Date    Diabetes ([facility] [address]     GERD (gastroesophageal reflux disease)     Hx of colonic polyps     Hx of gallstones     Hypercholesterolemia     Hypertension     Restless leg syndrome     Wears dentures        Social History     Socioeconomic History    Marital status: LEGALLY      Spouse name: Not on file    Number of children: Not on file    Years of education: Not on file    Highest education level: Not on file   Occupational History    Not on file   Social Needs    Financial resource strain: Not on file    Food insecurity:     Worry: Not on file     Inability: Not on file    Transportation needs:     Medical: Not on file     Non-medical: Not on file   Tobacco Use    Smoking status: Current Every Day Smoker     Packs/day: 0.50    Smokeless tobacco: Never Used   Substance and Sexual Activity    Alcohol use: Yes     Comment: occas.     Drug use: No    Sexual activity: Not on file   Lifestyle    Physical activity:     Days per week: Not on file     Minutes per session: Not on file    Stress: Not on file   Relationships    Social connections:     Talks on phone: Not on file     Gets together: Not on file     Attends Confucianism service: Not on file     Active member of club or organization: Not on file     Attends meetings of clubs or organizations: Not on file Relationship status: Not on file    Intimate partner violence:     Fear of current or ex partner: Not on file     Emotionally abused: Not on file     Physically abused: Not on file     Forced sexual activity: Not on file   Other Topics Concern    Not on file   Social History Narrative    Not on file         REVIEW OF SYSTEMS:   CONSTITUTIONAL SYMPTOMS:  Negative. EYES:  Negative. EARS, NOSE, THROAT AND MOUTH:  Negative. CARDIOVASCULAR:  Negative. RESPIRATORY:  Negative. GENITOURINARY: Per HPI. GASTROINTESTINAL:  Per HPI. INTEGUMENTARY (SKIN AND/OR BREAST):  Negative. MUSCULOSKELETAL: Per HPI.   ENDOCRINE/RHEUMATOLOGIC:  Negative. NEUROLOGICAL:  Per HPI. HEMATOLOGIC/LYMPHATIC:  Negative. ALLERGIC/IMMUNOLOGIC:  Negative. PSYCHIATRIC:  Negative. PHYSICAL EXAMINATION:   Visit Vitals  BP (!) 160/99 (BP 1 Location: Left arm, BP Patient Position: Sitting)   Pulse 71   Resp 17   Ht 5' 5\" (1.651 m)   Wt 220 lb 12.8 oz (100.2 kg)   SpO2 100%   BMI 36.74 kg/m²    PAIN SCALE: 8/10    CONSTITUTIONAL: The patient is in no apparent distress and is alert and oriented x 3. HEENT: Normocephalic. Hearing grossly intact. NECK: Supple and symmetric. no tenderness, or masses were felt. RESPIRATORY: No labored breathing. CARDIOVASCULAR: The carotid pulses were normal. Peripheral pulses were 2+. CHEST: Normal AP diameter and normal contour without any kyphoscoliosis. LYMPHATIC: No lymphadenopathy was appreciated in the neck, axillae or groin. SKIN: Negative for scars, rashes, lesions, or ulcers on the right upper, right lower, left upper, left lower and trunk. NEUROLOGICAL: Alert and oriented x 3. Ambulation without assistive device. FWB. EXTREMITIES: See musculoskeletal.  MUSCULOSKELETAL:   Head and Neck: Neck pain. Negative for misalignment, asymmetry, crepitation, defects, tenderness masses or effusions.  Left Upper Extremity: Inspection, percussion and palpation performed.   Talleys sign is negative.  Right Upper Extremity: Radicular pain. Inspection, percussion and palpation performed. Talleys sign is negative.  Spine, Ribs and Pelvis: Back pain. L hip pain. Inspection, percussion and palpation performed. Negative for misalignment, asymmetry, crepitation, defects, tenderness masses or effusions.  Left Lower Extremity: Inspection, percussion and palpation performed. Negative straight leg raise.  Right Lower Extremity: Inspection, percussion and palpation performed. Negative straight leg raise. SPINE EXAM:     Cervical spine: Neck is midline. Normal muscle tone. No focal atrophy is noted. Lumbar spine: No rash, ecchymosis, or gross obliquity. No focal atrophy is noted. ASSESSMENT    ICD-10-CM ICD-9-CM    1. Cervical spinal stenosis M48.02 723.0    2. Cervical myelopathy (HCC) G95.9 721.1    3. Nonspecific pain in the lumbar region M54.5 724.2 AMB POC XRAY, SPINE, LUMBOSACRAL; 4+       Written by Daly Valderrama, as dictated by Lucia Mcknight MD.    I, Dr. Lucia Mcknight MD, confirm that all documentation is accurate.

## 2019-06-25 NOTE — PROGRESS NOTES
Patient is in the office today for a follow up, an Medicare Wellness Visit. 1. Have you been to the ER, urgent care clinic since your last visit? Hospitalized since your last visit? No    2. Have you seen or consulted any other health care providers outside of the [address] [place] [address] since your last visit? Include any pap smears or colon screening. No      This is the Subsequent Medicare Annual Wellness Exam, performed 12 months or more after the Initial AWV or the last Subsequent AWV    I have reviewed the patient's medical history in detail and updated the computerized patient record. History     Past Medical History:   Diagnosis Date    Diabetes ([address]     GERD (gastroesophageal reflux disease)     Hx of colonic polyps     Hx of gallstones     Hypercholesterolemia     Hypertension     Restless leg syndrome     Wears dentures       Past Surgical History:   Procedure Laterality Date    HX OTHER SURGICAL      gallstones removed    UT COLONOSCOPY FLX DX W/COLLJ SPEC WHEN PFRMD  2-17-16    Dr. Ross Mckenzie     Current Outpatient Medications   Medication Sig Dispense Refill    lisinopril (PRINIVIL, ZESTRIL) 40 mg tablet Take 1 Tab by mouth daily. 90 Tab 3    pravastatin (PRAVACHOL) 40 mg tablet Take 1 Tab by mouth nightly. 90 Tab 3    pantoprazole (PROTONIX) 40 mg tablet Take 1 Tab by mouth daily. 90 Tab 3    SITagliptin-metFORMIN (JANUMET XR) 50-1,000 mg TM24 Take 2 Tabs by mouth daily. 180 Tab 3    topiramate (TOPAMAX) 25 mg tablet Take 1 Tab by mouth two (2) times a day. 60 Tab 1    gabapentin (NEURONTIN) 300 mg capsule TAKE 1 CAPSULE BY MOUTH THREE TIMES A DAY 90 Cap 0    levoFLOXacin (QUIXIN) 0.5 % ophthalmic solution Administer 2 Drops to right eye every six (6) hours. use in affected eye(s) 5 mL 0    triamcinolone acetonide (KENALOG) 0.025 % ointment Apply  to affected area two (2) times a day.  use thin layer 30 g 0    butalbital-acetaminophen-caffeine (FIORICET, ESGIC) -40 mg per tablet Take 1 Tab by mouth every four (4) hours as needed for Headache. 60 Tab 0    halobetasol (ULTRAVATE) 0.05 % topical cream APPLY TO AFFECTED AREA TWICE A DAY AS NEEDED  1    TOBRADEX ST drps ophthalmix suspension INSTILL 1 DROP INTO LEFT EYE TWICE A DAY  0    hydrOXYzine pamoate (VISTARIL) 25 mg capsule Take 1 Cap by mouth three (3) times daily as needed for Itching. 60 Cap 3    Lancets misc Accu-check  Fastclix Lancets. Test blood sugar daily. DX E11.9 100 Each 3    alcohol swabs (BD SINGLE USE SWABS REGULAR) padm Use daily to check blood sugar 100 Pad 4    Blood Glucose Control, Normal (ACCU-CHEK SMARTVIEW CONTRL SOL) soln Test blood sugar 1 x daily  DX E11.9        1 year supply  Check controls monthy on glucose meter 3 mL 4    Blood-Glucose Meter monitoring kit Accu-check Smartview Meter 1 Kit 0     Allergies   Allergen Reactions    Latex Sneezing    Benzalkonium Chloride Hives    Neosporin [Hydrocortisone] Rash     Family History   Problem Relation Age of Onset    Heart Attack Mother     Prostate Cancer Father     Colon Cancer Father     Stroke Sister      Social History     Tobacco Use    Smoking status: Current Every Day Smoker     Packs/day: 0.50    Smokeless tobacco: Never Used   Substance Use Topics    Alcohol use: Yes     Comment: occas.      Patient Active Problem List   Diagnosis Code    Essential hypertension I10    DM (diabetes mellitus), type 2, uncontrolled ([address] E11.65    High cholesterol E78.00    ACP (advance care planning) Z71.89    Type 2 diabetes mellitus with diabetic neuropathy ([address] E11.40    Severe obesity ([address] E66.01    Type 2 diabetes with nephropathy ([address] E11.21       Depression Risk Factor Screening:     3 most recent PHQ Screens 6/25/2019   Little interest or pleasure in doing things Not at all   Feeling down, depressed, irritable, or hopeless Not at all   Total Score PHQ 2 0     Alcohol Risk Factor Screening:   Patient says he rarely drinks alcohol. Functional Ability and Level of Safety:   Hearing Loss  Hearing is good. Activities of Daily Living  The home contains: no safety equipment. Patient does total self care    Fall Risk  Fall Risk Assessment, last 12 mths 6/25/2019   Able to walk? Yes   Fall in past 12 months? No   Fall with injury? -   Number of falls in past 12 months -   Fall Risk Score -       Abuse Screen  Patient is not abused    Cognitive Screening   Evaluation of Cognitive Function:  Has your family/caregiver stated any concerns about your memory: no  Normal    Patient Care Team   Patient Care Team:  Ulisses Bates MD as PCP - General (Internal Medicine)  Dr. Thee Abbasi as Physician (Ophthalmology)  Iraj Romo MD (Gastroenterology)  Adriano Branch MD (Orthopedic Surgery)  Kindra Mccallum MD (Pain Management)    Glaucoma Screening- UTD  Pneumonia Vaccine- UTD  Shingles Vaccine- 9/2015 pt aware of Shingrinx  Tdap Vaccine- not UTD   Colonoscopy 4/2019 Dr Lawrence Teixeira, f/u 4/2024  PSA- 6/2019  1.9  Advance Directive- Does not have. Working on it       Assessment/Plan   Education and counseling provided:  Are appropriate based on today's review and evaluation  End-of-Life planning (with patient's consent)    Diagnoses and all orders for this visit:    1. Medicare annual wellness visit, subsequent    2. Uncontrolled type 2 diabetes mellitus with hyperglycemia (HCC)  -     HEMOGLOBIN A1C W/O EAG; Future    3. High cholesterol  -     LIPID PANEL; Future    4. Essential hypertension  -     METABOLIC PANEL, COMPREHENSIVE; Future    5. Cervical spinal stenosis        Health Maintenance Due   Topic Date Due    DTaP/Tdap/Td series (1 - Tdap) 03/11/1971    Shingrix Vaccine Age 50> (1 of 2) 03/11/2000    FOBT Q 1 YEAR, 18+  06/16/2019     A comprehensive 5 year plan for medical care and screening exams was reviewed with pt and they received a copy of it.

## 2019-07-11 ENCOUNTER — HOSPITAL ENCOUNTER (OUTPATIENT)
Dept: PREADMISSION TESTING | Age: 69
Discharge: HOME | End: 2019-07-11
Payer: MEDICARE

## 2019-07-11 DIAGNOSIS — Z01.818 PRE-OP EXAM: ICD-10-CM

## 2019-07-11 LAB
ATRIAL RATE: 85 BPM
CALCULATED P AXIS, ECG09: 56 DEGREES
CALCULATED R AXIS, ECG10: 23 DEGREES
CALCULATED T AXIS, ECG11: 15 DEGREES
DIAGNOSIS, 93000: NORMAL
ERYTHROCYTE [DISTWIDTH] IN BLOOD BY AUTOMATED COUNT: 14.4 % (ref 11.6–14.5)
HCT VFR BLD AUTO: 37.9 % (ref 36–48)
HGB BLD-MCNC: 12.7 G/DL (ref 13–16)
MCH RBC QN AUTO: 26.1 PG (ref 24–34)
MCHC RBC AUTO-ENTMCNC: 33.5 G/DL (ref 31–37)
MCV RBC AUTO: 77.8 FL (ref 74–97)
P-R INTERVAL, ECG05: 150 MS
PLATELET # BLD AUTO: 258 K/UL (ref 135–420)
PMV BLD AUTO: 10.4 FL (ref 9.2–11.8)
Q-T INTERVAL, ECG07: 334 MS
QRS DURATION, ECG06: 82 MS
QTC CALCULATION (BEZET), ECG08: 397 MS
RBC # BLD AUTO: 4.87 M/UL (ref 4.7–5.5)
VENTRICULAR RATE, ECG03: 85 BPM
WBC # BLD AUTO: 9.7 K/UL (ref 4.6–13.2)

## 2019-07-11 PROCEDURE — 85027 COMPLETE CBC AUTOMATED: CPT

## 2019-07-11 PROCEDURE — 93005 ELECTROCARDIOGRAM TRACING: CPT

## 2019-07-11 PROCEDURE — 36415 COLL VENOUS BLD VENIPUNCTURE: CPT

## 2019-07-11 NOTE — PROGRESS NOTES
Daryle Copes has decided with their surgeon to have a spine surgery to improve mobility and decrease pain. Spine Surgery Pre-Operative class was attended today. Topics discussed included surgery preparation, what to expect the day of surgery, medications (to include a multimodal approach to pain control and limiting narcotics), nutrition, glycemic control, respiratory therapy, physical and occupational therapy, and discharge planning. Discussed the importance of using these alternative pain management methods with the goal of using less opioid use after surgery and at home. A patient education notebook was provided and the opportunity was given to ask questions. The phone number of the Orthopa[facility]  was provided for any future questions or concerns.

## 2019-07-12 ENCOUNTER — OFFICE VISIT (OUTPATIENT)
Dept: FAMILY MEDICINE CLINIC | Age: 69
End: 2019-07-12

## 2019-07-12 VITALS
HEIGHT: 65 IN | OXYGEN SATURATION: 96 % | HEART RATE: 80 BPM | RESPIRATION RATE: 20 BRPM | DIASTOLIC BLOOD PRESSURE: 88 MMHG | BODY MASS INDEX: 35.82 KG/M2 | WEIGHT: 215 LBS | SYSTOLIC BLOOD PRESSURE: 127 MMHG | TEMPERATURE: 97.9 F

## 2019-07-12 DIAGNOSIS — E78.00 HIGH CHOLESTEROL: ICD-10-CM

## 2019-07-12 DIAGNOSIS — I10 ESSENTIAL HYPERTENSION: ICD-10-CM

## 2019-07-12 DIAGNOSIS — E11.65 UNCONTROLLED TYPE 2 DIABETES MELLITUS WITH HYPERGLYCEMIA (HCC): Primary | ICD-10-CM

## 2019-07-12 DIAGNOSIS — F17.200 TOBACCO DEPENDENCE: ICD-10-CM

## 2019-07-12 DIAGNOSIS — M48.02 CERVICAL SPINAL STENOSIS: ICD-10-CM

## 2019-07-12 NOTE — PROGRESS NOTES
Patient is in the office today for preop clearance laminectomy. 1. Have you been to the ER, urgent care clinic since your last visit? Hospitalized since your last visit? No    2. Have you seen or consulted any other health care providers outside of the [address] [place] [address] since your last visit? Include any pap smears or colon screening.  No

## 2019-07-12 NOTE — PROGRESS NOTES
Preoperative Evaluation    Date of Exam: 2019    Rosendo Naqvi is a 71 y.o. male (:1950) who presents for preoperative evaluation. Procedure/Surgery: multiple cervical levels of laminectomy and fusion. Date of Procedure/Surgery: 19  Sur[facility]    Primary Physician: Anthony Shaffer MD    HPI: Patient presents for medical clearance for extensive neck surgery for cervical stenosis. Patient's hypertension is well controlled on lisinopril 40 mg daily. His diabetes is borderline controlled due to poor compliance with Janumet. Patient has been instructed to take 2 tablets on a regular basis to control his diabetes and facilitate healing status post surgery. Patient's cholesterol is controlled on Pravachol 40 mg nightly. Patient's EKG shows normal sinus rhythm and he denies any chest pain. He does smoke cigarettes and will use nicotine patches to stop completely. Overall patient's low risk for orthopedic surgery above and is medically cleared to proceed. Medical History:     Past Medical History:   Diagnosis Date    Diabetes ([address]     GERD (gastroesophageal reflux disease)     Hx of colonic polyps     Hx of gallstones     Hypercholesterolemia     Hypertension     Restless leg syndrome     Wears dentures      Allergies: Allergies   Allergen Reactions    Latex Sneezing    Benzalkonium Chloride Hives    Neosporin [Hydrocortisone] Rash      Medications:     Current Outpatient Medications   Medication Sig    lisinopril (PRINIVIL, ZESTRIL) 40 mg tablet Take 1 Tab by mouth daily.  pravastatin (PRAVACHOL) 40 mg tablet Take 1 Tab by mouth nightly.  pantoprazole (PROTONIX) 40 mg tablet Take 1 Tab by mouth daily.  SITagliptin-metFORMIN (JANUMET XR) 50-1,000 mg TM24 Take 2 Tabs by mouth daily.  topiramate (TOPAMAX) 25 mg tablet Take 1 Tab by mouth two (2) times a day.     gabapentin (NEURONTIN) 300 mg capsule TAKE 1 CAPSULE BY MOUTH THREE TIMES A DAY    levoFLOXacin (QUIXIN) 0.5 % ophthalmic solution Administer 2 Drops to right eye every six (6) hours. use in affected eye(s)    triamcinolone acetonide (KENALOG) 0.025 % ointment Apply  to affected area two (2) times a day. use thin layer    butalbital-acetaminophen-caffeine (FIORICET, ESGIC) -40 mg per tablet Take 1 Tab by mouth every four (4) hours as needed for Headache.  halobetasol (ULTRAVATE) 0.05 % topical cream APPLY TO AFFECTED AREA TWICE A DAY AS NEEDED    TOBRADEX ST drps ophthalmix suspension INSTILL 1 DROP INTO LEFT EYE TWICE A DAY    hydrOXYzine pamoate (VISTARIL) 25 mg capsule Take 1 Cap by mouth three (3) times daily as needed for Itching.  Lancets misc Accu-check  Fastclix Lancets. Test blood sugar daily. DX E11.9    alcohol swabs (BD SINGLE USE SWABS REGULAR) padm Use daily to check blood sugar    Blood Glucose Control, Normal (ACCU-CHEK SMARTVIEW CONTRL SOL) soln Test blood sugar 1 x daily  DX E11.9        1 year supply  Check controls monthy on glucose meter    Blood-Glucose Meter monitoring kit Accu-check Smartview Meter     No current facility-administered medications for this visit. Surgical History:     Past Surgical History:   Procedure Laterality Date    HX OTHER SURGICAL      gallstones removed    [place] COLONOSCOPY FLX DX W/COLLJ SPEC WHEN PFRMD  2-17-16    Dr. Ike Kim     Social History:     Social History     Socioeconomic History    Marital status: LEGALLY      Spouse name: Not on file    Number of children: Not on file    Years of education: Not on file    Highest education level: Not on file   Tobacco Use    Smoking status: Current Every Day Smoker     Packs/day: 0.50    Smokeless tobacco: Never Used   Substance and Sexual Activity    Alcohol use: Yes     Comment: occas.     Drug use: No       Recent use of: No recent use of aspirin (ASA), NSAIDS or steroids    Anesthesia Complications: None  History of abnormal bleeding : None      REVIEW OF SYSTEMS:  Respiratory: negative for dyspnea on exertion  Cardiovascular: negative for chest pain    EXAM:   Visit Vitals  /88 (BP 1 Location: Left arm, BP Patient Position: Sitting)   Pulse 80   Temp 97.9 °F (36.6 °C) (Oral)   Resp 20   Ht 5' 5\" (1.651 m)   Wt 215 lb (97.5 kg)   SpO2 96%   BMI 35.78 kg/m²     Eyes - right eye normal, left eye is prosthesis  Mouth - mucous membranes moist, pharynx normal without lesions  Chest - clear to auscultation, no wheezes, rales or rhonchi, symmetric air entry  Heart - normal rate, regular rhythm, normal S1, S2, no murmurs, rubs, clicks or gallops  Extremities - peripheral pulses normal, no pedal edema, no clubbing or cyanosis      DIAGNOSTICS:   1. EKG: EKG FINDINGS - normal EKG, normal sinus rhythm  2. Labs:   Lab Results   Component Value Date/Time    WBC 9.7 07/11/2019 08:22 AM    HGB 12.7 (L) 07/11/2019 08:22 AM    HCT 37.9 07/11/2019 08:22 AM    PLATELET 783 76/37/6495 08:22 AM    MCV 77.8 07/11/2019 08:22 AM        Lab Results   Component Value Date/Time    Sodium 139 06/18/2019 10:59 AM    Potassium 4.2 06/18/2019 10:59 AM    Chloride 105 06/18/2019 10:59 AM    CO2 19 (L) 06/18/2019 10:59 AM    Anion gap 8 04/24/2018 07:35 AM    Glucose 122 (H) 06/18/2019 10:59 AM    BUN 13 06/18/2019 10:59 AM    Creatinine 1.24 06/18/2019 10:59 AM    BUN/Creatinine ratio 10 06/18/2019 10:59 AM    GFR est AA 68 06/18/2019 10:59 AM    GFR est non-AA 59 (L) 06/18/2019 10:59 AM    Calcium 9.8 06/18/2019 10:59 AM    Bilirubin, total 0.5 06/18/2019 10:59 AM    ALT (SGPT) 28 06/18/2019 10:59 AM    AST (SGOT) 23 06/18/2019 10:59 AM    Alk.  phosphatase 83 06/18/2019 10:59 AM    Protein, total 6.7 06/18/2019 10:59 AM    Albumin 4.3 06/18/2019 10:59 AM    Globulin 2.8 04/24/2018 07:35 AM    A-G Ratio 1.8 06/18/2019 10:59 AM      Lab Results   Component Value Date/Time    Hemoglobin A1c 8.7 (H) 06/18/2019 10:59 AM        IMPRESSION: ICD-10-CM ICD-9-CM    1. Uncontrolled type 2 diabetes mellitus with hyperglycemia (HCC) E11.65 250.02 Pt to take Janumet on regular basis to get DM under better control. 2. Essential hypertension I10 401.9 Well controlled on Lisinopril 40 mg. Pt to take tab AM of surgery with sip of water    3. High cholesterol E78.00 272.0 Well controlled on Pravachol    4. Tobacco dependence F17.200 305.1 Pt to stop with nicotine patches before surgery     5.  Cervical spinal stenosis M48.02 723.0 Pt medically cleared for cervical neck surgery      Akin Shoemaker MD   7/12/2019

## 2019-07-12 NOTE — PATIENT INSTRUCTIONS
Cervical Laminectomy: What to Expect at [facility] Your Recovery You had a cervical laminectomy to relieve pressure on your spinal cord and the nerves in your neck. Your neck will probably feel stiff or sore. This should improve in the weeks after surgery. You may need pain medicine in the weeks after your surgery. Your doctor may recommend that you work with a physical therapist to strengthen the muscles around your neck and spine. You will need to be careful about what activities you do so you don't put too much strain on your neck. This care sheet gives you a general idea about how long it will take for you to recover. But each person recovers at a different pace. Follow the steps below to get better as quickly as possible. How can you care for yourself at home? Activity 
  · Your doctor may give you specific instructions on when you can do your normal activities again, such as driving and going back to work.  
  · Be active. Walking is a good choice.  
  · Rest when you feel tired.  
  · Allow your body to heal. Don't move quickly or lift anything heavy until you are feeling better.  
  · You will probably need to take 4 to 6 weeks off from work. It depends on the type of work you do and how you feel. Diet 
  · You can eat your normal diet. If your stomach is upset, try bland, low-fat foods like plain rice, broiled chicken, toast, and yogurt.  
  · If your bowel movements are not regular right after surgery, try to avoid constipation and straining. Drink plenty of water. Your doctor may suggest fiber, a stool softener, or a mild laxative. Medicines 
  · Your doctor will tell you if and when you can restart your medicines. He or she will also give you instructions about taking any new medicines.  
  · If you take aspirin or some other blood thinner, be sure to talk to your doctor.  He or she will tell you if and when to start taking this medicine again. Make sure that you understand exactly what your doctor wants you to do.  
  · Be safe with medicines. Read and follow all instructions on the label. ? If the doctor gave you a prescription medicine for pain, take it as prescribed. ? If you are not taking a prescription pain medicine, ask your doctor if you can take an over-the-counter medicine. Incision care 
  · If you have strips of tape on the cut (incision) the doctor made, leave the tape on for a week or until it falls off.  
  · Wash the area daily with warm water, and pat it dry. Don't use hydrogen peroxide or alcohol. They can slow healing.  
  · You may cover the area with a gauze bandage if it oozes fluid or rubs against clothing or your brace. Change the bandage every day. Exercise 
  · Do neck exercises as instructed by your doctor.  
  · Your doctor may advise you to work with a physical therapist to improve the strength and flexibility of your neck. Other instructions 
  · Follow your doctor's instructions about wearing a brace or collar to support your neck.  
  · To reduce stiffness and help sore muscles, use a warm water bottle, a heating pad set on low, or a warm cloth on your neck. Do not put heat right over the incision. Do not go to sleep with a heating pad on your skin. Follow-up care is a key part of your treatment and safety. Be sure to make and go to all appointments, and call your doctor if you are having problems. It's also a good idea to know your test results and keep a list of the medicines you take. When should you call for help? Call 911 anytime you think you may need emergency care. For example, call if: 
  · You passed out (lost consciousness).  
  · You have severe trouble breathing.  
  · You are unable to move an arm or a leg at all.  
  · You have symptoms of a blood clot in your lung (called a pulmonary embolism). These may include: 
? Sudden chest pain. ? Trouble breathing. ? Coughing up blood.  Call your doctor now or seek immediate medical care if: 
  · You have new or worse symptoms in your arms or legs. Symptoms may include: 
? Numbness or tingling. ? Weakness. ? Pain.  
  · You have pain that does not get better after you take pain medicine.  
  · You have loose stitches, or your incision comes open.  
  · You bleed through your bandage.  
  · You have symptoms of infection, such as: 
? Increased pain, swelling, warmth, or redness. ? Red streaks leading from the incision. ? Pus draining from the incision. ? A fever.  
 Watch closely for changes in your health, and be sure to contact your doctor if: 
  · You do not have a bowel movement after taking a laxative.  
  · You are not getting better as expected. Where can you learn more? Go to http://sumit-uli.info/. Enter Y939 in the search box to learn more about \"Cervical Laminectomy: What to Expect at Home. \" Current as of: September 20, 2018 Content Version: 11.9 © 9471-0174 [employer]. Care instructions adapted under license by [employer] (which disclaims liability or warranty for this information). If you have questions about a medical condition or this instruction, always ask your healthcare professional. [place] [address] any warranty or liability for your use of this information.

## 2019-07-15 NOTE — H&P
Pre-Admission History and Physical    Patient: Alison Goodwin   MRN: [id number]   SSN: [id number]   YOB: 1950   Age: 71 y.o. Sex: male     Patient scheduled for: C3 laminectomy, C2/3 fusion, C4/5, 5/6, 6/7 right laminoforaminotomy, C4/5, 5/6, 6/7 posterior fusion  Date of surgery: 7/22/19. [facility] Surgeon: Hermelindo Whittaekr MD    HPI:  Alison Goodwin is a 71 y.o. male. was working as a  on December 2, 2018, when he slipped on an area around an ice rink around [place]. He struck the back of his head. It sounds like he actually was knocked out momentarily. Since that time, he has had severe headache, neck pain, and right shoulder/arm pain. He has had a thickness and fullness to his hands and some numbness and tingling with some loss of balance and some degree of dexterity. e is dropping objects, more myelopathic symptoms though his gait seems to be preserved. He denies bowel or bladder dysfunction, fever, chills, or night sweats, weight loss or weight gain. He was treated by his primary care doctor without much improvement. He has been quite miserable. He rates his symptoms between a 4/10 and 8/10. MRI demonstratesevere cervical spondylosis, C2-3 arthritis with global stenosis, anterior and posterior cord compression due to disc and uncinate spurring anteriorly, and ligament invagination posteriorly causing cord compression and no clear gliosis. There is stenosis at C5-6 and C6-7 due to disc protrusion and uncinate spurring. There is no gross instability. This patient has failed the presurgical conservative treatments  including physical therapy, spinal block injections and medications. Pain has impacted the patient's functional ability to dress, drive and do activities independently. He is being admitted for surgical intervention.          Past Medical History:   Diagnosis Date    Diabetes ([address]     GERD (gastroesophageal reflux disease)     Hx of colonic polyps     Hx of gallstones     Hypercholesterolemia     Hypertension     Restless leg syndrome     Wears dentures      Social History     Socioeconomic History    Marital status: LEGALLY      Spouse name: Not on file    Number of children: Not on file    Years of education: Not on file    Highest education level: Not on file   Tobacco Use    Smoking status: Current Every Day Smoker     Packs/day: 0.50    Smokeless tobacco: Never Used   Substance and Sexual Activity    Alcohol use: Yes     Comment: occas.  Drug use: No     Past Surgical History:   Procedure Laterality Date    HX OTHER SURGICAL      gallstones removed    [place] COLONOSCOPY FLX DX W/COLLJ SPEC WHEN PFRMD  2-17-16    Dr. Berry Body     Family History   Problem Relation Age of Onset    Heart Attack Mother     Prostate Cancer Father     Colon Cancer Father     Stroke Sister      Allergies   Allergen Reactions    Latex Sneezing    Benzalkonium Chloride Hives    Neosporin [Hydrocortisone] Rash     Current Outpatient Medications   Medication Sig Dispense Refill    lisinopril (PRINIVIL, ZESTRIL) 40 mg tablet Take 1 Tab by mouth daily. 90 Tab 3    pravastatin (PRAVACHOL) 40 mg tablet Take 1 Tab by mouth nightly. 90 Tab 3    pantoprazole (PROTONIX) 40 mg tablet Take 1 Tab by mouth daily. 90 Tab 3    SITagliptin-metFORMIN (JANUMET XR) 50-1,000 mg TM24 Take 2 Tabs by mouth daily. 180 Tab 3    topiramate (TOPAMAX) 25 mg tablet Take 1 Tab by mouth two (2) times a day. 60 Tab 1    gabapentin (NEURONTIN) 300 mg capsule TAKE 1 CAPSULE BY MOUTH THREE TIMES A DAY 90 Cap 0    levoFLOXacin (QUIXIN) 0.5 % ophthalmic solution Administer 2 Drops to right eye every six (6) hours. use in affected eye(s) 5 mL 0    triamcinolone acetonide (KENALOG) 0.025 % ointment Apply  to affected area two (2) times a day.  use thin layer 30 g 0    butalbital-acetaminophen-caffeine (FIORICET, ESGIC) -40 mg per tablet Take 1 Tab by mouth every four (4) hours as needed for Headache. 60 Tab 0    halobetasol (ULTRAVATE) 0.05 % topical cream APPLY TO AFFECTED AREA TWICE A DAY AS NEEDED  1    TOBRADEX ST drps ophthalmix suspension INSTILL 1 DROP INTO LEFT EYE TWICE A DAY  0    hydrOXYzine pamoate (VISTARIL) 25 mg capsule Take 1 Cap by mouth three (3) times daily as needed for Itching. 60 Cap 3    Lancets misc Accu-check  Fastclix Lancets. Test blood sugar daily. DX E11.9 100 Each 3    alcohol swabs (BD SINGLE USE SWABS REGULAR) padm Use daily to check blood sugar 100 Pad 4    Blood Glucose Control, Normal (ACCU-CHEK SMARTVIEW CONTRL SOL) soln Test blood sugar 1 x daily  DX E11.9        1 year supply  Check controls monthy on glucose meter 3 mL 4    Blood-Glucose Meter monitoring kit Accu-check Smartview Meter 1 Kit 0       ROS:  Denies chills, fever,night sweats,  bowel or bladder dysfunction, unexplained weight loss/weight gain, chest pain, sob or anxiety. Physical Examination    Gen: Well developed, well nourished 71 y.o. male   BP (!) 160/99 (BP 1 Location: Left arm, BP Patient Position: Sitting)   Pulse 71   Resp 17   Ht 5' 5\" (1.651 m)   Wt 220 lb 12.8 oz (100.2 kg)   SpO2 100%   BMI 36.74 kg/m²    PAIN SCALE: 8/10     CONSTITUTIONAL: The patient is in no apparent distress and is alert and oriented x 3. HEENT: Normocephalic. Hearing grossly intact. NECK: Supple and symmetric. no tenderness, or masses were felt. RESPIRATORY: No labored breathing. CARDIOVASCULAR: The carotid pulses were normal. Peripheral pulses were 2+. CHEST: Normal AP diameter and normal contour without any kyphoscoliosis. LYMPHATIC: No lymphadenopathy was appreciated in the neck, axillae or groin. SKIN: Negative for scars, rashes, lesions, or ulcers on the right upper, right lower, left upper, left lower and trunk. NEUROLOGICAL: Alert and oriented x 3. Ambulation without assistive device. FWB.   EXTREMITIES: See musculoskeletal.  MUSCULOSKELETAL:  · Head and Neck: Neck pain. Negative for misalignment, asymmetry, crepitation, defects, tenderness masses or effusions. · Left Upper Extremity: Inspection, percussion and palpation performed. Talleys sign is negative. · Right Upper Extremity: Radicular pain. Inspection, percussion and palpation performed. Talleys sign is negative. · Spine, Ribs and Pelvis: Back pain. L hip pain. Inspection, percussion and palpation performed. Negative for misalignment, asymmetry, crepitation, defects, tenderness masses or effusions. · Left Lower Extremity: Inspection, percussion and palpation performed. Negative straight leg raise. · Right Lower Extremity: Inspection, percussion and palpation performed. Negative straight leg raise.           SPINE EXAM:      Cervical spine: Neck is midline. Normal muscle tone. No focal atrophy is noted.     Lumbar spine: No rash, ecchymosis, or gross obliquity. No focal atrophy is noted.                 Assessment and Plan    Due to the pt's persistent symptoms unrelieved by conservative measure John Oliver is being [facility] to undergo surgical intervention. The post-operative plan of care consists of physical therapy, home health and a 2 week f/u office visit. We are pending medical clearance by . The risks, benefits, complications and alternatives to surgery have been discussed in detail with the patient. The patient understands and agrees to proceed.      Lin Fair NP-C dictating for Jamison Arce MD

## 2019-07-19 NOTE — TELEPHONE ENCOUNTER
Pt needs testing strips (I don't see on current med list)    He uses the [address] [place] [facility]

## 2019-07-21 ENCOUNTER — ANESTHESIA EVENT (OUTPATIENT)
Dept: SURGERY | Age: 69
End: 2019-07-21
Payer: MEDICARE

## 2019-07-22 ENCOUNTER — HOSPITAL ENCOUNTER (OUTPATIENT)
Age: 69
Setting detail: OBSERVATION
LOS: 1 days | Discharge: HOME HEALTH | End: 2019-07-23
Attending: ORTHOPAEDIC SURGERY | Admitting: ORTHOPAEDIC SURGERY
Payer: MEDICARE

## 2019-07-22 ENCOUNTER — APPOINTMENT (OUTPATIENT)
Dept: GENERAL RADIOLOGY | Age: 69
End: 2019-07-22
Attending: ORTHOPAEDIC SURGERY
Payer: MEDICARE

## 2019-07-22 ENCOUNTER — ANESTHESIA (OUTPATIENT)
Dept: SURGERY | Age: 69
End: 2019-07-22
Payer: MEDICARE

## 2019-07-22 DIAGNOSIS — Z98.1 S/P CERVICAL SPINAL FUSION: Primary | ICD-10-CM

## 2019-07-22 DIAGNOSIS — M48.02 CERVICAL SPINAL STENOSIS: ICD-10-CM

## 2019-07-22 PROBLEM — G95.9 CERVICAL MYELOPATHY (HCC): Status: ACTIVE | Noted: 2019-07-22

## 2019-07-22 LAB
ABO + RH BLD: NORMAL
AMPHET UR QL SCN: NEGATIVE
BARBITURATES UR QL SCN: NEGATIVE
BENZODIAZ UR QL: NEGATIVE
BLOOD GROUP ANTIBODIES SERPL: NORMAL
CANNABINOIDS UR QL SCN: NEGATIVE
COCAINE UR QL SCN: NEGATIVE
GLUCOSE BLD STRIP.AUTO-MCNC: 121 MG/DL (ref 70–110)
GLUCOSE BLD STRIP.AUTO-MCNC: 156 MG/DL (ref 70–110)
GLUCOSE BLD STRIP.AUTO-MCNC: 196 MG/DL (ref 70–110)
HDSCOM,HDSCOM: NORMAL
METHADONE UR QL: NEGATIVE
OPIATES UR QL: NEGATIVE
PCP UR QL: NEGATIVE
SPECIMEN EXP DATE BLD: NORMAL

## 2019-07-22 PROCEDURE — 77030040361 HC SLV COMPR DVT MDII -B: Performed by: ORTHOPAEDIC SURGERY

## 2019-07-22 PROCEDURE — 77030011265 HC ELECTRD BLD HEX COVD -A: Performed by: ORTHOPAEDIC SURGERY

## 2019-07-22 PROCEDURE — 74011250636 HC RX REV CODE- 250/636

## 2019-07-22 PROCEDURE — 77030030163 HC BN WAX J&J -A: Performed by: ORTHOPAEDIC SURGERY

## 2019-07-22 PROCEDURE — 74011250636 HC RX REV CODE- 250/636: Performed by: NURSE PRACTITIONER

## 2019-07-22 PROCEDURE — 77030035236 HC SUT PDS STRATFX BARB J&J -B: Performed by: ORTHOPAEDIC SURGERY

## 2019-07-22 PROCEDURE — 74011250637 HC RX REV CODE- 250/637: Performed by: NURSE ANESTHETIST, CERTIFIED REGISTERED

## 2019-07-22 PROCEDURE — 74011250636 HC RX REV CODE- 250/636: Performed by: ORTHOPAEDIC SURGERY

## 2019-07-22 PROCEDURE — 76010000133 HC OR TIME 3 TO 3.5 HR: Performed by: ORTHOPAEDIC SURGERY

## 2019-07-22 PROCEDURE — 74011636637 HC RX REV CODE- 636/637: Performed by: NURSE ANESTHETIST, CERTIFIED REGISTERED

## 2019-07-22 PROCEDURE — 77030004402 HC BUR NEUR STRY -C: Performed by: ORTHOPAEDIC SURGERY

## 2019-07-22 PROCEDURE — 77030020782 HC GWN BAIR PAWS FLX 3M -B: Performed by: ORTHOPAEDIC SURGERY

## 2019-07-22 PROCEDURE — 76060000037 HC ANESTHESIA 3 TO 3.5 HR: Performed by: ORTHOPAEDIC SURGERY

## 2019-07-22 PROCEDURE — 99218 HC RM OBSERVATION: CPT

## 2019-07-22 PROCEDURE — 77030034475 HC MISC IMPL SPN: Performed by: ORTHOPAEDIC SURGERY

## 2019-07-22 PROCEDURE — C1713 ANCHOR/SCREW BN/BN,TIS/BN: HCPCS | Performed by: ORTHOPAEDIC SURGERY

## 2019-07-22 PROCEDURE — 74011250636 HC RX REV CODE- 250/636: Performed by: NURSE ANESTHETIST, CERTIFIED REGISTERED

## 2019-07-22 PROCEDURE — 74011000250 HC RX REV CODE- 250: Performed by: ORTHOPAEDIC SURGERY

## 2019-07-22 PROCEDURE — 77030013567 HC DRN WND RESERV BARD -A: Performed by: ORTHOPAEDIC SURGERY

## 2019-07-22 PROCEDURE — 74011000250 HC RX REV CODE- 250

## 2019-07-22 PROCEDURE — 77030018836 HC SOL IRR NACL ICUM -A: Performed by: ORTHOPAEDIC SURGERY

## 2019-07-22 PROCEDURE — 86900 BLOOD TYPING SEROLOGIC ABO: CPT

## 2019-07-22 PROCEDURE — 77030012893: Performed by: ORTHOPAEDIC SURGERY

## 2019-07-22 PROCEDURE — 77030034850: Performed by: ORTHOPAEDIC SURGERY

## 2019-07-22 PROCEDURE — L0120 CERV FLEX N/ADJ FOAM PRE OTS: HCPCS | Performed by: ORTHOPAEDIC SURGERY

## 2019-07-22 PROCEDURE — 80307 DRUG TEST PRSMV CHEM ANLYZR: CPT

## 2019-07-22 PROCEDURE — 77030027138 HC INCENT SPIROMETER -A: Performed by: ORTHOPAEDIC SURGERY

## 2019-07-22 PROCEDURE — 77030002933 HC SUT MCRYL J&J -A: Performed by: ORTHOPAEDIC SURGERY

## 2019-07-22 PROCEDURE — 77030020268 HC MISC GENERAL SUPPLY: Performed by: ORTHOPAEDIC SURGERY

## 2019-07-22 PROCEDURE — 82962 GLUCOSE BLOOD TEST: CPT

## 2019-07-22 PROCEDURE — 74011250637 HC RX REV CODE- 250/637: Performed by: ORTHOPAEDIC SURGERY

## 2019-07-22 PROCEDURE — 74011000272 HC RX REV CODE- 272: Performed by: ORTHOPAEDIC SURGERY

## 2019-07-22 PROCEDURE — 74011000258 HC RX REV CODE- 258

## 2019-07-22 PROCEDURE — 77030039267 HC ADH SKN EXOFIN S2SG -B: Performed by: ORTHOPAEDIC SURGERY

## 2019-07-22 PROCEDURE — 76210000016 HC OR PH I REC 1 TO 1.5 HR: Performed by: ORTHOPAEDIC SURGERY

## 2019-07-22 PROCEDURE — 77030040356 HC CORD BPLR FRCP COVD -A: Performed by: ORTHOPAEDIC SURGERY

## 2019-07-22 PROCEDURE — 77030012406 HC DRN WND PENRS BARD -A: Performed by: ORTHOPAEDIC SURGERY

## 2019-07-22 DEVICE — GRAFT BNE SUB SM CANC FRZN MORSELIZED W/ VIABLE CELL: Type: IMPLANTABLE DEVICE | Site: NECK | Status: FUNCTIONAL

## 2019-07-22 RX ORDER — MAGNESIUM SULFATE 100 %
4 CRYSTALS MISCELLANEOUS AS NEEDED
Status: DISCONTINUED | OUTPATIENT
Start: 2019-07-22 | End: 2019-07-23 | Stop reason: HOSPADM

## 2019-07-22 RX ORDER — NALOXONE HYDROCHLORIDE 0.4 MG/ML
0.1 INJECTION, SOLUTION INTRAMUSCULAR; INTRAVENOUS; SUBCUTANEOUS AS NEEDED
Status: DISCONTINUED | OUTPATIENT
Start: 2019-07-22 | End: 2019-07-23 | Stop reason: HOSPADM

## 2019-07-22 RX ORDER — GLYCOPYRROLATE 0.2 MG/ML
INJECTION INTRAMUSCULAR; INTRAVENOUS AS NEEDED
Status: DISCONTINUED | OUTPATIENT
Start: 2019-07-22 | End: 2019-07-22 | Stop reason: HOSPADM

## 2019-07-22 RX ORDER — DEXAMETHASONE SODIUM PHOSPHATE 4 MG/ML
INJECTION, SOLUTION INTRA-ARTICULAR; INTRALESIONAL; INTRAMUSCULAR; INTRAVENOUS; SOFT TISSUE AS NEEDED
Status: DISCONTINUED | OUTPATIENT
Start: 2019-07-22 | End: 2019-07-22 | Stop reason: HOSPADM

## 2019-07-22 RX ORDER — VANCOMYCIN HYDROCHLORIDE 1 G/20ML
INJECTION, POWDER, LYOPHILIZED, FOR SOLUTION INTRAVENOUS AS NEEDED
Status: DISCONTINUED | OUTPATIENT
Start: 2019-07-22 | End: 2019-07-22 | Stop reason: HOSPADM

## 2019-07-22 RX ORDER — DIPHENHYDRAMINE HYDROCHLORIDE 50 MG/ML
12.5 INJECTION, SOLUTION INTRAMUSCULAR; INTRAVENOUS
Status: DISCONTINUED | OUTPATIENT
Start: 2019-07-22 | End: 2019-07-23 | Stop reason: SDUPTHER

## 2019-07-22 RX ORDER — DIPHENHYDRAMINE HYDROCHLORIDE 50 MG/ML
12.5 INJECTION, SOLUTION INTRAMUSCULAR; INTRAVENOUS
Status: DISCONTINUED | OUTPATIENT
Start: 2019-07-22 | End: 2019-07-23 | Stop reason: HOSPADM

## 2019-07-22 RX ORDER — SODIUM CHLORIDE 0.9 % (FLUSH) 0.9 %
5-40 SYRINGE (ML) INJECTION EVERY 8 HOURS
Status: DISCONTINUED | OUTPATIENT
Start: 2019-07-22 | End: 2019-07-22 | Stop reason: HOSPADM

## 2019-07-22 RX ORDER — ONDANSETRON 2 MG/ML
4 INJECTION INTRAMUSCULAR; INTRAVENOUS ONCE
Status: DISCONTINUED | OUTPATIENT
Start: 2019-07-22 | End: 2019-07-22 | Stop reason: HOSPADM

## 2019-07-22 RX ORDER — BISACODYL 5 MG
10 TABLET, DELAYED RELEASE (ENTERIC COATED) ORAL DAILY
Status: DISCONTINUED | OUTPATIENT
Start: 2019-07-23 | End: 2019-07-23 | Stop reason: HOSPADM

## 2019-07-22 RX ORDER — SODIUM CHLORIDE 0.9 % (FLUSH) 0.9 %
5-40 SYRINGE (ML) INJECTION AS NEEDED
Status: DISCONTINUED | OUTPATIENT
Start: 2019-07-22 | End: 2019-07-23 | Stop reason: HOSPADM

## 2019-07-22 RX ORDER — PRAVASTATIN SODIUM 20 MG/1
40 TABLET ORAL
Status: DISCONTINUED | OUTPATIENT
Start: 2019-07-22 | End: 2019-07-23 | Stop reason: HOSPADM

## 2019-07-22 RX ORDER — CEFAZOLIN SODIUM 2 G/50ML
2 SOLUTION INTRAVENOUS ONCE
Status: COMPLETED | OUTPATIENT
Start: 2019-07-22 | End: 2019-07-22

## 2019-07-22 RX ORDER — FAMOTIDINE 20 MG/1
20 TABLET, FILM COATED ORAL ONCE
Status: COMPLETED | OUTPATIENT
Start: 2019-07-22 | End: 2019-07-22

## 2019-07-22 RX ORDER — TOPIRAMATE 25 MG/1
25 TABLET ORAL 2 TIMES DAILY
Status: DISCONTINUED | OUTPATIENT
Start: 2019-07-22 | End: 2019-07-23 | Stop reason: HOSPADM

## 2019-07-22 RX ORDER — SODIUM CHLORIDE 0.9 % (FLUSH) 0.9 %
5-40 SYRINGE (ML) INJECTION AS NEEDED
Status: DISCONTINUED | OUTPATIENT
Start: 2019-07-22 | End: 2019-07-22 | Stop reason: HOSPADM

## 2019-07-22 RX ORDER — DIPHENHYDRAMINE HCL 25 MG
25 CAPSULE ORAL
Status: DISCONTINUED | OUTPATIENT
Start: 2019-07-22 | End: 2019-07-23 | Stop reason: HOSPADM

## 2019-07-22 RX ORDER — ROCURONIUM BROMIDE 10 MG/ML
INJECTION, SOLUTION INTRAVENOUS AS NEEDED
Status: DISCONTINUED | OUTPATIENT
Start: 2019-07-22 | End: 2019-07-22 | Stop reason: HOSPADM

## 2019-07-22 RX ORDER — OXYCODONE HYDROCHLORIDE 5 MG/1
5-10 TABLET ORAL
Status: DISCONTINUED | OUTPATIENT
Start: 2019-07-22 | End: 2019-07-23 | Stop reason: HOSPADM

## 2019-07-22 RX ORDER — MIDAZOLAM HYDROCHLORIDE 1 MG/ML
INJECTION, SOLUTION INTRAMUSCULAR; INTRAVENOUS AS NEEDED
Status: DISCONTINUED | OUTPATIENT
Start: 2019-07-22 | End: 2019-07-22 | Stop reason: HOSPADM

## 2019-07-22 RX ORDER — MORPHINE SULFATE 5 MG/ML
INJECTION, SOLUTION INTRAVENOUS
Status: DISCONTINUED | OUTPATIENT
Start: 2019-07-22 | End: 2019-07-23

## 2019-07-22 RX ORDER — ACETAMINOPHEN 500 MG
1000 TABLET ORAL EVERY 6 HOURS
Status: DISCONTINUED | OUTPATIENT
Start: 2019-07-22 | End: 2019-07-23 | Stop reason: HOSPADM

## 2019-07-22 RX ORDER — DEXTROSE MONOHYDRATE 100 MG/ML
125-250 INJECTION, SOLUTION INTRAVENOUS AS NEEDED
Status: DISCONTINUED | OUTPATIENT
Start: 2019-07-22 | End: 2019-07-23 | Stop reason: SDUPTHER

## 2019-07-22 RX ORDER — ONDANSETRON 2 MG/ML
INJECTION INTRAMUSCULAR; INTRAVENOUS AS NEEDED
Status: DISCONTINUED | OUTPATIENT
Start: 2019-07-22 | End: 2019-07-22 | Stop reason: HOSPADM

## 2019-07-22 RX ORDER — SODIUM CHLORIDE 9 MG/ML
100 INJECTION, SOLUTION INTRAVENOUS CONTINUOUS
Status: DISCONTINUED | OUTPATIENT
Start: 2019-07-22 | End: 2019-07-23

## 2019-07-22 RX ORDER — LIDOCAINE HYDROCHLORIDE 10 MG/ML
0.1 INJECTION, SOLUTION EPIDURAL; INFILTRATION; INTRACAUDAL; PERINEURAL AS NEEDED
Status: DISCONTINUED | OUTPATIENT
Start: 2019-07-22 | End: 2019-07-22 | Stop reason: HOSPADM

## 2019-07-22 RX ORDER — PROPOFOL 10 MG/ML
INJECTION, EMULSION INTRAVENOUS AS NEEDED
Status: DISCONTINUED | OUTPATIENT
Start: 2019-07-22 | End: 2019-07-22 | Stop reason: HOSPADM

## 2019-07-22 RX ORDER — NEOMYCIN SULFATE, POLYMYXIN B SULFATE AND DEXAMETHASONE 3.5; 10000; 1 MG/ML; [USP'U]/ML; MG/ML
1 SUSPENSION/ DROPS OPHTHALMIC
Status: DISCONTINUED | OUTPATIENT
Start: 2019-07-22 | End: 2019-07-23 | Stop reason: HOSPADM

## 2019-07-22 RX ORDER — INSULIN LISPRO 100 [IU]/ML
INJECTION, SOLUTION INTRAVENOUS; SUBCUTANEOUS ONCE
Status: COMPLETED | OUTPATIENT
Start: 2019-07-22 | End: 2019-07-22

## 2019-07-22 RX ORDER — DEXTROSE 50 % IN WATER (D50W) INTRAVENOUS SYRINGE
25-50 AS NEEDED
Status: DISCONTINUED | OUTPATIENT
Start: 2019-07-22 | End: 2019-07-22 | Stop reason: HOSPADM

## 2019-07-22 RX ORDER — LEVOFLOXACIN 5 MG/ML
2 SOLUTION/ DROPS TOPICAL EVERY 6 HOURS
Status: DISCONTINUED | OUTPATIENT
Start: 2019-07-22 | End: 2019-07-22 | Stop reason: CLARIF

## 2019-07-22 RX ORDER — DEXTROSE 50 % IN WATER (D50W) INTRAVENOUS SYRINGE
25-50 AS NEEDED
Status: DISCONTINUED | OUTPATIENT
Start: 2019-07-22 | End: 2019-07-23 | Stop reason: SDUPTHER

## 2019-07-22 RX ORDER — LISINOPRIL 40 MG/1
40 TABLET ORAL DAILY
Status: DISCONTINUED | OUTPATIENT
Start: 2019-07-23 | End: 2019-07-23 | Stop reason: HOSPADM

## 2019-07-22 RX ORDER — SODIUM CHLORIDE 0.9 % (FLUSH) 0.9 %
5-40 SYRINGE (ML) INJECTION EVERY 8 HOURS
Status: DISCONTINUED | OUTPATIENT
Start: 2019-07-22 | End: 2019-07-23 | Stop reason: HOSPADM

## 2019-07-22 RX ORDER — DEXTROSE, SODIUM CHLORIDE, AND POTASSIUM CHLORIDE 5; .45; .15 G/100ML; G/100ML; G/100ML
25 INJECTION INTRAVENOUS CONTINUOUS
Status: DISCONTINUED | OUTPATIENT
Start: 2019-07-22 | End: 2019-07-23 | Stop reason: HOSPADM

## 2019-07-22 RX ORDER — ONDANSETRON 2 MG/ML
4 INJECTION INTRAMUSCULAR; INTRAVENOUS
Status: DISCONTINUED | OUTPATIENT
Start: 2019-07-22 | End: 2019-07-23 | Stop reason: HOSPADM

## 2019-07-22 RX ORDER — SUCCINYLCHOLINE CHLORIDE 20 MG/ML
INJECTION INTRAMUSCULAR; INTRAVENOUS AS NEEDED
Status: DISCONTINUED | OUTPATIENT
Start: 2019-07-22 | End: 2019-07-22 | Stop reason: HOSPADM

## 2019-07-22 RX ORDER — SODIUM CHLORIDE, SODIUM LACTATE, POTASSIUM CHLORIDE, CALCIUM CHLORIDE 600; 310; 30; 20 MG/100ML; MG/100ML; MG/100ML; MG/100ML
75 INJECTION, SOLUTION INTRAVENOUS CONTINUOUS
Status: DISCONTINUED | OUTPATIENT
Start: 2019-07-22 | End: 2019-07-22 | Stop reason: HOSPADM

## 2019-07-22 RX ORDER — KETAMINE HYDROCHLORIDE 50 MG/ML
INJECTION, SOLUTION INTRAMUSCULAR; INTRAVENOUS AS NEEDED
Status: DISCONTINUED | OUTPATIENT
Start: 2019-07-22 | End: 2019-07-22 | Stop reason: HOSPADM

## 2019-07-22 RX ORDER — HYDROMORPHONE HYDROCHLORIDE 2 MG/ML
0.5 INJECTION, SOLUTION INTRAMUSCULAR; INTRAVENOUS; SUBCUTANEOUS
Status: DISCONTINUED | OUTPATIENT
Start: 2019-07-22 | End: 2019-07-22 | Stop reason: HOSPADM

## 2019-07-22 RX ORDER — NEOSTIGMINE METHYLSULFATE 5 MG/5 ML
SYRINGE (ML) INTRAVENOUS AS NEEDED
Status: DISCONTINUED | OUTPATIENT
Start: 2019-07-22 | End: 2019-07-22 | Stop reason: HOSPADM

## 2019-07-22 RX ORDER — DIAZEPAM 5 MG/1
5 TABLET ORAL
Status: DISCONTINUED | OUTPATIENT
Start: 2019-07-22 | End: 2019-07-23 | Stop reason: HOSPADM

## 2019-07-22 RX ORDER — FENTANYL CITRATE 50 UG/ML
50 INJECTION, SOLUTION INTRAMUSCULAR; INTRAVENOUS
Status: DISCONTINUED | OUTPATIENT
Start: 2019-07-22 | End: 2019-07-22 | Stop reason: HOSPADM

## 2019-07-22 RX ORDER — HYDROMORPHONE HYDROCHLORIDE 2 MG/ML
INJECTION, SOLUTION INTRAMUSCULAR; INTRAVENOUS; SUBCUTANEOUS AS NEEDED
Status: DISCONTINUED | OUTPATIENT
Start: 2019-07-22 | End: 2019-07-22 | Stop reason: HOSPADM

## 2019-07-22 RX ORDER — MAGNESIUM SULFATE 100 %
4 CRYSTALS MISCELLANEOUS AS NEEDED
Status: DISCONTINUED | OUTPATIENT
Start: 2019-07-22 | End: 2019-07-22 | Stop reason: HOSPADM

## 2019-07-22 RX ORDER — HYDROMORPHONE HYDROCHLORIDE 1 MG/ML
1 INJECTION, SOLUTION INTRAMUSCULAR; INTRAVENOUS; SUBCUTANEOUS
Status: DISCONTINUED | OUTPATIENT
Start: 2019-07-22 | End: 2019-07-23 | Stop reason: HOSPADM

## 2019-07-22 RX ORDER — LIDOCAINE HYDROCHLORIDE 20 MG/ML
INJECTION, SOLUTION EPIDURAL; INFILTRATION; INTRACAUDAL; PERINEURAL AS NEEDED
Status: DISCONTINUED | OUTPATIENT
Start: 2019-07-22 | End: 2019-07-22 | Stop reason: HOSPADM

## 2019-07-22 RX ORDER — NALOXONE HYDROCHLORIDE 0.4 MG/ML
0.4 INJECTION, SOLUTION INTRAMUSCULAR; INTRAVENOUS; SUBCUTANEOUS AS NEEDED
Status: DISCONTINUED | OUTPATIENT
Start: 2019-07-22 | End: 2019-07-23 | Stop reason: HOSPADM

## 2019-07-22 RX ORDER — ONDANSETRON 2 MG/ML
4 INJECTION INTRAMUSCULAR; INTRAVENOUS AS NEEDED
Status: DISCONTINUED | OUTPATIENT
Start: 2019-07-22 | End: 2019-07-22

## 2019-07-22 RX ORDER — LORAZEPAM 2 MG/ML
1 INJECTION INTRAMUSCULAR
Status: DISCONTINUED | OUTPATIENT
Start: 2019-07-22 | End: 2019-07-23 | Stop reason: HOSPADM

## 2019-07-22 RX ORDER — INSULIN LISPRO 100 [IU]/ML
INJECTION, SOLUTION INTRAVENOUS; SUBCUTANEOUS
Status: DISCONTINUED | OUTPATIENT
Start: 2019-07-22 | End: 2019-07-22 | Stop reason: SDUPTHER

## 2019-07-22 RX ORDER — GABAPENTIN 300 MG/1
300 CAPSULE ORAL 3 TIMES DAILY
Status: DISCONTINUED | OUTPATIENT
Start: 2019-07-22 | End: 2019-07-23 | Stop reason: HOSPADM

## 2019-07-22 RX ORDER — DEXTROSE 50 % IN WATER (D50W) INTRAVENOUS SYRINGE
25-50 AS NEEDED
Status: DISCONTINUED | OUTPATIENT
Start: 2019-07-22 | End: 2019-07-22

## 2019-07-22 RX ORDER — FENTANYL CITRATE 50 UG/ML
INJECTION, SOLUTION INTRAMUSCULAR; INTRAVENOUS AS NEEDED
Status: DISCONTINUED | OUTPATIENT
Start: 2019-07-22 | End: 2019-07-22 | Stop reason: HOSPADM

## 2019-07-22 RX ORDER — CIPROFLOXACIN HYDROCHLORIDE 3.5 MG/ML
2 SOLUTION/ DROPS TOPICAL EVERY 6 HOURS
Status: DISCONTINUED | OUTPATIENT
Start: 2019-07-22 | End: 2019-07-23 | Stop reason: HOSPADM

## 2019-07-22 RX ORDER — CEFAZOLIN SODIUM 2 G/50ML
2 SOLUTION INTRAVENOUS EVERY 8 HOURS
Status: DISCONTINUED | OUTPATIENT
Start: 2019-07-22 | End: 2019-07-23 | Stop reason: HOSPADM

## 2019-07-22 RX ORDER — INSULIN LISPRO 100 [IU]/ML
INJECTION, SOLUTION INTRAVENOUS; SUBCUTANEOUS EVERY 6 HOURS
Status: DISCONTINUED | OUTPATIENT
Start: 2019-07-22 | End: 2019-07-23 | Stop reason: HOSPADM

## 2019-07-22 RX ORDER — SODIUM CHLORIDE 9 MG/ML
INJECTION, SOLUTION INTRAVENOUS
Status: DISCONTINUED | OUTPATIENT
Start: 2019-07-22 | End: 2019-07-22 | Stop reason: HOSPADM

## 2019-07-22 RX ADMIN — GABAPENTIN 300 MG: 300 CAPSULE ORAL at 20:02

## 2019-07-22 RX ADMIN — FENTANYL CITRATE 50 MCG: 50 INJECTION, SOLUTION INTRAMUSCULAR; INTRAVENOUS at 12:05

## 2019-07-22 RX ADMIN — FENTANYL CITRATE 50 MCG: 50 INJECTION, SOLUTION INTRAMUSCULAR; INTRAVENOUS at 12:01

## 2019-07-22 RX ADMIN — Medication 10 ML: at 11:09

## 2019-07-22 RX ADMIN — TOPIRAMATE 25 MG: 25 TABLET, FILM COATED ORAL at 20:02

## 2019-07-22 RX ADMIN — INSULIN LISPRO 3 UNITS: 100 INJECTION, SOLUTION INTRAVENOUS; SUBCUTANEOUS at 10:55

## 2019-07-22 RX ADMIN — ACETAMINOPHEN 1000 MG: 500 TABLET ORAL at 20:02

## 2019-07-22 RX ADMIN — HYDROMORPHONE HYDROCHLORIDE 0.5 MG: 2 INJECTION INTRAMUSCULAR; INTRAVENOUS; SUBCUTANEOUS at 15:35

## 2019-07-22 RX ADMIN — PROPOFOL 20 MG: 10 INJECTION, EMULSION INTRAVENOUS at 14:27

## 2019-07-22 RX ADMIN — INSULIN LISPRO 3 UNITS: 100 INJECTION, SOLUTION INTRAVENOUS; SUBCUTANEOUS at 15:36

## 2019-07-22 RX ADMIN — KETAMINE HYDROCHLORIDE 25 MG: 50 INJECTION, SOLUTION INTRAMUSCULAR; INTRAVENOUS at 12:40

## 2019-07-22 RX ADMIN — CEFAZOLIN 2 G: 10 INJECTION, POWDER, FOR SOLUTION INTRAVENOUS at 20:30

## 2019-07-22 RX ADMIN — PROPOFOL 20 MG: 10 INJECTION, EMULSION INTRAVENOUS at 14:25

## 2019-07-22 RX ADMIN — HYDROMORPHONE HYDROCHLORIDE 0.5 MG: 2 INJECTION, SOLUTION INTRAMUSCULAR; INTRAVENOUS; SUBCUTANEOUS at 14:14

## 2019-07-22 RX ADMIN — FAMOTIDINE 20 MG: 20 TABLET ORAL at 10:32

## 2019-07-22 RX ADMIN — HYDROMORPHONE HYDROCHLORIDE 0.5 MG: 2 INJECTION INTRAMUSCULAR; INTRAVENOUS; SUBCUTANEOUS at 15:44

## 2019-07-22 RX ADMIN — GLYCOPYRROLATE 0.5 MG: 0.2 INJECTION INTRAMUSCULAR; INTRAVENOUS at 14:23

## 2019-07-22 RX ADMIN — KETAMINE HYDROCHLORIDE 25 MG: 50 INJECTION, SOLUTION INTRAMUSCULAR; INTRAVENOUS at 13:21

## 2019-07-22 RX ADMIN — FENTANYL CITRATE 50 MCG: 50 INJECTION, SOLUTION INTRAMUSCULAR; INTRAVENOUS at 12:49

## 2019-07-22 RX ADMIN — SODIUM CHLORIDE, SODIUM LACTATE, POTASSIUM CHLORIDE, AND CALCIUM CHLORIDE 75 ML/HR: 600; 310; 30; 20 INJECTION, SOLUTION INTRAVENOUS at 10:20

## 2019-07-22 RX ADMIN — ROCURONIUM BROMIDE 30 MG: 10 INJECTION, SOLUTION INTRAVENOUS at 12:15

## 2019-07-22 RX ADMIN — ROCURONIUM BROMIDE 20 MG: 10 INJECTION, SOLUTION INTRAVENOUS at 13:02

## 2019-07-22 RX ADMIN — SUCCINYLCHOLINE CHLORIDE 140 MG: 20 INJECTION INTRAMUSCULAR; INTRAVENOUS at 12:01

## 2019-07-22 RX ADMIN — LIDOCAINE HYDROCHLORIDE 100 MG: 20 INJECTION, SOLUTION EPIDURAL; INFILTRATION; INTRACAUDAL; PERINEURAL at 12:01

## 2019-07-22 RX ADMIN — HYDROMORPHONE HYDROCHLORIDE 0.5 MG: 2 INJECTION, SOLUTION INTRAMUSCULAR; INTRAVENOUS; SUBCUTANEOUS at 14:25

## 2019-07-22 RX ADMIN — MIDAZOLAM HYDROCHLORIDE 2 MG: 1 INJECTION, SOLUTION INTRAMUSCULAR; INTRAVENOUS at 11:53

## 2019-07-22 RX ADMIN — FENTANYL CITRATE 50 MCG: 50 INJECTION, SOLUTION INTRAMUSCULAR; INTRAVENOUS at 13:02

## 2019-07-22 RX ADMIN — PROPOFOL 30 MG: 10 INJECTION, EMULSION INTRAVENOUS at 14:02

## 2019-07-22 RX ADMIN — PRAVASTATIN SODIUM 40 MG: 20 TABLET ORAL at 21:40

## 2019-07-22 RX ADMIN — CEFAZOLIN 2 G: 10 INJECTION, POWDER, FOR SOLUTION INTRAVENOUS at 12:14

## 2019-07-22 RX ADMIN — PROPOFOL 30 MG: 10 INJECTION, EMULSION INTRAVENOUS at 13:29

## 2019-07-22 RX ADMIN — Medication 3 MG: at 14:23

## 2019-07-22 RX ADMIN — SODIUM CHLORIDE 100 ML/HR: 900 INJECTION, SOLUTION INTRAVENOUS at 19:50

## 2019-07-22 RX ADMIN — DEXAMETHASONE SODIUM PHOSPHATE 4 MG: 4 INJECTION, SOLUTION INTRA-ARTICULAR; INTRALESIONAL; INTRAMUSCULAR; INTRAVENOUS; SOFT TISSUE at 13:53

## 2019-07-22 RX ADMIN — DEXAMETHASONE SODIUM PHOSPHATE 4 MG: 4 INJECTION, SOLUTION INTRA-ARTICULAR; INTRALESIONAL; INTRAMUSCULAR; INTRAVENOUS; SOFT TISSUE at 12:10

## 2019-07-22 RX ADMIN — Medication 10 ML: at 21:41

## 2019-07-22 RX ADMIN — MORPHINE SULFATE: 10 INJECTION, SOLUTION INTRAMUSCULAR; INTRAVENOUS at 15:57

## 2019-07-22 RX ADMIN — PROPOFOL 20 MG: 10 INJECTION, EMULSION INTRAVENOUS at 14:44

## 2019-07-22 RX ADMIN — SODIUM CHLORIDE, SODIUM LACTATE, POTASSIUM CHLORIDE, AND CALCIUM CHLORIDE: 600; 310; 30; 20 INJECTION, SOLUTION INTRAVENOUS at 11:47

## 2019-07-22 RX ADMIN — Medication 10 ML: at 20:03

## 2019-07-22 RX ADMIN — SODIUM CHLORIDE: 9 INJECTION, SOLUTION INTRAVENOUS at 12:08

## 2019-07-22 RX ADMIN — ONDANSETRON 4 MG: 2 INJECTION INTRAMUSCULAR; INTRAVENOUS at 14:14

## 2019-07-22 RX ADMIN — PROPOFOL 170 MG: 10 INJECTION, EMULSION INTRAVENOUS at 12:01

## 2019-07-22 NOTE — PERIOP NOTES
TRANSFER - OUT REPORT:    Verbal report given to Fadumo Jensen RN on Simeon Chars  being transferred to  for routine post - op       Report consisted of patients Situation, Background, Assessment and   Recommendations(SBAR). Information from the following report(s) SBAR, Kardex, OR Summary, Intake/Output, MAR and Recent Results was reviewed with the receiving nurse. Lines:   Peripheral IV 07/22/19 Left Wrist (Active)   Site Assessment Clean, dry, & intact 7/22/2019  3:01 PM   Phlebitis Assessment 0 7/22/2019  3:01 PM   Infiltration Assessment 0 7/22/2019  3:01 PM   Dressing Status Clean, dry, & intact 7/22/2019  3:01 PM   Dressing Type Tape;Transparent 7/22/2019  3:01 PM   Hub Color/Line Status Pink; Infusing;Flushed 7/22/2019 10:29 AM   Alcohol Cap Used No 7/22/2019 10:29 AM       Peripheral IV 07/22/19 Right Hand (Active)   Site Assessment Clean, dry, & intact 7/22/2019  3:01 PM   Phlebitis Assessment 0 7/22/2019  3:01 PM   Infiltration Assessment 0 7/22/2019  3:01 PM   Dressing Status Clean, dry, & intact 7/22/2019  3:01 PM   Dressing Type Tape;Transparent 7/22/2019  3:01 PM   Hub Color/Line Status Pink;Flushed; Infusing 7/22/2019  3:01 PM   Action Taken Blood drawn 7/22/2019 11:08 AM   Alcohol Cap Used No 7/22/2019 11:08 AM        Opportunity for questions and clarification was provided.       Patient transported with:   O2 @ 1 liters  Registered Nurse  [facility]

## 2019-07-22 NOTE — BRIEF OP NOTE
BRIEF OPERATIVE NOTE    Date of Procedure: 7/22/2019   Preoperative Diagnosis: Stenosis of cervical spine with myelopathy ([facility]) [M48.02, G99.2]  Postoperative Diagnosis: Stenosis of cervical spine with myelopathy ([address] [M48.02, G99.2]    Procedure(s):  C3 LAMINECTOMY; partial c2 laminectomy; C4/5 C5/6 C6/7 RIGHT LAMINOFORAMINOTOMY;  C6/7 POSTERIOR FUSION DTRAX/C-ARM/  Surgeon(s) and Role:     * Brianna Marin MD - Primary         Surgical Assistant: 0    Surgical Staff:  Circ-1: Ainsley Perez RN  Circ-2: Svetlana Esteves RN  Radiology Technician: Derian Ambriz  Scrub Tech-1: Ramón Lund  Surg Asst-1: Michelle Stauffer  Surg Asst-2: Shae TOSCANO  Event Time In Time Out   Incision Start 1245    Incision Close       Anesthesia: General   Estimated Blood Loss: 50  Specimens: * No specimens in log *   Findings: stenosis, spondylosis   Complications: 0  Implants:   Implant Name Type Inv.  Item Serial No.  Lot No. LRB No. Used Action   GRAFT BNE [employer]  --  - [id number]  [employer]  --  [id number] MUSCULOSKELETAL TRANS N/A N/A 1 Implanted   Cervical Cage-B, 4mm    NUVASIVE [id number] N/A 2 Implanted   Bone  Srew    NUVASIVE [id number] N/A 2 Implanted

## 2019-07-22 NOTE — ANESTHESIA PREPROCEDURE EVALUATION
Relevant Problems   No relevant active problems       Anesthetic History   No history of anesthetic complications            Review of Systems / Medical History  Patient summary reviewed and pertinent labs reviewed    Pulmonary  Within defined limits                 Neuro/Psych   Within defined limits           Cardiovascular    Hypertension          Hyperlipidemia    Exercise tolerance: >4 METS     GI/Hepatic/Renal     GERD           Endo/Other    Diabetes: type 2    Morbid obesity     Other Findings   Comments: Lt eye prosthesis  RLS           Physical Exam    Airway  Mallampati: II  TM Distance: 4 - 6 cm  Neck ROM: normal range of motion   Mouth opening: Normal     Cardiovascular  Regular rate and rhythm,  S1 and S2 normal,  no murmur, click, rub, or gallop  Rhythm: regular  Rate: normal         Dental    Dentition: Edentulous     Pulmonary  Breath sounds clear to auscultation               Abdominal  GI exam deferred       Other Findings            Anesthetic Plan    ASA: 3  Anesthesia type: general          Induction: Intravenous  Anesthetic plan and risks discussed with: Patient

## 2019-07-22 NOTE — PROGRESS NOTES
Ciprofloxacin eye drops was therapeutically interchanged for Levofloxacin eye drops per the P&T Committee approved Therapeutic Interchanges Policy. Neomycin/Polymyxin/Dexamethasone eye drops was therapeutically interchanged for Tobramycin/Dexamethasone eye drops per the P&T Committee approved Therapeutic Interchanges Policy.

## 2019-07-22 NOTE — ANESTHESIA POSTPROCEDURE EVALUATION
Procedure(s):  C3 LAMINECTOMY; partial c2 laminectomy; C4/5 C5/6 C6/7 RIGHT LAMINOFORAMINOTOMY;  C6/7 POSTERIOR FUSION. general    Anesthesia Post Evaluation      Multimodal analgesia: multimodal analgesia used between 6 hours prior to anesthesia start to PACU discharge  Patient location during evaluation: bedside  Patient participation: complete - patient participated  Level of consciousness: awake  Pain management: adequate  Airway patency: patent  Anesthetic complications: no  Cardiovascular status: stable  Respiratory status: acceptable  Hydration status: acceptable  Post anesthesia nausea and vomiting:  controlled      Vitals Value Taken Time   /71 7/22/2019  4:32 PM   Temp 36.4 °C (97.6 °F) 7/22/2019  3:01 PM   Pulse 81 7/22/2019  4:33 PM   Resp 22 7/22/2019  4:33 PM   SpO2 99 % 7/22/2019  4:33 PM   Vitals shown include unvalidated device data.

## 2019-07-22 NOTE — INTERVAL H&P NOTE
H&P Update:  Shivani Verma was seen and examined. History and physical has been reviewed. The patient has been examined.  There have been no significant clinical changes since the completion of the originally dated History and Physical.

## 2019-07-22 NOTE — PROGRESS NOTES
Problem: Falls - Risk of  Goal: *Absence of Falls  Description  Document Mason Wooten Fall Risk and appropriate interventions in the flowsheet.   7/22/2019 1844 by Jack Macario RN  Outcome: Progressing Towards Goal  Note:   Fall Risk Interventions:  Mobility Interventions: Patient to call before getting OOB, PT Consult for mobility concerns         Medication Interventions: Patient to call before getting OOB, Teach patient to arise slowly                7/22/2019 1843 by Jack Macario RN  Outcome: Progressing Towards Goal  Note:   Fall Risk Interventions:  Mobility Interventions: Patient to call before getting OOB, PT Consult for mobility concerns         Medication Interventions: Patient to call before getting OOB, Teach patient to arise slowly                   Problem: Patient Education: Go to Patient Education Activity  Goal: Patient/Family Education  7/22/2019 1844 by Jack Macario RN  Outcome: Progressing Towards Goal  7/22/2019 1843 by Jack Macario RN  Outcome: Progressing Towards Goal     Problem: Infection - Risk of, Surgical Site Infection  Goal: *Absence of surgical site infection signs and symptoms  7/22/2019 1844 by Jack Macario RN  Outcome: Progressing Towards Goal  7/22/2019 1843 by Jack Macario RN  Outcome: Progressing Towards Goal     Problem: Patient Education: Go to Patient Education Activity  Goal: Patient/Family Education  7/22/2019 1844 by Jack Macario RN  Outcome: Progressing Towards Goal  7/22/2019 1843 by Jack Macario RN  Outcome: Progressing Towards Goal     Problem: Deep Venous Thrombosis - Risk of  Goal: *Absence of deep venous thrombosis signs and symptoms(Stroke Metric)  7/22/2019 1844 by Jack Macario RN  Outcome: Progressing Towards Goal  7/22/2019 1843 by Jack Macario RN  Outcome: Progressing Towards Goal  Goal: *Absence of impaired coagulation signs and symptoms  7/22/2019 1844 by Jack Macario RN  Outcome: Progressing Towards Goal  7/22/2019 1843 by Neville Staton RN  Outcome: Progressing Towards Goal  Goal: *Knowledge of prescribed medications  7/22/2019 1844 by Neville Staton RN  Outcome: Progressing Towards Goal  7/22/2019 1843 by Neville Staton RN  Outcome: Progressing Towards Goal  Goal: *Absence of bleeding  7/22/2019 1844 by Neville Staton RN  Outcome: Progressing Towards Goal  7/22/2019 1843 by Neville Staton RN  Outcome: Progressing Towards Goal     Problem: Patient Education: Go to Patient Education Activity  Goal: Patient/Family Education  7/22/2019 1844 by Neville Staton RN  Outcome: Progressing Towards Goal  7/22/2019 1843 by Neville Staton RN  Outcome: Progressing Towards Goal

## 2019-07-22 NOTE — PROGRESS NOTES
OR today  C3 lami, partial C2 lami, C4/5, 5/6, 6/7 right laminoforaminotomy, C6/7 posterior fusion  Seen in PACU  VSS  4/5 BLE and RAAD  Has abelardo collar on  Posterior neck dressing dry  NARENDRA with approximately 10 cc in bulb  Morphine PCA for pain control  States he has surgical incisional pain.    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

## 2019-07-23 ENCOUNTER — HOME HEALTH ADMISSION (OUTPATIENT)
Dept: HOME HEALTH SERVICES | Facility: HOME HEALTH | Age: 69
End: 2019-07-23
Payer: MEDICARE

## 2019-07-23 ENCOUNTER — TELEPHONE (OUTPATIENT)
Dept: FAMILY MEDICINE CLINIC | Age: 69
End: 2019-07-23

## 2019-07-23 VITALS
SYSTOLIC BLOOD PRESSURE: 148 MMHG | WEIGHT: 215.5 LBS | HEART RATE: 80 BPM | BODY MASS INDEX: 35.9 KG/M2 | HEIGHT: 65 IN | OXYGEN SATURATION: 98 % | TEMPERATURE: 97 F | RESPIRATION RATE: 16 BRPM | DIASTOLIC BLOOD PRESSURE: 90 MMHG

## 2019-07-23 LAB
GLUCOSE BLD STRIP.AUTO-MCNC: 191 MG/DL (ref 70–110)
GLUCOSE BLD STRIP.AUTO-MCNC: 217 MG/DL (ref 70–110)
GLUCOSE BLD STRIP.AUTO-MCNC: 224 MG/DL (ref 70–110)

## 2019-07-23 PROCEDURE — 82962 GLUCOSE BLOOD TEST: CPT

## 2019-07-23 PROCEDURE — 99218 HC RM OBSERVATION: CPT

## 2019-07-23 PROCEDURE — 97161 PT EVAL LOW COMPLEX 20 MIN: CPT

## 2019-07-23 PROCEDURE — 74011250637 HC RX REV CODE- 250/637: Performed by: ORTHOPAEDIC SURGERY

## 2019-07-23 PROCEDURE — 97165 OT EVAL LOW COMPLEX 30 MIN: CPT

## 2019-07-23 PROCEDURE — 74011636637 HC RX REV CODE- 636/637: Performed by: NURSE PRACTITIONER

## 2019-07-23 PROCEDURE — 74011250636 HC RX REV CODE- 250/636: Performed by: ORTHOPAEDIC SURGERY

## 2019-07-23 PROCEDURE — 74011636637 HC RX REV CODE- 636/637: Performed by: ORTHOPAEDIC SURGERY

## 2019-07-23 RX ORDER — INSULIN GLARGINE 100 [IU]/ML
10 INJECTION, SOLUTION SUBCUTANEOUS DAILY
Status: DISCONTINUED | OUTPATIENT
Start: 2019-07-23 | End: 2019-07-23 | Stop reason: HOSPADM

## 2019-07-23 RX ORDER — DEXTROSE MONOHYDRATE 100 MG/ML
125-250 INJECTION, SOLUTION INTRAVENOUS AS NEEDED
Status: DISCONTINUED | OUTPATIENT
Start: 2019-07-23 | End: 2019-07-23 | Stop reason: HOSPADM

## 2019-07-23 RX ORDER — OXYCODONE HYDROCHLORIDE 5 MG/1
5 TABLET ORAL
Qty: 20 TAB | Refills: 0 | Status: SHIPPED | OUTPATIENT
Start: 2019-07-23 | End: 2019-07-30

## 2019-07-23 RX ADMIN — INSULIN LISPRO 6 UNITS: 100 INJECTION, SOLUTION INTRAVENOUS; SUBCUTANEOUS at 11:59

## 2019-07-23 RX ADMIN — GABAPENTIN 300 MG: 300 CAPSULE ORAL at 08:50

## 2019-07-23 RX ADMIN — BISACODYL 10 MG: 5 TABLET, COATED ORAL at 08:50

## 2019-07-23 RX ADMIN — GABAPENTIN 300 MG: 300 CAPSULE ORAL at 15:26

## 2019-07-23 RX ADMIN — ACETAMINOPHEN 1000 MG: 500 TABLET ORAL at 11:59

## 2019-07-23 RX ADMIN — CEFAZOLIN 2 G: 10 INJECTION, POWDER, FOR SOLUTION INTRAVENOUS at 03:13

## 2019-07-23 RX ADMIN — OXYCODONE HYDROCHLORIDE 5 MG: 5 TABLET ORAL at 15:26

## 2019-07-23 RX ADMIN — DIAZEPAM 5 MG: 5 TABLET ORAL at 03:11

## 2019-07-23 RX ADMIN — TOPIRAMATE 25 MG: 25 TABLET, FILM COATED ORAL at 08:50

## 2019-07-23 RX ADMIN — INSULIN LISPRO 6 UNITS: 100 INJECTION, SOLUTION INTRAVENOUS; SUBCUTANEOUS at 00:12

## 2019-07-23 RX ADMIN — LISINOPRIL 40 MG: 40 TABLET ORAL at 08:50

## 2019-07-23 RX ADMIN — INSULIN LISPRO 3 UNITS: 100 INJECTION, SOLUTION INTRAVENOUS; SUBCUTANEOUS at 06:55

## 2019-07-23 RX ADMIN — Medication 10 ML: at 06:27

## 2019-07-23 RX ADMIN — INSULIN GLARGINE 10 UNITS: 100 INJECTION, SOLUTION SUBCUTANEOUS at 09:48

## 2019-07-23 RX ADMIN — Medication 10 ML: at 06:26

## 2019-07-23 RX ADMIN — CEFAZOLIN 2 G: 10 INJECTION, POWDER, FOR SOLUTION INTRAVENOUS at 11:59

## 2019-07-23 RX ADMIN — ACETAMINOPHEN 1000 MG: 500 TABLET ORAL at 06:25

## 2019-07-23 NOTE — DIABETES MGMT
Diabetes Patient/Family Education Record  Factors That  May Influence Patients Ability  to Learn or  Comply with Recommendations   []   Language barrier    []   Cultural needs   []   Motivation    []   Cognitive limitation    []   Physical   [x]   Education    []   Physiological factors   []   Hearing/vision/speaking impairment   []   Caodaism beliefs    []   Financial factors   []  Other:   []  No factors identified at this time. Person Instructed:   [x]   Patient: He reported pre adm BG range of 90 - >200 despite taking prescribed diabetes medication. [x]   Family: supportive wife visiting at bedside actively participated during assessment of home diabetes management and education. []  Other     Preference for Learning:   [x]   Verbal   [x]   Written: diab educ packet   []  Demonstration     Level of Comprehension & Competence:    [x]  Good                                      [] Fair                                     []  Poor                             []  Needs Reinforcement   [x]  Teachback completed    Education Component:   [x]  Medication management, including how to administer insulin (if appropriate) and potential medication interactions: Yes. Patient reported home diabetes medication: Janumet  mg two tabs once daily. Patient verbalized knowledge that he is taking two diabetes meds in one pill. [x]  Nutritional management -obtain usual meal pattern: Yes. Wife reported her  has been eating everything. Patient acknowledge eating sweets such as cookies, pies and cakes. Educated patient about the nutr rec for managing diabetes: eat 3 meals daily and encouraged serving size/portion control of carbs (starches, fruits, dairy) and limiting intake of concentrated sweets. Patient verbalized understanding and agreed to make necessary changes in his eating habits. He is willing to learn more and accepted the flyer for free diabetes classes.  Patient and wife stated that they plan to attend. [x]  Exercise: Yes. Patient reported history of injury. He fell on ice and has been experiencing constant headache since therefore he was not able to stay active. Patient knows the importance of staying physically active. He is hoping that this neck surgery will help. [x]  Signs, symptoms, and treatment of hyperglycemia and hypoglycemia: Yes. [x] Prevention, recognition and treatment of hyperglycemia and hypoglycemic: Yes. [x]  Importance of blood glucose monitoring and how to obtain a blood glucose meter: Yes. Educated patient on the following recommendations:  Fasting blood glucose target range before each meal:   Random blood sugar two hours after meal: <180    []  Instruction on use of the blood glucose meter   [x]  Discuss the importance of HbA1C monitoring: Patient verbalized understanding of A1c and discussed his current level of 8.7% (6/18/2019) which is equivalent to estimated average blood glucose of 203 mg/dL during the past 2-3 months. Encouraged patient to follow his diabetes treatment: medication, diet, and exercise to help achieve the recommended A1c level of <7%. []  Sick day guidelines   [x]  Proper use and disposal of lancets, needles, syringes or insulin pens (if appropriate)   [x]  Potential long-term complications (retinopathy, kidney disease, neuropathy, foot care)   [x] Information about whom to contact in case of emergency or for more information    [x]  Goal:  Patient/family will demonstrate understanding of Diabetes Self Management Skills by: 7/30/2019  Plan for post-discharge education or self-management support:    [x] Outpatient class schedule provided            [] Patient Declined    [] Scheduled for outpatient classes (date) _______  Verify:  Does patient understand how diabetes medications work? Yes. Does patient know what their most recent A1c is? Yes. Does patient monitor glucose at home? Yes.   Does patient have difficulty obtaining diabetes medications and testing supplies?  No.       Miracle Lind RN [facility]  Pager: [phone]

## 2019-07-23 NOTE — DIABETES MGMT
Glycemic Control Plan of Care    T2DM with pre-op A1c of 8.7% (6/18/2019). See separate notes, 7/23/2019, for assessment of home diabetes management and education. His supportive wife visiting at bedside actively participated. They verbalized understanding that his diabetes is currently not under control and encouraged to follow his diabetes treatment plan: med, diet and recommended regular exercise to help achieve the recommended A1c <7%. Home diabetes medications: Patient reported on 7/23/2019:  Janumet  mg. Two tabs daily. POC BG range on 7/22/2019: 121-196 mg/dL. Very resistant dose of correctional lispro insulin. POC BG report on 7/23/2019 at time of review: 224, 191 mg/dL. Seen patient this morning and noted he ate 100% of his breakfast.  Discussed inpatient glycemic management using insulin. He verbalized understanding. Recommendation(s):  1.) Cont inpatient glycemic monitoring and add10 units of basal lantus insulin daily. Called Erika Go, NP ortho, and obtained order. Assessment:  Patient is 71year old with past medical history including type 2 diabetes mellitus with neuropathy, hypertension, high cholesterol, obesity and report of fall. Patient reported that he fell on ice approximately 6 months ago and he has been experiencing constant headache since then. He was admitted on 7/22/2019 for elective procedure. Noted:  Cervical spondylotic myelopathy with radiculopathy. Status post cervical lami, fusion, instrumentation, interbody cage and graft on 7/22/2019. Hyperglycemia. T2DM with pre op A1c of 8.7% (6/18/2019). Most recent blood glucose values:    Results for Kate Koehler (MRN [id number]) as of 7/23/2019 08:42   Ref. Range 7/22/2019 10:03 7/22/2019 11:44 7/22/2019 15:16   GLUCOSE,FAST - POC Latest Ref Range: 70 - 110 mg/dL 156 (H) 121 (H) 196 (H)     Results for Kate Koehler (MRN [id number]) as of 7/23/2019 08:42   Ref.  Range 7/23/2019 00:07 7/23/2019 06:40   GLUCOSE,FAST - POC Latest Ref Range: 70 - 110 mg/dL 224 (H) 191 (H)     Current A1C: 8.7% (6/18/2019) which is equivalent to estimated average blood glucose of 203 mg/dL during the past 2-3 months. Current hospital diabetes medications:  Basal lantus insulin 10 units daily, first dose ordered 7/23/2019. Correctional lispro insulin ACHS. Very resistant dose. Total daily dose insulin requirement previous day: 7/22/2019:  Lispro: 6 units    Home diabetes medications: Patient reported on 7/23/2019:  Janumet  mg. Two tabs daily. Diet: Diabetic consistent carb 1800kcal; regular. Goals:  Blood glucose will be within target range of  mg/dL by 7/26/2019.     Education:  _X__  Refer to Diabetes Education Record: 7/23/2019             ___  Education not indicated at this time    Damien Mariano RN [facility]  Pager: [phone]

## 2019-07-23 NOTE — PROGRESS NOTES
Reason for Admission:   Stenosis of cervical spine with myelopathy ([facility] [M48.02, G99.2]  Cervical spinal stenosis [M48.02]  Cervical myelopathy ([facility] [G95.9]               RRAT Score:     29             Resources/supports as identified by patient/family:       Top Challenges facing patient (as identified by patient/family and CM): Finances/Medication cost?    Able to pay  Transportation      [facility] system or lack thereof?   family  Living arrangements? Lives with fiance   Self-care/ADLs/Cognition? Independent, A&OX3        Current Advanced Directive/Advance Care Plan:   no                          Plan for utilizing home health:    yes                      Likelihood of readmission:   HIGH    Transition of Care Plan:                    Initial assessment completed with patient. Cognitive status of patient: oriented to time, place, person and situation. Face sheet information confirmed:  yes. The patient designates Paige Fail to participate in his discharge plan and to receive any needed information. This patient lives in a single family home with patient and other:  Nidiaance'. Patient is able to navigate steps as needed. Prior to hospitalization, patient was considered to be independent with ADLs/IADLS : yes . Patient has a current ACP document on file: no  The patient and other:  Kamaljitmasoud Ontiveros' will be available to transport patient home upon discharge. The patient already has [employer] medical equipment available in the home. Patient is not currently active with home health. Patient has not stayed in a skilled nursing facility or rehab. This patient is on dialysis :no      List of available Home Health agencies were provided and reviewed with the patient prior to discharge. Freedom of choice signed: yes, for [facility]. ORDERS SENT TO [address] [place] [address]. PT PUT IN QUE. Barrett Pena Currently, the discharge plan is Home with [address] [place] [address] Jim Metcalf.     The patient states that he can obtain his medications from the pharmacy, and take his medications as directed. Patient's current insurance is Medicare and Medicaid. .      Care Management Interventions  PCP Verified by CM:  Yes  Last Visit to PCP: 07/16/19  Mode of Transport at Discharge: Self  Transition of Care Consult (CM Consult): [address]: Yes  Physical Therapy Consult: Yes  Occupational Therapy Consult: Yes  Current Support Network: Lives with Spouse  Confirm Follow Up Transport: Family  Plan discussed with Pt/Family/Caregiver: Yes  Freedom of Choice Offered: Yes  Discharge Location  Discharge Placement: Home with home health        Katie Whaley2 Osbaldo Swanson.  [phone]

## 2019-07-23 NOTE — PROGRESS NOTES
PHYSICAL THERAPY EVALUATION AND DISCHARGE    Patient: Ginger Babcock (62 y.o. male)  Date: 7/23/2019  Primary Diagnosis: Stenosis of cervical spine with myelopathy (HCC) [M48.02, G99.2]  Cervical spinal stenosis [M48.02]  Cervical myelopathy (HCC) [G95.9]  Procedure(s) (LRB):  C3 LAMINECTOMY; partial c2 laminectomy; C4/5 C5/6 C6/7 RIGHT LAMINOFORAMINOTOMY;  C6/7 POSTERIOR FUSION (N/A) 1 Day Post-Op   Precautions: Fall    ASSESSMENT :  Patient is 70 yo M admitted to hospital for ACDF and presents today alert and agreeable to therapy. Patient was educated on cervical precautions and demonstrated good compliance with precautions throughout session. Patient was given demo with instruction on sit <> stand transfer and gait training and transferred to standing with modified independent and ambulated 275ft no AD at slow pace and negotiated 4 steps with BHR at mod indep with reciprocal pattern. At conclusion of session patient transferred to sitting in recliner and was left resting with call bell by the side and SCDs donned. Patient instructed to call for assistance if they needed to get up for any reason and denied need for further assistance. Patient is safe for home mobility and is agreeable to D/C from PT at this level of care. Patient does not require further skilled intervention at this level of care. PLAN :  Recommendations and Planned Interventions:   No formal PT needs identified at this time. Discharge Recommendations: Outpatient  Further Equipment Recommendations for Discharge: N/A     SUBJECTIVE:   Patient stated I feel pretty good this morning.     OBJECTIVE DATA SUMMARY:     Past Medical History:   Diagnosis Date    Diabetes ([address]     GERD (gastroesophageal reflux disease)     Hx of colonic polyps     Hx of gallstones     Hypercholesterolemia     Hypertension     Restless leg syndrome     Wears dentures      Past Surgical History:   Procedure Laterality Date    HX OTHER SURGICAL gallstones removed    MD COLONOSCOPY FLX DX W/COLLJ SPEC WHEN PFRMD  2-17-16    Dr. Carly Hernandez     Barriers to Learning/Limitations: None  Compensate with: N/A  Home Situation:   Home Situation  Home Environment: Private residence  # Steps to Enter: 5  One/Two Story Residence: One story  Living Alone: No  Support Systems: Family member(s), Spouse/Significant Other/Partner  Patient Expects to be Discharged to[de-identified] Private residence  Current DME Used/Available at Home: None  Critical Behavior:   A&Ox4  Strength:    Strength: Within functional limits(BLE)  Tone & Sensation:   Tone: Normal(BLE)   Sensation: Intact(BLE)   Range Of Motion:  AROM: Within functional limits(BLE)   Functional Mobility:  Bed Mobility:   Scooting: Modified independent  Transfers:  Sit to Stand: Modified independent  Stand to Sit: Modified independent  Balance:   Sitting: Intact  Standing: Intact  Ambulation/Gait Training:  Distance (ft): 275 Feet (ft)   Ambulation - Level of Assistance: Modified independent   Speed/Nicole: Slow  Stairs:  Number of Stairs Trained: 4  Stairs - Level of Assistance: Modified independent  Rail Use: Both  Pain:  Pain level pre-treatment: 0/10   Pain level post-treatment: 0/10    Activity Tolerance:   Patient tolerated activity well and demos good carryover of cervical precautions. Please refer to the flowsheet for vital signs taken during this treatment. After treatment:   ?         Patient left in no apparent distress sitting up in chair  ? Patient left in no apparent distress in bed  ? Call bell left within reach  ? Nursing notified  ? Caregiver present  ? Bed alarm activated  ? SCDs applied    COMMUNICATION/EDUCATION:   ?         Role of Physical Therapy in the acute care setting. ?         Fall prevention education was provided and the patient/caregiver indicated understanding. ? Patient/family have participated as able in goal setting and plan of care.   ? Patient/family agree to work toward stated goals and plan of care. ?         Patient understands intent and goals of therapy, but is neutral about his/her participation. ? Patient is unable to participate in goal setting/plan of care: ongoing with therapy staff. ?         Other:     Thank you for this referral.  Erasmo Bass, PT   Time Calculation: 14 mins      Eval Complexity: History: MEDIUM  Complexity : 1-2 comorbidities / personal factors will impact the outcome/ POC Exam:LOW Complexity : 1-2 Standardized tests and measures addressing body structure, function, activity limitation and / or participation in recreation  Presentation: LOW Complexity : Stable, uncomplicated  Clinical Decision Making:Low Complexity    Overall Complexity:LOW

## 2019-07-23 NOTE — DISCHARGE SUMMARY
Discharge  Summary     Patient: Sylvia Carter MRN: [id number]  SSN: [id number]    YOB: 1950  Age: 71 y.o. Sex: male       Admit Date: 7/22/2019    Discharge Date: 7/23/2019      Admission Diagnoses: Stenosis of cervical spine with myelopathy ([facility] [M48.02, G99.2]  Cervical spinal stenosis [M48.02]  Cervical myelopathy ([facility] [G95.9]    Discharge Diagnoses:   Problem List as of 7/23/2019 Date Reviewed: 7/12/2019          Codes Class Noted - Resolved    Cervical myelopathy ([facility] ICD-10-CM: G95.9  ICD-9-CM: 721.1  7/22/2019 - Present        Cervical spinal stenosis ICD-10-CM: M48.02  ICD-9-CM: 723.0  7/22/2019 - Present        Type 2 diabetes with nephropathy ([facility] ICD-10-CM: E11.21  ICD-9-CM: 250.40, 583.81  6/25/2019 - Present        Severe obesity ([facility] ICD-10-CM: E66.01  ICD-9-CM: 278.01  5/17/2019 - Present        Type 2 diabetes mellitus with diabetic neuropathy ([facility] ICD-10-CM: E11.40  ICD-9-CM: 250.60, 357.2  6/11/2018 - Present        Essential hypertension ICD-10-CM: I10  ICD-9-CM: 401.9  1/8/2016 - Present        DM (diabetes mellitus), type 2, uncontrolled ([facility] ICD-10-CM: E11.65  ICD-9-CM: 250.02  1/8/2016 - Present        High cholesterol ICD-10-CM: E78.00  ICD-9-CM: 272.0  1/8/2016 - Present        ACP (advance care planning) ICD-10-CM: Z71.89  ICD-9-CM: V65.49  1/8/2016 - Present    Overview Signed 1/8/2016  3:49 PM by Leslie Wesley MD     Pt does not have one but is working on getting one done                     Discharge Condition: Good    Procedure:   C3 laminectomy, partial C2 laminectomy, C4-C5 right laminoforaminotomy, C5-C6 right laminoforaminotomy, C6-C7 right laminoforaminotomy, C6-C7 fusion posterior with segmental instrumentation C6-C7 with D-Trax interbody cage and graft, posterior interfacet cage.       Hospital Course: Normal hospital course for this procedure. Tolerated surgical intervention well. Incision dry and intact. Ambulatory. Disposition: home    Discharge Medications:   Current Discharge Medication List      START taking these medications    Details   oxyCODONE IR (ROXICODONE) 5 mg immediate release tablet Take 1 Tab by mouth every six (6) hours as needed for Pain for up to 7 days. Max Daily Amount: 20 mg.  Qty: 20 Tab, Refills: 0    Associated Diagnoses: S/P cervical spinal fusion; Cervical spinal stenosis         CONTINUE these medications which have NOT CHANGED    Details   glucose blood VI test strips (BLOOD GLUCOSE TEST) strip Test blood sugar 1 x daily  DX E11.9  Qty: 100 Strip, Refills: 3      lisinopril (PRINIVIL, ZESTRIL) 40 mg tablet Take 1 Tab by mouth daily. Qty: 90 Tab, Refills: 3    Associated Diagnoses: Essential hypertension      pravastatin (PRAVACHOL) 40 mg tablet Take 1 Tab by mouth nightly. Qty: 90 Tab, Refills: 3      pantoprazole (PROTONIX) 40 mg tablet Take 1 Tab by mouth daily. Qty: 90 Tab, Refills: 3      SITagliptin-metFORMIN (JANUMET XR) 50-1,000 mg TM24 Take 2 Tabs by mouth daily. Qty: 180 Tab, Refills: 3    Associated Diagnoses: Uncontrolled type 2 diabetes mellitus without complication, without long-term current use of insulin (HCC)      topiramate (TOPAMAX) 25 mg tablet Take 1 Tab by mouth two (2) times a day. Qty: 60 Tab, Refills: 1    Associated Diagnoses: Bilateral occipital neuralgia; Cervical spinal stenosis      gabapentin (NEURONTIN) 300 mg capsule TAKE 1 CAPSULE BY MOUTH THREE TIMES A DAY  Qty: 90 Cap, Refills: 0    Associated Diagnoses: Bilateral occipital neuralgia; Concussion with brief LOC; Osteoarthritis of cervical spine with myelopathy      levoFLOXacin (QUIXIN) 0.5 % ophthalmic solution Administer 2 Drops to right eye every six (6) hours. use in affected eye(s)  Qty: 5 mL, Refills: 0      triamcinolone acetonide (KENALOG) 0.025 % ointment Apply  to affected area two (2) times a day.  use thin layer  Qty: 30 g, Refills: 0      halobetasol (ULTRAVATE) 0.05 % topical cream APPLY TO AFFECTED AREA TWICE A DAY AS NEEDED  Refills: 1      TOBRADEX ST drps ophthalmix suspension INSTILL 1 DROP INTO LEFT EYE TWICE A DAY  Refills: 0      hydrOXYzine pamoate (VISTARIL) 25 mg capsule Take 1 Cap by mouth three (3) times daily as needed for Itching. Qty: 60 Cap, Refills: 3    Associated Diagnoses: Contact dermatitis of right eyelid      Lancets misc Accu-check  Fastclix Lancets. Test blood sugar daily. DX E11.9  Qty: 100 Each, Refills: 3      alcohol swabs (BD SINGLE USE SWABS REGULAR) padm Use daily to check blood sugar  Qty: 100 Pad, Refills: 4      Blood Glucose Control, Normal (ACCU-CHEK SMARTVIEW CONTRL SOL) soln Test blood sugar 1 x daily  DX E11.9        1 year supply  Check controls monthy on glucose meter  Qty: 3 mL, Refills: 4      Blood-Glucose Meter monitoring kit Accu-check Smartview Meter  Qty: 1 Kit, Refills: 0         STOP taking these medications       butalbital-acetaminophen-caffeine (FIORICET, ESGIC) -40 mg per tablet Comments:   Reason for Stopping: Follow-up Appointments   Procedures    FOLLOW UP VISIT Appointment in: Two Weeks     Standing Status:   Standing     Number of Occurrences:   1     Order Specific Question:   Appointment in     Answer:    Two Weeks       Signed By: Mason Pereira NP     July 23, 2019

## 2019-07-23 NOTE — PHYSICIAN ADVISORY
Letter of Admission Status Determination: Upgrade to Inpatient     Vernon Luna was hospitalized as outpatient status on 7/22/2019 jorge a surgical procedure authorized as Inpatient. Therefore, we recommend upgrading patient's hospitalization status to INPATIENT, as authorized by the payor. The final decision regarding the patient's hospitalization status depends on the attending physician's clinical judgment.        Asiya Hammond MD, CARYN, [facility], [address] [place] [address]  [phone]

## 2019-07-23 NOTE — PROGRESS NOTES
I have reviewed discharge instructions with the patient and spouse. The patient and spouse verbalized understanding. Patient awaiting transportation.

## 2019-07-23 NOTE — PROGRESS NOTES
conducted an initial consultation and Spiritual Assessment for Nini Mehta, who is a 71 y.o.,male. Patient's Primary Language is: [place]. According to the patient's EMR Catholic Affiliation is: Non Buddhism.     The reason the Patient came to the hospital is:   Patient Active Problem List    Diagnosis Date Noted    Cervical myelopathy ([facility] 07/22/2019    Cervical spinal stenosis 07/22/2019    Type 2 diabetes with nephropathy ([facility] 06/25/2019    Severe obesity ([facility] 05/17/2019    Type 2 diabetes mellitus with diabetic neuropathy ([facility] 06/11/2018    Essential hypertension 01/08/2016    DM (diabetes mellitus), type 2, uncontrolled ([facility] 01/08/2016    High cholesterol 01/08/2016    ACP (advance care planning) 01/08/2016        The  provided the following Interventions:  Initiated a relationship of care and support with this very pleasant 71year old male patient. Explored issues of marco a, belief, spirituality and Episcopal/ritual needs while hospitalized. Listened empathically as the patient shared the reason for his hospitalization. Provided information about Spiritual Care Services. Offered prayer and assurance of continued prayers on patient's behalf. The following outcomes where achieved:  Patient shared limited information about both his medical narrative and spiritual journey/beliefs.  confirmed Patient's Catholic Affiliation as a believer in God. Patient processed feeling about current hospitalization. Patient expressed gratitude for 's visit. Assessment:  Patient does not have any Episcopal/cultural needs that will affect patient's preferences in health care. There are no spiritual or Episcopal issues which require intervention at this time. Plan:  Chaplains will continue to follow and will provide pastoral care on an as needed/requested basis.    recommends bedside caregivers page  on duty if patient shows signs of acute spiritual or emotional distress.     [address] [place] [address]   [phone]

## 2019-07-23 NOTE — PROGRESS NOTES
Shivani Verma  Rounded post cervical surgery. Educated patient/family:    Activity:   Walk every hours and eat all meals in a chair. walking every hour to prevent clots. Turn head slowly from side to side for ROM. Sleep with HOB elevated for the next 3 nights to prevent swelling in neck. Follow neck precautions (no raising hands above head, no lifting, no bending & log roll in/out of bed)  VTE prophylaxis:  SCD on both legs when not walking. Ankle pumps 10 x per hour in hospital & at home. Pain Control:  Pain medications side effects discussed. Wean off narcotics ASAP. Use Tylenol ( 3000 mg/24 hours) , distraction, & change position to help with pain. Swallow every hour to help sore throat pain. Don't get nauseated. Eat a snack before taking pain medication    Do not get constipated: take stool softener/mild laxative daily while on narcotics. Wearing collar for comfort & reminder not to move neck up/down. Incentive Spirometry:   Use of incentive spirometer 10 x/hr. Demonstration  1500 ml x 3  Wound Care:   Dressing to back of neck intact with NARENDRA drain present. Leave dressing alone unless comes loose at home. Take off old dressing & put clean guaze over wound. Instructed to shower per MD protocol once home. Keep wound dry. Keep incision clean and dry. No lotions, powders, creams to surgical leg. .    Patient Safety:   Call light & belongings in reach. Call for help when want to walk or get OOB. Diet:   Eat for healing. Soft foods with protein for healing. Eat small bites  Drink 8 glasses of water a day. Drink lots of fluids through a straw. Report to staff or surgeon any trouble swallowing. Educational material given. Patient verbalized understanding:Use of incentive spirometer, ankle pumping, doing exercises twice a day, taking medication to prevent constipation, how to manage their wound, drinking lots of water and eating protein.    Given the opportunity for asking questions. Belongings left in reach, call light in reach. Mobility Intervention:       [] Pt dangled at edge of bed    [] Pt assisted OOB to bedside commode    [] Pt assisted OOB to chair    [] Pt ambulated to bathroom    [] Patient was ambulated in room/hallway    Assistive Device Utilized:       [] Rolling walker   [] Crutches   [] Straight Cane   [] Knee immobilizer   [] IV pole    After Rounding and Checking on Patient     [x] Patient left in no apparent distress sitting up in chair  [] Patient left in no apparent distress in bed  [x] Call bell left within reach  [x] SCDs on both legs & machine turned on  [] Ice applied  [x] RN notified  [x]  present  [] Bed alarm activated    Reason patient not mobilized:      [] Patient refused   [] Nausea/vomiting   [] Low blood pressure   [] Drowsy/lethargic    Pain Rating:       Left patient with call light, cell phone and personal belongings in reach for safety.

## 2019-07-23 NOTE — OP NOTES
[address] [place] [address]   OPERATIVE REPORT    Name:  Megan Ramos  MR#:   [id number]  :  1950  ACCOUNT #:  [de-identified]  DATE OF SERVICE:  2019    PREOPERATIVE DIAGNOSIS:  Cervical spondylotic myelopathy with radiculopathy. POSTOPERATIVE DIAGNOSIS:  Cervical spondylotic myelopathy with radiculopathy. PROCEDURE PERFORMED:  C3 laminectomy, partial C2 laminectomy, C4-C5 right laminoforaminotomy, C5-C6 right laminoforaminotomy, C6-C7 right laminoforaminotomy, C6-C7 fusion posterior with segmental instrumentation C6-C7 with D-Trax interbody cage and graft, posterior interfacet cage. SURGEON:  Branden cMginnis MD    ASSISTANT:  None. ANESTHESIA:  General endotracheal.    COMPLICATIONS:  None. SPECIMENS REMOVED:  None. IMPLANTS:  D-Trax interfacet cage and graft material with fixation screws at C6-C7. ESTIMATED BLOOD LOSS:  100 mL. FINDINGS:  The patient had abnormal facets at C2-C3 that were quite stiff. Consideration was given to do a D-Trax fusion. I found concern about the competence of the lateral mass to accept screw fixation, relative stability. Consideration was given to doing a D-Trax device, but the thickness of the lateral mass was such that I do not feel the device could be seated appropriately. Again, there was relative stability even following decompression. There was severe stenosis at C2-C3. There was minimal motion evident at C4-C5 or C5-C6. Wall motion evident at C6-C7, and I did a fusion with an interfacet cage on the right allowing distraction of the facet to improve foraminal height and decompression as well as providing stabilization of the segment and fusion. Laminoforaminotomy was done on the right at each level for decompression of the exiting nerve roots given the patient's right-sided radicular symptoms. I did not expose or decompress the left side to minimize the soft tissue stripping and exposure given the patient's health issues.     PROCEDURE: Following induction of general endotracheal anesthesia, the patient was turned to prone position on a spinal frame. The patient was prepped and draped in the usual fashion. Biplanar fluoroscopy was utilized to assist with utilizing of the D-Trax fusion device if needed. A midline incision was made initially from tip of C2 down to approximately C7. C-arm image verified our surgical level. I initially exposed C2, C3, C4 and exposed it bilaterally. A paramedian incision was made in the cervicodorsal fascia. The inferior half of the C2 was exposed, C3, and the superior segment of C4 was exposed. C-arm image verified our surgical level. There were degenerative changes evident with some synovitis. There was no traumatic motion at C2-C3. Consideration was given to utilizing a D-Trax device, but on placement of the initial joint finder, one could see that the lateral mass was very thin. Head was turned to that instability to accept a lateral mass screw or interbody device. A trough was made at the lamina-facet junction at C3 as well as inferior aspect of C3. I debrided the inferior aspect of the C2 lamina and the superior aspect of the C4 lamina. I completed the bilateral laminectomy at C3, the inferior laminectomy at C2 with a 1-mm Kerrison, and resected the ligamentum flavum at C3-C4, and did en bloc laminectomy of C3, and resection of the invaginating ligamentum flavum at C3-C4. At the conclusion, there was no more invaginating material evident of C2. The dura appeared well decompressed around its entire posterior aspect. The facet maintained the stability. I did not feel additional instrumentation attempts were worth the risk. I then extended the dissection on the right to C4-C5, C5-C6, and C6-C7. This was done under direct visualization and confirmed as best possible with fluoroscopic imaging. The C4-C5 and C5-C6 segments appeared to be nearly autofuse. C6-C7 had more dramatic motion.   I placed a D-Trax interfacet cage at C6-C7. The joint finder distractor was placed, it was trephined, bleeded. Joint space was prepared. The cage tamped firmly into place with graft material.  The fixation screw placed locking the cage in place, and the segments were dramatically more stable immediately on initial testing and visualization. I then with a high-speed bur thinned out the medial aspect of the facets at C4-C5, C5-C6, and C6-C7 with a 1-mm Kerrison and completed the laminoforaminotomy until I could palpate easily out the foramina at each segment and removed hypertrophic ligamentum flavum and hypertrophic facet bone. I then decorticated the lamina in addition to the cage at C6-C7 and placed further demineralized bone matrix for graft material.  Whatever leftover bone was present, I used to enhance the near autofusion at C4-C5 and C5-C6. Gelfoam and thrombin were placed over the laminotomy sites. A deep drain was placed. Vancomycin powder instilled for infection prophylaxis. The cervicodorsal fascia was then closed with number 1 Vicryl. Subcutaneous tissue was closed with 2-0 Vicryl. Skin closed with a 4-0 Monocryl subcuticular suture and Dermabond. A sterile occlusive dressing was placed upon the wound. All counts were correct.       MD SURENDRA Li [email or web address]  D:  07/22/2019 14:53  T:  07/22/2019 21:33  JOB #:  [id number]

## 2019-07-23 NOTE — DISCHARGE INSTRUCTIONS
PATIENT DISCHARGE INSTRUCTIONS      PATIENT DISCHARGE INSTRUCTIONS    Annamarie Bae / [id number] : 1950    Admitted 2019 Discharged: 2019       · It is important that you take the medication exactly as they are prescribed. · Keep your medication in the bottles provided by the pharmacist and keep a list of the medication names, dosages, and times to be taken in your wallet. · Do not take other medications without consulting your doctor. What to do at Home    Recommended Diet: Diabetic Diet    Recommended Activity: No lifting, Driving, or Strenuous exercise for 2 weeks    If you experience any of the following symptoms  fever greater than 101.5, increased pain or wound drainage,, please follow up with [facility] Center Provider. Rodolfo Guzman

## 2019-07-23 NOTE — HOME CARE
Rec referral for [address] - pt is on our ACO list - pt has RW and cane at home  - d/c expected this afternoon - [facility] will follow per Dr. Quinn Nieto RN

## 2019-07-23 NOTE — PROGRESS NOTES
Discharge order noted for today. Pt has been accepted to [facility] agency. Met with patient and are agreeable to the transition plan today. [facility] Patient's discharge summary and home health  orders have been forwarded to [facility] home health  agency via [facility]. Updated bedside RN, Marii Arriola,  to the transition plan.   Discharge information has been documented on the AVS.       Fidel Hernandez, [address] [facility].  [phone]

## 2019-07-23 NOTE — PROGRESS NOTES
OCCUPATIONAL THERAPY EVALUATION/DISCHARGE    Patient: Zakia Marin (20 y.o. male)  Date: 7/23/2019  Primary Diagnosis: Stenosis of cervical spine with myelopathy (HCC) [M48.02, G99.2]  Cervical spinal stenosis [M48.02]  Cervical myelopathy (HCC) [G95.9]  Procedure(s) (LRB):  C3 LAMINECTOMY; partial c2 laminectomy; C4/5 C5/6 C6/7 RIGHT LAMINOFORAMINOTOMY;  C6/7 POSTERIOR FUSION (N/A) 1 Day Post-Op   Precautions:   Spinal(cervical)  PLOF: Pt was independent with basic self care tasks and functional mobility PTA. ASSESSMENT AND RECOMMENDATIONS:  Based on the objective data described below, the patient is able to perform basic self care tasks without assistance while seated and in standing. He bathed using the CHG wipes before dressing. Functional transfers completed with modified independence. Cervical spine precautions reviewed and patient verbalized understanding. Patient has a supportive spouse at home to assist him prn. He has a tub without a shower and will have his wife assist him with tub transfer for maximal safety when appropriate. Skilled occupational therapy is not indicated at this time. Discharge Recommendations: None  Further Equipment Recommendations for Discharge: N/A      SUBJECTIVE:   Patient stated I just want to get moving.     OBJECTIVE DATA SUMMARY:     Past Medical History:   Diagnosis Date    Diabetes ([address]     GERD (gastroesophageal reflux disease)     Hx of colonic polyps     Hx of gallstones     Hypercholesterolemia     Hypertension     Restless leg syndrome     Wears dentures      Past Surgical History:   Procedure Laterality Date    HX OTHER SURGICAL      gallstones removed    NC COLONOSCOPY FLX DX W/COLLJ SPEC WHEN PFRMD  2-17-16    Dr. Raul Saenz     Barriers to Learning/Limitations: None  Compensate with: visual, verbal, tactile, kinesthetic cues/model    Home Situation:   Home Situation  Home Environment: Private residence  # Steps to Enter: 5  One/Two Story Residence: One story  Living Alone: No  Support Systems: Family member(s), Spouse/Significant Other/Partner  Patient Expects to be Discharged to[de-identified] Private residence  Current DME Used/Available at Home: None  Tub or Shower Type: Tub  ? Right hand dominant   ? Left hand dominant    Cognitive/Behavioral Status:  Neurologic State: Alert  Orientation Level: Oriented X4  Cognition: Appropriate decision making; Follows commands  Safety/Judgement: Awareness of environment;Good awareness of safety precautions    Skin: Intact on UEs  Edema: None noted in UEs    Vision/Perceptual:    Acuity: Within Defined Limits    Corrective Lenses: Glasses    Coordination: BUE  Coordination: Within functional limits(BLE)  Fine Motor Skills-Upper: Left Intact; Right Intact    Gross Motor Skills-Upper: Left Intact; Right Intact    Balance:  Sitting: Intact  Standing: Intact    Strength: BUE  Strength: Within functional limits    Tone & Sensation: BUE  Tone: Normal  Sensation: Intact    Range of Motion: BUE  AROM: Within functional limits    Functional Mobility and Transfers for ADLs:  Bed Mobility:  Scooting: Modified independent  Transfers:  Sit to Stand: Modified independent  Stand to Sit: Modified independent   Toilet Transfer : Modified independent    ADL Assessment:  Feeding: Modified independent    Oral Facial Hygiene/Grooming: Modified Independent    Bathing: Modified independent    Upper Body Dressing: Modified independent    Lower Body Dressing: Modified independent    Toileting: Modified independent    Pain:  Pain level pre-treatment: 8/10, at incision site   Pain level post-treatment: 8/10, at incision site   Pain Intervention(s): Medication (see MAR); Rest, Repositioning  Response to intervention: Nurse notified, See doc flow    Activity Tolerance:   Good  Please refer to the flowsheet for vital signs taken during this treatment. After treatment:   ?  Patient left in no apparent distress sitting up in chair  ?   Patient left in no apparent distress in bed  ? Call bell left within reach  ? Nursing notified  ? Caregiver present  ? Bed alarm activated    COMMUNICATION/EDUCATION:   ?      Role of Occupational Therapy in the acute care setting  ? Home safety education was provided and the patient/caregiver indicated understanding. ? Patient/family have participated as able and agree with findings and recommendations. ?      Patient is unable to participate in plan of care at this time. Thank you for this referral.  Sonya Mittal MS OTR/L   Time Calculation: 19 mins      Eval Complexity: History: LOW Complexity : Brief history review ; Examination: LOW Complexity : 1-3 performance deficits relating to physical, cognitive , or psychosocial skils that result in activity limitations and / or participation restrictions ;    Decision Making:LOW Complexity : No comorbidities that affect functional and no verbal or physical assistance needed to complete eval tasks

## 2019-07-23 NOTE — PROGRESS NOTES
PO day #1  VSS  Ambulated 275 feet with PT  Sitting up in recliner  States right arm pain is much improved  Only having incisional pain  Pt was continuing to use Morphine PCA  Will have PCA d/c'ed as ordered this am  abelardo kimble on   NARENDRA = 30 cc  Will d/c NARENDRA  Dressing with small amount of bloody drainage  Neuro intact  Pt desires to go home  Will d/c to home in the next 2-4 hours once pt is doing ok with po pain meds  F/u in office in 2 weeks    Abelino Parker, NP

## 2019-07-23 NOTE — TELEPHONE ENCOUNTER
Spoke with Ms. Higuera she is aware per the pharmacist there are forms for Medicare part B that patient has to fill out. MsAbhinav Morris states she has never had to do this. Ms. Asiya Morris was advised this is a requirement of Medicare. Ms. Asiya Morris wanted to know if she could  the forms from the pharmacy. MS. Asiya Morris was advised to call the pharmacy and ask.

## 2019-07-23 NOTE — TELEPHONE ENCOUNTER
Patients wife called requesting the patient diabetic test strips are resent to the pharmacy with a diagnosis code on the script so that Medicare is able to cover the strips. Patient would like this request done as soon as possible because he will be discharged from the hospital today and would like to have them available for when he goes home. Please advise.

## 2019-07-23 NOTE — PROGRESS NOTES
Bedside shift change report given to Son (oncoming nurse) by Elisabeth Loomis (offgoing nurse). Report included the following information SBAR, Kardex, Procedure Summary, Intake/Output and MAR.

## 2019-07-23 NOTE — PROGRESS NOTES
Bedside and Verbal shift change report given to Conner Anderson RN (oncoming nurse) by Clayton Lozano RN (offgoing nurse). Report included the following information SBAR, Kardex, MAR and Recent Results.

## 2019-07-24 ENCOUNTER — HOME CARE VISIT (OUTPATIENT)
Dept: HOME HEALTH SERVICES | Facility: HOME HEALTH | Age: 69
End: 2019-07-24

## 2019-07-24 ENCOUNTER — PATIENT OUTREACH (OUTPATIENT)
Dept: FAMILY MEDICINE CLINIC | Age: 69
End: 2019-07-24

## 2019-07-24 ENCOUNTER — HOME CARE VISIT (OUTPATIENT)
Dept: SCHEDULING | Facility: HOME HEALTH | Age: 69
End: 2019-07-24
Payer: MEDICARE

## 2019-07-24 PROCEDURE — 400013 HH SOC

## 2019-07-24 PROCEDURE — 3331090002 HH PPS REVENUE DEBIT

## 2019-07-24 PROCEDURE — G0151 HHCP-SERV OF PT,EA 15 MIN: HCPCS

## 2019-07-24 PROCEDURE — 3331090001 HH PPS REVENUE CREDIT

## 2019-07-25 ENCOUNTER — HOME CARE VISIT (OUTPATIENT)
Dept: HOME HEALTH SERVICES | Facility: HOME HEALTH | Age: 69
End: 2019-07-25
Payer: MEDICARE

## 2019-07-25 VITALS
TEMPERATURE: 96.6 F | DIASTOLIC BLOOD PRESSURE: 90 MMHG | OXYGEN SATURATION: 95 % | RESPIRATION RATE: 16 BRPM | SYSTOLIC BLOOD PRESSURE: 126 MMHG | HEART RATE: 83 BPM

## 2019-07-25 DIAGNOSIS — I10 ESSENTIAL HYPERTENSION: ICD-10-CM

## 2019-07-25 PROCEDURE — 3331090002 HH PPS REVENUE DEBIT

## 2019-07-25 PROCEDURE — 3331090001 HH PPS REVENUE CREDIT

## 2019-07-25 RX ORDER — PRAVASTATIN SODIUM 40 MG/1
40 TABLET ORAL
Qty: 90 TAB | Refills: 1 | Status: SHIPPED | OUTPATIENT
Start: 2019-07-25 | End: 2020-03-27

## 2019-07-25 RX ORDER — LISINOPRIL 40 MG/1
40 TABLET ORAL DAILY
Qty: 90 TAB | Refills: 3 | Status: SHIPPED | OUTPATIENT
Start: 2019-07-25 | End: 2020-10-14 | Stop reason: SDUPTHER

## 2019-07-25 RX ORDER — PANTOPRAZOLE SODIUM 40 MG/1
40 TABLET, DELAYED RELEASE ORAL DAILY
Qty: 90 TAB | Refills: 3 | Status: SHIPPED | OUTPATIENT
Start: 2019-07-25 | End: 2020-08-26 | Stop reason: SDUPTHER

## 2019-07-25 NOTE — TELEPHONE ENCOUNTER
Requested Prescriptions     Pending Prescriptions Disp Refills    lisinopril (PRINIVIL, ZESTRIL) 40 mg tablet 90 Tab 3     Sig: Take 1 Tab by mouth daily.  pantoprazole (PROTONIX) 40 mg tablet 90 Tab 3     Sig: Take 1 Tab by mouth daily.  SITagliptin-metFORMIN (JANUMET XR) 50-1,000 mg TM24 180 Tab 3     Sig: Take 2 Tabs by mouth daily.  pravastatin (PRAVACHOL) 40 mg tablet 90 Tab 3     Sig: Take 1 Tab by mouth nightly. Patient is out of his lisinopril.

## 2019-07-26 PROCEDURE — 3331090001 HH PPS REVENUE CREDIT

## 2019-07-26 PROCEDURE — 3331090002 HH PPS REVENUE DEBIT

## 2019-07-27 ENCOUNTER — HOME CARE VISIT (OUTPATIENT)
Dept: SCHEDULING | Facility: HOME HEALTH | Age: 69
End: 2019-07-27
Payer: MEDICARE

## 2019-07-27 VITALS
TEMPERATURE: 96.1 F | OXYGEN SATURATION: 98 % | DIASTOLIC BLOOD PRESSURE: 68 MMHG | SYSTOLIC BLOOD PRESSURE: 122 MMHG | HEART RATE: 101 BPM

## 2019-07-27 PROCEDURE — G0157 HHC PT ASSISTANT EA 15: HCPCS

## 2019-07-27 PROCEDURE — 3331090001 HH PPS REVENUE CREDIT

## 2019-07-27 PROCEDURE — 3331090002 HH PPS REVENUE DEBIT

## 2019-07-28 PROCEDURE — 3331090001 HH PPS REVENUE CREDIT

## 2019-07-28 PROCEDURE — 3331090002 HH PPS REVENUE DEBIT

## 2019-07-29 ENCOUNTER — HOME CARE VISIT (OUTPATIENT)
Dept: SCHEDULING | Facility: HOME HEALTH | Age: 69
End: 2019-07-29
Payer: MEDICARE

## 2019-07-29 PROCEDURE — 3331090002 HH PPS REVENUE DEBIT

## 2019-07-29 PROCEDURE — 3331090001 HH PPS REVENUE CREDIT

## 2019-07-29 PROCEDURE — G0157 HHC PT ASSISTANT EA 15: HCPCS

## 2019-07-30 VITALS
DIASTOLIC BLOOD PRESSURE: 80 MMHG | TEMPERATURE: 98.1 F | HEART RATE: 88 BPM | OXYGEN SATURATION: 96 % | SYSTOLIC BLOOD PRESSURE: 122 MMHG

## 2019-07-30 PROCEDURE — 3331090002 HH PPS REVENUE DEBIT

## 2019-07-30 PROCEDURE — 3331090001 HH PPS REVENUE CREDIT

## 2019-07-31 ENCOUNTER — HOME CARE VISIT (OUTPATIENT)
Dept: SCHEDULING | Facility: HOME HEALTH | Age: 69
End: 2019-07-31
Payer: MEDICARE

## 2019-07-31 PROCEDURE — 3331090002 HH PPS REVENUE DEBIT

## 2019-07-31 PROCEDURE — 3331090001 HH PPS REVENUE CREDIT

## 2019-07-31 PROCEDURE — G0157 HHC PT ASSISTANT EA 15: HCPCS

## 2019-08-01 ENCOUNTER — OFFICE VISIT (OUTPATIENT)
Dept: FAMILY MEDICINE CLINIC | Age: 69
End: 2019-08-01

## 2019-08-01 VITALS
HEIGHT: 65 IN | DIASTOLIC BLOOD PRESSURE: 73 MMHG | WEIGHT: 213 LBS | RESPIRATION RATE: 20 BRPM | BODY MASS INDEX: 35.49 KG/M2 | OXYGEN SATURATION: 96 % | SYSTOLIC BLOOD PRESSURE: 116 MMHG | HEART RATE: 95 BPM | TEMPERATURE: 98 F

## 2019-08-01 DIAGNOSIS — I10 ESSENTIAL HYPERTENSION: ICD-10-CM

## 2019-08-01 PROCEDURE — 3331090002 HH PPS REVENUE DEBIT

## 2019-08-01 PROCEDURE — 3331090001 HH PPS REVENUE CREDIT

## 2019-08-01 NOTE — PROGRESS NOTES
Patient is in the office today for post op follow up, patient is requesting FreeStyle Bassam. 1. Have you been to the ER, urgent care clinic since your last visit? Hospitalized since your last visit? No    2. Have you seen or consulted any other health care providers outside of the [address] [place] [address] since your last visit? Include any pap smears or colon screening.  No

## 2019-08-01 NOTE — PATIENT INSTRUCTIONS
Cervical Laminectomy: What to Expect at [facility] Your Recovery You had a cervical laminectomy to relieve pressure on your spinal cord and the nerves in your neck. Your neck will probably feel stiff or sore. This should improve in the weeks after surgery. You may need pain medicine in the weeks after your surgery. Your doctor may recommend that you work with a physical therapist to strengthen the muscles around your neck and spine. You will need to be careful about what activities you do so you don't put too much strain on your neck. This care sheet gives you a general idea about how long it will take for you to recover. But each person recovers at a different pace. Follow the steps below to get better as quickly as possible. How can you care for yourself at home? Activity 
  · Your doctor may give you specific instructions on when you can do your normal activities again, such as driving and going back to work.  
  · Be active. Walking is a good choice.  
  · Rest when you feel tired.  
  · Allow your body to heal. Don't move quickly or lift anything heavy until you are feeling better.  
  · You will probably need to take 4 to 6 weeks off from work. It depends on the type of work you do and how you feel. Diet 
  · You can eat your normal diet. If your stomach is upset, try bland, low-fat foods like plain rice, broiled chicken, toast, and yogurt.  
  · If your bowel movements are not regular right after surgery, try to avoid constipation and straining. Drink plenty of water. Your doctor may suggest fiber, a stool softener, or a mild laxative. Medicines 
  · Your doctor will tell you if and when you can restart your medicines. He or she will also give you instructions about taking any new medicines.  
  · If you take aspirin or some other blood thinner, be sure to talk to your doctor.  He or she will tell you if and when to start taking this medicine again. Make sure that you understand exactly what your doctor wants you to do.  
  · Be safe with medicines. Read and follow all instructions on the label. ? If the doctor gave you a prescription medicine for pain, take it as prescribed. ? If you are not taking a prescription pain medicine, ask your doctor if you can take an over-the-counter medicine. Incision care 
  · If you have strips of tape on the cut (incision) the doctor made, leave the tape on for a week or until it falls off.  
  · Wash the area daily with warm water, and pat it dry. Don't use hydrogen peroxide or alcohol. They can slow healing.  
  · You may cover the area with a gauze bandage if it oozes fluid or rubs against clothing or your brace. Change the bandage every day. Exercise 
  · Do neck exercises as instructed by your doctor.  
  · Your doctor may advise you to work with a physical therapist to improve the strength and flexibility of your neck. Other instructions 
  · Follow your doctor's instructions about wearing a brace or collar to support your neck.  
  · To reduce stiffness and help sore muscles, use a warm water bottle, a heating pad set on low, or a warm cloth on your neck. Do not put heat right over the incision. Do not go to sleep with a heating pad on your skin. Follow-up care is a key part of your treatment and safety. Be sure to make and go to all appointments, and call your doctor if you are having problems. It's also a good idea to know your test results and keep a list of the medicines you take. When should you call for help? Call 911 anytime you think you may need emergency care. For example, call if: 
  · You passed out (lost consciousness).  
  · You have severe trouble breathing.  
  · You are unable to move an arm or a leg at all.  
  · You have symptoms of a blood clot in your lung (called a pulmonary embolism). These may include: 
? Sudden chest pain. ? Trouble breathing. ? Coughing up blood.  Call your doctor now or seek immediate medical care if: 
  · You have new or worse symptoms in your arms or legs. Symptoms may include: 
? Numbness or tingling. ? Weakness. ? Pain.  
  · You have pain that does not get better after you take pain medicine.  
  · You have loose stitches, or your incision comes open.  
  · You bleed through your bandage.  
  · You have symptoms of infection, such as: 
? Increased pain, swelling, warmth, or redness. ? Red streaks leading from the incision. ? Pus draining from the incision. ? A fever.  
 Watch closely for changes in your health, and be sure to contact your doctor if: 
  · You do not have a bowel movement after taking a laxative.  
  · You are not getting better as expected. Where can you learn more? Go to http://sumit-uli.info/. Enter W544 in the search box to learn more about \"Cervical Laminectomy: What to Expect at Home. \" Current as of: September 20, 2018 Content Version: 12.1 © 7816-8130 [employer]. Care instructions adapted under license by [employer] (which disclaims liability or warranty for this information). If you have questions about a medical condition or this instruction, always ask your healthcare professional. [place] [address] any warranty or liability for your use of this information.

## 2019-08-02 ENCOUNTER — HOME CARE VISIT (OUTPATIENT)
Dept: SCHEDULING | Facility: HOME HEALTH | Age: 69
End: 2019-08-02
Payer: MEDICARE

## 2019-08-02 VITALS
DIASTOLIC BLOOD PRESSURE: 84 MMHG | OXYGEN SATURATION: 99 % | TEMPERATURE: 99.7 F | HEART RATE: 88 BPM | SYSTOLIC BLOOD PRESSURE: 118 MMHG

## 2019-08-02 LAB
ALBUMIN SERPL-MCNC: 3.9 G/DL (ref 3.6–4.8)
ALBUMIN/GLOB SERPL: 1.4 {RATIO} (ref 1.2–2.2)
ALP SERPL-CCNC: 97 IU/L (ref 39–117)
ALT SERPL-CCNC: 11 IU/L (ref 0–44)
AST SERPL-CCNC: 13 IU/L (ref 0–40)
BILIRUB SERPL-MCNC: <0.2 MG/DL (ref 0–1.2)
BUN SERPL-MCNC: 12 MG/DL (ref 8–27)
BUN/CREAT SERPL: 14 (ref 10–24)
CALCIUM SERPL-MCNC: 10.1 MG/DL (ref 8.6–10.2)
CHLORIDE SERPL-SCNC: 104 MMOL/L (ref 96–106)
CO2 SERPL-SCNC: 21 MMOL/L (ref 20–29)
CREAT SERPL-MCNC: 0.88 MG/DL (ref 0.76–1.27)
GLOBULIN SER CALC-MCNC: 2.8 G/DL (ref 1.5–4.5)
GLUCOSE SERPL-MCNC: 207 MG/DL (ref 65–99)
POTASSIUM SERPL-SCNC: 5 MMOL/L (ref 3.5–5.2)
PROT SERPL-MCNC: 6.7 G/DL (ref 6–8.5)
SODIUM SERPL-SCNC: 139 MMOL/L (ref 134–144)

## 2019-08-02 PROCEDURE — 3331090001 HH PPS REVENUE CREDIT

## 2019-08-02 PROCEDURE — G0151 HHCP-SERV OF PT,EA 15 MIN: HCPCS

## 2019-08-02 PROCEDURE — 3331090002 HH PPS REVENUE DEBIT

## 2019-08-03 VITALS
HEART RATE: 88 BPM | RESPIRATION RATE: 16 BRPM | TEMPERATURE: 98.3 F | SYSTOLIC BLOOD PRESSURE: 140 MMHG | DIASTOLIC BLOOD PRESSURE: 86 MMHG | OXYGEN SATURATION: 96 %

## 2019-08-03 PROCEDURE — 3331090001 HH PPS REVENUE CREDIT

## 2019-08-03 PROCEDURE — 3331090002 HH PPS REVENUE DEBIT

## 2019-08-04 PROCEDURE — 3331090001 HH PPS REVENUE CREDIT

## 2019-08-04 PROCEDURE — 3331090002 HH PPS REVENUE DEBIT

## 2019-08-05 ENCOUNTER — OFFICE VISIT (OUTPATIENT)
Dept: ORTHOPEDIC SURGERY | Age: 69
End: 2019-08-05

## 2019-08-05 VITALS
BODY MASS INDEX: 35.56 KG/M2 | HEIGHT: 65 IN | DIASTOLIC BLOOD PRESSURE: 79 MMHG | WEIGHT: 213.4 LBS | RESPIRATION RATE: 16 BRPM | TEMPERATURE: 97.9 F | HEART RATE: 87 BPM | SYSTOLIC BLOOD PRESSURE: 115 MMHG | OXYGEN SATURATION: 99 %

## 2019-08-05 DIAGNOSIS — Z98.1 S/P CERVICAL SPINAL FUSION: Primary | ICD-10-CM

## 2019-08-05 PROCEDURE — 3331090002 HH PPS REVENUE DEBIT

## 2019-08-05 PROCEDURE — 3331090001 HH PPS REVENUE CREDIT

## 2019-08-05 RX ORDER — METHYLPREDNISOLONE 4 MG/1
TABLET ORAL
Qty: 1 DOSE PACK | Refills: 0 | Status: SHIPPED | OUTPATIENT
Start: 2019-08-05 | End: 2019-10-08 | Stop reason: ALTCHOICE

## 2019-08-05 NOTE — PROGRESS NOTES
Chief complaint/History of Present Illness:  No chief complaint on file. MIRTA Bucio is a  71 y.o.  male      HISTORY OF PRESENT ILLNESS:  The patient comes in today two weeks status post his C3 laminectomy, partial C2 laminectomy, and right C4-5, C5-6, and C6-7 laminectomy with C6-7 posterior fusion. He states he cannot really tell a difference in his neck pain, and his right arm pain is not any better. He still gets a shooting pain in there, but he is off the pain medications and is only taking Tylenol for pain. He thinks the rainy weather the last few days has increased his pain. He continues to take Topamax 25 mg twice a day. He is diabetic. His blood sugar when he checked it was 179. He is having trouble tolerating that Denise collar. He takes it off to eat and to shower, but he has been trying to sleep with it, and he has not been able to sleep, so he gets up and eats. He denies fever and bowel or bladder dysfunction. PHYSICAL EXAM:  Mr. Sushil Greenfield is a 22-year-old male. He is alert and oriented. He has a normal mood and affect. He has a full weightbearing, non-antalgic gait utilizing a tripod cane. He has 4/5 strength of the bilateral upper extremities and a negative Talleys. He has a Denise collar in place. His posterior cervical incision is healing nicely. The edge is well-approximated. There is no erythema, warmth, drainage, or signs of infection. ASSESSMENT/PLAN:  This is a patient two weeks out from his multilevel laminectomy with C6-7 posterior fusion. We are going to give him a Medrol Dosepak to see if that will help with any inflammation that is giving him continuous pain. We went over wound care and activity level. We will see him back in four weeks with Dr. Jose L Cameron, at which time we will get a cervical AP and lateral x-ray.        Review of systems:    Past Medical History:   Diagnosis Date    Diabetes ([address]     GERD (gastroesophageal reflux disease)  Hx of colonic polyps     Hx of gallstones     Hypercholesterolemia     Hypertension     Restless leg syndrome     Wears dentures      Past Surgical History:   Procedure Laterality Date    HX OTHER SURGICAL      gallstones removed    MA COLONOSCOPY FLX DX W/COLLJ SPEC WHEN PFRMD  2-17-16    Dr. Lisa Beal History     Socioeconomic History    Marital status: LEGALLY      Spouse name: Not on file    Number of children: Not on file    Years of education: Not on file    Highest education level: Not on file   Occupational History    Not on file   Social Needs    Financial resource strain: Not on file    Food insecurity:     Worry: Not on file     Inability: Not on file    Transportation needs:     Medical: Not on file     Non-medical: Not on file   Tobacco Use    Smoking status: Current Every Day Smoker     Packs/day: 0.50    Smokeless tobacco: Never Used   Substance and Sexual Activity    Alcohol use: Yes     Comment: occas.     Drug use: Yes     Types: Marijuana    Sexual activity: Not on file   Lifestyle    Physical activity:     Days per week: Not on file     Minutes per session: Not on file    Stress: Not on file   Relationships    Social connections:     Talks on phone: Not on file     Gets together: Not on file     Attends Latter day service: Not on file     Active member of club or organization: Not on file     Attends meetings of clubs or organizations: Not on file     Relationship status: Not on file    Intimate partner violence:     Fear of current or ex partner: Not on file     Emotionally abused: Not on file     Physically abused: Not on file     Forced sexual activity: Not on file   Other Topics Concern    Not on file   Social History Narrative    Not on file     Family History   Problem Relation Age of Onset    Heart Attack Mother     Prostate Cancer Father     Colon Cancer Father     Stroke Sister        Physical Exam:  There were no vitals taken for this visit. Pain Scale: /10       has been . reviewed and is appropriate        Diagnoses and all orders for this visit:    1. S/P cervical spinal fusion  -     methylPREDNISolone (MEDROL, DEEPA,) 4 mg tablet; Per dose pack instructions            Follow-up and Dispositions    · Return in about 1 month (around 9/2/2019) for with Dr Dimas Saha.              We have informed Dalila Shelton to notify us for immediate appointment if he has any worsening neurogical symptoms or if an emergency situation presents, then call 911

## 2019-08-07 NOTE — PROGRESS NOTES
Luke Mesa is a 71 y.o.  male and presents with Hospital Follow Up (spinal stenosis); Blood sugar problem; and Hypertension      SUBJECTIVE:  Pt was hospitalized from 7/22/19 until 7/23/19 for cervical spine surgery for spinal stenosis. He tolerated the surgery and continues to f/u with spine. His DM is not well controlled on Janumet with BS ~ 200. He will continue to work on improving diet with less starches and taking his Janumet consistently. If BS not improving would add GLP-1 in the near future. Pt's HTN is well controlled on Lisinopril 40 mg daily. Respiratory ROS: negative for - shortness of breath  Cardiovascular ROS: negative for - chest pain    Current Outpatient Medications   Medication Sig    flash glucose scanning reader (UncovetSTYLE TAYLOR 14 DAY READER) misc Use as directed    flash glucose sensor (FREESTYLE TAYLOR 14 DAY SENSOR) kit Use one every 14 days    lisinopril (PRINIVIL, ZESTRIL) 40 mg tablet Take 1 Tab by mouth daily.  pantoprazole (PROTONIX) 40 mg tablet Take 1 Tab by mouth daily.  SITagliptin-metFORMIN (JANUMET XR) 50-1,000 mg TM24 Take 2 Tabs by mouth daily.  pravastatin (PRAVACHOL) 40 mg tablet Take 1 Tab by mouth nightly. Indications: high cholesterol    glucose blood VI test strips (BLOOD GLUCOSE TEST) strip Test blood sugar 1 x daily  DX E11.9    topiramate (TOPAMAX) 25 mg tablet Take 1 Tab by mouth two (2) times a day. (Patient taking differently: Take 1 Tab by mouth two (2) times a day. Indications: Migraine Prevention)    gabapentin (NEURONTIN) 300 mg capsule TAKE 1 CAPSULE BY MOUTH THREE TIMES A DAY    levoFLOXacin (QUIXIN) 0.5 % ophthalmic solution Administer 2 Drops to right eye every six (6) hours. use in affected eye(s)    triamcinolone acetonide (KENALOG) 0.025 % ointment Apply  to affected area two (2) times a day.  use thin layer    halobetasol (ULTRAVATE) 0.05 % topical cream APPLY TO AFFECTED AREA TWICE A DAY AS NEEDED    TOBRADEX ST abdiaziz ophthalmix suspension INSTILL 1 DROP INTO LEFT EYE TWICE A DAY    hydrOXYzine pamoate (VISTARIL) 25 mg capsule Take 1 Cap by mouth three (3) times daily as needed for Itching.  Lancets misc Accu-check  Fastclix Lancets. Test blood sugar daily. DX E11.9    alcohol swabs (BD SINGLE USE SWABS REGULAR) padm Use daily to check blood sugar    Blood Glucose Control, Normal (ACCU-CHEK SMARTVIEW CONTRL SOL) soln Test blood sugar 1 x daily  DX E11.9        1 year supply  Check controls monthy on glucose meter    Blood-Glucose Meter monitoring kit Accu-check Smartview Meter    methylPREDNISolone (MEDROL, DEEPA,) 4 mg tablet Per dose pack instructions     No current facility-administered medications for this visit. OBJECTIVE:  alert, well appearing, and in no distress  Visit Vitals  /73 (BP 1 Location: Left arm, BP Patient Position: Sitting)   Pulse 95   Temp 98 °F (36.7 °C) (Oral)   Resp 20   Ht 5' 5\" (1.651 m)   Wt 213 lb (96.6 kg)   SpO2 96%   BMI 35.45 kg/m²      well developed and well nourished  Neck - neck brace in place   Chest - clear to auscultation, no wheezes, rales or rhonchi, symmetric air entry  Heart - normal rate, regular rhythm, normal S1, S2, no murmurs, rubs, clicks or gallops  Extremities - peripheral pulses normal, no pedal edema, no clubbing or cyanosis    Labs:   Lab Results   Component Value Date/Time    Sodium 139 08/01/2019 11:54 AM    Potassium 5.0 08/01/2019 11:54 AM    Chloride 104 08/01/2019 11:54 AM    CO2 21 08/01/2019 11:54 AM    Anion gap 8 04/24/2018 07:35 AM    Glucose 207 (H) 08/01/2019 11:54 AM    BUN 12 08/01/2019 11:54 AM    Creatinine 0.88 08/01/2019 11:54 AM    BUN/Creatinine ratio 14 08/01/2019 11:54 AM    GFR est  08/01/2019 11:54 AM    GFR est non-AA 88 08/01/2019 11:54 AM    Calcium 10.1 08/01/2019 11:54 AM    Bilirubin, total <0.2 08/01/2019 11:54 AM    ALT (SGPT) 11 08/01/2019 11:54 AM    AST (SGOT) 13 08/01/2019 11:54 AM    Alk.  phosphatase 97 08/01/2019 11:54 AM    Protein, total 6.7 08/01/2019 11:54 AM    Albumin 3.9 08/01/2019 11:54 AM    Globulin 2.8 04/24/2018 07:35 AM    A-G Ratio 1.4 08/01/2019 11:54 AM        Discussed the patient's BMI with him. The BMI follow up plan is as follows: I have counseled this patient on diet and exercise regimens. Assessment/Plan      ICD-10-CM ICD-9-CM    1. Uncontrolled type 2 diabetes mellitus without complication, without long-term current use of insulin (HCC) E11.65 250.02 Will try to take Janumet consistently and cut back starches in diet. 2. Essential hypertension I10 401.9 Well controlled on lisinopril 40 mg daily METABOLIC PANEL, COMPREHENSIVE     Follow-up and Dispositions    · Return if symptoms worsen or fail to improve. Reviewed plan of care. Patient has provided input and agrees with goals.

## 2019-08-12 ENCOUNTER — TELEPHONE (OUTPATIENT)
Dept: ORTHOPEDIC SURGERY | Age: 69
End: 2019-08-12

## 2019-08-12 NOTE — TELEPHONE ENCOUNTER
He can drive once he is off pain medication, can see his blind spot and operate the vehicle safely and has a 's license. [id number] [address] for temporary handicap placard.

## 2019-08-12 NOTE — TELEPHONE ENCOUNTER
PT CAME BY MO OFFICE TO ASK IF HE IS ALLOWED TO TRAVEL IN A FEW WEEKS - DRIVING AND IS ALSO REQUESTING A HANDICAP FORM.  PLEASE CALL PT TO ADVISE

## 2019-08-13 NOTE — TELEPHONE ENCOUNTER
Spoke with patient informed of NP Galina message below, and that [facility]. form was ready at the . Patient would like to know about the extra sensitivity at his incision site. He states its very very sensitive, his shirt collar rubbing against it is an issue. Also when he tries to wear his neck collar it's even worse. Please advise.

## 2019-08-13 NOTE — TELEPHONE ENCOUNTER
Patient was instructed to cover surgical site with sterile dressing to minimize contact with clothing per NP [place].

## 2019-08-13 NOTE — TELEPHONE ENCOUNTER
He had a posterior fusion and those can be painful. If his shirt is bothering the incision then he can put a dressing on it to prevent his shirt form rubbing directly on it.

## 2019-08-15 ENCOUNTER — DOCUMENTATION ONLY (OUTPATIENT)
Dept: ORTHOPEDIC SURGERY | Age: 69
End: 2019-08-15

## 2019-08-18 DIAGNOSIS — M54.81 BILATERAL OCCIPITAL NEURALGIA: ICD-10-CM

## 2019-08-18 DIAGNOSIS — M48.02 CERVICAL SPINAL STENOSIS: ICD-10-CM

## 2019-08-19 RX ORDER — TOPIRAMATE 25 MG/1
TABLET ORAL
Qty: 60 TAB | Refills: 1 | Status: SHIPPED | OUTPATIENT
Start: 2019-08-19 | End: 2019-11-25 | Stop reason: SDUPTHER

## 2019-09-03 ENCOUNTER — OFFICE VISIT (OUTPATIENT)
Dept: ORTHOPEDIC SURGERY | Age: 69
End: 2019-09-03

## 2019-09-03 VITALS
RESPIRATION RATE: 16 BRPM | SYSTOLIC BLOOD PRESSURE: 138 MMHG | WEIGHT: 216.6 LBS | OXYGEN SATURATION: 99 % | BODY MASS INDEX: 36.09 KG/M2 | HEIGHT: 65 IN | TEMPERATURE: 97.8 F | DIASTOLIC BLOOD PRESSURE: 78 MMHG | HEART RATE: 89 BPM

## 2019-09-03 DIAGNOSIS — Z98.1 S/P CERVICAL SPINAL FUSION: Primary | ICD-10-CM

## 2019-09-03 DIAGNOSIS — M48.02 CERVICAL SPINAL STENOSIS: ICD-10-CM

## 2019-09-03 RX ORDER — GABAPENTIN 100 MG/1
100 CAPSULE ORAL 3 TIMES DAILY
Qty: 90 CAP | Refills: 2 | Status: SHIPPED | OUTPATIENT
Start: 2019-09-03 | End: 2021-09-08 | Stop reason: ALTCHOICE

## 2019-09-03 NOTE — LETTER
9/3/2019 12:15 PM 
 
Mr. Monique Shukla [address] To Whom It May Concern: 
 
Monique Shukla is currently under the care of [facility]. He is to remain out of work for 6 weeks until next office visit. If there are questions or concerns please have the patient contact our office.  
 
 
 
Sincerely, 
 
 
 
 
Uriel Link MD

## 2019-09-03 NOTE — LETTER
NOTIFICATION RETURN TO WORK / SCHOOL 
 
9/3/2019 12:15 PM 
 
Mr. Jessica Flores [address] To Whom It May Concern: 
 
Jessica Flores is currently under the care of [address] [place] [address]. He was seen at the office today. He is to remain out of work until the next office visit in 6 weeks. If there are questions or concerns please have the patient contact our office.  
 
 
 
Sincerely, 
 
 
 
 
Neville Nelson MD

## 2019-09-03 NOTE — PROGRESS NOTES
Marla Huizar [facility] [address]  [address], [address]  [place], [facility] [address]  Phone: [phone]  Fax: [phone]  PROGRESS NOTE  Patient: Bryon Johnson                MRN: [id number]       SSN: [id number]  YOB: 1950        AGE: 71 y.o. SEX: male  Body mass index is 36.04 kg/m². PCP: Umu Reid MD  09/03/19    Chief Complaint   Patient presents with    Neck Pain     PO SX 7/22       HISTORY OF PRESENT ILLNESS, RADIOGRAPHS, and PLAN:     HISTORY OF PRESENT ILLNESS:  Mr. Kate Mercado returns today. He is six weeks out from his posterior cervical decompression and fusion. He is doing well. His neck and arm pain are much improved. The only symptoms he is having is tenderness to palpation of the incision and soft tissue tenderness. PHYSICAL EXAMINATION:  The wound itself looks healed and dry. RADIOGRAPHS:  His x-rays demonstrate no instability. ASSESSMENT/PLAN: At this point, I discussed the matter at length with him. He is progressing well. We are going to cut back his dosage of Neurontin, which is giving him some cognitive and sleep issues to see how he does with that and increase his activity level. He is still unable to return to work activities. 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 cc: Henry Orosco M.D.          Workers Compensation         Past Medical History:   Diagnosis Date    Diabetes ([facility] [address]     GERD (gastroesophageal reflux disease)     Hx of colonic polyps     Hx of gallstones     Hypercholesterolemia     Hypertension     Restless leg syndrome     Wears dentures        Family History   Problem Relation Age of Onset    Heart Attack Mother     Prostate Cancer Father     Colon Cancer Father     Stroke Sister        Current Outpatient Medications   Medication Sig Dispense Refill    flash glucose scanning reader (FREESTYLE TAYLOR 14 DAY READER) misc Use as directed 1 Each 0    flash glucose sensor (FREESTYLE TAYLOR 14 DAY SENSOR) kit Use one every 14 days 2 Kit 5    lisinopril (PRINIVIL, ZESTRIL) 40 mg tablet Take 1 Tab by mouth daily. 90 Tab 3    pantoprazole (PROTONIX) 40 mg tablet Take 1 Tab by mouth daily. 90 Tab 3    SITagliptin-metFORMIN (JANUMET XR) 50-1,000 mg TM24 Take 2 Tabs by mouth daily. 180 Tab 3    pravastatin (PRAVACHOL) 40 mg tablet Take 1 Tab by mouth nightly. Indications: high cholesterol 90 Tab 1    glucose blood VI test strips (BLOOD GLUCOSE TEST) strip Test blood sugar 1 x daily  DX E11.9 100 Strip 3    hydrOXYzine pamoate (VISTARIL) 25 mg capsule Take 1 Cap by mouth three (3) times daily as needed for Itching. 60 Cap 3    Lancets misc Accu-check  Fastclix Lancets. Test blood sugar daily. DX E11.9 100 Each 3    alcohol swabs (BD SINGLE USE SWABS REGULAR) padm Use daily to check blood sugar 100 Pad 4    Blood Glucose Control, Normal (ACCU-CHEK SMARTVIEW CONTRL SOL) soln Test blood sugar 1 x daily  DX E11.9        1 year supply  Check controls monthy on glucose meter 3 mL 4    Blood-Glucose Meter monitoring kit Accu-check Smartview Meter 1 Kit 0    topiramate (TOPAMAX) 25 mg tablet TAKE 1 TABLET BY MOUTH TWICE A DAY 60 Tab 1    methylPREDNISolone (MEDROL, DEEPA,) 4 mg tablet Per dose pack instructions 1 Dose Pack 0    gabapentin (NEURONTIN) 300 mg capsule TAKE 1 CAPSULE BY MOUTH THREE TIMES A DAY 90 Cap 0    levoFLOXacin (QUIXIN) 0.5 % ophthalmic solution Administer 2 Drops to right eye every six (6) hours. use in affected eye(s) 5 mL 0    triamcinolone acetonide (KENALOG) 0.025 % ointment Apply  to affected area two (2) times a day.  use thin layer 30 g 0    halobetasol (ULTRAVATE) 0.05 % topical cream APPLY TO AFFECTED AREA TWICE A DAY AS NEEDED  1    TOBRADEX ST drps ophthalmix suspension INSTILL 1 DROP INTO LEFT EYE TWICE A DAY  0       Allergies   Allergen Reactions    Latex Sneezing  Benzalkonium Chloride Hives    Neosporin [Hydrocortisone] Rash       Past Surgical History:   Procedure Laterality Date    HX OTHER SURGICAL      gallstones removed    OK COLONOSCOPY FLX DX W/COLLJ SPEC WHEN PFRMD  2-17-16    Dr. Erik Rebolledo       Past Medical History:   Diagnosis Date    Diabetes ([facility] [address]     GERD (gastroesophageal reflux disease)     Hx of colonic polyps     Hx of gallstones     Hypercholesterolemia     Hypertension     Restless leg syndrome     Wears dentures        Social History     Socioeconomic History    Marital status: LEGALLY      Spouse name: Not on file    Number of children: Not on file    Years of education: Not on file    Highest education level: Not on file   Occupational History    Not on file   Social Needs    Financial resource strain: Not on file    Food insecurity:     Worry: Not on file     Inability: Not on file    Transportation needs:     Medical: Not on file     Non-medical: Not on file   Tobacco Use    Smoking status: Current Every Day Smoker     Packs/day: 0.50    Smokeless tobacco: Never Used   Substance and Sexual Activity    Alcohol use: Yes     Comment: occas.     Drug use: Yes     Types: Marijuana    Sexual activity: Not on file   Lifestyle    Physical activity:     Days per week: Not on file     Minutes per session: Not on file    Stress: Not on file   Relationships    Social connections:     Talks on phone: Not on file     Gets together: Not on file     Attends Congregation service: Not on file     Active member of club or organization: Not on file     Attends meetings of clubs or organizations: Not on file     Relationship status: Not on file    Intimate partner violence:     Fear of current or ex partner: Not on file     Emotionally abused: Not on file     Physically abused: Not on file     Forced sexual activity: Not on file   Other Topics Concern    Not on file   Social History Narrative    Not on file         REVIEW OF SYSTEMS:   CONSTITUTIONAL SYMPTOMS:  Negative. EYES:  Negative. EARS, NOSE, THROAT AND MOUTH:  Negative. CARDIOVASCULAR:  Negative. RESPIRATORY:  Negative. GENITOURINARY: Per HPI. GASTROINTESTINAL:  Per HPI. INTEGUMENTARY (SKIN AND/OR BREAST):  Negative. MUSCULOSKELETAL: Per HPI.   ENDOCRINE/RHEUMATOLOGIC:  Negative. NEUROLOGICAL:  Per HPI. HEMATOLOGIC/LYMPHATIC:  Negative. ALLERGIC/IMMUNOLOGIC:  Negative. PSYCHIATRIC:  Negative. PHYSICAL EXAMINATION:   Visit Vitals  /78 (BP 1 Location: Right arm, BP Patient Position: Sitting)   Pulse 89   Temp 97.8 °F (36.6 °C) (Oral)   Resp 16   Ht 5' 5\" (1.651 m)   Wt 216 lb 9.6 oz (98.2 kg)   SpO2 99%   BMI 36.04 kg/m²    PAIN SCALE: 0 - No pain/10    CONSTITUTIONAL: The patient is in no apparent distress and is alert and oriented x 3. HEENT: Normocephalic. Hearing grossly intact. NECK: Supple and symmetric. no tenderness, or masses were felt. RESPIRATORY: No labored breathing. CARDIOVASCULAR: The carotid pulses were normal. Peripheral pulses were 2+. CHEST: Normal AP diameter and normal contour without any kyphoscoliosis. LYMPHATIC: No lymphadenopathy was appreciated in the neck, axillae or groin. SKIN:  Incision tender. Healing well, no drainage, no erythema, no hernia, no seroma, no swelling, no dehiscence, incision well approximated. Negative for scars, rashes, lesions, or ulcers on the right upper, right lower, left upper, left lower and trunk. NEUROLOGICAL: Alert and oriented x 3. Ambulation with quad cane. FWB. EXTREMITIES: See musculoskeletal.  MUSCULOSKELETAL:   Head and Neck: Neck tightness. Negative for misalignment, asymmetry, crepitation, defects, tenderness masses or effusions.  Left Upper Extremity: Inspection, percussion and palpation performed. Talleys sign is negative.  Right Upper Extremity: Inspection, percussion and palpation performed. Talleys sign is negative.    Spine, Ribs and Pelvis: Inspection, percussion and palpation performed. Negative for misalignment, asymmetry, crepitation, defects, tenderness masses or effusions.  Left Lower Extremity: Inspection, percussion and palpation performed. Negative straight leg raise.  Right Lower Extremity: Inspection, percussion and palpation performed. Negative straight leg raise. SPINE EXAM:     Cervical spine: Neck is midline. Normal muscle tone. No focal atrophy is noted. ASSESSMENT    ICD-10-CM ICD-9-CM    1. S/P cervical spinal fusion Z98.1 V45.4 gabapentin (NEURONTIN) 100 mg capsule   2. Cervical spinal stenosis M48.02 723.0 AMB POC XRAY, SPINE, CERVICAL; 2 OR 3      gabapentin (NEURONTIN) 100 mg capsule       Written by Prabhakar Alfred, as dictated by Keyur Donis MD.    I, Dr. Keyur Donis MD, confirm that all documentation is accurate.

## 2019-09-16 ENCOUNTER — TELEPHONE (OUTPATIENT)
Dept: FAMILY MEDICINE CLINIC | Age: 69
End: 2019-09-16

## 2019-09-26 LAB
ALBUMIN SERPL-MCNC: 4.4 G/DL (ref 3.6–4.8)
ALBUMIN/GLOB SERPL: 2 {RATIO} (ref 1.2–2.2)
ALP SERPL-CCNC: 107 IU/L (ref 39–117)
ALT SERPL-CCNC: 17 IU/L (ref 0–44)
AST SERPL-CCNC: 16 IU/L (ref 0–40)
BILIRUB SERPL-MCNC: 0.3 MG/DL (ref 0–1.2)
BUN SERPL-MCNC: 15 MG/DL (ref 8–27)
BUN/CREAT SERPL: 13 (ref 10–24)
CALCIUM SERPL-MCNC: 9.3 MG/DL (ref 8.6–10.2)
CHLORIDE SERPL-SCNC: 101 MMOL/L (ref 96–106)
CHOLEST SERPL-MCNC: 140 MG/DL (ref 100–199)
CO2 SERPL-SCNC: 20 MMOL/L (ref 20–29)
CREAT SERPL-MCNC: 1.15 MG/DL (ref 0.76–1.27)
GLOBULIN SER CALC-MCNC: 2.2 G/DL (ref 1.5–4.5)
GLUCOSE SERPL-MCNC: 165 MG/DL (ref 65–99)
HBA1C MFR BLD: 8.4 % (ref 4.8–5.6)
HDLC SERPL-MCNC: 32 MG/DL
LDLC SERPL CALC-MCNC: 69 MG/DL (ref 0–99)
POTASSIUM SERPL-SCNC: 4.3 MMOL/L (ref 3.5–5.2)
PROT SERPL-MCNC: 6.6 G/DL (ref 6–8.5)
SODIUM SERPL-SCNC: 139 MMOL/L (ref 134–144)
TRIGL SERPL-MCNC: 194 MG/DL (ref 0–149)
VLDLC SERPL CALC-MCNC: 39 MG/DL (ref 5–40)

## 2019-10-02 ENCOUNTER — OFFICE VISIT (OUTPATIENT)
Dept: FAMILY MEDICINE CLINIC | Age: 69
End: 2019-10-02

## 2019-10-02 VITALS
WEIGHT: 216 LBS | HEIGHT: 65 IN | RESPIRATION RATE: 20 BRPM | HEART RATE: 80 BPM | TEMPERATURE: 98 F | SYSTOLIC BLOOD PRESSURE: 150 MMHG | BODY MASS INDEX: 35.99 KG/M2 | DIASTOLIC BLOOD PRESSURE: 87 MMHG | OXYGEN SATURATION: 95 %

## 2019-10-02 DIAGNOSIS — I10 ESSENTIAL HYPERTENSION: ICD-10-CM

## 2019-10-02 DIAGNOSIS — E11.65 UNCONTROLLED TYPE 2 DIABETES MELLITUS WITH HYPERGLYCEMIA (HCC): Primary | ICD-10-CM

## 2019-10-02 DIAGNOSIS — E78.00 HIGH CHOLESTEROL: ICD-10-CM

## 2019-10-02 DIAGNOSIS — Z23 ENCOUNTER FOR IMMUNIZATION: ICD-10-CM

## 2019-10-02 NOTE — PROGRESS NOTES
Patient is in the office today for 4 month follow up. 1. Have you been to the ER, urgent care clinic since your last visit? Hospitalized since your last visit? No    2. Have you seen or consulted any other health care providers outside of the [address] [place] [address] since your last visit? Include any pap smears or colon screening.  No

## 2019-10-02 NOTE — PROGRESS NOTES
Subjective:       Chief Complaint  The patient presents for follow up of diabetes, hypertension and high cholesterol. MIRTA Beatncur is a 71 y.o. male seen for follow up of diabetes. Jonn has hypertension and hyperlipidemia. Diabetes poorly controlled on Janumet due to poor diet, pt to try and improve diet and try to lose weight, no significant medication side effects noted, on Janumet, hypertension borderline controlled, no significant medication side effects noted, on lisinopril 40mg, pt admits he has not been compliant with taking the medication daily,  hyperlipidemia well controlled, no significant medication side effects noted, on Pravachol. Diet and Lifestyle: not attempting to follow a low fat, low cholesterol diet, does not rigorously follow a diabetic diet, sedentary    Home BP Monitoring: is not measured at home. Diabetic Review of Systems - home glucose monitoring: is performed regularly, 1x/day. -150      Other symptoms and concerns: pt continues to work on diet and exercise to lose weight      Patient remains out of work due to [employer]. injury which she sustained several months ago. He recently had neck surgery relationship to his Workmen's Comp. issue and is slowly recovering. Current Outpatient Medications   Medication Sig    gabapentin (NEURONTIN) 100 mg capsule Take 1 Cap by mouth three (3) times daily. Max Daily Amount: 300 mg.  topiramate (TOPAMAX) 25 mg tablet TAKE 1 TABLET BY MOUTH TWICE A DAY    flash glucose scanning reader (FREESTYLE TAYLOR 14 DAY READER) misc Use as directed    flash glucose sensor (FREESTYLE TAYLOR 14 DAY SENSOR) kit Use one every 14 days    lisinopril (PRINIVIL, ZESTRIL) 40 mg tablet Take 1 Tab by mouth daily.  pantoprazole (PROTONIX) 40 mg tablet Take 1 Tab by mouth daily.  SITagliptin-metFORMIN (JANUMET XR) 50-1,000 mg TM24 Take 2 Tabs by mouth daily.     pravastatin (PRAVACHOL) 40 mg tablet Take 1 Tab by mouth nightly. Indications: high cholesterol    glucose blood VI test strips (BLOOD GLUCOSE TEST) strip Test blood sugar 1 x daily  DX E11.9    triamcinolone acetonide (KENALOG) 0.025 % ointment Apply  to affected area two (2) times a day. use thin layer    halobetasol (ULTRAVATE) 0.05 % topical cream APPLY TO AFFECTED AREA TWICE A DAY AS NEEDED    TOBRADEX ST drps ophthalmix suspension INSTILL 1 DROP INTO LEFT EYE TWICE A DAY    Lancets misc Accu-check  Fastclix Lancets. Test blood sugar daily. DX E11.9    alcohol swabs (BD SINGLE USE SWABS REGULAR) padm Use daily to check blood sugar    Blood Glucose Control, Normal (ACCU-CHEK SMARTVIEW CONTRL SOL) soln Test blood sugar 1 x daily  DX E11.9        1 year supply  Check controls monthy on glucose meter    Blood-Glucose Meter monitoring kit Accu-check Smartview Meter    acetaminophen (TYLENOL EXTRA STRENGTH) 500 mg tablet Take  by mouth every six (6) hours as needed for Pain.  gabapentin (NEURONTIN) 300 mg capsule TAKE 1 CAPSULE BY MOUTH THREE TIMES A DAY    levoFLOXacin (QUIXIN) 0.5 % ophthalmic solution Administer 2 Drops to right eye every six (6) hours. use in affected eye(s)    hydrOXYzine pamoate (VISTARIL) 25 mg capsule Take 1 Cap by mouth three (3) times daily as needed for Itching. No current facility-administered medications for this visit.               Review of Systems  Respiratory: negative for dyspnea on exertion  Cardiovascular: negative for chest pain    Objective:     Visit Vitals  /87 (BP 1 Location: Left arm, BP Patient Position: Sitting)   Pulse 80   Temp 98 °F (36.7 °C) (Oral)   Resp 20   Ht 5' 5\" (1.651 m)   Wt 216 lb (98 kg)   SpO2 95%   BMI 35.94 kg/m²        General appearance - alert, well appearing, and in no distress   Neck: pt with soft neck collar  Chest - clear to auscultation, no wheezes, rales or rhonchi, symmetric air entry  Heart - normal rate, regular rhythm, normal S1, S2, no murmurs, rubs, clicks or gallops  Extremities - no edema         Labs:   Lab Results   Component Value Date/Time    Hemoglobin A1c 8.4 (H) 09/25/2019 08:38 AM    Hemoglobin A1c 8.7 (H) 06/18/2019 10:59 AM    Hemoglobin A1c 7.5 (H) 12/05/2018 07:34 AM    Glucose 165 (H) 09/25/2019 08:38 AM    Glucose (POC) 217 (H) 07/23/2019 11:24 AM    Microalbumin/Creat ratio (mg/g creat) 8 04/24/2018 07:35 AM    Microalb/Creat ratio (ug/mg creat.) 7.2 06/18/2019 10:59 AM    Microalbumin,urine random 1.90 04/24/2018 07:35 AM    LDL, calculated 69 09/25/2019 08:38 AM    Creatinine 1.15 09/25/2019 08:38 AM      Lab Results   Component Value Date/Time    Cholesterol, total 140 09/25/2019 08:38 AM    HDL Cholesterol 32 (L) 09/25/2019 08:38 AM    LDL, calculated 69 09/25/2019 08:38 AM    Triglyceride 194 (H) 09/25/2019 08:38 AM    CHOL/HDL Ratio 3.1 04/24/2018 07:35 AM       Lab Results   Component Value Date/Time    ALT (SGPT) 17 09/25/2019 08:38 AM    AST (SGOT) 16 09/25/2019 08:38 AM    Alk. phosphatase 107 09/25/2019 08:38 AM    Bilirubin, total 0.3 09/25/2019 08:38 AM       Lab Results   Component Value Date/Time    GFR est AA 75 09/25/2019 08:38 AM    GFR est non-AA 65 09/25/2019 08:38 AM    Creatinine 1.15 09/25/2019 08:38 AM    BUN 15 09/25/2019 08:38 AM    Sodium 139 09/25/2019 08:38 AM    Potassium 4.3 09/25/2019 08:38 AM    Chloride 101 09/25/2019 08:38 AM    CO2 20 09/25/2019 08:38 AM      Lab Results   Component Value Date/Time    Prostate Specific Ag 1.9 06/18/2019 10:59 AM    Prostate Specific Ag 4.3 (H) 12/05/2018 07:34 AM    Prostate Specific Ag 2.4 09/07/2017 09:18 AM    Prostate Specific Ag 1.6 01/08/2016 10:54 AM           Assessment / Plan     Diabetes borderline controlled on Janumet will try to improve with weight loss and better diet. Hypertension borderline controlled, on lisinopril.   Patient will try to be more compliant and lose weight to improve or will need to add more medications   Hyperlipidemia well controlled, on Pravachol .      ICD-10-CM ICD-9-CM    1. Uncontrolled type 2 diabetes mellitus with hyperglycemia (HCC) E11.65 250.02 HEMOGLOBIN A1C W/O EAG   2. Essential hypertension R96 998.8 METABOLIC PANEL, COMPREHENSIVE   3. High cholesterol E78.00 272.0 LIPID PANEL   4. Encounter for immunization Z23 V03.89 INFLUENZA VACCINE INACTIVATED (IIV), SUBUNIT, ADJUVANTED, IM                Diabetic issues reviewed with him: diabetic diet discussed in detail, and low cholesterol diet, weight control and daily exercise discussed. Follow-up and Dispositions    · Return in about 3 months (around 1/2/2020) for labs 1 week before. Reviewed plan of care. Patient has provided input and agrees with goals. Discussed the patient's BMI with him. The BMI follow up plan is as follows:     dietary management education, guidance, and counseling  encourage exercise  monitor weight  prescribed dietary intake    An After Visit Summary was printed and given to the patient.

## 2019-10-08 ENCOUNTER — OFFICE VISIT (OUTPATIENT)
Dept: NEUROLOGY | Age: 69
End: 2019-10-08

## 2019-10-08 VITALS
BODY MASS INDEX: 36.99 KG/M2 | HEIGHT: 65 IN | WEIGHT: 222 LBS | OXYGEN SATURATION: 97 % | TEMPERATURE: 97.8 F | SYSTOLIC BLOOD PRESSURE: 128 MMHG | DIASTOLIC BLOOD PRESSURE: 76 MMHG | RESPIRATION RATE: 22 BRPM | HEART RATE: 74 BPM

## 2019-10-08 DIAGNOSIS — G95.9 CERVICAL MYELOPATHY (HCC): Primary | ICD-10-CM

## 2019-10-08 RX ORDER — ACETAMINOPHEN 500 MG
1000 TABLET ORAL
COMMUNITY

## 2019-10-08 NOTE — LETTER
10/8/19 Patient: Yesy Villalba YOB: 1950 Date of Visit: 10/8/2019 Eric Johnson MD 
[address] [address] VIA In Basket Dear Eric Johnson MD, Thank you for referring Mr. Selin Urbina to [facility] for evaluation. My notes for this consultation are attached. If you have questions, please do not hesitate to call me. I look forward to following your patient along with you.  
 
 
Sincerely, 
 
Megan Segura MD

## 2019-10-08 NOTE — LETTER
10/8/2019 10:54 AM 
 
Patient:  Gabby Hartman YOB: 1950 Date of Visit: 10/8/2019 Dear MD Tanvi [address] [facility] [address] VIA In Basket 
 : Thank you for referring Mr. Cristóbal Parsons to me for evaluation/treatment. Below are the relevant portions of my assessment and plan of care. If you have questions, please do not hesitate to call me. I look forward to following Mr. Keller Maddison along with you.  
 
 
 
Sincerely, 
 
 
Moe Mccray MD

## 2019-10-08 NOTE — PROGRESS NOTES
Gabby Hartman is a 71 y.o., right handed male, with an established history of hypertension and diabetes, who has been sent in for evaluation and treatment of headache neck pain. He's had chronic spasm of the neck muscles posteriorly. He gets tightness of the muscles in the shoulders and the neck. There's an electric shock kind of pain felt in the back of the neck. He's post posterior decompression and laminectomy in the cervical spine,  . He had a C2-C7 fusion. He has had a slow but steady course of improvement. I have been asked to see him for continued pain in the neck, with some neuropathic symptoms. Social History; , lives with his wife. He smokes 1/2 PPD, no alcohol or tobacco.  Retired . Family History; mother  from heart attack. Father  from cancer. Siblings cancer, arthritis, diabetes, hypertension. Current Outpatient Medications   Medication Sig Dispense Refill    acetaminophen (TYLENOL EXTRA STRENGTH) 500 mg tablet Take  by mouth every six (6) hours as needed for Pain.  gabapentin (NEURONTIN) 100 mg capsule Take 1 Cap by mouth three (3) times daily. Max Daily Amount: 300 mg. 90 Cap 2    topiramate (TOPAMAX) 25 mg tablet TAKE 1 TABLET BY MOUTH TWICE A DAY 60 Tab 1    lisinopril (PRINIVIL, ZESTRIL) 40 mg tablet Take 1 Tab by mouth daily. 90 Tab 3    SITagliptin-metFORMIN (JANUMET XR) 50-1,000 mg TM24 Take 2 Tabs by mouth daily. 180 Tab 3    pravastatin (PRAVACHOL) 40 mg tablet Take 1 Tab by mouth nightly. Indications: high cholesterol 90 Tab 1    flash glucose scanning reader (FREESTYLE TAYLOR 14 DAY READER) misc Use as directed 1 Each 0    flash glucose sensor (FREESTYLE TAYLOR 14 DAY SENSOR) kit Use one every 14 days 2 Kit 5    pantoprazole (PROTONIX) 40 mg tablet Take 1 Tab by mouth daily.  90 Tab 3    glucose blood VI test strips (BLOOD GLUCOSE TEST) strip Test blood sugar 1 x daily  DX E11.9 100 Strip 3    gabapentin (NEURONTIN) 300 mg capsule TAKE 1 CAPSULE BY MOUTH THREE TIMES A DAY 90 Cap 0    levoFLOXacin (QUIXIN) 0.5 % ophthalmic solution Administer 2 Drops to right eye every six (6) hours. use in affected eye(s) 5 mL 0    triamcinolone acetonide (KENALOG) 0.025 % ointment Apply  to affected area two (2) times a day. use thin layer 30 g 0    halobetasol (ULTRAVATE) 0.05 % topical cream APPLY TO AFFECTED AREA TWICE A DAY AS NEEDED  1    TOBRADEX ST drps ophthalmix suspension INSTILL 1 DROP INTO LEFT EYE TWICE A DAY  0    hydrOXYzine pamoate (VISTARIL) 25 mg capsule Take 1 Cap by mouth three (3) times daily as needed for Itching. 60 Cap 3    Lancets misc Accu-check  Fastclix Lancets. Test blood sugar daily.   DX E11.9 100 Each 3    alcohol swabs (BD SINGLE USE SWABS REGULAR) padm Use daily to check blood sugar 100 Pad 4    Blood Glucose Control, Normal (ACCU-CHEK SMARTVIEW CONTRL SOL) soln Test blood sugar 1 x daily  DX E11.9        1 year supply  Check controls monthy on glucose meter 3 mL 4    Blood-Glucose Meter monitoring kit Accu-check Smartview Meter 1 Kit 0       Past Medical History:   Diagnosis Date    Diabetes ([facility] [address]     GERD (gastroesophageal reflux disease)     Hx of colonic polyps     Hx of gallstones     Hypercholesterolemia     Hypertension     Restless leg syndrome     Wears dentures        Past Surgical History:   Procedure Laterality Date    HX OTHER SURGICAL      gallstones removed    [place] COLONOSCOPY FLX DX W/COLLJ SPEC WHEN PFRMD  2-17-16    Dr. Marjorie Tijerina       Allergies   Allergen Reactions    Latex Sneezing    Benzalkonium Chloride Hives    Neosporin [Hydrocortisone] Rash       Patient Active Problem List   Diagnosis Code    Essential hypertension I10    DM (diabetes mellitus), type 2, uncontrolled ([address] E11.65    High cholesterol E78.00    ACP (advance care planning) Z71.89    Type 2 diabetes mellitus with diabetic neuropathy (HCC) E11.40    Severe obesity (HCC) E66.01    Type 2 diabetes with nephropathy ([facility] [address] E11.21    Cervical myelopathy ([facility] [address] G95.9    Cervical spinal stenosis M48.02         Review of Systems:   As above otherwise 11 point review of systems negative including;   Constitutional no fever or chills  Skin denies rash or itching  HENT  Denies tinnitus, hearing lose  Eyes denies diplopia vision lose  Respiratory denies shortness of breath  Cardiovascular denies chest pain, dyspnea on exertion  Gastrointestinal denies nausea, vomiting, diarrhea, constipation  Genitourinary denies incontinence  Musculoskeletal denies joint pain or swelling  Endocrine denies weight change  Hematology denies easy bruising or bleeding   Neurological as above in HPI      PHYSICAL EXAMINATION:      VITAL SIGNS:    Visit Vitals  /76 (BP 1 Location: Left arm, BP Patient Position: Sitting)   Pulse 74   Temp 97.8 °F (36.6 °C) (Oral)   Resp 22   Ht 5' 5\" (1.651 m)   Wt 100.7 kg (222 lb)   SpO2 97%   BMI 36.94 kg/m²       GENERAL: The patient is well developed, well nourished, and in no apparent distress. EXTREMITIES: No clubbing, cyanosis, or edema is identified. Pulses 2+ and symmetrical.  Muscle tone is normal.  HEAD:   Ear, nose, and throat appear to be without trauma. The patient is normocephalic. NEUROLOGIC EXAMINATION    MENTAL STATUS: The patient is awake, alert, and oriented x 4. Fund of knowledge is adequate. Speech is fluent and memory appears to be intact, both long and short term. 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 The right eye has a normally round 4 mm and reactive pupil the fundus is normal..   III, IV, VI - Extraocular movements are intact and there is no nystagmus. V - Facial sensation is intact to pinprick and light touch. VII - Face is symmetrical.   VIII - Hearing is present. IX, X, XII- Palate rises symmetrically. Gag is present. Tongue is in the midline.       XI - Shoulder shrugging and head turning intact  MOTOR: The patient is 5/5 in all four limbs without any drift. Fine finger movements are symmetrical.  Isolated motor group testing reveals no focal abnormalities. Tone is normal.  Sensory examination is intact to pinprick, light touch and position sense testing. Reflexes are 2+ and symmetrical. Plantars are down going. Cerebellar examination reveals no gross ataxia or dysmetria. Gait is normal and the patient can tandem walk without any difficulty. Well healed scar in the posterior cervical spine. CBC:   Lab Results   Component Value Date/Time    WBC 9.7 07/11/2019 08:22 AM    RBC 4.87 07/11/2019 08:22 AM    HGB 12.7 (L) 07/11/2019 08:22 AM    HCT 37.9 07/11/2019 08:22 AM    PLATELET 712 32/73/9971 08:22 AM     BMP:   Lab Results   Component Value Date/Time    Glucose 165 (H) 09/25/2019 08:38 AM    Sodium 139 09/25/2019 08:38 AM    Potassium 4.3 09/25/2019 08:38 AM    Chloride 101 09/25/2019 08:38 AM    CO2 20 09/25/2019 08:38 AM    BUN 15 09/25/2019 08:38 AM    Creatinine 1.15 09/25/2019 08:38 AM    Calcium 9.3 09/25/2019 08:38 AM     CMP:   Lab Results   Component Value Date/Time    Glucose 165 (H) 09/25/2019 08:38 AM    Sodium 139 09/25/2019 08:38 AM    Potassium 4.3 09/25/2019 08:38 AM    Chloride 101 09/25/2019 08:38 AM    CO2 20 09/25/2019 08:38 AM    BUN 15 09/25/2019 08:38 AM    Creatinine 1.15 09/25/2019 08:38 AM    Calcium 9.3 09/25/2019 08:38 AM    Anion gap 8 04/24/2018 07:35 AM    BUN/Creatinine ratio 13 09/25/2019 08:38 AM    Alk. phosphatase 107 09/25/2019 08:38 AM    Protein, total 6.6 09/25/2019 08:38 AM    Albumin 4.4 09/25/2019 08:38 AM    Globulin 2.8 04/24/2018 07:35 AM    A-G Ratio 2.0 09/25/2019 08:38 AM     Coagulation: No results found for: PTP, INR, APTT, PTTT, INREXT       Impression: Carlos Hill making an expected recovery from his recent cervical laminectomy is had some problems with some neuropathic pain but that seems to be resolving at this time.   He seems to be making a good recovery from his recent surgery. Plan: From the purely neurologic standpoint nothing to add at this time. As long as he continues to recover and his medications are being tapered and nothing really that medical neurology can add at this time. We will see him as needed only. PLEASE NOTE:   This document has been produced using voice recognition software. Unrecognized errors in transcription may be present.

## 2019-10-22 ENCOUNTER — OFFICE VISIT (OUTPATIENT)
Dept: ORTHOPEDIC SURGERY | Age: 69
End: 2019-10-22

## 2019-10-22 VITALS
WEIGHT: 219 LBS | OXYGEN SATURATION: 99 % | DIASTOLIC BLOOD PRESSURE: 80 MMHG | BODY MASS INDEX: 36.49 KG/M2 | HEIGHT: 65 IN | TEMPERATURE: 97.9 F | SYSTOLIC BLOOD PRESSURE: 121 MMHG | HEART RATE: 82 BPM

## 2019-10-22 DIAGNOSIS — M54.50 LUMBAR PAIN: ICD-10-CM

## 2019-10-22 DIAGNOSIS — M48.02 CERVICAL SPINAL STENOSIS: ICD-10-CM

## 2019-10-22 DIAGNOSIS — Z98.1 S/P CERVICAL SPINAL FUSION: Primary | ICD-10-CM

## 2019-10-22 NOTE — PROGRESS NOTES
[facility]  [address], [address]  [place], [address] [place] [address] Street  Phone: [phone]  Fax: [phone]  PROGRESS NOTE  Patient: Дмитрий Knight                MRN: [id number]       SSN: [id number]  YOB: 1950        AGE: 71 y.o. SEX: male  Body mass index is 36.44 kg/m². PCP: Jose Ignacio MD  10/22/19    No chief complaint on file. HISTORY OF PRESENT ILLNESS, RADIOGRAPHS, and PLAN:     HISTORY OF PRESENT ILLNESS:  Mr. Chaya Domínguez returns today. He is about three months out from his posterior cervical decompression and fusion for myelopathy. He has noted dramatic improvement in his arm strength and balance and walking. He gets episodic dysesthetic sensations in his arms and legs, all part of the reinnervation process. His wound is healed and dry. ASSESSMENT/PLAN: We are going to get him involved in some physical therapy and help him with some mechanical symptoms in his neck. We will see him back in three months time. cc: Malka Cobos M.D. Past Medical History:   Diagnosis Date    Diabetes ([facility] [address]     GERD (gastroesophageal reflux disease)     Hx of colonic polyps     Hx of gallstones     Hypercholesterolemia     Hypertension     Restless leg syndrome     Wears dentures        Family History   Problem Relation Age of Onset    Heart Attack Mother     Prostate Cancer Father     Colon Cancer Father     Stroke Sister        Current Outpatient Medications   Medication Sig Dispense Refill    acetaminophen (TYLENOL EXTRA STRENGTH) 500 mg tablet Take  by mouth every six (6) hours as needed for Pain.  gabapentin (NEURONTIN) 100 mg capsule Take 1 Cap by mouth three (3) times daily.  Max Daily Amount: 300 mg. 90 Cap 2    topiramate (TOPAMAX) 25 mg tablet TAKE 1 TABLET BY MOUTH TWICE A DAY 60 Tab 1    flash glucose scanning reader ([employer] TAYLOR 14 DAY READER) misc Use as directed 1 Each 0    flash glucose sensor (FREESTYLE TAYLOR 14 DAY SENSOR) kit Use one every 14 days 2 Kit 5    lisinopril (PRINIVIL, ZESTRIL) 40 mg tablet Take 1 Tab by mouth daily. 90 Tab 3    pantoprazole (PROTONIX) 40 mg tablet Take 1 Tab by mouth daily. 90 Tab 3    SITagliptin-metFORMIN (JANUMET XR) 50-1,000 mg TM24 Take 2 Tabs by mouth daily. 180 Tab 3    pravastatin (PRAVACHOL) 40 mg tablet Take 1 Tab by mouth nightly. Indications: high cholesterol 90 Tab 1    glucose blood VI test strips (BLOOD GLUCOSE TEST) strip Test blood sugar 1 x daily  DX E11.9 100 Strip 3    levoFLOXacin (QUIXIN) 0.5 % ophthalmic solution Administer 2 Drops to right eye every six (6) hours. use in affected eye(s) 5 mL 0    Lancets misc Accu-check  Fastclix Lancets. Test blood sugar daily. DX E11.9 100 Each 3    alcohol swabs (BD SINGLE USE SWABS REGULAR) padm Use daily to check blood sugar 100 Pad 4    Blood Glucose Control, Normal (ACCU-CHEK SMARTVIEW CONTRL SOL) soln Test blood sugar 1 x daily  DX E11.9        1 year supply  Check controls monthy on glucose meter 3 mL 4    Blood-Glucose Meter monitoring kit Accu-check Smartview Meter 1 Kit 0    gabapentin (NEURONTIN) 300 mg capsule TAKE 1 CAPSULE BY MOUTH THREE TIMES A DAY (Patient not taking: Reported on 10/22/2019) 90 Cap 0    triamcinolone acetonide (KENALOG) 0.025 % ointment Apply  to affected area two (2) times a day. use thin layer 30 g 0    halobetasol (ULTRAVATE) 0.05 % topical cream APPLY TO AFFECTED AREA TWICE A DAY AS NEEDED  1    TOBRADEX ST drps ophthalmix suspension INSTILL 1 DROP INTO LEFT EYE TWICE A DAY  0    hydrOXYzine pamoate (VISTARIL) 25 mg capsule Take 1 Cap by mouth three (3) times daily as needed for Itching.  (Patient not taking: Reported on 10/22/2019) 60 Cap 3       Allergies   Allergen Reactions    Latex Sneezing    Benzalkonium Chloride Hives     Pt denies    Neosporin [Hydrocortisone] Rash       Past Surgical History:   Procedure Laterality Date    HX OTHER SURGICAL gallstones removed    [place] COLONOSCOPY FLX DX W/COLLJ SPEC WHEN PFRMD  2-17-16    Dr. Lisa Delacruz       Past Medical History:   Diagnosis Date    Diabetes ([address]     GERD (gastroesophageal reflux disease)     Hx of colonic polyps     Hx of gallstones     Hypercholesterolemia     Hypertension     Restless leg syndrome     Wears dentures        Social History     Socioeconomic History    Marital status: SINGLE     Spouse name: Not on file    Number of children: Not on file    Years of education: Not on file    Highest education level: Not on file   Occupational History    Not on file   Social Needs    Financial resource strain: Not on file    Food insecurity:     Worry: Not on file     Inability: Not on file    Transportation needs:     Medical: Not on file     Non-medical: Not on file   Tobacco Use    Smoking status: Current Every Day Smoker     Packs/day: 0.50    Smokeless tobacco: Never Used   Substance and Sexual Activity    Alcohol use: Yes     Comment: occas.  Drug use: Yes     Types: Marijuana    Sexual activity: Not on file   Lifestyle    Physical activity:     Days per week: Not on file     Minutes per session: Not on file    Stress: Not on file   Relationships    Social connections:     Talks on phone: Not on file     Gets together: Not on file     Attends [employer] service: Not on file     Active member of club or organization: Not on file     Attends meetings of clubs or organizations: Not on file     Relationship status: Not on file    Intimate partner violence:     Fear of current or ex partner: Not on file     Emotionally abused: Not on file     Physically abused: Not on file     Forced sexual activity: Not on file   Other Topics Concern    Not on file   Social History Narrative    Not on file         REVIEW OF SYSTEMS:   CONSTITUTIONAL SYMPTOMS:  Negative. EYES:  Negative. EARS, NOSE, THROAT AND MOUTH:  Negative. CARDIOVASCULAR:  Negative.    RESPIRATORY: Negative. GENITOURINARY: Per HPI. GASTROINTESTINAL:  Per HPI. INTEGUMENTARY (SKIN AND/OR BREAST):  Negative. MUSCULOSKELETAL: Per HPI.   ENDOCRINE/RHEUMATOLOGIC:  Negative. NEUROLOGICAL:  Per HPI. HEMATOLOGIC/LYMPHATIC:  Negative. ALLERGIC/IMMUNOLOGIC:  Negative. PSYCHIATRIC:  Negative. PHYSICAL EXAMINATION:   Visit Vitals  /80 (BP 1 Location: Left arm, BP Patient Position: Sitting)   Pulse 82   Temp 97.9 °F (36.6 °C) (Oral)   Ht 5' 5\" (1.651 m)   Wt 219 lb (99.3 kg)   SpO2 99%   BMI 36.44 kg/m²    PAIN SCALE: 9/10    CONSTITUTIONAL: The patient is in no apparent distress and is alert and oriented x 3. HEENT: Normocephalic. Hearing grossly intact. NECK: Supple and symmetric. no tenderness, or masses were felt. RESPIRATORY: No labored breathing. CARDIOVASCULAR: The carotid pulses were normal. Peripheral pulses were 2+. CHEST: Normal AP diameter and normal contour without any kyphoscoliosis. LYMPHATIC: No lymphadenopathy was appreciated in the neck, axillae or groin. SKIN: Negative for scars, rashes, lesions, or ulcers on the right upper, right lower, left upper, left lower and trunk. NEUROLOGICAL: Alert and oriented x 3. Ambulation without assistive device. FWB. EXTREMITIES: See musculoskeletal.  MUSCULOSKELETAL:   Head and Neck: Neck pain. Negative for misalignment, asymmetry, crepitation, defects, tenderness masses or effusions.  Left Upper Extremity: Shoulder pain. Inspection, percussion and palpation performed. Talleys sign is negative.  Right Upper Extremity: Inspection, percussion and palpation performed. Talleys sign is negative.  Spine, Ribs and Pelvis: Inspection, percussion and palpation performed. Negative for misalignment, asymmetry, crepitation, defects, tenderness masses or effusions.  Left Lower Extremity: Inspection, percussion and palpation performed. Negative straight leg raise.    Right Lower Extremity: Inspection, percussion and palpation performed. Negative straight leg raise. SPINE EXAM:     Cervical spine: Neck is midline. Normal muscle tone. No focal atrophy is noted. Lumbar spine: No rash, ecchymosis, or gross obliquity. No focal atrophy is noted. ASSESSMENT    ICD-10-CM ICD-9-CM    1. S/P cervical spinal fusion Z98.1 V45.4 REFERRAL TO PHYSICAL THERAPY   2. Cervical spinal stenosis M48.02 723.0 REFERRAL TO PHYSICAL THERAPY   3. Lumbar pain M54.5 724.2 REFERRAL TO PHYSICAL THERAPY       Written by Sonal Nuno, as dictated by Deann Lu MD.    I, Dr. Deann Lu MD, confirm that all documentation is accurate.

## 2019-10-22 NOTE — PATIENT INSTRUCTIONS
Back Stretches: Exercises  Introduction  Here are some examples of exercises for stretching your back. Start each exercise slowly. Ease off the exercise if you start to have pain. Your doctor or physical therapist will tell you when you can start these exercises and which ones will work best for you. How to do the exercises  Overhead stretch    1. Stand comfortably with your feet shoulder-width apart. 2. Looking straight ahead, raise both arms over your head and reach toward the ceiling. Do not allow your head to tilt back. 3. Hold for 15 to 30 seconds, then lower your arms to your sides. 4. Repeat 2 to 4 times. Side stretch    1. Stand comfortably with your feet shoulder-width apart. 2. Raise one arm over your head, and then lean to the other side. 3. Slide your hand down your leg as you let the weight of your arm gently stretch your side muscles. Hold for 15 to 30 seconds. 4. Repeat 2 to 4 times on each side. Press-up    1. Lie on your stomach, supporting your body with your forearms. 2. Press your elbows down into the floor to raise your upper back. As you do this, relax your stomach muscles and allow your back to arch without using your back muscles. As your press up, do not let your hips or pelvis come off the floor. 3. Hold for 15 to 30 seconds, then relax. 4. Repeat 2 to 4 times. Relax and rest    1. Lie on your back with a rolled towel under your neck and a pillow under your knees. Extend your arms comfortably to your sides. 2. Relax and breathe normally. 3. Remain in this position for about 10 minutes. 4. If you can, do this 2 or 3 times each day. Follow-up care is a key part of your treatment and safety. Be sure to make and go to all appointments, and call your doctor if you are having problems. It's also a good idea to know your test results and keep a list of the medicines you take. Where can you learn more? Go to http://sumit-uli.info/.   Enter I749 in the search box to learn more about \"Back Stretches: Exercises. \"  Current as of: June 26, 2019  Content Version: 12.2  © 5768-4556 [employer]. Care instructions adapted under license by [employer] (which disclaims liability or warranty for this information). If you have questions about a medical condition or this instruction, always ask your healthcare professional. [address] any warranty or liability for your use of this information.

## 2019-10-29 ENCOUNTER — HOSPITAL ENCOUNTER (OUTPATIENT)
Dept: PHYSICAL THERAPY | Age: 69
Discharge: HOME | End: 2019-10-29
Payer: MEDICARE

## 2019-10-29 PROCEDURE — 97161 PT EVAL LOW COMPLEX 20 MIN: CPT

## 2019-10-29 PROCEDURE — 97535 SELF CARE MNGMENT TRAINING: CPT

## 2019-10-29 PROCEDURE — 97110 THERAPEUTIC EXERCISES: CPT

## 2019-10-29 NOTE — PROGRESS NOTES
[facility]  [address]  [place], [address]  [phone] fax    Plan of Care/ Statement of Necessity for Physical Therapy Services    Patient name: Michael Garcia Start of Care: 10/29/2019   Referral source: Keyla Mann MD : [id number]    Medical Diagnosis: Spinal stenosis, cervical region [M48.02]  Low back pain [M54.5]  Payor: Connor Zavala / Plan: [address] HMO / Product Type: Managed Care Medicare /  Onset Date: Cervical Fusion 2019, Injury onset 2018    Treatment Diagnosis: neck pain and limited ROM   Prior Hospitalization: see medical history Provider#: [id number]   Medications: Verified on Patient summary List    Comorbidities: HTN, diabetic, high cholesterol, recent cervical fusion 3 months ago, left 5th digit fusion, gall stone removal,  otherwise unremarkable per pt report   Prior Level of Function:  active without limitations      The Plan of Care and following information is based on the information from the initial evaluation. Assessment/ key information: Pt is a 71year old male presenting with reports of neck stiffness and discomfort that he reports began after a cervical fusion 3 months ago as the result of a fall in 2018. He reports the symptoms radiating down his arms have gone away since surgery, but report now complains of neck stiffness and soreness, particularly in the evening, and left sided soreness, particularly in his left shoulder that he attributes to lying on his side since surgery. He presents with limited cervical motion with discomfort in all planes and TTP left > right suboccipital musculature, scalenes, upper traps and left deltoid. Pt will benefit from skilled PT management to address their impairments and improve their level of function.     Evaluation Complexity History MEDIUM  Complexity : 1-2 comorbidities / personal factors will impact the outcome/ POC ; Examination MEDIUM Complexity : 3 Standardized tests and measures addressing body structure, function, activity limitation and / or participation in recreation  ;Presentation LOW Complexity : Stable, uncomplicated  ;Clinical Decision Making MEDIUM Complexity : FOTO score of 26-74  Overall Complexity Rating: LOW   Problem List: pain affecting function, decrease ROM, decrease ADL/ functional abilitiies, decrease activity tolerance and decrease flexibility/ joint mobility   Treatment Plan may include any combination of the following: Therapeutic exercise, Therapeutic activities, Neuromuscular re-education, Physical agent/modality, Manual therapy, Patient education and Self Care training  Patient / Family readiness to learn indicated by: asking questions, trying to perform skills and interest  Persons(s) to be included in education: patient (P)  Barriers to Learning/Limitations: None  Patient Goal (s):  \"I want to be a little more comfortable and be able to get back to silver sneakers like 1-2 days a week. \"  Patient Self Reported Health Status: fair  Rehabilitation Potential: fair    Short Term Goals: To be accomplished in 2 weeks:   1. Patient will report performance of HEP at least 2 times per day to facilitate improved outcomes and improved self management. Status @ eval: issued HEP  Long Term Goals: To be accomplished in 4 weeks:   1. Patient will report FOTO score of 43 or better to indicate significant improvement in functional status. Status @ eval:42   2. Pt will demonstrate B cervical rotation AROM to 50 degrees void of pain provocation to improve ease of functional gaze. Status @ eval: 37 right, 41 left with pain   3. Pt will report ability to sleep through the night undisturbed by pain to improve QOL   Status @ eval: pt reports sleep frequently disturbed  Frequency / Duration: Patient to be seen 2 times per week for 4 weeks.     Patient/ Caregiver education and instruction: Diagnosis, prognosis, self care, activity modification and exercises   [x]  Plan of care has been reviewed with PTA    Certification Period: 10/29/2019 - 11/27/2019  Camilla Gilbert PT, DPT, ATC 10/29/2019 4:27 PM    ________________________________________________________________________    I certify that the above Therapy Services are being furnished while the patient is under my care. I agree with the treatment plan and certify that this therapy is necessary.     [de-identified] Signature:____________________  Date:____________Time: _________    Please sign and return to [facility] [address] [place] [address] [place] [address], [facility] [address]  [phone] fax

## 2019-10-29 NOTE — PROGRESS NOTES
PT DAILY TREATMENT NOTE/CERVICAL [id number]    Patient Name: Jim Romo  Date:10/29/2019  : 1950  [x]  Patient  Verified  Payor: [employer]ICARE / Plan: [address] [place] [address] HMO / Product Type: Managed Care Medicare /    In time:12:08pm  Out time:12:46pm  Total Treatment Time (min): 38  Visit #: 1 of 8    Medicare/BCBS Only   Total Timed Codes (min):  23 1:1 Treatment Time:  38     Treatment Area: Spinal stenosis, cervical region [M48.02]  Low back pain [M54.5]    SUBJECTIVE  Pain Level (0-10 scale): 6-7/10  []constant []intermittent []improving []worsening []no change since onset    Any medication changes, allergies to medications, adverse drug reactions, diagnosis change, or new procedure performed?: [x] No    [] Yes (see summary sheet for update)  Subjective functional status/changes:      Pt is a 71year old male presenting with reports of neck stiffness and discomfort that he reports began after a cervical fusion 3 months ago as the result of a fall in 2018. He reports the symptoms radiating down his arms have gone away since surgery, but report now complains of neck stiffness and soreness, particularly in the evening, and left sided soreness, particularly in his left shoulder that he attributes to lying on his side since surgery. PLOF: active without limitations  Limitations to PLOF: limited daily activity 2/2 caution post surgery and 5 lb weight restriction  Mechanism of Injury: see above  Current symptoms/Complaints: see above  Previous Treatment/Compliance: PT prior to surgery, cervical fusion, self heat and ice use  PMHx/Surgical Hx: HTN, diabetic, high cholesterol, recent cervical fusion 3 months ago, left 5th digit fusion, gall stone removal,  otherwise unremarkable per pt report  Work Hx: see intake  Living Situation:  Pt Goals: \"I want to be a little more comfortable and be able to get back to silver sneakers like 1-2 days a week. \"  Barriers: []pain []financial []time []transportation []other  Motivation: appears motivated and cooperative  Substance use: []Alcohol []Tobacco []other:   FABQ Score: []low []elevate  Cognition: A & O x 4    Other:    OBJECTIVE/EXAMINATION  Domestic Life:   Activity/Recreational Limitations:   Mobility:   Self Care:     15 min [x]Eval                  []Re-Eval     13 min Therapeutic Exercise:  [] See flow sheet :   Rationale: increase ROM to improve the patients ability to improve ease of daily activity and reduce pain. Self Care: 10 minutes pt education regarding relevant anatomy, diagnosis, prognosis, plan of care, activity modification to facilitate pt self manangment. With   [] TE   [] TA   [] neuro   [] other: Patient Education: [x] Review HEP    [] Progressed/Changed HEP based on:   [] positioning   [] body mechanics   [] transfers   [] heat/ice application    [] other:      Other Objective/Functional Measures:    Physical Therapy Evaluation Cervical Spine     SUBJECTIVE  Chief Complaint:    Mechanism of injury:    Symptoms  Aggravated by:   [] Bending [] Sitting [] Standing [] Reaching Overhead   [x] Moving [] Cough [] Sneeze [] Eating   [] AM  [x] PM  Lying:  [] sup   [] pro   [x] sidelying left   [] Other:     Eased by:    [] Bending [] Sitting [] Standing Lying: [] sup  [] pro  [] sidelying   [] Moving [] AM  [] PM  [] Other:     General Health:  Red Flags Indicated? [] Yes    [x] No  [] Yes [] No Recent weight change (If yes, due to dieting?  [] Yes  [] No)   [] Yes [] No Persistent cough  [] Yes [] No Unremitting pain at night  [] Yes [] No Dizziness  [] Yes [] No Blurred vision  [] Yes [] No Hands more cold or painful in cold weather  [] Yes [] No Ringing in ears  [] Yes [] No Difficulty swallowing  [] Yes [] No Dysfunction of bowel or bladder  [] Yes [] No Recent illness within past 3 weeks (i.e, cold, flu)  [] Yes [] No Jaw pain    Past History/Treatments:    Diagnostic Tests: [] Lab work [x] X-rays    [] CT [] MRI     [] Other:  Results: see in EPIC    Functional Status  Prior level of function:  Present functional limitations:  What position do you sleep in?:    Headaches: Do you have headaches? [x] Yes   [] No  How often do you get headaches?  varies  How long does the headache last?  5 minutes  What aggravates it? Frustration sometimes can  What relieves it? Does the headache coincide with any other symptoms (visual disturbances, light sensitivity)? Where is the headache? Does it change locations?   Other:    OBJECTIVE  Posture: [] WNL  Head Position: forward head posture  Shoulder/Scapular Position:  C-Kyphosis:  [] increased   [] decreased   C-Lordosis:   [] increased   [] decreased  T-Kyphosis:  [] increased   [] decreased  T-Lordosis:   [] increased   [] decreased     TMJ: [x] N/A [] Abnormal - ROM:   Palpation:    Cervical Retraction: [] WNL    [x] Abnormal: limited motion    Shoulder/Scapular Screen: [x] WNL    [] Abnormal:    Active Movements: [] N/A   [] Too acute   [] Other:  ROM % AROM % PROM Comments:pain, area   Forward flexion 30  Sharp discomfort in lowe neck   Extension 25  Sharp discomfort in lowe neck   SB right 10  Sharp discomfort in lowe neck   SB left  8  Sharp discomfort in lowe neck   Rotation right 37  Sharp discomfort in lowe neck   Rotation left 41  Sharp discomfort in lowe neck     Thoracic Spine: [] N/A    [] WNL   [x] Other: limited generalized thoracic mobility grossly assessed    PROM:    Palpation:  [] Min  [x] Mod  [] Severe    Location: left > right suboccipital musculature, scalenes, upper traps and left deltoid  [] Min  [] Mod  [] Severe    Location:  [] Min  [] Mod  [] Severe    Location:    Neuro Screen (myotome/dematome/felexes): [] WNL  Myotome Level Muscle Test Myotome Level Muscle Test   C5 Shoulder Adduction - Deltoid C8 Finger Flexors   C6 Wrist Extension T1 Finger Abduction - Interossei   C7 Elbow Extension     Comments:  Upper Limb Tension Tests: [] N/A Ulnar: [] R    [] L    [] +    [] -       Median: [] R    [] L    [] +    [] -       Radial: [] R    [] L    [] +    [] -    Special Tests:  Cervical:        Vertebral Artery:  [] R    [] L    [] +    [] -       Alar Ligament: [] R    [] L    [] +    [] -       Transverse Lig: [] R    [] L    [] +    [] -       Spurling's:  [] R    [] L    [] +    [] -       Distraction:  [] R    [] L    [] +    [] -       Compression: [] R    [] L    [] +    [] -    Thoracic Outlet Tests: [] N/A       Adson's:  [] R    [] L    [] +    [] -       Hyperabduction: [] R    [] L    [] +    [] -       Danish's:  [] R    [] L    [] +    [] -       Ruma:  [] R    [] L    [] +    [] -    Diaphragmatic Breathing: [] Normal    [] Abnormal    Muscle Flexibility: [] N/A   Scalenes: [] WNL    [x] Tight    [x] R    [x] L   Upper Trap: [] WNL    [x] Tight    [x] R    [x] L   Levator: [] WNL    [x] Tight    [x] R    [x] L   Pect. Minor: [] WNL    [] Tight    [] R    [] L    Global Muscular Weakness: [] N/A   Lower Trap: 4/5 bilat   Rhomboids: 4+/5 bilat   Middle Trap: 4+/5 bilat   Serratus Ant: 5/5 bilat   Ext Rotators: 4/5 bilat   Other:    Other tests/comments:    Pain Level (0-10 scale) post treatment: 6/10    ASSESSMENT/Changes in Function: Per POC. Patient will continue to benefit from skilled PT services to modify and progress therapeutic interventions, address functional mobility deficits, address ROM deficits, address strength deficits, analyze and address soft tissue restrictions, analyze and cue movement patterns, analyze and modify body mechanics/ergonomics, assess and modify postural abnormalities and instruct in home and community integration to attain remaining goals. []  See Plan of Care  []  See progress note/recertification  []  See Discharge Summary         Progress towards goals / Updated goals:  PER POC.      PLAN  []  Upgrade activities as tolerated     [x]  Continue plan of care  []  Update interventions per flow sheet []  Discharge due to:_  []  Other:_      Osvaldo Lynch PT, DPT, ATC 10/29/2019  12:16 PM

## 2019-10-31 ENCOUNTER — APPOINTMENT (OUTPATIENT)
Dept: PHYSICAL THERAPY | Age: 69
End: 2019-10-31
Payer: MEDICARE

## 2019-11-01 ENCOUNTER — HOSPITAL ENCOUNTER (OUTPATIENT)
Dept: PHYSICAL THERAPY | Age: 69
Discharge: HOME | End: 2019-11-01
Payer: MEDICARE

## 2019-11-01 PROCEDURE — 97110 THERAPEUTIC EXERCISES: CPT

## 2019-11-01 PROCEDURE — 97140 MANUAL THERAPY 1/> REGIONS: CPT

## 2019-11-01 NOTE — PROGRESS NOTES
PT DAILY TREATMENT NOTE 10-18    Patient Name: Michael Garcia  Date:2019  : 1950  [x]  Patient  Verified  Payor: Connor Zavala / Plan: [address] [place] [address] HMO / Product Type: Managed Care Medicare /    In time:2:34  Out time:3:08  Total Treatment Time (min): 34  Visit #: 2 of 8    Medicare/BCBS Only   Total Timed Codes (min):  34 1:1 Treatment Time:  34       Treatment Area: Spinal stenosis, cervical region [M48.02]  Low back pain [M54.5]    SUBJECTIVE  Pain Level (0-10 scale): 10  Any medication changes, allergies to medications, adverse drug reactions, diagnosis change, or new procedure performed?: [x] No    [] Yes (see summary sheet for update)  Subjective functional status/changes:   [] No changes reported  The patient states that his neck bothers him especially at night. He reports having poain through the upper left side of his left shoulder. He states that his nerves to his mid back and upper back are shooting all over his back at night. OBJECTIVE  26 min Therapeutic Exercise:  [x] See flow sheet :   Rationale: increase ROM and increase strength to improve the patients ability to improve ADL ease. 8 min Manual Therapy:  Cervical spine PROM rotation in supine position, STM with MFR into scalenes and UT as well as LS with trigger point release. Rationale: decrease pain, increase ROM, increase tissue extensibility and decrease trigger points to improve ease of driving. With   [] TE   [] TA   [] neuro   [] other: Patient Education: [x] Review HEP    [] Progressed/Changed HEP based on:   [] positioning   [] body mechanics   [] transfers   [] heat/ice application    [] other:      Other Objective/Functional Measures:   Opted to hold tuck lifts due to discomfort through left shoulder upon lifts. Cues required in order to perform SA presses into wall, with patient performing correctly 50% of the time post cues.       Pain Level (0-10 scale) post treatment: 6/10    ASSESSMENT/Changes in Function: Slight decrease in pain post treatment. Notable forward posture and forward head of the patient. He does report compliance with HEP. Patient will continue to benefit from skilled PT services to modify and progress therapeutic interventions, address functional mobility deficits, address ROM deficits, address strength deficits, analyze and address soft tissue restrictions, analyze and cue movement patterns, analyze and modify body mechanics/ergonomics, assess and modify postural abnormalities and instruct in home and community integration to attain remaining goals. [x]  See Plan of Care  []  See progress note/recertification  []  See Discharge Summary         Progress towards goals / Updated goals:  Short Term Goals: To be accomplished in 2 weeks:              1. Patient will report performance of HEP at least 2 times per day to facilitate improved outcomes and improved self management. Status @ eval: issued HEP   Current: MET; compliance reported. Long Term Goals: To be accomplished in 4 weeks:              1. Patient will report FOTO score of 43 or better to indicate significant improvement in functional status. Status @ eval:42              2. Pt will demonstrate B cervical rotation AROM to 50 degrees void of pain provocation to improve ease of functional gaze.               Status @ eval: 37 right, 41 left with pain              3. Pt will report ability to sleep through the night undisturbed by pain to improve QOL              Status @ eval: pt reports sleep frequently disturbed    PLAN  []  Upgrade activities as tolerated     [x]  Continue plan of care  []  Update interventions per flow sheet       []  Discharge due to:_  []  Other:_      Conner Pang, PT 11/1/2019  2:52 PM    Future Appointments   Date Time Provider Yandel [place]   11/5/2019 10:00 AM Kathryn Hogue PTA [facility]   11/8/2019  9:30 AM Jose Ramon Schroeder [facility]   11/12/2019 10:00 AM Tamia Jeffrey, PT [facility]   11/14/2019 10:30 AM Tamia Jeffrey, PT [facility]   11/18/2019  9:30 AM Lamar Altman [facility]   11/20/2019 10:30 AM Bashir Aguiar PTA [facility]   1/3/2020  8:30 AM [facility]URSE [facility]   1/10/2020  8:15 AM Rebekah Hudson MD [facility] SCH[facility]   1/21/2020 11:15 AM Cristian Arredondo  [address]

## 2019-11-05 ENCOUNTER — HOSPITAL ENCOUNTER (OUTPATIENT)
Dept: PHYSICAL THERAPY | Age: 69
Discharge: HOME | End: 2019-11-05
Payer: MEDICARE

## 2019-11-05 PROCEDURE — 97110 THERAPEUTIC EXERCISES: CPT

## 2019-11-05 PROCEDURE — 97140 MANUAL THERAPY 1/> REGIONS: CPT

## 2019-11-05 NOTE — PROGRESS NOTES
PT DAILY TREATMENT NOTE 10-18    Patient Name: Lola Ibrahim  Date:2019  : 1950  [x]  Patient  Verified  Payor: Yessenia Loki / Plan: [address] [place] [address] HMO / Product Type: Managed Care Medicare /    In time:10:00  Out time:10:38  Total Treatment Time (min): 38  Visit #: 3 of 8    Medicare/BCBS Only   Total Timed Codes (min):  38 1:1 Treatment Time:  38     Treatment Area: Spinal stenosis, cervical region [M48.02]  Low back pain [M54.5]    SUBJECTIVE  Pain Level (0-10 scale): 7/10  Any medication changes, allergies to medications, adverse drug reactions, diagnosis change, or new procedure performed?: [x] No    [] Yes (see summary sheet for update)  Subjective functional status/changes:   [] No changes reported  \"Neck is about a 7 right now. But on Friday morning I woke up with my left hip hurting, that's like a 12. \"    OBJECTIVE    30 min Therapeutic Exercise:  [x] See flow sheet :   Rationale: increase ROM, increase strength and increase proprioception to improve the patients ability to perform ADL's.    8 min Manual Therapy:  STM/DTM (B) UT, lev scap, scalenes, C/S paraspinals with pt in supine. Rationale: decrease pain, increase ROM and increase tissue extensibility to improve ease of looking at blind spots while driving. With   [x] TE   [] TA   [] neuro   [] other: Patient Education: [x] Review HEP    [] Progressed/Changed HEP based on:   [] positioning   [] body mechanics   [] transfers   [] heat/ice application    [] other:      Other Objective/Functional Measures: Added side-lying shoulder PRE's and supine nod/lift 10 reps each. PD modalities. Pain Level (0-10 scale) post treatment: 7/10    ASSESSMENT/Changes in Function: Demonstrated good mechanics with wall SA punches today. No change in pain level post-treatment. Several trigger points noted in neck musculature.  Continue PT to decrease mm tension, increase strength/stability to improve overall ease of performing ADL's and driving. Patient will continue to benefit from skilled PT services to modify and progress therapeutic interventions, address functional mobility deficits, address ROM deficits, address strength deficits, analyze and address soft tissue restrictions and analyze and modify body mechanics/ergonomics to attain remaining goals. [x]  See Plan of Care  []  See progress note/recertification  []  See Discharge Summary         Progress towards goals / Updated goals:  Short Term Goals: To be accomplished in 2 weeks:              4. Patient will report performance of HEP at least 2 times per day to facilitate improved outcomes and improved self management.              Status @ eval: issued HEP              Current: MET; compliance reported. 11/1/2019  Long Term Goals: To be accomplished in 4 weeks:              1. Patient will report FOTO score of 43 or better to indicate significant improvement in functional status.               DGFVGX @ eval:42   Current:              8. Pt will demonstrate B cervical rotation AROM to 50 degrees void of pain provocation to improve ease of functional gaze.              Status @ eval: 37 right, 41 left with pain   Current:              3. Pt will report ability to sleep through the night undisturbed by pain to improve QOL              Status @ eval: pt reports sleep frequently disturbed   Current:    PLAN  []  Upgrade activities as tolerated     [x]  Continue plan of care  []  Update interventions per flow sheet       []  Discharge due to:_  []  Other:_      Rina Walters PTA 11/5/2019  9:45 AM    Future Appointments   Date Time Provider Yandel [place]   11/5/2019 10:00 AM Unique Mcgill, PTA [facility]   11/8/2019  9:30 AM Alexia Polanco [facility]   11/12/2019 10:00 AM Chela Whitt, PT [facility]   11/14/2019 10:30 [facility]   11/18/2019  9:30 AM Alexia Polanco [facility]   11/20/2019 10:30 AM Marylou Vivian Wynne, PTA [facility]   1/3/2020  8:30 AM [facility] NURSE [facility]   1/10/2020  8:15 AM Hu Doran MD [facility]   1/21/2020 11:15 AM Queta Maki  [address]

## 2019-11-08 ENCOUNTER — HOSPITAL ENCOUNTER (OUTPATIENT)
Dept: PHYSICAL THERAPY | Age: 69
Discharge: HOME | End: 2019-11-08
Payer: MEDICARE

## 2019-11-08 PROCEDURE — 97110 THERAPEUTIC EXERCISES: CPT

## 2019-11-08 PROCEDURE — 97164 PT RE-EVAL EST PLAN CARE: CPT

## 2019-11-08 NOTE — PROGRESS NOTES
[facility]  [address]  [place], [address]  [phone] fax    Continued Plan of Care/ Re-certification for Physical Therapy Services    Patient name: Parrish Soto Start of Care: 10/29/2019   Referral source: Caroline Bond MD : 1950   Medical/Treatment Diagnosis: Spinal stenosis, cervical region [M48.02]  Low back pain [M54.5]  Payor: Mayra Collier / Plan: [address] HMO / Product Type: Managed Care Medicare /  Onset Date:Cervical Fusion 2019, Injury onset 2018          Prior Hospitalization: see medical history Provider#: [id number]   Medications: Verified on Patient Summary List     Comorbidities: HTN, diabetic, high cholesterol, recent cervical fusion 3 months ago, left 5th digit fusion, gall stone removal,  otherwise unremarkable per pt report   Prior Level of Function:  active without limitations  Visits from Start of Care: 4    Missed Visits: 1    The Plan of Care and following information is based on the patient's current status:  Short Term Goals: To be accomplished in 2 weeks:              1. Patient will report performance of HEP at least 2 times per day to facilitate improved outcomes and improved self management.              Status @ eval: issued HEP              MGFDMMR: MET; compliance reported. 2019  Long Term Goals: To be accomplished in 4 weeks:              1. Patient will report FOTO score of 43 or better to indicate significant improvement in functional status.               XOLIZB @ eval:42              KTZHJOT:              4. Pt will demonstrate B cervical rotation AROM to 50 degrees void of pain provocation to improve ease of functional gaze.              Status @ eval: 37 right, 41 left with pain              Current:              3. Pt will report ability to sleep through the night undisturbed by pain to improve QOL              Status @ eval: pt reports sleep frequently disturbed              Current:    Key functional changes:     Sensation: WNL sensation to light touch in BLEs     Strength: WNL myotomal strength in BLEs     Trunk ROM: limited to 50% rotation right, 25% left with increased Left LBP bilat, SB 75% right 50% left with increased left LBP to left, trunk flexion fingers to knees with increased left post thigh and leg aching pain and tingling and left LBP     Ext:25 % with increased left LBP and tingling into post thigh and leg to calf     (+) left quadrant sign in ext     (+) left unilateral SLR, (-) Left slump test         Problems/ barriers to goal attainment: onset of LBP and LLE paresthesias     Problem List: pain affecting function, decrease ROM, decrease strength, decrease ADL/ functional abilitiies, decrease activity tolerance and decrease flexibility/ joint mobility    Treatment Plan: Therapeutic exercise, Therapeutic activities, Neuromuscular re-education, Physical agent/modality, Manual therapy, Aquatic therapy, Patient education and Self Care training     Patient Goal (s) has been updated and includes: \"I want to be a little more comfortable and be able to get back to silver sneakers like 1-2 days a week. \"     Goals for this certification period to be accomplished in 4 weeks:  Progress towards goals / Updated goals:  Short Term Goals: To be accomplished in 2 weeks:              8. Patient will report performance of HEP at least 2 times per day to facilitate improved outcomes and improved self management.              Status @ eval: issued HEP              VGOMGFZ: MET; compliance reported. 11/1/2019  Long Term Goals: To be accomplished in 4 weeks:              1. Patient will report FOTO score of 43 or better to indicate significant improvement in functional status.               AYUNDU @ eval:42              EUXTDQE:              8. Pt will demonstrate B cervical rotation AROM to 50 degrees void of pain provocation to improve ease of functional gaze.              SVNSHQ @ eval: 37 right, 41 left with pain              Current:              3. Pt will report ability to sleep through the night undisturbed by pain to improve QOL              Status @ eval: pt reports sleep frequently disturbed              Current:   4. Pt will demonstrate ability to perform trunk flexion finger to ankles void of LBP or LLE pain provocation to improve QOL. Frequency / Duration: Patient to be seen 2 times per week for 4 weeks:    Assessment / Recommendations:Pt reports onset of LBP with left LE tingling, aching, and numbness over the last week. Upon re-assessment, he presents with some indications of potential left lumbosacral radiculopathy as evidenced by pain limited trunk ROM, (+) left quadrant test in ext, (+) unilateral SLR on left with relief of LLE paresthesias noted with prolonged lumbar flexion and lumbar distraction. Will plan to incorporate treatment for these impairments over course of upcoming treatment and assess pt response. Treatment to address prior impairments noted in C/S as well as newly reported L/S related impairments. Certification Period: 11/9/2019 - 12/7/2019    Sarah Montenegro, PT ,DPT, ATC 11/8/2019 11:58 AM    ________________________________________________________________________  I certify that the above Therapy Services are being furnished while the patient is under my care. I agree with the treatment plan and certify that this therapy is necessary. [] I have read the above and request that my patient continue as recommended.   [] I have read the above report and request that my patient continue therapy with the following changes/special instructions: _____________________________________________  [] I have read the above report and request that my patient be discharged from therapy    Physician's Signature:____________Date:_________TIME:________    ** Signature, Date and Time must be completed for valid certification **    Please sign and return to In [address] [place] [address] Way  [address]  [place], [address]  [phone] fax

## 2019-11-08 NOTE — PROGRESS NOTES
PT DAILY TREATMENT NOTE 10-18    Patient Name: Lauryn Durbin  Date:2019  : 1950  [x]  Patient  Verified  Payor: Anu Garcia / Plan: [address] [place] [address] HMO / Product Type: Managed Care Medicare /    In time:9:30am  Out time:10:02am  Total Treatment Time (min): 32  Visit #: 4 of 8    Medicare/BCBS Only   Total Timed Codes (min):  17 1:1 Treatment Time:  28       Treatment Area: Spinal stenosis, cervical region [M48.02]  Low back pain [M54.5]    SUBJECTIVE  Pain Level (0-10 scale): 10/10  Any medication changes, allergies to medications, adverse drug reactions, diagnosis change, or new procedure performed?: [x] No    [] Yes (see summary sheet for update)  Subjective functional status/changes:   [] No changes reported  Pt reports 10/10 pain since last night. He reports he has neck pain on both sides that radiates down his back into his left leg with reports of numbness in his LLE down to the back of his calf. He reports this has happened previously, but had gone away prior to Saturday. He reports intermittent radiating pains in his left back through to his neck as well. He reports the left leg pain started last Saturday insidiously. OBJECTIVE    15 min -Eval                 X -Re-Eval     9 min Therapeutic Exercise:  [x] See flow sheet : HEP handout instruction for L/S   Rationale: increase ROM and increase strength to improve the patients ability to improve ease of daily activity. 8 min Manual Therapy:  L/S manual traction in supine   Rationale: decrease pain, increase ROM and increase tissue extensibility to reduce LE paresthesias and improve pain. With   [] TE   [] TA   [] neuro   [] other: Patient Education: [x] Review HEP    [] Progressed/Changed HEP based on:   [] positioning   [] body mechanics   [] transfers   [] heat/ice application    [] other:      Other Objective/Functional Measures:     Sensation: WNL sensation to light touch in BLEs    Strength:  WNL myotomal strength in BLEs    Trunk ROM: limited to 50% rotation right, 25% left with increased Left LBP bilat, SB 75% right 50% left with increased left LBP to left, trunk flexion fingers to knees with increased left post thigh and leg aching pain and tingling and left LBP    Ext:25 % with increased left LBP and tingling into post thigh and leg to calf    (+) left quadrant sign in ext    (+) left unilateral SLR, (-) Left slump test     Pain Level (0-10 scale) post treatment: 8/10    ASSESSMENT/Changes in Function: Pt reports onset of LBP with left LE tingling, aching, and numbness over the last week. Upon re-assessment, he presents with some indications of potential left lumbosacral radiculopathy as evidenced by pain limited trunk ROM, (+) left quadrant test in ext, (+) unilateral SLR on left with relief of LLE paresthesias noted with prolonged lumbar flexion and lumbar distraction. Will plan to incorporate treatment for these impairments over course of upcoming treatment and assess pt response. Limited c/s treatment 2' re-evaluation and treatment of back related symptoms (included in referral but no symptoms reported during initial evaluation). Patient will continue to benefit from skilled PT services to modify and progress therapeutic interventions, address functional mobility deficits, address ROM deficits, address strength deficits, analyze and address soft tissue restrictions, analyze and cue movement patterns and instruct in home and community integration to attain remaining goals. []  See Plan of Care  []  See progress note/recertification  []  See Discharge Summary         Progress towards goals / Updated goals:  Short Term Goals: To be accomplished in 2 weeks:              0. Patient will report performance of HEP at least 2 times per day to facilitate improved outcomes and improved self management.              Status @ eval: issued HEP              RSQUKDM: MET; compliance reported. 11/1/2019  Long Term Goals: To be accomplished in 4 weeks:              1. Patient will report FOTO score of 43 or better to indicate significant improvement in functional status.               CPYDMA @ eval:42              Current:              6. Pt will demonstrate B cervical rotation AROM to 50 degrees void of pain provocation to improve ease of functional gaze.              Status @ eval: 37 right, 41 left with pain              Current:              3. Pt will report ability to sleep through the night undisturbed by pain to improve QOL              Status @ eval: pt reports sleep frequently disturbed              Current:    PLAN  []  Upgrade activities as tolerated     [x]  Continue plan of care  []  Update interventions per flow sheet       []  Discharge due to:_  [x]  Other:_add L/s treatment with flexion bias and manual lumbar traction to start     Nora Hernandez, PT, DPT, ATC 11/8/2019  9:39 AM    Future Appointments   Date Time Provider [place]   11/12/2019 10:00 [facility]   11/14/2019 10:30 AM Kwesi Gonzalez PT [facility]   11/18/2019  9:30 AM Stoney Leak [facility]   11/20/2019 10:30 AM [facility]   1/3/2020  8:30 AM [facility] NURSE [facility]   1/10/2020  8:15 AM Justin Lazar MD [facility]   1/21/2020 11:15 AM Chris Brito  [address]

## 2019-11-12 ENCOUNTER — HOSPITAL ENCOUNTER (OUTPATIENT)
Dept: PHYSICAL THERAPY | Age: 69
Discharge: HOME | End: 2019-11-12
Payer: MEDICARE

## 2019-11-12 PROCEDURE — 97140 MANUAL THERAPY 1/> REGIONS: CPT

## 2019-11-12 PROCEDURE — 97110 THERAPEUTIC EXERCISES: CPT

## 2019-11-12 NOTE — PROGRESS NOTES
PT DAILY TREATMENT NOTE 10-18    Patient Name: Lupillo Masters  Date:2019  : 1950  [x]  Patient  Verified  Payor: Irena Bateman / Plan: [address] [place] [address] [employer] HMO / Product Type: Managed Care Medicare /    In time:10:00  Out time:1045  Total Treatment Time (min): 45  Visit #: 1 of 8    Medicare/BCBS Only   Total Timed Codes (min):  45 1:1 Treatment Time:  40       Treatment Area: Spinal stenosis, cervical region [M48.02]  Low back pain [M54.5]    SUBJECTIVE  Pain Level (0-10 scale): 7-8/10 for c/s,  5/10 for L/S  Any medication changes, allergies to medications, adverse drug reactions, diagnosis change, or new procedure performed?: [x] No    [] Yes (see summary sheet for update)  Subjective functional status/changes:   [] No changes reported  Pt reports his low back has been doing better since last session. C/o pain in the c/s at night with trying to sleep. OBJECTIVE      35 min Therapeutic Exercise:  [x] See flow sheet :   Rationale: increase ROM and increase strength to improve the patients ability to perform functional tasks    10 min Manual Therapy:  STM/DTM B UT, levator, paraspinals    Rationale: decrease pain, increase ROM and increase tissue extensibility to improve functional tasks. With   [] TE   [] TA   [] neuro   [] other: Patient Education: [x] Review HEP    [] Progressed/Changed HEP based on:   [] positioning   [] body mechanics   [] transfers   [] heat/ice application    [] other:      Other Objective/Functional Measures: added SBx3, increased reps per flow sheet     Pain Level (0-10 scale) post treatment: 4-5/10 lumbar and c/s    ASSESSMENT/Changes in Function: pt with good relief following today's treatment. Much improvement noted in lumbar pain.      Patient will continue to benefit from skilled PT services to modify and progress therapeutic interventions, address functional mobility deficits, address ROM deficits, address strength deficits, analyze and address soft tissue restrictions, analyze and cue movement patterns, analyze and modify body mechanics/ergonomics and assess and modify postural abnormalities to attain remaining goals. []  See Plan of Care  []  See progress note/recertification  []  See Discharge Summary         Progress towards goals / Updated goals:  Goals for this certification period to be accomplished in 4 weeks:              1. Patient will report FOTO score of 43 or better to indicate significant improvement in functional status.             ALTIGJ @ recert:42              VSZKLMX:              4. Pt will demonstrate B cervical rotation AROM to 50 degrees void of pain provocation to improve ease of functional gaze.              Status @ recert: 37 right, 41 left with pain              Current:              3. Pt will report ability to sleep through the night undisturbed by pain to improve QOL              Status @ recert: pt reports sleep frequently disturbed              Current: pt reports frequent disruption on 11-12-19               4. Pt will demonstrate ability to perform trunk flexion finger to ankles void of LBP or LLE pain provocation to improve QOL   . Status @ recert: limited and painful       PLAN  []  Upgrade activities as tolerated     [x]  Continue plan of care  []  Update interventions per flow sheet       []  Discharge due to:_  []  Other:_      Sammi Pedersen, PT 11/12/2019  10:06 AM    Future Appointments   Date Time Provider [place]   11/14/2019 10:30 AM Eda Skiff, PT [facility]   11/18/2019  9:30 AM Melissa Garcia [facility]   11/20/2019 10:30 AM Ara Fonseca PTA [facility]   1/3/2020  8:30 AM [facility] NURSE [facility]   1/10/2020  8:15 AM Sarah Morfin MD 11 [facility]   1/21/2020 11:15 AM Chris Herbert  [address]

## 2019-11-14 ENCOUNTER — HOSPITAL ENCOUNTER (OUTPATIENT)
Dept: PHYSICAL THERAPY | Age: 69
Discharge: HOME | End: 2019-11-14
Payer: MEDICARE

## 2019-11-14 PROCEDURE — 97140 MANUAL THERAPY 1/> REGIONS: CPT

## 2019-11-14 PROCEDURE — 97110 THERAPEUTIC EXERCISES: CPT

## 2019-11-14 NOTE — PROGRESS NOTES
PT DAILY TREATMENT NOTE 10-18    Patient Name: Blanca Luke  Date:2019  : 1950  [x]  Patient  Verified  Payor: Rubi Dwyer / Plan: [address] [place] [address] HMO / Product Type: Managed Care Medicare /    In time:1030  Out time:1117  Total Treatment Time (min): 47  Visit #: 2 of 8    Medicare/BCBS Only   Total Timed Codes (min):  47 1:1 Treatment Time:  40       Treatment Area: Low back pain [M54.5]  Cervicalgia [M54.2]    SUBJECTIVE  Pain Level (0-10 scale): 7  Any medication changes, allergies to medications, adverse drug reactions, diagnosis change, or new procedure performed?: [x] No    [] Yes (see summary sheet for update)  Subjective functional status/changes:   [] No changes reported  Pt reports good relief after last session. He reports pain increased at night. He feels he is showing some improvement. Pain is mostly on the left c/s per pt report.      OBJECTIVE    Modality rationale:    Min Type Additional Details    [] Estim:  []Unatt       []IFC  []Premod                        []Other:  []w/ice   []w/heat  Position:  Location:    [] Estim: []Att    []TENS instruct  []NMES                    []Other:  []w/US   []w/ice   []w/heat  Position:  Location:    []  Traction: [] Cervical       []Lumbar                       [] Prone          []Supine                       []Intermittent   []Continuous Lbs:  [] before manual  [] after manual    []  Ultrasound: []Continuous   [] Pulsed                           []1MHz   []3MHz W/cm2:  Location:    []  Iontophoresis with dexamethasone         Location: [] Take home patch   [] In clinic    []  Ice     []  heat  []  Ice massage  []  Laser   []  Anodyne Position:  Location:    []  Laser with stim  []  Other:  Position:  Location:    []  Vasopneumatic Device Pressure:       [] lo [] med [] hi   Temperature: [] lo [] med [] hi   [] Skin assessment post-treatment:  []intact []redness- no adverse reaction    []redness  adverse reaction: 37 min Therapeutic Exercise:  [x] See flow sheet :   Rationale: increase ROM and increase strength to improve the patients ability to perform functional tasks     10 min Manual Therapy:  STM/DTM TrP release UT, c/s paraspinals with focus on left   Rationale: decrease pain, increase ROM and increase tissue extensibility to improve functional tasks. With   [] TE   [] TA   [] neuro   [] other: Patient Education: [x] Review HEP    [] Progressed/Changed HEP based on:   [] positioning   [] body mechanics   [] transfers   [] heat/ice application    [] other:      Other Objective/Functional Measures: increased reps per flow sheet     Pain Level (0-10 scale) post treatment: 5    ASSESSMENT/Changes in Function: decreased pain level following manual. C/s rotation is progressing as noted below. Multiple latent and active trigger points in the left c/s musculature and UT. Patient will continue to benefit from skilled PT services to modify and progress therapeutic interventions, address functional mobility deficits, address ROM deficits, address strength deficits, analyze and address soft tissue restrictions, analyze and cue movement patterns, analyze and modify body mechanics/ergonomics and assess and modify postural abnormalities to attain remaining goals. []  See Plan of Care  []  See progress note/recertification  []  See Discharge Summary         Progress towards goals / Updated goals:  Goals for this certification period to be accomplished in 4 weeks:              1. Patient will report FOTO score of 43 or better to indicate significant improvement in functional status.             UPRZLL @ recert:42              OAHUOBU:              0. Pt will demonstrate B cervical rotation AROM to 50 degrees void of pain provocation to improve ease of functional gaze.              Status @ recert: 37 right, 41 left with pain              Current:  Right 45 degrees, left 50 degrees on 11-14-19              3. Pt will report ability to sleep through the night undisturbed by pain to improve QOL              Status @ recert: pt reports sleep frequently disturbed              Current: pt reports frequent disruption on 11-12-19               4. Pt will demonstrate ability to perform trunk flexion finger to ankles void of LBP or LLE pain provocation to improve QOL              . Status @ recert: limited and painful    PLAN  []  Upgrade activities as tolerated     [x]  Continue plan of care  []  Update interventions per flow sheet       []  Discharge due to:_  []  Other:_      Tien Rodrigez, PT 11/14/2019  10:49 AM    Future Appointments   Date Time Provider Y[place]   11/18/2019  9:30 AM [facility]   11/20/2019 10:30 AM [facility]   1/3/2020  8:30 AM [facility] NURSE [facility]   1/10/2020  8:15 AM Korin Bartlett MD [facility]   1/21/2020 11:15 AM Dominick Pate  [address]

## 2019-11-18 ENCOUNTER — HOSPITAL ENCOUNTER (OUTPATIENT)
Dept: PHYSICAL THERAPY | Age: 69
Discharge: HOME | End: 2019-11-18
Payer: MEDICARE

## 2019-11-18 PROCEDURE — 97140 MANUAL THERAPY 1/> REGIONS: CPT

## 2019-11-18 PROCEDURE — 97110 THERAPEUTIC EXERCISES: CPT

## 2019-11-18 NOTE — PROGRESS NOTES
PT DAILY TREATMENT NOTE 10-18    Patient Name: Jazzmine Sawyer  Date:2019  : 1950  [x]  Patient  Verified  Payor: Florine Lombard / Plan: [address] [place] [address] HMO / Product Type: Managed Care Medicare /    In time:9:30  Out time:10:10  Total Treatment Time (min): 40  Visit #: 3 of 8    Medicare/BCBS Only   Total Timed Codes (min):  40 1:1 Treatment Time:  28       Treatment Area: Low back pain [M54.5]  Cervicalgia [M54.2]    SUBJECTIVE  Pain Level (0-10 scale): 6  Any medication changes, allergies to medications, adverse drug reactions, diagnosis change, or new procedure performed?: [x] No    [] Yes (see summary sheet for update)  Subjective functional status/changes:   [] No changes reported  Pt reports having a good weekend and felt much better    OBJECTIVE    30 min Therapeutic Exercise:  [x] See flow sheet :   Rationale: increase ROM and increase strength to improve the patients ability to perform ADLs    10 min Manual Therapy:  STM/DTM to left c/s paraspinals    Rationale: decrease pain, increase ROM and increase tissue extensibility to improve functional mobility            With   [] TE   [] TA   [] neuro   [] other: Patient Education: [x] Review HEP    [] Progressed/Changed HEP based on:   [] positioning   [] body mechanics   [] transfers   [] heat/ice application    [] other:      Other Objective/Functional Measures:   Incr'd reps per flow sheet     Pain Level (0-10 scale) post treatment: 5    ASSESSMENT/Changes in Function: Pt reports decr's ms tension throughout the day and is compliant with stretching at home. Cont's to report c/s pain left > right and mainly in the evening.      Patient will continue to benefit from skilled PT services to modify and progress therapeutic interventions, address functional mobility deficits, address ROM deficits, address strength deficits, analyze and address soft tissue restrictions, analyze and cue movement patterns, analyze and modify body mechanics/ergonomics and assess and modify postural abnormalities to attain remaining goals. []  See Plan of Care  []  See progress note/recertification  []  See Discharge Summary         Progress towards goals / Updated goals:  Goals for this certification period to be accomplished in 4 weeks:              1. Patient will report FOTO score of 43 or better to indicate significant improvement in functional status.               PZWHQN @ recert:42              XBTQTEV:              6. Pt will demonstrate B cervical rotation AROM to 50 degrees void of pain provocation to improve ease of functional gaze.              Status @ recert: 37 right, 41 left with pain              Current:  Right 45 degrees, left 50 degrees on 11-14-19              3. Pt will report ability to sleep through the night undisturbed by pain to improve QOL              Status @ recert: pt reports sleep frequently disturbed              Current: pt reports frequent disruption on 11-12-19               4. Pt will demonstrate ability to perform trunk flexion finger to ankles void of LBP or LLE pain provocation to improve QOL              .Status @ recert: limited and painful    PLAN  []  Upgrade activities as tolerated     [x]  Continue plan of care  []  Update interventions per flow sheet       []  Discharge due to:_  []  Other:_      Vasquez Bobby PTA 11/18/2019  9:30 AM    Future Appointments   Date Time Provider Y[place]   11/20/2019 10:30 AM Alvaro Lassiter PTA [facility]   1/3/2020  8:30 AM [facility] NURSE [facility]   1/10/2020  8:15 AM Ed Adkins MD 11 [facility]   1/21/2020 11:15 AM Leigh Ann Stroud  [address]

## 2019-11-20 ENCOUNTER — HOSPITAL ENCOUNTER (OUTPATIENT)
Dept: PHYSICAL THERAPY | Age: 69
Discharge: HOME | End: 2019-11-20
Payer: MEDICARE

## 2019-11-20 PROCEDURE — 97110 THERAPEUTIC EXERCISES: CPT

## 2019-11-20 PROCEDURE — 97140 MANUAL THERAPY 1/> REGIONS: CPT

## 2019-11-25 ENCOUNTER — OFFICE VISIT (OUTPATIENT)
Dept: FAMILY MEDICINE CLINIC | Age: 69
End: 2019-11-25

## 2019-11-25 VITALS
HEIGHT: 65 IN | OXYGEN SATURATION: 97 % | HEART RATE: 87 BPM | TEMPERATURE: 98.4 F | RESPIRATION RATE: 18 BRPM | WEIGHT: 217 LBS | BODY MASS INDEX: 36.15 KG/M2 | DIASTOLIC BLOOD PRESSURE: 83 MMHG | SYSTOLIC BLOOD PRESSURE: 139 MMHG

## 2019-11-25 DIAGNOSIS — M79.675 TOE PAIN, LEFT: ICD-10-CM

## 2019-11-25 DIAGNOSIS — M54.81 BILATERAL OCCIPITAL NEURALGIA: ICD-10-CM

## 2019-11-25 DIAGNOSIS — M48.02 CERVICAL SPINAL STENOSIS: ICD-10-CM

## 2019-11-25 DIAGNOSIS — L03.032 PARONYCHIA OF GREAT TOE, LEFT: Primary | ICD-10-CM

## 2019-11-25 DIAGNOSIS — E11.40 TYPE 2 DIABETES MELLITUS WITH DIABETIC NEUROPATHY, WITHOUT LONG-TERM CURRENT USE OF INSULIN (HCC): ICD-10-CM

## 2019-11-25 RX ORDER — PREDNISONE 10 MG/1
TABLET ORAL
Qty: 21 TAB | Refills: 0 | Status: SHIPPED | OUTPATIENT
Start: 2019-11-25 | End: 2020-03-25 | Stop reason: SDUPTHER

## 2019-11-25 RX ORDER — TOPIRAMATE 25 MG/1
TABLET ORAL
Qty: 60 TAB | Refills: 4 | Status: SHIPPED | OUTPATIENT
Start: 2019-11-25 | End: 2021-09-08 | Stop reason: ALTCHOICE

## 2019-11-25 RX ORDER — CLINDAMYCIN HYDROCHLORIDE 300 MG/1
300 CAPSULE ORAL 3 TIMES DAILY
Qty: 30 CAP | Refills: 0 | Status: SHIPPED | OUTPATIENT
Start: 2019-11-25 | End: 2019-12-05

## 2019-11-25 NOTE — PROGRESS NOTES
Patient is in the office today for left hallux pain. 1. Have you been to the ER, urgent care clinic since your last visit? Hospitalized since your last visit? No    2. Have you seen or consulted any other health care providers outside of the [address] [place] [address] since your last visit? Include any pap smears or colon screening.  No

## 2019-11-26 NOTE — PROGRESS NOTES
Carolina Kaiser is a 71 y.o.  male and presents with Toe Pain and Diabetes      SUBJECTIVE:    Pt presents with left big toe pain which started about 2 weeks ago initially. Patient went to his podiatrist Valentine Jean who did not see any abnormality at that time. Patient says the pain in the toe  temporarily went away for a day or so and then came back and then he hits the toe and now has redness and swelling underneath the cuticle. He denies any fever chills. He is a diabetic with last hemoglobin A1c 8.4. Patient has no known history of gout. Respiratory ROS: negative for - shortness of breath  Cardiovascular ROS: negative for - chest pain    Current Outpatient Medications   Medication Sig    topiramate (TOPAMAX) 25 mg tablet TAKE 1 TABLET BY MOUTH TWICE A DAY    clindamycin (CLEOCIN) 300 mg capsule Take 1 Cap by mouth three (3) times daily for 10 days.  predniSONE (STERAPRED DS) 10 mg dose pack See administration instruction per 10mg dose pack    acetaminophen (TYLENOL EXTRA STRENGTH) 500 mg tablet Take  by mouth every six (6) hours as needed for Pain.  gabapentin (NEURONTIN) 100 mg capsule Take 1 Cap by mouth three (3) times daily. Max Daily Amount: 300 mg.    flash glucose scanning reader (FREESTYLE TAYLOR 14 DAY READER) misc Use as directed    flash glucose sensor (FREESTYLE TAYLOR 14 DAY SENSOR) kit Use one every 14 days    lisinopril (PRINIVIL, ZESTRIL) 40 mg tablet Take 1 Tab by mouth daily.  pantoprazole (PROTONIX) 40 mg tablet Take 1 Tab by mouth daily.  SITagliptin-metFORMIN (JANUMET XR) 50-1,000 mg TM24 Take 2 Tabs by mouth daily.  pravastatin (PRAVACHOL) 40 mg tablet Take 1 Tab by mouth nightly. Indications: high cholesterol    glucose blood VI test strips (BLOOD GLUCOSE TEST) strip Test blood sugar 1 x daily  DX E11.9    levoFLOXacin (QUIXIN) 0.5 % ophthalmic solution Administer 2 Drops to right eye every six (6) hours.  use in affected eye(s)    triamcinolone acetonide (KENALOG) 0.025 % ointment Apply  to affected area two (2) times a day. use thin layer    halobetasol (ULTRAVATE) 0.05 % topical cream APPLY TO AFFECTED AREA TWICE A DAY AS NEEDED    Lancets misc Accu-check  Fastclix Lancets. Test blood sugar daily. DX E11.9    alcohol swabs (BD SINGLE USE SWABS REGULAR) padm Use daily to check blood sugar    Blood Glucose Control, Normal (ACCU-CHEK SMARTVIEW CONTRL SOL) soln Test blood sugar 1 x daily  DX E11.9        1 year supply  Check controls monthy on glucose meter    Blood-Glucose Meter monitoring kit Accu-check Smartview Meter    gabapentin (NEURONTIN) 300 mg capsule TAKE 1 CAPSULE BY MOUTH THREE TIMES A DAY (Patient not taking: Reported on 10/22/2019)    TOBRADEX ST drps ophthalmix suspension INSTILL 1 DROP INTO LEFT EYE TWICE A DAY    hydrOXYzine pamoate (VISTARIL) 25 mg capsule Take 1 Cap by mouth three (3) times daily as needed for Itching. No current facility-administered medications for this visit. OBJECTIVE:  alert, well appearing, and in no distress  Visit Vitals  /83 (BP 1 Location: Left arm, BP Patient Position: Sitting)   Pulse 87   Temp 98.4 °F (36.9 °C) (Oral)   Resp 18   Ht 5' 5\" (1.651 m)   Wt 217 lb (98.4 kg)   SpO2 97%   BMI 36.11 kg/m²      well developed and well nourished  Skin -patient with redness and swelling under the cuticle of the left big toe. No drainage appreciated. Only tenderness notices around the paronychia but none with palpating the rest of the bones in the toe. Assessment/Plan      ICD-10-CM ICD-9-CM    1. Paronychia of great toe, left L03.032 681.11 Will treat with clindamycin (CLEOCIN) 300 mg capsule TID      Check CBC WITH AUTOMATED DIFF   2. Toe pain, left M79.675 729.5 Possible gout. Will check URIC ACID      And use predniSONE (STERAPRED DS) 10 mg dose pack initially    3. Bilateral occipital neuralgia M54.81 723.8 topiramate (TOPAMAX) 25 mg tablet   4.  Cervical spinal stenosis M48.02 723.0 topiramate (TOPAMAX) 25 mg tablet     5. Type 2 diabetes mellitus with diabetic neuropathy, without long-term current use of insulin (HCC) E11.40 250.60  patient will monitor his blood sugars while on prednisone. He continues on Janumet     357.2                  Follow-up and Dispositions    · Return in about 1 week (around 12/2/2019) for foot infection/pain . Reviewed plan of care. Patient has provided input and agrees with goals.

## 2019-11-27 LAB
BASOPHILS # BLD AUTO: 0 X10E3/UL (ref 0–0.2)
BASOPHILS NFR BLD AUTO: 1 %
EOSINOPHIL # BLD AUTO: 0.3 X10E3/UL (ref 0–0.4)
EOSINOPHIL NFR BLD AUTO: 3 %
ERYTHROCYTE [DISTWIDTH] IN BLOOD BY AUTOMATED COUNT: 13.3 % (ref 12.3–15.4)
HCT VFR BLD AUTO: 37.2 % (ref 37.5–51)
HGB BLD-MCNC: 12.5 G/DL (ref 13–17.7)
IMM GRANULOCYTES # BLD AUTO: 0.1 X10E3/UL (ref 0–0.1)
IMM GRANULOCYTES NFR BLD AUTO: 1 %
LYMPHOCYTES # BLD AUTO: 1.9 X10E3/UL (ref 0.7–3.1)
LYMPHOCYTES NFR BLD AUTO: 23 %
MCH RBC QN AUTO: 26 PG (ref 26.6–33)
MCHC RBC AUTO-ENTMCNC: 33.6 G/DL (ref 31.5–35.7)
MCV RBC AUTO: 78 FL (ref 79–97)
MONOCYTES # BLD AUTO: 0.5 X10E3/UL (ref 0.1–0.9)
MONOCYTES NFR BLD AUTO: 6 %
NEUTROPHILS # BLD AUTO: 5.6 X10E3/UL (ref 1.4–7)
NEUTROPHILS NFR BLD AUTO: 66 %
PLATELET # BLD AUTO: 309 X10E3/UL (ref 150–450)
RBC # BLD AUTO: 4.8 X10E6/UL (ref 4.14–5.8)
URATE SERPL-MCNC: 6.6 MG/DL (ref 3.7–8.6)
WBC # BLD AUTO: 8.5 X10E3/UL (ref 3.4–10.8)

## 2019-12-02 ENCOUNTER — OFFICE VISIT (OUTPATIENT)
Dept: FAMILY MEDICINE CLINIC | Age: 69
End: 2019-12-02

## 2019-12-02 ENCOUNTER — HOSPITAL ENCOUNTER (OUTPATIENT)
Dept: PHYSICAL THERAPY | Age: 69
Discharge: HOME | End: 2019-12-02
Payer: MEDICARE

## 2019-12-02 VITALS
HEIGHT: 65 IN | HEART RATE: 89 BPM | RESPIRATION RATE: 20 BRPM | OXYGEN SATURATION: 94 % | DIASTOLIC BLOOD PRESSURE: 84 MMHG | SYSTOLIC BLOOD PRESSURE: 123 MMHG | WEIGHT: 218 LBS | BODY MASS INDEX: 36.32 KG/M2 | TEMPERATURE: 98.1 F

## 2019-12-02 DIAGNOSIS — L03.032 PARONYCHIA OF GREAT TOE, LEFT: Primary | ICD-10-CM

## 2019-12-02 PROCEDURE — 97110 THERAPEUTIC EXERCISES: CPT

## 2019-12-02 PROCEDURE — 97140 MANUAL THERAPY 1/> REGIONS: CPT

## 2019-12-02 NOTE — PROGRESS NOTES
PT DAILY TREATMENT NOTE 10-18    Patient Name: Wm Sharma  Date:2019  : 1950  [x]  Patient  Verified  Payor: Asad Whitt / Plan: [address] [place] [address] HMO / Product Type: Managed Care Medicare /    In time:8:30  Out time:9:04  Total Treatment Time (min): 34  Visit #: 5 of 8    Medicare/BCBS Only   Total Timed Codes (min):  34 1:1 Treatment Time:  23       Treatment Area: Low back pain [M54.5]  Cervicalgia [M54.2]    SUBJECTIVE  Pain Level (0-10 scale): 5/10  Any medication changes, allergies to medications, adverse drug reactions, diagnosis change, or new procedure performed?: [x] No    [] Yes (see summary sheet for update)  Subjective functional status/changes:   [] No changes reported  Reports improved sleeping tolerance. OBJECTIVE    26 min Therapeutic Exercise:  [x] See flow sheet :   Rationale: increase ROM, increase strength and increase proprioception to improve the patients ability to perform ADL's.    8 min Manual Therapy:  STM/DTM (B) UT, lev scap, C/S paraspinals, PROM C/S (B) Rotations and lateral-flexion. Pt supine. Rationale: decrease pain, increase ROM, increase tissue extensibility and decrease trigger points to improve ease of performing functional tasks and improve sleep tolerance. With   [x] TE   [] TA   [] neuro   [] other: Patient Education: [x] Review HEP    [] Progressed/Changed HEP based on:   [] positioning   [] body mechanics   [] transfers   [] heat/ice application    [] other:      Other Objective/Functional Measures: No change in there ex. Pain Level (0-10 scale) post treatment: 5/10    ASSESSMENT/Changes in Function: Pt reports improved sleeping tolerance, less pain associated with sleeping, continues to have tension/tightness. No change in pain level however pt expressed a decrease in tension. Continue PT to decrease mm restrictions to improve ease of performing functional tasks.     Patient will continue to benefit from skilled PT services to modify and progress therapeutic interventions, address functional mobility deficits, address ROM deficits, address strength deficits, analyze and address soft tissue restrictions and analyze and modify body mechanics/ergonomics to attain remaining goals. [x]  See Plan of Care  []  See progress note/recertification  []  See Discharge Summary         Progress towards goals / Updated goals:  Goals for this certification period to be accomplished in 4 weeks:              1. Patient will report FOTO score of 43 or better to indicate significant improvement in functional status.             QVSNTG @ recert:42              ICWFBHM:              7. Pt will demonstrate B cervical rotation AROM to 50 degrees void of pain provocation to improve ease of functional gaze.              Status @ recert: 37 right, 41 left with pain              Current:  Right 45 degrees, left 50 degrees on 11-14-19              3. Pt will report ability to sleep through the night undisturbed by pain to improve QOL              Status @ recert: pt reports sleep frequently disturbed              Current: pt reports frequent disruption on 11-12-19; Pt reports improved sleeping tolerance, less pain associated with sleeping, continues to have tension/tightness. 12/2/2019               4. Pt will demonstrate ability to perform trunk flexion finger to ankles void of LBP or LLE pain provocation to improve QOL              Status @ recert: limited and painful              Current: Met, Trunk flexion fingers to toes.  11/20/2019    PLAN  []  Upgrade activities as tolerated     [x]  Continue plan of care  []  Update interventions per flow sheet       []  Discharge due to:_  []  Other:_      Natty Zheng PTA 12/2/2019  8:27 AM    Future Appointments   Date Time Provider Yandel Clarkisti   12/2/2019  8:30 AM Eidn Ellis PTA [facility]   12/2/2019  4:00 PM Arland Rinne, MD [address] [place] [address]   12/4/2019  8:30 AM Chan Conde Son[facility]   12/9/2019  8:30 AM David Kevin, PTA [facility]   12/11/2019  8:30 AM Sera Estrada, PT [facility]   1/3/2020  8:30 AM [facility] NURSE [facility] SCH[facility]   1/10/2020  8:15 AM Janessa Zabala MD [facility] SCH[facility]   1/21/2020 11:15 AM Yolande Maldonado  [address]

## 2019-12-04 ENCOUNTER — HOSPITAL ENCOUNTER (OUTPATIENT)
Dept: PHYSICAL THERAPY | Age: 69
Discharge: HOME | End: 2019-12-04
Payer: MEDICARE

## 2019-12-04 PROCEDURE — 97140 MANUAL THERAPY 1/> REGIONS: CPT

## 2019-12-04 PROCEDURE — 97110 THERAPEUTIC EXERCISES: CPT

## 2019-12-04 NOTE — TELEPHONE ENCOUNTER
Patient wanting to see if a prescription can be written to lancets to fit his backup meter, ACCU-Check [facility].  Please adv pt [phone]

## 2019-12-04 NOTE — PROGRESS NOTES
PT DAILY TREATMENT NOTE 10-18    Patient Name: Nicole Hilario  Date:2019  : 1950  [x]  Patient  Verified  Payor: Geni Latrice / Plan: [address] [place] [address] HMO / Product Type: Managed Care Medicare /    In time: 12:01pm  Out time:12:39pm  Total Treatment Time (min): 38  Visit #: 6 of 8    Medicare/BCBS Only   Total Timed Codes (min):  38 1:1 Treatment Time:  34       Treatment Area: Low back pain [M54.5]  Cervicalgia [M54.2]    SUBJECTIVE  Pain Level (0-10 scale): 4-5/10  Any medication changes, allergies to medications, adverse drug reactions, diagnosis change, or new procedure performed?: [x] No    [] Yes (see summary sheet for update)  Subjective functional status/changes:   [] No changes reported  \"The neck is still tense and today I have a little pain in the middle of my neck. This weekend was really tense. \" Pt reports is back is \"a lot better\". OBJECTIVE    30 min Therapeutic Exercise:  [x] See flow sheet :   Rationale: increase ROM and increase strength to improve the patients ability to improve ease of daily activity. 8 min Manual Therapy:  C/S STM, C/S PROM, B UT hold relax stretch   Rationale: decrease pain, increase ROM and increase tissue extensibility to improve ease of cervical mobility. With   [] TE   [] TA   [] neuro   [] other: Patient Education: [x] Review HEP    [] Progressed/Changed HEP based on:   [] positioning   [] body mechanics   [] transfers   [] heat/ice application    [] other:      Other Objective/Functional Measures: C/s AROM rotation: left 50 left sided neck aching pain, pulling on right side, right 46 degrees    TTP right scalenes     Pain Level (0-10 scale) post treatment: 1-2/10    ASSESSMENT/Changes in Function: Pt reported good pain control post treatment today. He demonstrates some limited improvement in cervical ROM, self reported function, and reports near abolishment of his back pain at this time.  He will continue to benefit from skilled PT to address his cervical ROM deficits and facilitate return to PLOF. Patient will continue to benefit from skilled PT services to modify and progress therapeutic interventions, address functional mobility deficits, address ROM deficits, address strength deficits, analyze and address soft tissue restrictions, analyze and cue movement patterns and instruct in home and community integration to attain remaining goals. []  See Plan of Care  []  See progress note/recertification  []  See Discharge Summary         Progress towards goals / Updated goals:  Goals for this certification period to be accomplished in 4 weeks:              1. Patient will report FOTO score of 43 or better to indicate significant improvement in functional status.             IXXUUL @ recert:42              CHNNUYS:               6. Pt will demonstrate B cervical rotation AROM to 50 degrees void of pain provocation to improve ease of functional gaze.              Status @ recert: 37 right, 41 left with pain              Current:  Right 45 degrees, left 50 degrees on 11-14-19 Right 46, left 50 with discomfort on left side of neck turning to left 12/4/2019              3. Pt will report ability to sleep through the night undisturbed by pain to improve QOL              Status @ recert: pt reports sleep frequently disturbed              Current: pt reports frequent disruption on 11-12-19; Pt reports improved sleeping tolerance, less pain associated with sleeping, continues to have tension/tightness.  12/2/2019               4. Pt will demonstrate ability to perform trunk flexion finger to ankles void of LBP or LLE pain provocation to improve QOL              Status @ recert: limited and painful              Current: Met, Trunk flexion fingers to toes. 11/20/2019    PLAN  []  Upgrade activities as tolerated     [x]  Continue plan of care  []  Update interventions per flow sheet       []  Discharge due to:_  []  Other:_      Bell Burns Ame Salgado, PT, DPT, ATC 12/4/2019  12:14 PM    Future Appointments   Date Time Provider Yandel Twyla   12/9/2019  8:30 AM Wilian Cart, PTA [facility]   12/11/2019  8:30 AM Kaylyn Henriquez, PT [facility]   1/3/2020  8:30 AM [facility] NURSE [facility] SCH[facility]   1/10/2020  8:15 AM Syed Gonsales MD [facility] SCHED   1/21/2020 11:15 AM Jonel Hardin  [address]

## 2019-12-05 RX ORDER — LANCETS
EACH MISCELLANEOUS
Qty: 100 EACH | Refills: 5 | Status: SHIPPED | OUTPATIENT
Start: 2019-12-05 | End: 2021-09-08

## 2019-12-07 NOTE — PROGRESS NOTES
Michael Garcia is a 71 y.o.  male and presents with Foot Pain      SUBJECTIVE:    Pt presents with follow-up of paronychia of the left great toe. Patient's pain, discomfort and swelling around his great toe nail has significantly improved with patient not having any pain now after taking antibiotic clindamycin for 4 weeks. Patient will finish out the full 10-day course of antibiotics. Patient will follow up with his podiatrist to consider treatment for ingrown toenails. Current Outpatient Medications   Medication Sig    topiramate (TOPAMAX) 25 mg tablet TAKE 1 TABLET BY MOUTH TWICE A DAY    acetaminophen (TYLENOL EXTRA STRENGTH) 500 mg tablet Take  by mouth every six (6) hours as needed for Pain.  gabapentin (NEURONTIN) 100 mg capsule Take 1 Cap by mouth three (3) times daily. Max Daily Amount: 300 mg.    flash glucose scanning reader (MashWorxSTYLE TAYLOR 14 DAY READER) misc Use as directed    flash glucose sensor (FREESTYLE TAYLOR 14 DAY SENSOR) kit Use one every 14 days    lisinopril (PRINIVIL, ZESTRIL) 40 mg tablet Take 1 Tab by mouth daily.  pantoprazole (PROTONIX) 40 mg tablet Take 1 Tab by mouth daily.  SITagliptin-metFORMIN (JANUMET XR) 50-1,000 mg TM24 Take 2 Tabs by mouth daily.  pravastatin (PRAVACHOL) 40 mg tablet Take 1 Tab by mouth nightly. Indications: high cholesterol    glucose blood VI test strips (BLOOD GLUCOSE TEST) strip Test blood sugar 1 x daily  DX E11.9    levoFLOXacin (QUIXIN) 0.5 % ophthalmic solution Administer 2 Drops to right eye every six (6) hours. use in affected eye(s)    triamcinolone acetonide (KENALOG) 0.025 % ointment Apply  to affected area two (2) times a day. use thin layer    halobetasol (ULTRAVATE) 0.05 % topical cream APPLY TO AFFECTED AREA TWICE A DAY AS NEEDED    TOBRADEX ST drps ophthalmix suspension INSTILL 1 DROP INTO LEFT EYE TWICE A DAY    Lancets misc Accu-check  Fastclix Lancets. Test blood sugar daily.   DX E11.9    alcohol swabs (BD SINGLE USE SWABS REGULAR) padm Use daily to check blood sugar    Blood Glucose Control, Normal (ACCU-CHEK SMARTVIEW CONTRL SOL) soln Test blood sugar 1 x daily  DX E11.9        1 year supply  Check controls monthy on glucose meter    Blood-Glucose Meter monitoring kit Accu-check Smartview Meter    lancets (ACCU-CHEK SOFTCLIX LANCETS) misc Check BS 2x/day    predniSONE (STERAPRED DS) 10 mg dose pack See administration instruction per 10mg dose pack    gabapentin (NEURONTIN) 300 mg capsule TAKE 1 CAPSULE BY MOUTH THREE TIMES A DAY (Patient not taking: Reported on 10/22/2019)    hydrOXYzine pamoate (VISTARIL) 25 mg capsule Take 1 Cap by mouth three (3) times daily as needed for Itching. No current facility-administered medications for this visit. OBJECTIVE:  alert, well appearing, and in no distress  Visit Vitals  /84 (BP 1 Location: Left arm, BP Patient Position: Sitting)   Pulse 89   Temp 98.1 °F (36.7 °C) (Oral)   Resp 20   Ht 5' 5\" (1.651 m)   Wt 218 lb (98.9 kg)   SpO2 94%   BMI 36.28 kg/m²      well developed and well nourished  Skin -patient with redness and swelling under the cuticle of the left big toe. No drainage appreciated. Only tenderness notices around the paronychia but none with palpating the rest of the bones in the toe. Assessment/Plan      ICD-10-CM ICD-9-CM    1. Paronychia of great toe, left L03.032 681.11  resolved with antibiotic clindamycin. Patient to finish full 10-day course. Patient to follow-up with his podiatrist to consider treatment for ingrown toenails                     Follow-up and Dispositions    · Return if symptoms worsen or fail to improve. Reviewed plan of care. Patient has provided input and agrees with goals.

## 2019-12-09 ENCOUNTER — APPOINTMENT (OUTPATIENT)
Dept: PHYSICAL THERAPY | Age: 69
End: 2019-12-09
Payer: MEDICARE

## 2019-12-11 ENCOUNTER — APPOINTMENT (OUTPATIENT)
Dept: PHYSICAL THERAPY | Age: 69
End: 2019-12-11
Payer: MEDICARE

## 2019-12-16 ENCOUNTER — HOSPITAL ENCOUNTER (OUTPATIENT)
Dept: PHYSICAL THERAPY | Age: 69
Discharge: HOME | End: 2019-12-16
Payer: MEDICARE

## 2019-12-16 PROCEDURE — 97140 MANUAL THERAPY 1/> REGIONS: CPT

## 2019-12-16 PROCEDURE — 97110 THERAPEUTIC EXERCISES: CPT

## 2019-12-16 PROCEDURE — 97112 NEUROMUSCULAR REEDUCATION: CPT

## 2019-12-16 NOTE — PROGRESS NOTES
[facility]  [address]  [place], [address]  [phone] fax    Continued Plan of Care/ Re-certification for Physical Therapy Services    Patient name: Karolina Nguyen Start of Care: 10/29/2019   Referral source: Dorcas Sepulveda MD : 1950   Medical/Treatment Diagnosis: Spinal stenosis, cervical region [M48.02]  Low back pain [M54.5]  Payor: [facility] / Plan: [address] [place] [address] HMO / Product Type: Managed Care Medicare /  Onset Date:Cervical Fusion 2019, Injury onset 2018           Prior Hospitalization: see medical history Provider#: [id number]   Medications: Verified on Patient Summary List      Comorbidities: HTN, diabetic, high cholesterol, recent cervical fusion 3 months ago, left 5th digit fusion, gall stone removal,  otherwise unremarkable per pt report   Prior Level of Function:  active without limitations  Visits from Start of Care: 11    Missed Visits: 0    The Plan of Care and following information is based on the patient's current status:    Key functional changes: Cervical rotation: right 47 degrees, left 54 degrees  Finger tips to ankles: ABLE   Pt reports able to sleep through the night without being awoken by pain.     Ensured the patient abides by lifting precautions within clinic and elsewhere until restrictions lifted by MD. The patient did verbalize understanding. Goals for this certification period to be accomplished in 4 weeks:              1. Patient will report FOTO score of 43 or better to indicate significant improvement in functional status.               ZCHTKU @ recert:42              PN: Improved 48 per 2019              2. Pt will demonstrate B cervical rotation AROM to 50 degrees void of pain provocation to improve ease of functional gaze.              Status @ recert: 37 right, 41 left with pain              PN: Progressed to 47 right, 53 left              3. Pt will report ability to sleep through the night undisturbed by pain to improve QOL              Status @ recert: pt reports sleep frequently disturbed              PN: Met - reports being able to sleep through the night, but difficulty getting to sleep 12/16/2019               4. Pt will demonstrate ability to perform trunk flexion finger to ankles void of LBP or LLE pain provocation to improve QOL              Status @ recert: limited and painful              PN: Met, Trunk flexion fingers to toes. 11/20/2019    Problems/ barriers to goal attainment: None     Problem List: pain affecting function, decrease ROM, decrease strength, edema affecting function, decrease ADL/ functional abilitiies, decrease activity tolerance and decrease flexibility/ joint mobility    Treatment Plan: Therapeutic exercise, Therapeutic activities, Neuromuscular re-education, Physical agent/modality, Manual therapy and Patient education     Patient Goal (s) has been updated and includes: better mobility and less stiffness. Goals for this certification period to be accomplished in 2 weeks:   1. Patient will report FOTO score of 43 or better to indicate significant improvement in functional status.             PN: Improved 48 per 12/04/2019              2. Pt will demonstrate B cervical rotation AROM to 50 degrees void of pain provocation to improve ease of functional gaze.              PN: Progressed to 47 right, 53 left      Frequency / Duration: Patient to be seen 2 times per week for 2 weeks:    Assessment / Recommendations: The patient has improved with FOTO score from last assessment indicating improved quality of life. He has partially met his cervical range of motion goal. His lumbar spine flexibility has improved, noting ability to perform fingertips to ankles. The patient will continue to benefit from 2 weeks of PT in order to progress cervical mobility.     Certification Period: 12/08/2019 - 1/06/2019    Mirza Cueto PT 12/16/2019 11:31 AM    ________________________________________________________________________  I certify that the above Therapy Services are being furnished while the patient is under my care. I agree with the treatment plan and certify that this therapy is necessary. [] I have read the above and request that my patient continue as recommended.   [] I have read the above report and request that my patient continue therapy with the following changes/special instructions: _____________________________________________  [] I have read the above report and request that my patient be discharged from therapy    Physician's Signature:____________Date:_________TIME:________    ** Signature, Date and Time must be completed for valid certification **    Please sign and return to [facility] [address] [address] [address] [place] [address], [facility] [address]  [phone] fax

## 2019-12-16 NOTE — PROGRESS NOTES
PT DAILY TREATMENT NOTE 10-18    Patient Name: Bert Vieira  Date:2019  : 1950  [x]  Patient  Verified  Payor: Jose Francisco Scotterly / Plan: [address] [place] [address] HMO / Product Type: Managed Care Medicare /    In time:11:04  Out time:11:42  Total Treatment Time (min): 38  Visit #: 7 of 8    Medicare/BCBS Only   Total Timed Codes (min):  38 1:1 Treatment Time:  38       Treatment Area: Low back pain [M54.5]  Cervicalgia [M54.2]    SUBJECTIVE  Pain Level (0-10 scale): 1-2/10  Any medication changes, allergies to medications, adverse drug reactions, diagnosis change, or new procedure performed?: [x] No    [] Yes (see summary sheet for update)  Subjective functional status/changes:   [] No changes reported  The patient states that he is able to sleep through the night, though has difficulty getting to sleep, but once he does he is ok. He states he has had good improvement up to this point. He did miss a week of PT due to being out of town visiting his grandson for [employer] last week. OBJECTIVE  18 min Therapeutic Exercise:  [x] See flow sheet :   Rationale: increase ROM and increase strength to improve the patients ability to improve ADL ease. 12 min Neuromuscular Re-education:  [x]  See flow sheet :   Rationale: increase ROM and increase strength  to improve the patients ability to improve ADL ease. 8 min Manual Therapy:  UT/LS TPR with MFR of cervical spine performed in seated utilizing rotation ROM during manual for improving ROM. Rationale: decrease pain, increase ROM and increase tissue extensibility to improve ADL ease.            With   [] TE   [] TA   [] neuro   [] other: Patient Education: [x] Review HEP    [] Progressed/Changed HEP based on:   [] positioning   [] body mechanics   [] transfers   [] heat/ice application    [] other:      Other Objective/Functional Measures:   Cervical rotation: right 47 degrees, left 54 degrees  Finger tips to ankles: ABLE   Pt reports able to sleep through the night without being awoken by pain. Ensured the patient abides by lifting precautions within clinic and elsewhere until restrictions lifted by MD. The patient did verbalize understanding. Pain Level (0-10 scale) post treatment: 2/10    ASSESSMENT/Changes in Function: The patient has improved with FOTO score from last assessment indicating improved quality of life. He has partially met his cervical range of motion goal. His lumbar spine flexibility has improved, noting ability to perform fingertips to ankles. The patient will continue to benefit from 2 weeks of PT in order to progress cervical mobility. Patient will continue to benefit from skilled PT services to modify and progress therapeutic interventions, address functional mobility deficits, address ROM deficits, address strength deficits, analyze and address soft tissue restrictions, analyze and cue movement patterns, analyze and modify body mechanics/ergonomics, assess and modify postural abnormalities and instruct in home and community integration to attain remaining goals. []  See Plan of Care  []  See progress note/recertification  []  See Discharge Summary         Progress towards goals / Updated goals:  Goals for this certification period to be accomplished in 4 weeks:              1. Patient will report FOTO score of 43 or better to indicate significant improvement in functional status.               ZYDTIV @ recert:42              PN: Improved 48 per 12/04/2019              2. Pt will demonstrate B cervical rotation AROM to 50 degrees void of pain provocation to improve ease of functional gaze.              Status @ recert: 37 right, 41 left with pain              PN: Progressed to 47 right, 53 left              3. Pt will report ability to sleep through the night undisturbed by pain to improve QOL              Status @ recert: pt reports sleep frequently disturbed              PN: Met - reports being able to sleep through the night, but difficulty getting to sleep 12/16/2019               4. Pt will demonstrate ability to perform trunk flexion finger to ankles void of LBP or LLE pain provocation to improve QOL              Status @ recert: limited and painful              PN: Met, Trunk flexion fingers to toes. 11/20/2019    PLAN  []  Upgrade activities as tolerated     [x]  Continue plan of care  []  Update interventions per flow sheet       []  Discharge due to:_  []  Other:_      Josefa Home, PT 12/16/2019  11:10 AM    Future Appointments   Date Time Provider Yandel [place]   12/16/2019 11:30 AM Colten Jones, PT [facility]   12/18/2019  9:30 AM Colten Jones PT [facility]   1/3/2020  8:30 AM [facility] NURSE [facility]   1/10/2020  8:15 AM Danyelle Chowdhury MD [facility]   1/21/2020 11:15 AM Jose Null  [facility]

## 2019-12-18 ENCOUNTER — HOSPITAL ENCOUNTER (OUTPATIENT)
Dept: PHYSICAL THERAPY | Age: 69
End: 2019-12-18
Payer: MEDICARE

## 2019-12-24 ENCOUNTER — HOSPITAL ENCOUNTER (OUTPATIENT)
Dept: PHYSICAL THERAPY | Age: 69
Discharge: HOME | End: 2019-12-24
Payer: MEDICARE

## 2019-12-24 PROCEDURE — 97110 THERAPEUTIC EXERCISES: CPT

## 2019-12-24 PROCEDURE — 97140 MANUAL THERAPY 1/> REGIONS: CPT

## 2019-12-24 NOTE — PROGRESS NOTES
PT DAILY TREATMENT NOTE 10-18    Patient Name: Deangelo Beaulieu  Date:2019  : 1950  [x]  Patient  Verified  Payor: Luke Moreno / Plan: [address] [place] [address] HMO / Product Type: Managed Care Medicare /    In time:7:28  Out time:8:04  Total Treatment Time (min): 36  Visit #: 1 of 4    Medicare/BCBS Only   Total Timed Codes (min):  36 1:1 Treatment Time:  36       Treatment Area: Low back pain [M54.5]  Cervicalgia [M54.2]    SUBJECTIVE  Pain Level (0-10 scale): 6/10  Any medication changes, allergies to medications, adverse drug reactions, diagnosis change, or new procedure performed?: [x] No    [] Yes (see summary sheet for update)  Subjective functional status/changes:   [] No changes reported  \"It's stiff this morning, pain on the right side of my neck and into my shoulder. \"    OBJECTIVE    28 min Therapeutic Exercise:  [x] See flow sheet :   Rationale: increase ROM, increase strength and increase proprioception to improve the patients ability to perform ADL's.    8 min Manual Therapy:  STM/DTM (B) UT, lev scap, C/S paraspinals, PROM C/S (B) Rotations and lateral-flexion. Pt supine. Rationale: decrease pain, increase ROM, increase tissue extensibility and decrease trigger points to improve ease of performing functional activities. With   [x] TE   [] TA   [] neuro   [] other: Patient Education: [x] Review HEP    [] Progressed/Changed HEP based on:   [] positioning   [] body mechanics   [] transfers   [] heat/ice application    [] other:      Other Objective/Functional Measures: No change in there ex. Pain Level (0-10 scale) post treatment: 3-4/10    ASSESSMENT/Changes in Function: Pt reported a decrease in tension/pain post-treatment. Puts forth good effort and fully participates in treatment. CC is tightness and head feeling heavy during position changes. Continue PT to decrease mm restrictions, increase ease of performing functional activities.     Patient will continue to benefit from skilled PT services to modify and progress therapeutic interventions, address functional mobility deficits, address ROM deficits, address strength deficits, analyze and address soft tissue restrictions and analyze and modify body mechanics/ergonomics to attain remaining goals. [x]  See Plan of Care  []  See progress note/recertification  []  See Discharge Summary         Progress towards goals / Updated goals:   1. Patient will report FOTO score of 43 or better to indicate significant improvement in functional status.               PN: Improved 48 per 12/04/2019              2. Pt will demonstrate B cervical rotation AROM to 50 degrees void of pain provocation to improve ease of functional gaze.              PN: Progressed to 47 right, 53 left    PLAN  []  Upgrade activities as tolerated     [x]  Continue plan of care  []  Update interventions per flow sheet       []  Discharge due to:_  []  Other:_      Jose Rasmussen PTA 12/24/2019  7:27 AM    Future Appointments   Date Time Provider Yandel Wakefield   12/24/2019  7:30 AM Malgorzata Daniels PTA [facility]   12/27/2019 11:00 AM Malgorzata Daniels PTA [facility]   12/31/2019 10:30 AM Malgorzata Daniels PTA [facility]   1/3/2020  8:30 AM [facility] NURSE [facility]   1/7/2020 11:45 AM Amado Oneal MD [facility] SCH[facility]   1/21/2020 11:15 AM Clotilde Calderon  [address]

## 2019-12-27 ENCOUNTER — HOSPITAL ENCOUNTER (OUTPATIENT)
Dept: PHYSICAL THERAPY | Age: 69
Discharge: HOME | End: 2019-12-27
Payer: MEDICARE

## 2019-12-27 PROCEDURE — 97140 MANUAL THERAPY 1/> REGIONS: CPT

## 2019-12-27 PROCEDURE — 97110 THERAPEUTIC EXERCISES: CPT

## 2019-12-27 NOTE — PROGRESS NOTES
PT DAILY TREATMENT NOTE 10-18    Patient Name: Kimani Lainez  Date:2019   : 1950   [x]  Patient  Verified   Payor: BLUE CROSS MEDICARE / [place] / Product Type: Managed Care Medicare /    In time:11:02  Out time:11:42  Total Treatment Time (min): 40  Visit #: 2 of 4    Medicare/BCBS Only   Total Timed Codes (min):  40 1:1 Treatment Time:  27     Treatment Area: Low back pain [M54.5]  Cervicalgia [M54.2]    SUBJECTIVE  Pain Level (0-10 scale): 0/10  Any medication changes, allergies to medications, adverse drug reactions, diagnosis change, or new procedure performed?: [x] No    [] Yes (see summary sheet for update)  Subjective functional status/changes:   [] No changes reported  \"Just tight this morning, tighter than it's been, no pain at the moment. \"    OBJECTIVE    32 min Therapeutic Exercise:  [x] See flow sheet :   Rationale: increase ROM, increase strength and increase proprioception to improve the patients ability to perform ADL's.    8 min Manual Therapy:  STM/DTM (B) UT, lev scap, C/S paraspinals, PROM C/S (B) Rotations and lateral-flexion. Pt supine. Rationale: decrease pain, increase ROM, increase tissue extensibility and decrease trigger points to improve ease of performing functional activities. With   [x] TE   [] TA   [] neuro   [] other: Patient Education: [x] Review HEP    [] Progressed/Changed HEP based on:   [] positioning   [] body mechanics   [] transfers   [] heat/ice application    [] other:      Other Objective/Functional Measures: No change in there ex. Pain Level (0-10 scale) post treatment: 2/10    ASSESSMENT/Changes in Function: Pt expressed a decrease in tension post-treatment. CC is tightness/stiffness in the morning, pain at night. Pt making gradual progress, plan to D/C to HEP after 2 F/U visits.     Patient will continue to benefit from skilled PT services to modify and progress therapeutic interventions, address functional mobility deficits, address ROM deficits, address strength deficits, analyze and address soft tissue restrictions and analyze and modify body mechanics/ergonomics to attain remaining goals. [x]  See Plan of Care  []  See progress note/recertification  []  See Discharge Summary         Progress towards goals / Updated goals:  1. Patient will report FOTO score of 43 or better to indicate significant improvement in functional status.               PN: Improved 48 per 12/04/2019              2. Pt will demonstrate B cervical rotation AROM to 50 degrees void of pain provocation to improve ease of functional gaze.              PN: Progressed to 47 right, 53 left    PLAN  []  Upgrade activities as tolerated     [x]  Continue plan of care  []  Update interventions per flow sheet       []  Discharge due to:_  []  Other:_      Cecilio Valerio PTA 12/27/2019  11:02 AM    Future Appointments   Date Time Provider Yandel Wakefield   12/31/2019 10:30 AM Jessica Cardona PTA [id number]   1/3/2020  8:30 AM [facility] NURSE [facility]   1/7/2020 11:45 AM Renato Ronquillo MD [facility] SCH[facility]   1/21/2020 11:15 AM Julito Nichole  [address]

## 2019-12-31 ENCOUNTER — APPOINTMENT (OUTPATIENT)
Dept: PHYSICAL THERAPY | Age: 69
End: 2019-12-31
Payer: MEDICARE

## 2020-01-04 LAB
ALBUMIN SERPL-MCNC: 4.5 G/DL (ref 3.6–4.8)
ALBUMIN/GLOB SERPL: 1.7 {RATIO} (ref 1.2–2.2)
ALP SERPL-CCNC: 107 IU/L (ref 39–117)
ALT SERPL-CCNC: 14 IU/L (ref 0–44)
AST SERPL-CCNC: 14 IU/L (ref 0–40)
BILIRUB SERPL-MCNC: 0.4 MG/DL (ref 0–1.2)
BUN SERPL-MCNC: 19 MG/DL (ref 8–27)
BUN/CREAT SERPL: 15 (ref 10–24)
CALCIUM SERPL-MCNC: 10.1 MG/DL (ref 8.6–10.2)
CHLORIDE SERPL-SCNC: 101 MMOL/L (ref 96–106)
CHOLEST SERPL-MCNC: 141 MG/DL (ref 100–199)
CO2 SERPL-SCNC: 17 MMOL/L (ref 20–29)
CREAT SERPL-MCNC: 1.3 MG/DL (ref 0.76–1.27)
GLOBULIN SER CALC-MCNC: 2.6 G/DL (ref 1.5–4.5)
GLUCOSE SERPL-MCNC: 256 MG/DL (ref 65–99)
HBA1C MFR BLD: 9.4 % (ref 4.8–5.6)
HDLC SERPL-MCNC: 46 MG/DL
LDLC SERPL CALC-MCNC: 76 MG/DL (ref 0–99)
POTASSIUM SERPL-SCNC: 4.8 MMOL/L (ref 3.5–5.2)
PROT SERPL-MCNC: 7.1 G/DL (ref 6–8.5)
SODIUM SERPL-SCNC: 137 MMOL/L (ref 134–144)
TRIGL SERPL-MCNC: 93 MG/DL (ref 0–149)
VLDLC SERPL CALC-MCNC: 19 MG/DL (ref 5–40)

## 2020-01-08 ENCOUNTER — APPOINTMENT (OUTPATIENT)
Dept: PHYSICAL THERAPY | Age: 70
End: 2020-01-08
Payer: MEDICARE

## 2020-01-14 ENCOUNTER — OFFICE VISIT (OUTPATIENT)
Dept: FAMILY MEDICINE CLINIC | Age: 70
End: 2020-01-14

## 2020-01-14 ENCOUNTER — HOSPITAL ENCOUNTER (OUTPATIENT)
Dept: PHYSICAL THERAPY | Age: 70
Discharge: HOME | End: 2020-01-14
Payer: MEDICARE

## 2020-01-14 VITALS
WEIGHT: 211.2 LBS | SYSTOLIC BLOOD PRESSURE: 157 MMHG | BODY MASS INDEX: 35.19 KG/M2 | OXYGEN SATURATION: 96 % | DIASTOLIC BLOOD PRESSURE: 96 MMHG | HEART RATE: 100 BPM | TEMPERATURE: 98.5 F | RESPIRATION RATE: 20 BRPM | HEIGHT: 65 IN

## 2020-01-14 DIAGNOSIS — M25.552 PAIN OF LEFT HIP JOINT: ICD-10-CM

## 2020-01-14 DIAGNOSIS — F41.9 ANXIETY: ICD-10-CM

## 2020-01-14 DIAGNOSIS — E11.65 UNCONTROLLED TYPE 2 DIABETES MELLITUS WITH HYPERGLYCEMIA (HCC): Primary | ICD-10-CM

## 2020-01-14 DIAGNOSIS — E78.00 HIGH CHOLESTEROL: ICD-10-CM

## 2020-01-14 DIAGNOSIS — I10 ESSENTIAL HYPERTENSION: ICD-10-CM

## 2020-01-14 PROCEDURE — 97140 MANUAL THERAPY 1/> REGIONS: CPT

## 2020-01-14 PROCEDURE — 97110 THERAPEUTIC EXERCISES: CPT

## 2020-01-14 NOTE — PROGRESS NOTES
PT DAILY TREATMENT NOTE 10-18    Patient Name: Benton Alaniz  Date:2020  : 1950  [x]  Patient  Verified  Payor: BLUE CROSS MEDICARE / [place] / Product Type: Managed Care Medicare /    In time:1:20  Out time:2:15  Total Treatment Time (min): 55  Visit #: 3 of 4    Medicare/BCBS Only   Total Timed Codes (min):  45 1:1 Treatment Time:  35       Treatment Area: Low back pain [M54.5]  Cervicalgia [M54.2]    SUBJECTIVE  Pain Level (0-10 scale): 7-8  Any medication changes, allergies to medications, adverse drug reactions, diagnosis change, or new procedure performed?: [x] No    [] Yes (see summary sheet for update)  Subjective functional status/changes:   [] No changes reported   pt reports pain increase the last couple of days, which he contributes to the weather.      OBJECTIVE    Modality rationale: decrease pain and increase tissue extensibility to improve the patients ability to perform ADL   Min Type Additional Details    [] Estim:  []Unatt       []IFC  []Premod                        []Other:  []w/ice   []w/heat  Position:  Location:    [] Estim: []Att    []TENS instruct  []NMES                    []Other:  []w/US   []w/ice   []w/heat  Position:  Location:    []  Traction: [] Cervical       []Lumbar                       [] Prone          []Supine                       []Intermittent   []Continuous Lbs:  [] before manual  [] after manual    []  Ultrasound: []Continuous   [] Pulsed                           []1MHz   []3MHz W/cm2:  Location:    []  Iontophoresis with dexamethasone         Location: [] Take home patch   [] In clinic   10 []  Ice     [x]  heat  []  Ice massage  []  Laser   []  Anodyne Position:supine  Location:c/s    []  Laser with stim  []  Other:  Position:  Location:    []  Vasopneumatic Device Pressure:       [] lo [] med [] hi   Temperature: [] lo [] med [] hi   [] Skin assessment post-treatment:  []intact []redness- no adverse reaction []redness  adverse reaction:   37 min Therapeutic Exercise:  [x]? See flow sheet :   Rationale: increase ROM, increase strength and increase proprioception to improve the patients ability to perform ADL's.     8 min Manual Therapy:  STM/DTM left UT, lev scap, C/S paraspinals, scalenes   Rationale: decrease pain, increase ROM, increase tissue extensibility and decrease trigger points to improve ease of performing functional activities. With   [] TE   [] TA   [] neuro   [] other: Patient Education: [x] Review HEP    [] Progressed/Changed HEP based on:   [] positioning   [] body mechanics   [] transfers   [] heat/ice application    [] other:      Other Objective/Functional Measures:  No changes in therex     Pain Level (0-10 scale) post treatment: 4    ASSESSMENT/Changes in Function: pt with diminished pain post treatment. He was progressing well until recent exacerbation. Patient will continue to benefit from skilled PT services to modify and progress therapeutic interventions, address functional mobility deficits, address ROM deficits, address strength deficits, analyze and address soft tissue restrictions, analyze and cue movement patterns, analyze and modify body mechanics/ergonomics and assess and modify postural abnormalities to attain remaining goals. []  See Plan of Care  []  See progress note/recertification  []  See Discharge Summary         Progress towards goals / Updated goals:   1. Patient will report FOTO score of 43 or better to indicate significant improvement in functional status.               PN: Improved 48 per 12/04/2019              2. Pt will demonstrate B cervical rotation AROM to 50 degrees void of pain provocation to improve ease of functional gaze.              PN: Progressed to 47 right, 53 left      PLAN  []  Upgrade activities as tolerated     [x]  Continue plan of care  []  Update interventions per flow sheet       []  Discharge due to:_  []  Other:_      Ruthine Reusing Fred PT 1/14/2020  1:23 PM    Future Appointments   Date Time Provider Yandel Twyla   1/14/2020  1:30 PM Colton Duron, PT [id number]   1/21/2020 11:15 AM Fred Jansen  [address]    4/8/2020  7:10 AM [facility] NURSE [facility]   4/15/2020 10:45 AM Aida Doran MD [facility]

## 2020-01-14 NOTE — PROGRESS NOTES
Subjective:       Chief Complaint  The patient presents for follow up of diabetes, hypertension and high cholesterol. MIRTA Gomez is a 71 y.o. male seen for follow up of diabetes. Jonn has hypertension and hyperlipidemia. Diabetes poorly controlled on Janumet due to poor diet, pt to try and improve diet and try to lose weight, no significant medication side effects noted, on Janumet, hypertension poorly controlled, no significant medication side effects noted, on lisinopril 40 mg, pt admits he has not been compliant with taking the medication daily,  hyperlipidemia well controlled, no significant medication side effects noted, on Pravachol. Diet and Lifestyle: not attempting to follow a low fat, low cholesterol diet, does not rigorously follow a diabetic diet, sedentary    Home BP Monitoring: is not measured at home. Diabetic Review of Systems - home glucose monitoring: is performed regularly, 1x/day. Other symptoms and concerns: pt continues to work on diet and exercise to lose weight      Patient remains out of work due to [employer]. injury which she sustained about 1 year ago. He had neck surgery and was doing better but is again having neck pain as well as pain in multiple joints including left hip. Pt is very frustrated and anxious about his current life situation since he was injured. He does not know the next steps to take. Current Outpatient Medications   Medication Sig    lancets (ACCU-CHEK SOFTCLIX LANCETS) misc Check BS 2x/day    topiramate (TOPAMAX) 25 mg tablet TAKE 1 TABLET BY MOUTH TWICE A DAY    predniSONE (STERAPRED DS) 10 mg dose pack See administration instruction per 10mg dose pack    acetaminophen (TYLENOL EXTRA STRENGTH) 500 mg tablet Take  by mouth every six (6) hours as needed for Pain.  gabapentin (NEURONTIN) 100 mg capsule Take 1 Cap by mouth three (3) times daily.  Max Daily Amount: 300 mg.    flash glucose scanning reader (FREESTYLE TAYLOR 14 DAY READER) misc Use as directed    flash glucose sensor (FREESTYLE TAYLOR 14 DAY SENSOR) kit Use one every 14 days    lisinopril (PRINIVIL, ZESTRIL) 40 mg tablet Take 1 Tab by mouth daily.  pantoprazole (PROTONIX) 40 mg tablet Take 1 Tab by mouth daily.  SITagliptin-metFORMIN (JANUMET XR) 50-1,000 mg TM24 Take 2 Tabs by mouth daily.  pravastatin (PRAVACHOL) 40 mg tablet Take 1 Tab by mouth nightly. Indications: high cholesterol    glucose blood VI test strips (BLOOD GLUCOSE TEST) strip Test blood sugar 1 x daily  DX E11.9    gabapentin (NEURONTIN) 300 mg capsule TAKE 1 CAPSULE BY MOUTH THREE TIMES A DAY    levoFLOXacin (QUIXIN) 0.5 % ophthalmic solution Administer 2 Drops to right eye every six (6) hours. use in affected eye(s)    triamcinolone acetonide (KENALOG) 0.025 % ointment Apply  to affected area two (2) times a day. use thin layer    halobetasol (ULTRAVATE) 0.05 % topical cream APPLY TO AFFECTED AREA TWICE A DAY AS NEEDED    TOBRADEX ST drps ophthalmix suspension INSTILL 1 DROP INTO LEFT EYE TWICE A DAY    hydrOXYzine pamoate (VISTARIL) 25 mg capsule Take 1 Cap by mouth three (3) times daily as needed for Itching.  Lancets misc Accu-check  Fastclix Lancets. Test blood sugar daily. DX E11.9    alcohol swabs (BD SINGLE USE SWABS REGULAR) padm Use daily to check blood sugar    Blood Glucose Control, Normal (ACCU-CHEK SMARTVIEW CONTRL SOL) soln Test blood sugar 1 x daily  DX E11.9        1 year supply  Check controls monthy on glucose meter    Blood-Glucose Meter monitoring kit Accu-check Smartview Meter     No current facility-administered medications for this visit.               Review of Systems  Respiratory: negative for dyspnea on exertion  Cardiovascular: negative for chest pain    Objective:     Visit Vitals  BP (!) 157/96 (BP 1 Location: Left arm, BP Patient Position: Sitting)   Pulse 100   Temp 98.5 °F (36.9 °C) (Oral)   Resp 20   Ht 5' 5\" (1.651 m)   Wt 211 lb 3.2 oz (95.8 kg)   SpO2 96%   BMI 35.15 kg/m²        General appearance - alert, well appearing, and in no distress   Chest - clear to auscultation, no wheezes, rales or rhonchi, symmetric air entry  Heart - normal rate, regular rhythm, normal S1, S2, no murmurs, rubs, clicks or gallops  Extremities - no edema         Labs:   Lab Results   Component Value Date/Time    Hemoglobin A1c 9.4 (H) 01/03/2020 08:29 AM    Hemoglobin A1c 8.4 (H) 09/25/2019 08:38 AM    Hemoglobin A1c 8.7 (H) 06/18/2019 10:59 AM    Glucose 256 (H) 01/03/2020 08:29 AM    Glucose (POC) 217 (H) 07/23/2019 11:24 AM    Microalbumin/Creat ratio (mg/g creat) 8 04/24/2018 07:35 AM    Microalb/Creat ratio (ug/mg creat.) 7.2 06/18/2019 10:59 AM    Microalbumin,urine random 1.90 04/24/2018 07:35 AM    LDL, calculated 76 01/03/2020 08:29 AM    Creatinine 1.30 (H) 01/03/2020 08:29 AM      Lab Results   Component Value Date/Time    Cholesterol, total 141 01/03/2020 08:29 AM    HDL Cholesterol 46 01/03/2020 08:29 AM    LDL, calculated 76 01/03/2020 08:29 AM    Triglyceride 93 01/03/2020 08:29 AM    CHOL/HDL Ratio 3.1 04/24/2018 07:35 AM       Lab Results   Component Value Date/Time    ALT (SGPT) 14 01/03/2020 08:29 AM    AST (SGOT) 14 01/03/2020 08:29 AM    Alk.  phosphatase 107 01/03/2020 08:29 AM    Bilirubin, total 0.4 01/03/2020 08:29 AM       Lab Results   Component Value Date/Time    GFR est AA 64 01/03/2020 08:29 AM    GFR est non-AA 56 (L) 01/03/2020 08:29 AM    Creatinine 1.30 (H) 01/03/2020 08:29 AM    BUN 19 01/03/2020 08:29 AM    Sodium 137 01/03/2020 08:29 AM    Potassium 4.8 01/03/2020 08:29 AM    Chloride 101 01/03/2020 08:29 AM    CO2 17 (L) 01/03/2020 08:29 AM      Lab Results   Component Value Date/Time    Prostate Specific Ag 1.9 06/18/2019 10:59 AM    Prostate Specific Ag 4.3 (H) 12/05/2018 07:34 AM    Prostate Specific Ag 2.4 09/07/2017 09:18 AM    Prostate Specific Ag 1.6 01/08/2016 10:54 AM           Assessment / Plan     Diabetes poorly controlled on Janumet with poor diet. If not able to improve would need more meds. Hypertension borderline controlled, on lisinopril. Patient will try to be more compliant and lose weight to improve. Hyperlipidemia well controlled, on Pravachol . ICD-10-CM ICD-9-CM    1. Uncontrolled type 2 diabetes mellitus with hyperglycemia (HCC) E11.65 250.02 HEMOGLOBIN A1C W/O EAG   2. Essential hypertension B35 129.8 METABOLIC PANEL, COMPREHENSIVE   3. High cholesterol E78.00 272.0 LIPID PANEL   4. Pain of left hip joint M25.552 719.45 Will treat with Prednisone    5. Anxiety F41.9 300.00 Uncontrolled. Pt given list of therapist.                 Diabetic issues reviewed with him: diabetic diet discussed in detail, and low cholesterol diet, weight control and daily exercise discussed. Follow-up and Dispositions    · Return in about 3 months (around 4/14/2020) for labs 1 week before. Reviewed plan of care. Patient has provided input and agrees with goals. Discussed the patient's BMI with him. The BMI follow up plan is as follows:     dietary management education, guidance, and counseling  encourage exercise  monitor weight  prescribed dietary intake    An After Visit Summary was printed and given to the patient.

## 2020-01-14 NOTE — PROGRESS NOTES
Patient is in the office today for a follow up. Patient states he is having neck, back, left hip and left leg pain 9/10. Do you have an Advance Directive no  Do you want more information :information given    1. Have you been to the ER, urgent care clinic since your last visit? Hospitalized since your last visit? No    2. Have you seen or consulted any other health care providers outside of the [address] [place] [address] since your last visit? Include any pap smears or colon screening.  No

## 2020-01-14 NOTE — PATIENT INSTRUCTIONS
High Blood Pressure: Care Instructions  Overview    It's normal for blood pressure to go up and down throughout the day. But if it stays up, you have high blood pressure. Another name for high blood pressure is hypertension. Despite what a lot of people think, high blood pressure usually doesn't cause headaches or make you feel dizzy or lightheaded. It usually has no symptoms. But it does increase your risk of stroke, heart attack, and other problems. You and your doctor will talk about your risks of these problems based on your blood pressure. Your doctor will give you a goal for your blood pressure. Your goal will be based on your health and your age. Lifestyle changes, such as eating healthy and being active, are always important to help lower blood pressure. You might also take medicine to reach your blood pressure goal.  Follow-up care is a key part of your treatment and safety. Be sure to make and go to all appointments, and call your doctor if you are having problems. It's also a good idea to know your test results and keep a list of the medicines you take. How can you care for yourself at home? Medical treatment  · If you stop taking your medicine, your blood pressure will go back up. You may take one or more types of medicine to lower your blood pressure. Be safe with medicines. Take your medicine exactly as prescribed. Call your doctor if you think you are having a problem with your medicine. · Talk to your doctor before you start taking aspirin every day. Aspirin can help certain people lower their risk of a heart attack or stroke. But taking aspirin isn't right for everyone, because it can cause serious bleeding. · See your doctor regularly. You may need to see the doctor more often at first or until your blood pressure comes down. · If you are taking blood pressure medicine, talk to your doctor before you take decongestants or anti-inflammatory medicine, such as ibuprofen.  Some of these medicines can raise blood pressure. · Learn how to check your blood pressure at home. Lifestyle changes  · Stay at a healthy weight. This is especially important if you put on weight around the waist. Losing even 10 pounds can help you lower your blood pressure. · If your doctor recommends it, get more exercise. Walking is a good choice. Bit by bit, increase the amount you walk every day. Try for at least 30 minutes on most days of the week. You also may want to swim, bike, or do other activities. · Avoid or limit alcohol. Talk to your doctor about whether you can drink any alcohol. · Try to limit how much sodium you eat to less than 2,300 milligrams (mg) a day. Your doctor may ask you to try to eat less than 1,500 mg a day. · Eat plenty of fruits (such as bananas and oranges), vegetables, legumes, whole grains, and low-fat dairy products. · Lower the amount of saturated fat in your diet. Saturated fat is found in animal products such as milk, cheese, and meat. Limiting these foods may help you lose weight and also lower your risk for heart disease. · Do not smoke. Smoking increases your risk for heart attack and stroke. If you need help quitting, talk to your doctor about stop-smoking programs and medicines. These can increase your chances of quitting for good. When should you call for help? Call  911 anytime you think you may need emergency care. This may mean having symptoms that suggest that your blood pressure is causing a serious heart or blood vessel problem. Your blood pressure may be over 180/120.   For example, call  911 if:    · You have symptoms of a heart attack. These may include:  ? Chest pain or pressure, or a strange feeling in the chest.  ? Sweating. ? Shortness of breath. ? Nausea or vomiting. ? Pain, pressure, or a strange feeling in the back, neck, jaw, or upper belly or in one or both shoulders or arms. ? Lightheadedness or sudden weakness.   ? A fast or irregular heartbeat.     · You have symptoms of a stroke. These may include:  ? Sudden numbness, tingling, weakness, or loss of movement in your face, arm, or leg, especially on only one side of your body. ? Sudden vision changes. ? Sudden trouble speaking. ? Sudden confusion or trouble understanding simple statements. ? Sudden problems with walking or balance. ? A sudden, severe headache that is different from past headaches.     · You have severe back or belly pain.    Do not wait until your blood pressure comes down on its own. Get help right away.   Call your doctor now or seek immediate care if:    · Your blood pressure is much higher than normal (such as 180/120 or higher), but you don't have symptoms.     · You think high blood pressure is causing symptoms, such as:  ? Severe headache.  ? Blurry vision.    Watch closely for changes in your health, and be sure to contact your doctor if:    · Your blood pressure measures higher than your doctor recommends at least 2 times. That means the top number is higher or the bottom number is higher, or both.     · You think you may be having side effects from your blood pressure medicine. Where can you learn more? Go to http://sumitAssemblageuli.info/. Enter P595 in the search box to learn more about \"High Blood Pressure: Care Instructions. \"  Current as of: April 9, 2019  Content Version: 12.2  © 8297-4838 Healthwise, Incorporated. Care instructions adapted under license by [employer] (which disclaims liability or warranty for this information). If you have questions about a medical condition or this instruction, always ask your healthcare professional. [place] [address] any warranty or liability for your use of this information. Advance Directives: Care Instructions  Your Care Instructions  An advance directive is a legal way to state your wishes at the end of your life.  It tells your family and your doctor what to do if you can no longer say what you want. There are two main types of advance directives. You can change them any time that your wishes change. · A living will tells your family and your doctor your wishes about life support and other treatment. · A durable power of  for health care lets you name a person to make treatment decisions for you when you can't speak for yourself. This person is called a health care agent. If you do not have an advance directive, decisions about your medical care may be made by a doctor or a  who doesn't know you. It may help to think of an advance directive as a gift to the people who care for you. If you have one, they won't have to make tough decisions by themselves. Follow-up care is a key part of your treatment and safety. Be sure to make and go to all appointments, and call your doctor if you are having problems. It's also a good idea to know your test results and keep a list of the medicines you take. How can you care for yourself at home? · Discuss your wishes with your loved ones and your doctor. This way, there are no surprises. · Many states have a unique form. Or you might use a universal form that has been approved by many states. This kind of form can sometimes be completed and stored online. Your electronic copy will then be available wherever you have a connection to the Internet. In most cases, doctors will respect your wishes even if you have a form from a different state. · You don't need a  to do an advance directive. But you may want to get legal advice. · Think about these questions when you prepare an advance directive:  ? Who do you want to make decisions about your medical care if you are not able to? Many people choose a family member or close friend. ? Do you know enough about life support methods that might be used? If not, talk to your doctor so you understand. ? What are you most afraid of that might happen?  You might be afraid of having pain, losing your independence, or being kept alive by machines. ? Where would you prefer to die? Choices include your home, a hospital, or a nursing home. ? Would you like to have information about hospice care to support you and your family? ? Do you want to donate organs when you die? ? Do you want certain Alevism practices performed before you die? If so, put your wishes in the advance directive. · Read your advance directive every year, and make changes as needed. When should you call for help? Be sure to contact your doctor if you have any questions. Where can you learn more? Go to http://sumit-uli.info/. Enter R264 in the search box to learn more about \"Advance Directives: Care Instructions. \"  Current as of: April 1, 2019  Content Version: 12.2  © 3132-5723 [employer]. Care instructions adapted under license by [employer] (which disclaims liability or warranty for this information). If you have questions about a medical condition or this instruction, always ask your healthcare professional. [address] any warranty or liability for your use of this information.

## 2020-01-29 NOTE — PROGRESS NOTES
[facility]  [address]  [place], [address]  [phone] fax    Physical Therapy Discharge Summary  Patient name: Jim Nam Start of Care: 10/29/2019   Referral source: Felix Fermin MD : [id number]   Medical/Treatment Diagnosis: Spinal stenosis, cervical region [M48.02]  Low back pain [M54.5]  Payor: Yury Lora / Plan: [address] [place] [address] HMO / Product Type: Managed Care Medicare /  Onset Date:Cervical Fusion 2019, Injury onset 2018           Prior Hospitalization: see medical history Provider#: [id number]   Medications: Verified on Patient Summary List      Comorbidities: HTN, diabetic, high cholesterol, recent cervical fusion 3 months ago, left 5th digit fusion, gall stone removal,  otherwise unremarkable per pt report   Prior Level of Function:  active without limitations    Visits from Start of Care: 14    Missed Visits: 4  Reporting Period : 19 to 20      Summary of Care: The patient was progressing well with PT but did have an exacerbation of symptoms at the time of his last session. He has not scheduled further f/u and is now being discharged from PT. Unable to fully reassess due to unexpected discharge.       1. Patient will report FOTO score of 43 or better to indicate significant improvement in functional status.               PN: Improved 48    Current: MET goals              2. Pt will demonstrate B cervical rotation AROM to 50 degrees void of pain provocation to improve ease of functional gaze.              PN: Progressed to 47 right, 53 left         ASSESSMENT/RECOMMENDATIONS:  [x]Discontinue therapy: []Patient has reached or is progressing toward set goals      [x]Patient  has abdicated      []Due to lack of appreciable progress towards set [address], PT 2020 3:06 PM

## 2020-03-25 ENCOUNTER — TELEPHONE (OUTPATIENT)
Dept: FAMILY MEDICINE CLINIC | Age: 70
End: 2020-03-25

## 2020-03-25 ENCOUNTER — OFFICE VISIT (OUTPATIENT)
Dept: FAMILY MEDICINE CLINIC | Age: 70
End: 2020-03-25

## 2020-03-25 VITALS
SYSTOLIC BLOOD PRESSURE: 151 MMHG | HEART RATE: 87 BPM | RESPIRATION RATE: 20 BRPM | TEMPERATURE: 98.1 F | HEIGHT: 65 IN | WEIGHT: 202 LBS | OXYGEN SATURATION: 95 % | BODY MASS INDEX: 33.66 KG/M2 | DIASTOLIC BLOOD PRESSURE: 95 MMHG

## 2020-03-25 DIAGNOSIS — R21 RASH: Primary | ICD-10-CM

## 2020-03-25 DIAGNOSIS — L98.9 FINGER LESION: ICD-10-CM

## 2020-03-25 RX ORDER — CETIRIZINE HCL 10 MG
10 TABLET ORAL DAILY
Qty: 30 TAB | Refills: 1 | Status: SHIPPED | OUTPATIENT
Start: 2020-03-25 | End: 2020-04-17

## 2020-03-25 RX ORDER — PREDNISONE 10 MG/1
TABLET ORAL
Qty: 21 TAB | Refills: 0 | Status: SHIPPED | OUTPATIENT
Start: 2020-03-25 | End: 2020-05-19 | Stop reason: ALTCHOICE

## 2020-03-25 NOTE — PROGRESS NOTES
Patient is in the office today for all over body rash. 1. Have you been to the ER, urgent care clinic since your last visit? Hospitalized since your last visit? No    2. Have you seen or consulted any other health care providers outside of the [address] [place] [address] since your last visit? Include any pap smears or colon screening.  No

## 2020-03-25 NOTE — PATIENT INSTRUCTIONS
High Blood Pressure: Care Instructions Overview It's normal for blood pressure to go up and down throughout the day. But if it stays up, you have high blood pressure. Another name for high blood pressure is hypertension. Despite what a lot of people think, high blood pressure usually doesn't cause headaches or make you feel dizzy or lightheaded. It usually has no symptoms. But it does increase your risk of stroke, heart attack, and other problems. You and your doctor will talk about your risks of these problems based on your blood pressure. Your doctor will give you a goal for your blood pressure. Your goal will be based on your health and your age. Lifestyle changes, such as eating healthy and being active, are always important to help lower blood pressure. You might also take medicine to reach your blood pressure goal. 
Follow-up care is a key part of your treatment and safety. Be sure to make and go to all appointments, and call your doctor if you are having problems. It's also a good idea to know your test results and keep a list of the medicines you take. How can you care for yourself at home? Medical treatment · If you stop taking your medicine, your blood pressure will go back up. You may take one or more types of medicine to lower your blood pressure. Be safe with medicines. Take your medicine exactly as prescribed. Call your doctor if you think you are having a problem with your medicine. · Talk to your doctor before you start taking aspirin every day. Aspirin can help certain people lower their risk of a heart attack or stroke. But taking aspirin isn't right for everyone, because it can cause serious bleeding. · See your doctor regularly. You may need to see the doctor more often at first or until your blood pressure comes down. · If you are taking blood pressure medicine, talk to your doctor before you take decongestants or anti-inflammatory medicine, such as ibuprofen. Some of these medicines can raise blood pressure. · Learn how to check your blood pressure at home. Lifestyle changes · Stay at a healthy weight. This is especially important if you put on weight around the waist. Losing even 10 pounds can help you lower your blood pressure. · If your doctor recommends it, get more exercise. Walking is a good choice. Bit by bit, increase the amount you walk every day. Try for at least 30 minutes on most days of the week. You also may want to swim, bike, or do other activities. · Avoid or limit alcohol. Talk to your doctor about whether you can drink any alcohol. · Try to limit how much sodium you eat to less than 2,300 milligrams (mg) a day. Your doctor may ask you to try to eat less than 1,500 mg a day. · Eat plenty of fruits (such as bananas and oranges), vegetables, legumes, whole grains, and low-fat dairy products. · Lower the amount of saturated fat in your diet. Saturated fat is found in animal products such as milk, cheese, and meat. Limiting these foods may help you lose weight and also lower your risk for heart disease. · Do not smoke. Smoking increases your risk for heart attack and stroke. If you need help quitting, talk to your doctor about stop-smoking programs and medicines. These can increase your chances of quitting for good. When should you call for help? Call  911 anytime you think you may need emergency care. This may mean having symptoms that suggest that your blood pressure is causing a serious heart or blood vessel problem. Your blood pressure may be over 180/120. 
 For example, call  911 if: 
  · You have symptoms of a heart attack. These may include: 
? Chest pain or pressure, or a strange feeling in the chest. 
? Sweating. ? Shortness of breath. ? Nausea or vomiting. ? Pain, pressure, or a strange feeling in the back, neck, jaw, or upper belly or in one or both shoulders or arms. ? Lightheadedness or sudden weakness. ? A fast or irregular heartbeat.  
  · You have symptoms of a stroke. These may include: 
? Sudden numbness, tingling, weakness, or loss of movement in your face, arm, or leg, especially on only one side of your body. ? Sudden vision changes. ? Sudden trouble speaking. ? Sudden confusion or trouble understanding simple statements. ? Sudden problems with walking or balance. ? A sudden, severe headache that is different from past headaches.  
  · You have severe back or belly pain.  
 Do not wait until your blood pressure comes down on its own. Get help right away. 
 Call your doctor now or seek immediate care if: 
  · Your blood pressure is much higher than normal (such as 180/120 or higher), but you don't have symptoms.  
  · You think high blood pressure is causing symptoms, such as: 
? Severe headache. 
? Blurry vision.  
 Watch closely for changes in your health, and be sure to contact your doctor if: 
  · Your blood pressure measures higher than your doctor recommends at least 2 times. That means the top number is higher or the bottom number is higher, or both.  
  · You think you may be having side effects from your blood pressure medicine. Where can you learn more? Go to http://sumit-uli.info/ Enter R362 in the search box to learn more about \"High Blood Pressure: Care Instructions. \" Current as of: December 15, 2019Content Version: 12.4 © 7139-7440 Healthwise, Incorporated. Care instructions adapted under license by [employer] (which disclaims liability or warranty for this information). If you have questions about a medical condition or this instruction, always ask your healthcare professional. [place] [address] any warranty or liability for your use of this information.

## 2020-03-27 RX ORDER — PRAVASTATIN SODIUM 40 MG/1
40 TABLET ORAL
Qty: 90 TAB | Refills: 1 | Status: CANCELLED | OUTPATIENT
Start: 2020-03-27

## 2020-03-27 RX ORDER — PRAVASTATIN SODIUM 40 MG/1
40 TABLET ORAL
Qty: 90 TAB | Refills: 1 | Status: SHIPPED | OUTPATIENT
Start: 2020-03-27 | End: 2020-08-03 | Stop reason: SDUPTHER

## 2020-04-01 NOTE — PROGRESS NOTES
Arleen Michael is a 79 y.o.  male and presents with Rash and Lesion (left index)      SUBJECTIVE:    Patient presents with generalized rash his arms, legs and torso. Rash has been going on for 2 weeks and he has significant itching with it. He has been using triamcinolone cream along with Benadryl without any significant improvement. He also has a lesion on his left index finger. Respiratory ROS: negative for - shortness of breath  Cardiovascular ROS: negative for - chest pain    Current Outpatient Medications   Medication Sig    predniSONE (STERAPRED DS) 10 mg dose pack See administration instruction per 10mg dose pack    cetirizine (ZYRTEC) 10 mg tablet Take 1 Tab by mouth daily.  acetaminophen (TYLENOL EXTRA STRENGTH) 500 mg tablet Take  by mouth every six (6) hours as needed for Pain.  gabapentin (NEURONTIN) 100 mg capsule Take 1 Cap by mouth three (3) times daily. Max Daily Amount: 300 mg.    flash glucose scanning reader (ZipZapSTYLE TAYLOR 14 DAY READER) misc Use as directed    flash glucose sensor (FREESTYLE TAYLOR 14 DAY SENSOR) kit Use one every 14 days    lisinopril (PRINIVIL, ZESTRIL) 40 mg tablet Take 1 Tab by mouth daily.  pantoprazole (PROTONIX) 40 mg tablet Take 1 Tab by mouth daily.  SITagliptin-metFORMIN (JANUMET XR) 50-1,000 mg TM24 Take 2 Tabs by mouth daily.  triamcinolone acetonide (KENALOG) 0.025 % ointment Apply  to affected area two (2) times a day. use thin layer    TOBRADEX ST drps ophthalmix suspension INSTILL 1 DROP INTO LEFT EYE TWICE A DAY    hydrOXYzine pamoate (VISTARIL) 25 mg capsule Take 1 Cap by mouth three (3) times daily as needed for Itching.  alcohol swabs (BD SINGLE USE SWABS REGULAR) padm Use daily to check blood sugar    Blood-Glucose Meter monitoring kit Accu-check Smartview Meter    pravastatin (PRAVACHOL) 40 mg tablet TAKE 1 TAB BY MOUTH NIGHTLY.  INDICATIONS: HIGH CHOLESTEROL    lancets (ACCU-CHEK SOFTCLIX LANCETS) misc Check BS 2x/day    topiramate (TOPAMAX) 25 mg tablet TAKE 1 TABLET BY MOUTH TWICE A DAY    glucose blood VI test strips (BLOOD GLUCOSE TEST) strip Test blood sugar 1 x daily  DX E11.9    gabapentin (NEURONTIN) 300 mg capsule TAKE 1 CAPSULE BY MOUTH THREE TIMES A DAY    levoFLOXacin (QUIXIN) 0.5 % ophthalmic solution Administer 2 Drops to right eye every six (6) hours. use in affected eye(s)    halobetasol (ULTRAVATE) 0.05 % topical cream APPLY TO AFFECTED AREA TWICE A DAY AS NEEDED    Lancets misc Accu-check  Fastclix Lancets. Test blood sugar daily. DX E11.9    Blood Glucose Control, Normal (ACCU-CHEK SMARTVIEW CONTRL SOL) soln Test blood sugar 1 x daily  DX E11.9        1 year supply  Check controls monthy on glucose meter     No current facility-administered medications for this visit. OBJECTIVE:  alert, well appearing, and in no distress  Visit Vitals  BP (!) 151/95 (BP 1 Location: Left arm, BP Patient Position: Sitting)   Pulse 87   Temp 98.1 °F (36.7 °C) (Oral)   Resp 20   Ht 5' 5\" (1.651 m)   Wt 202 lb (91.6 kg)   SpO2 95%   BMI 33.61 kg/m²      well developed and well nourished  Skin -scaly generalized rash on arms and torso. Assessment/Plan      ICD-10-CM ICD-9-CM    1. Rash R21 782. 1 Will treat with predniSONE (STERAPRED DS) 10 mg dose pack      And cetirizine (ZYRTEC) 10 mg tablet      REFERRAL TO DERMATOLOGY   2. Finger lesion L98.9 709.9 REFERRAL TO DERMATOLOGY     Follow-up and Dispositions    · Return if symptoms worsen or fail to improve. Reviewed plan of care. Patient has provided input and agrees with goals.

## 2020-04-17 DIAGNOSIS — R21 RASH: ICD-10-CM

## 2020-04-17 RX ORDER — CETIRIZINE HCL 10 MG
TABLET ORAL
Qty: 30 TAB | Refills: 1 | Status: SHIPPED | OUTPATIENT
Start: 2020-04-17 | End: 2020-05-21

## 2020-05-12 ENCOUNTER — TELEPHONE (OUTPATIENT)
Dept: INTERNAL MEDICINE CLINIC | Age: 70
End: 2020-05-12

## 2020-05-12 NOTE — TELEPHONE ENCOUNTER
Dwight, Pharmacist with [address], called stating she did a medical intervention with him today. Ham Faria is taking meds correctly but his blood sugar is elevated in the 300's. He doesn't feel a major difference but he is concerned because he is taking the Janumet XR as prescribed and can't seem to get his sugar down. Please advise. Do you want to do a virtual appt with him?

## 2020-05-13 ENCOUNTER — VIRTUAL VISIT (OUTPATIENT)
Dept: INTERNAL MEDICINE CLINIC | Age: 70
End: 2020-05-13

## 2020-05-13 DIAGNOSIS — E11.29 TYPE II OR UNSPECIFIED TYPE DIABETES MELLITUS WITH RENAL MANIFESTATIONS, UNCONTROLLED(250.42) (HCC): Primary | ICD-10-CM

## 2020-05-13 DIAGNOSIS — E11.65 TYPE II OR UNSPECIFIED TYPE DIABETES MELLITUS WITH RENAL MANIFESTATIONS, UNCONTROLLED(250.42) (HCC): Primary | ICD-10-CM

## 2020-05-13 RX ORDER — INSULIN GLARGINE 100 [IU]/ML
INJECTION, SOLUTION SUBCUTANEOUS
Qty: 15 ML | Refills: 2 | Status: SHIPPED | OUTPATIENT
Start: 2020-05-13 | End: 2020-11-25

## 2020-05-13 RX ORDER — PEN NEEDLE, DIABETIC 30 GX3/16"
NEEDLE, DISPOSABLE MISCELLANEOUS
Qty: 50 PEN NEEDLE | Refills: 5 | Status: SHIPPED | OUTPATIENT
Start: 2020-05-13 | End: 2021-11-04

## 2020-05-14 NOTE — PROGRESS NOTES
Vance Velazquez is a 79 y.o. male evaluated via audio only technology on 5/13/2020. Consent: He and/or his health care decision maker is aware that he may receive a bill for this audio only encounter, depending on his insurance coverage, and has provided verbal consent to proceed: Yes    I communicated with the patient and/or health care decision maker about the nature and details of the following:  Assessment & Plan:   Diagnoses and all orders for this visit:    1. Type II or unspecified type diabetes mellitus with renal manifestations, uncontrolled(250.42) ([facility])  -     insulin glargine (LANTUS,BASAGLAR) 100 unit/mL (3 mL) inpn; Inject 20 units daily in AM SQ  -     Insulin Needles, Disposable, 31 gauge x 5/16\" ndle; Inject daily  -     REFERRAL TO PHARMACIST        12  Subjective:   Vance Velazquez is a 79 y.o. male who was seen for Diabetes    Pt's BD have been running 300-500 on Janumet. Had a d/w pt that the only way to get it down at this time will be to start insulin and if diet improves may be able to go back to pills. Will start pt on Lantus 20 units and titrate up until BS<150. Will have nurse of PharmD f/u with pt to help with how to give insulin injections. Prior to Admission medications    Medication Sig Start Date End Date Taking? Authorizing Provider   insulin glargine (LANTUS,BASAGLAR) 100 unit/mL (3 mL) inpn Inject 20 units daily in AM SQ 5/13/20  Yes Felicia Doran MD   Insulin Needles, Disposable, 31 gauge x 5/16\" ndle Inject daily 5/13/20  Yes Felicia Doran MD   cetirizine (ZYRTEC) 10 mg tablet TAKE 1 TABLET BY MOUTH EVERY DAY 4/17/20   Felicia Doran MD   pravastatin (PRAVACHOL) 40 mg tablet TAKE 1 TAB BY MOUTH NIGHTLY.  INDICATIONS: HIGH CHOLESTEROL 3/27/20   Kait Ferrer MD   predniSONE (STERAPRED DS) 10 mg dose pack See administration instruction per 10mg dose pack 3/25/20   Felicia Doran MD   lancets (ACCU-CHEK SOFTCLIX LANCETS) misc Check BS 2x/day 12/5/19   Espinoza Heather Mcknight MD   topiramate (TOPAMAX) 25 mg tablet TAKE 1 TABLET BY MOUTH TWICE A DAY 11/25/19   Heather Doran MD   acetaminophen (TYLENOL EXTRA STRENGTH) 500 mg tablet Take  by mouth every six (6) hours as needed for Pain. Provider, Historical   gabapentin (NEURONTIN) 100 mg capsule Take 1 Cap by mouth three (3) times daily. Max Daily Amount: 300 mg. 9/3/19   Fabiola Mora MD   flash glucose scanning reader (FREESTYLE TAYLOR 14 DAY READER) [facility] Use as directed 8/1/19   Heather Doran MD   flash glucose sensor (FREESTYLE TAYLOR 14 DAY SENSOR) kit Use one every 14 days 8/1/19   Heather Doran MD   lisinopril (PRINIVIL, ZESTRIL) 40 mg tablet Take 1 Tab by mouth daily. 7/25/19   Sanjeev Leslie MD   pantoprazole (PROTONIX) 40 mg tablet Take 1 Tab by mouth daily. 7/25/19   Heather Doran MD   SITagliptin-metFORMIN (JANUMET XR) 50-1,000 mg TM24 Take 2 Tabs by mouth daily. 7/25/19   Sanjeev Leslie MD   glucose blood VI test strips (BLOOD GLUCOSE TEST) strip Test blood sugar 1 x daily  DX E11.9 7/19/19   Heather Doran MD   gabapentin (NEURONTIN) 300 mg capsule TAKE 1 CAPSULE BY MOUTH THREE TIMES A DAY 5/16/19   Fabiola Mora MD   levoFLOXacin [facility]) 0.5 % ophthalmic solution Administer 2 Drops to right eye every six (6) hours. use in affected eye(s) 2/18/19   Jevon Melchor MD   triamcinolone acetonide (KENALOG) 0.025 % ointment Apply  to affected area two (2) times a day. use thin layer 2/18/19   Jevon Melchor MD   halobetasol (ULTRAVATE) 0.05 % topical cream APPLY TO AFFECTED AREA TWICE A DAY AS NEEDED 10/22/18   Provider, Historical   TOBRADEX ST drps ophthalmix suspension INSTILL 1 DROP INTO LEFT EYE TWICE A DAY 11/30/18   Provider, Historical   hydrOXYzine pamoate (VISTARIL) 25 mg capsule Take 1 Cap by mouth three (3) times daily as needed for Itching. 6/26/18   Sam Blunt PA-C   Lancets [facility] Accu-check  Fastclix Lancets. Test blood sugar daily.   DX E11.9 10/23/17   Sanjeev Leslie MD   alcohol swabs (BD SINGLE USE SWABS REGULAR) padm Use daily to check blood sugar 3/23/16   Heather Doran MD   Blood Glucose Control, Normal (ACCU-CHEK SMARTVIEW CONTRL SOL) soln Test blood sugar 1 x daily  DX E11.9        1 year supply  Check controls monthy on glucose meter 3/23/16   Heather Doran MD   Blood-Glucose Meter monitoring kit Accu-check Smartview Meter 3/23/16   Sanjeev Leslie MD     Allergies   Allergen Reactions    Latex Sneezing    Benzalkonium Chloride Hives     Pt denies    Neosporin [Hydrocortisone] Rash       Patient Active Problem List   Diagnosis Code    Essential hypertension I10    DM (diabetes mellitus), type 2, uncontrolled ([facility] [address] E11.65    High cholesterol E78.00    ACP (advance care planning) Z71.89    Type 2 diabetes mellitus with diabetic neuropathy (HCC) E11.40    Severe obesity (HCC) E66.01    Type 2 diabetes with nephropathy ([facility] [address] E11.21    Cervical myelopathy (HCC) G95.9    Cervical spinal stenosis M48.02       ROS    I affirm this is a Patient-Initiated Episode with a Patient who has not had a related appointment within my department in the past 7 days or scheduled within the next 24 hours. Total Time: minutes: 11-20 minutes    Note: not billable if this call serves to triage the patient into an appointment for the relevant concern    Follow-up and Dispositions    · Return in about 1 week (around 5/20/2020) for DM.            Surya Roberts MD

## 2020-05-18 ENCOUNTER — TELEPHONE (OUTPATIENT)
Dept: INTERNAL MEDICINE CLINIC | Age: 70
End: 2020-05-18

## 2020-05-18 NOTE — PROGRESS NOTES
CC:  Diabetes management    S/O: Renee Kwok is a 79 y.o. male referred by Jose Guadalupe Richmond MD for diabetes management. Past Medical History:   Diagnosis Date    Diabetes ([address]     GERD (gastroesophageal reflux disease)     Hx of colonic polyps     Hx of gallstones     Hypercholesterolemia     Hypertension     Restless leg syndrome     Wears dentures      Current diabetes regimen consists of the following: Lantus 20 units daily, Janumet XR  mg 2 tabs daily.  -States he started Lantus 2 days ago    Patient reports the following signs/symptoms of diabetes: blurry vision. Patient denies recent hypoglycemic symptoms. Patient checks blood sugars 2-3 times per day and readings run 140s-340s mg/dL. Most recent A1C was 9.4 %. Lab Results   Component Value Date/Time    Hemoglobin A1c 9.4 (H) 01/03/2020 08:29 AM     Date Fasting After Breakfast Before Lunch After Lunch After Dinner Bedtime   5/19 179, 187 211 (30 mins after)       5/18 307 (overnight) 147, 158 190 241     5/17 256  269 239, 275 262 353   5/16 295 246, 227 320 337, 344 265    5/15 324 308  379     -States using freestyle arnie for BG checks - has been using it for 2 months now    A typical days food intake is as follows:  Breakfast: Coffee and croissant sandwich; sometimes bowl of cereal or microwaveable pancakes  Lunch: Lighter lunch - sometimes piece of apple, oranges, bananas, pears, berries  Dinner: Pasta with chicken; sometimes microwavable dinners  Beverages: Water, sometimes coffee; states has can of soda 2-3 times per day  Snacks: Multigrain cheerios, fruity chetan, potato chips, cookies, cheetos   -Snacks around 1-2 times per week  -States he is staying in hotel right now; most meals are microwavable  -States he will try cutting down pasta    Exercise: States not very active right now    Patient reports adherence with His medications - uses pillbox. However not taking gabapentin.  Patient denies adverse effects from their medications.  -States he stopped taking gabapentin around 4 months ago - doesn't know why  -States he is still having pain in neck from neck surgery (last June)  -Reports stiffness in neck depending on weather; states he gets aching, sharp pain when he lays down  -Using halobetasol cream for rash on neck - states he had rash when sugars started increasing    Past Surgical History:   Procedure Laterality Date    HX OTHER SURGICAL      gallstones removed    ME COLONOSCOPY FLX DX W/COLLJ SPEC WHEN PFRMD  2-17-16    Dr. Levin Hatchet     Family History   Problem Relation Age of Onset    Heart Attack Mother     Prostate Cancer Father     Colon Cancer Father     Stroke Sister      Social History     Socioeconomic History    Marital status: SINGLE     Spouse name: Not on file    Number of children: Not on file    Years of education: Not on file    Highest education level: Not on file   Tobacco Use    Smoking status: Current Every Day Smoker     Packs/day: 0.50    Smokeless tobacco: Never Used   Substance and Sexual Activity    Alcohol use: Yes     Comment: occas.  Drug use: Yes     Types: Marijuana     Allergies   Allergen Reactions    Latex Sneezing    Benzalkonium Chloride Hives     Pt denies    Neosporin [Hydrocortisone] Rash     Current Outpatient Medications   Medication Sig    Insulin Needles, Disposable, 31 gauge x 5/16\" ndle Inject daily    cetirizine (ZYRTEC) 10 mg tablet TAKE 1 TABLET BY MOUTH EVERY DAY    pravastatin (PRAVACHOL) 40 mg tablet TAKE 1 TAB BY MOUTH NIGHTLY. INDICATIONS: HIGH CHOLESTEROL    lancets (ACCU-CHEK SOFTCLIX LANCETS) misc Check BS 2x/day    acetaminophen (TYLENOL EXTRA STRENGTH) 500 mg tablet Take 1,000 mg by mouth every six (6) hours as needed for Pain.     flash glucose scanning reader (FREESTYLE TAYLOR 14 DAY READER) misc Use as directed    flash glucose sensor (FREESTYLE TAYLOR 14 DAY SENSOR) kit Use one every 14 days    lisinopril (PRINIVIL, ZESTRIL) 40 mg tablet Take 1 Tab by mouth daily.  pantoprazole (PROTONIX) 40 mg tablet Take 1 Tab by mouth daily.  SITagliptin-metFORMIN (JANUMET XR) 50-1,000 mg TM24 Take 2 Tabs by mouth daily.  glucose blood VI test strips (BLOOD GLUCOSE TEST) strip Test blood sugar 1 x daily  DX E11.9    halobetasol (ULTRAVATE) 0.05 % topical cream APPLY TO AFFECTED AREA TWICE A DAY AS NEEDED    Lancets misc Accu-check  Fastclix Lancets. Test blood sugar daily. DX E11.9    alcohol swabs (BD SINGLE USE SWABS REGULAR) padm Use daily to check blood sugar    Blood Glucose Control, Normal (ACCU-CHEK SMARTVIEW CONTRL SOL) soln Test blood sugar 1 x daily  DX E11.9        1 year supply  Check controls monthy on glucose meter    Blood-Glucose Meter monitoring kit Accu-check Smartview Meter    insulin glargine (LANTUS,BASAGLAR) 100 unit/mL (3 mL) inpn Inject 20 units daily in AM SQ    topiramate (TOPAMAX) 25 mg tablet TAKE 1 TABLET BY MOUTH TWICE A DAY    gabapentin (NEURONTIN) 100 mg capsule Take 1 Cap by mouth three (3) times daily. Max Daily Amount: 300 mg.  triamcinolone acetonide (KENALOG) 0.025 % ointment Apply  to affected area two (2) times a day. use thin layer    hydrOXYzine pamoate (VISTARIL) 25 mg capsule Take 1 Cap by mouth three (3) times daily as needed for Itching. No current facility-administered medications for this visit. There were no vitals taken for this visit.     Lab Results   Component Value Date/Time    Sodium 137 01/03/2020 08:29 AM    Potassium 4.8 01/03/2020 08:29 AM    Chloride 101 01/03/2020 08:29 AM    CO2 17 (L) 01/03/2020 08:29 AM    Anion gap 8 04/24/2018 07:35 AM    Glucose 256 (H) 01/03/2020 08:29 AM    BUN 19 01/03/2020 08:29 AM    Creatinine 1.30 (H) 01/03/2020 08:29 AM    BUN/Creatinine ratio 15 01/03/2020 08:29 AM    GFR est AA 64 01/03/2020 08:29 AM    GFR est non-AA 56 (L) 01/03/2020 08:29 AM    Calcium 10.1 01/03/2020 08:29 AM      Lab Results   Component Value Date/Time Creatinine 1.30 (H) 01/03/2020 08:29 AM     Lab Results   Component Value Date/Time    Cholesterol, total 141 01/03/2020 08:29 AM    HDL Cholesterol 46 01/03/2020 08:29 AM    LDL, calculated 76 01/03/2020 08:29 AM    VLDL, calculated 19 01/03/2020 08:29 AM    Triglyceride 93 01/03/2020 08:29 AM    CHOL/HDL Ratio 3.1 04/24/2018 07:35 AM     Lab Results   Component Value Date/Time    ALT (SGPT) 14 01/03/2020 08:29 AM    AST (SGOT) 14 01/03/2020 08:29 AM    Alk. phosphatase 107 01/03/2020 08:29 AM    Bilirubin, total 0.4 01/03/2020 08:29 AM     Lab Results   Component Value Date/Time    Microalbumin/Creat ratio (mg/g creat) 8 04/24/2018 07:35 AM    Microalb/Creat ratio (ug/mg creat.) 7.2 06/18/2019 10:59 AM    Microalbumin,urine random 1.90 04/24/2018 07:35 AM     BP Readings from Last 3 Encounters:   03/25/20 (!) 151/95   01/14/20 (!) 157/96   12/02/19 123/84     Wt Readings from Last 3 Encounters:   03/25/20 202 lb (91.6 kg)   01/14/20 211 lb 3.2 oz (95.8 kg)   12/02/19 218 lb (98.9 kg)     Estimated body mass index is 33.61 kg/m² as calculated from the following:    Height as of 3/25/20: 5' 5\" (1.651 m). Weight as of 3/25/20: 202 lb (91.6 kg). Screenings:  Diabetic Foot and Eye Exam HM Status   Topic Date Due    Diabetic Foot Care  12/19/2020    Eye Exam  05/10/2021       Assessment/Plan:    1. Diabetes: uncontrolled with goal A1C at least <7%. Blood glucose readings: high with 340s readings the highest consistently.  -Discussed pathophysiology of diabetes, long-term consequences of uncontrolled diabetes and blood sugars, importance of staying up to date with eye/foot/dental exams, signs/symptoms of hypo/hyperglycemia, and A1C and BG goals with patient today    A. Medications: Stressed importance of medication adherence on overall diabetes control as well as with preventing complications of diabetes. Reviewed symptoms of hypoglycemia and how to treat.   -Instructed patient to continue Lantus 20 units and Janumet   -Reviewed proper administration and storage of Lantus  -Instructed patient to check BG 4-5 times per day (fasting, before meals, 2 hours after meals)  -Reviewed importance of checking BG with fingerstick when BG <70 or when symptoms do not match up with readings    B. Diet/lifestyle: Reinforced importance of regular activity/exercise to help improve insulin resistance and reviewed food labels/carbohydrates/general carb guidelines for meals (45-60 g) and snacks (goal of 15g). Patient education materials were provided via [employer] and reviewed with the patient.  -Instructed patient to decrease intake of soda and carbs    C. Screenings/Prevention:  Vaccinations: Patient is eligible for the following vaccinations: hepatitis B  Dilated eye exam (recommended at least every 2 years or annually if retinopathy present): next due 5/10/21    Albumin-to creatinine ratio (recommended annually): next due 6/18/2020  Foot Exam (recommended annually): next due 12/19/2020     2. Hypertension: Blood pressure is not at goal of < 140/90 mm Hg per the ADA guidelines (/95 on 3/25/2020). Emphasized the importance of regular physical activity, restricting salt in diet and weight maintenance/loss on overall blood pressure control.  -Currently on lisinopril 40 mg daily    3. Medication reconciliation was completed during the visit. Medications Discontinued During This Encounter   Medication Reason    gabapentin (NEURONTIN) 300 mg capsule Therapy Completed    levoFLOXacin (QUIXIN) 0.5 % ophthalmic solution Therapy Completed    predniSONE (STERAPRED DS) 10 mg dose pack Therapy Completed    TOBRADEX ST drps ophthalmix suspension Therapy Completed   -Patient states he will follow up with ortho before restarting gabapentin    Patient verbalized understanding of the information presented and all of the patients questions were answered.     Patient advised to call the office with any additional questions or concerns. Follow-up: 1 week telephone call. Notification of recommendations will be sent to Dr. Jess Pedraza MD for review.     Thank you for the consult,  Ana María Velazquez, PharmD  Clinical Pharmacist Specialist    Time spent with the patient:  45  Time spent documentin [address]: MED RECONCILIATION/REVIEW [address]: [facility] Patient?: No  Total # of Interventions Recommended: Count: 1 - diabetes education  Total Interventions Accepted: 1  Time Spent (min): [address] [place] [address], [facility], PharmD

## 2020-05-18 NOTE — TELEPHONE ENCOUNTER
Called patient to schedule appointment for PharmD diabetes education and management per referral from Dr. Nupur Parra. Confirmed patient's . Scheduled patient for PharmD telephone appointment on . Instructed patient to have all medications and BG monitor ready for appointment. Patient expressed understanding and had no further questions.     Malu Miller, PharmD  Clinical Pharmacist Specialist

## 2020-05-19 ENCOUNTER — VIRTUAL VISIT (OUTPATIENT)
Dept: INTERNAL MEDICINE CLINIC | Age: 70
End: 2020-05-19

## 2020-05-19 DIAGNOSIS — E11.21 TYPE 2 DIABETES WITH NEPHROPATHY (HCC): Primary | ICD-10-CM

## 2020-05-19 NOTE — Clinical Note
Ryan López,  I spoke with Mr. Carrington Correa yesterday for diabetes education. He states he started Lantus 20 units 2 days ago; BG are starting to come down. I counseled him on dietary recommendations and instructed him to decrease intake of soda and carbs. Of note, he states he still has some neck stiffness and pain from neck surgery he got last year, but he stopped gabapentin a few months ago. I instructed him to follow up with you and ortho regarding his neck pain. I am following up with him in 1 week to adjust insulin.   Jessica Weldon, PharmD Clinical Pharmacist Specialist

## 2020-05-20 ENCOUNTER — VIRTUAL VISIT (OUTPATIENT)
Dept: INTERNAL MEDICINE CLINIC | Age: 70
End: 2020-05-20

## 2020-05-20 DIAGNOSIS — E11.29 TYPE II OR UNSPECIFIED TYPE DIABETES MELLITUS WITH RENAL MANIFESTATIONS, UNCONTROLLED(250.42) (HCC): Primary | ICD-10-CM

## 2020-05-20 DIAGNOSIS — E11.65 TYPE II OR UNSPECIFIED TYPE DIABETES MELLITUS WITH RENAL MANIFESTATIONS, UNCONTROLLED(250.42) (HCC): Primary | ICD-10-CM

## 2020-05-20 NOTE — PROGRESS NOTES
Staci Tubbs is a 79 y.o. male evaluated via audio only technology on 5/20/2020. Consent: He and/or his health care decision maker is aware that he may receive a bill for this audio only encounter, depending on his insurance coverage, and has provided verbal consent to proceed: Yes    I communicated with the patient and/or health care decision maker about the nature and details of the following:  Assessment & Plan:   Diagnoses and all orders for this visit:    1. Type II or unspecified type diabetes mellitus with renal manifestations, uncontrolled(250.42) (HCC)  -     MICROALBUMIN, UR, RAND W/ MICROALB/CREAT RATIO; Future  Pt doing much better on Lantus 20 units daily in addition to Janumet XR 50/1000 BID. 12  Subjective:   Staci Tubbs is a 79 y.o. male who was seen for Diabetes     Pt's DM is much better controlled on Lantus/Basaglar insulin 20 units daily with BS this . Pt also continues on Janumet 50/1000 BID. He will continue to work with PharmD on diet and if BS get low will decrease insulin as needed. Pt also notes rash he had on his neck is improving with his BS getting better. Prior to Admission medications    Medication Sig Start Date End Date Taking? Authorizing Provider   multivit-min/FA/lycopen/lutein (CENTRUM SILVER MEN PO) Take 1 Tab by mouth daily. Provider, Historical   insulin glargine (LANTUS,BASAGLAR) 100 unit/mL (3 mL) inpn Inject 20 units daily in AM SQ 5/13/20   Natalie Doran MD   Insulin Needles, Disposable, 31 gauge x 5/16\" ndle Inject daily 5/13/20   Natalie Doran MD   cetirizine (ZYRTEC) 10 mg tablet TAKE 1 TABLET BY MOUTH EVERY DAY 4/17/20   Natalie Doran MD   pravastatin (PRAVACHOL) 40 mg tablet TAKE 1 TAB BY MOUTH NIGHTLY.  INDICATIONS: HIGH CHOLESTEROL 3/27/20   Natalie Doran MD   lancets (ACCU-CHEK SOFTCLIX LANCETS) misc Check BS 2x/day 12/5/19   Dia Gallegos MD   topiramate (TOPAMAX) 25 mg tablet TAKE 1 TABLET BY MOUTH TWICE A DAY 11/25/19   Lynn Allen MD   acetaminophen (TYLENOL EXTRA STRENGTH) 500 mg tablet Take 1,000 mg by mouth every six (6) hours as needed for Pain. Provider, Historical   gabapentin (NEURONTIN) 100 mg capsule Take 1 Cap by mouth three (3) times daily. Max Daily Amount: 300 mg. 9/3/19   Milagros Virk MD   flash glucose scanning reader (FREESTYLE TAYLOR 14 DAY READER) [facility] Use as directed 8/1/19   Arnulfo Doran MD   flash glucose sensor (FREESTYLE TAYLOR 14 DAY SENSOR) kit Use one every 14 days 8/1/19   Arnulfo Doran MD   lisinopril (PRINIVIL, ZESTRIL) 40 mg tablet Take 1 Tab by mouth daily. 7/25/19   Lynn Allen MD   pantoprazole (PROTONIX) 40 mg tablet Take 1 Tab by mouth daily. 7/25/19   Arnulfo Doran MD   SITagliptin-metFORMIN (JANUMET XR) 50-1,000 mg TM24 Take 2 Tabs by mouth daily. 7/25/19   Lynn Allen MD   glucose blood VI test strips (BLOOD GLUCOSE TEST) strip Test blood sugar 1 x daily  DX E11.9 7/19/19   Arnulfo Doran MD   triamcinolone acetonide (KENALOG) 0.025 % ointment Apply  to affected area two (2) times a day. use thin layer 2/18/19   Cookie Melchor MD   halobetasol (ULTRAVATE) 0.05 % topical cream APPLY TO AFFECTED AREA TWICE A DAY AS NEEDED 10/22/18   Provider, Historical   hydrOXYzine pamoate (VISTARIL) 25 mg capsule Take 1 Cap by mouth three (3) times daily as needed for Itching. 6/26/18   Con GELACIO Rainey   Lancets [facility] Accu-check  Fastclix Lancets. Test blood sugar daily.   DX E11.9 10/23/17   Lynn Allen MD   alcohol swabs (BD SINGLE USE SWABS REGULAR) padm Use daily to check blood sugar 3/23/16   Arnulfo Doran MD   Blood Glucose Control, Normal (ACCU-CHEK SMARTVIEW CONTRL SOL) soln Test blood sugar 1 x daily  DX E11.9        1 year supply  Check controls monthy on glucose meter 3/23/16   Arnulfo Doran MD   Blood-Glucose Meter monitoring kit Accu-check Smartview Meter 3/23/16   Lynn Allen MD     Allergies   Allergen Reactions    Latex Sneezing    Benzalkonium Chloride Hives     Pt denies    Neosporin [Hydrocortisone] Rash       Patient Active Problem List   Diagnosis Code    Essential hypertension I10    DM (diabetes mellitus), type 2, uncontrolled ([facility] [address] E11.65    High cholesterol E78.00    ACP (advance care planning) Z71.89    Type 2 diabetes mellitus with diabetic neuropathy (HCC) E11.40    Severe obesity (HCC) E66.01    Type 2 diabetes with nephropathy ([facility] [address] E11.21    Cervical myelopathy ([facility]) G95.9    Cervical spinal stenosis M48.02       ROS    I affirm this is a Patient-Initiated Episode with a Patient who has not had a related appointment within my department in the past 7 days or scheduled within the next 24 hours.     Total Time: minutes: 5-10 minutes    Note: not billable if this call serves to triage the patient into an appointment for the relevant concern      Alcon Alexander MD

## 2020-05-22 NOTE — PROGRESS NOTES
Pharmacy Note:  Diabetes Follow-up    S/O: Placed an outgoing call to patient to follow-up on SMBG readings and diabetes. Patient referred by Alyson Meehan MD for diabetes management. Patient is currently taking the following for diabetes: Lantus 20 units daily every morning, Janumet XR  mg 2 tabs daily. He denies any signs/sx of hypoglycemia. Denies any BG <70. Denies any signs of high blood sugars. Last A1C was 9.4 on 1/3/2020. Patient is checking His blood sugar 3-4 times daily and reports the following values:  Date Fasting After Breakfast Before Lunch After Lunch Before Dinner After Dinner Bedtime   5/25   112 214  173    5/24 147   226  177    5/23  101  145 178 213    5/22  172  102  213 235   5/21   107 225 134 232    5/20   114 119 (30 mins after)  186 167   5/19     236 194 188   -States checks fasting BG, before meals, and 2 hours after meals    Diet/Exercise:  -States he tries to eat at least 2 meals per day  -States he has been cutting down on carbs and sweets; states he hasn't had a soda since last PharmD visit  -States he has been reading labels on food and microwaveable meals  -States he sleeps during the day sometimes and is up moreso at night  -States he tries to get a lot of exercise - walks several blocks to get coffee    Medication Adherence:  -Patient denies adherence with medications - states he missed medications 1 morning and 1 night since last PharmD visit  -Patient denies adverse effects from medications  -States he hasn't restarted gabapentin yet    Screenings:  Diabetic Foot and Eye Exam HM Status   Topic Date Due    Diabetic Foot Care  12/19/2020    Eye Exam  05/10/2021       Current Outpatient Medications   Medication Sig    cetirizine (ZYRTEC) 10 mg tablet TAKE 1 TABLET BY MOUTH EVERY DAY    multivit-min/FA/lycopen/lutein (CENTRUM SILVER MEN PO) Take 1 Tab by mouth daily.     insulin glargine (LANTUS,BASAGLAR) 100 unit/mL (3 mL) inpn Inject 20 units daily in AM SQ    Insulin Needles, Disposable, 31 gauge x 5/16\" ndle Inject daily    pravastatin (PRAVACHOL) 40 mg tablet TAKE 1 TAB BY MOUTH NIGHTLY. INDICATIONS: HIGH CHOLESTEROL    lancets (ACCU-CHEK SOFTCLIX LANCETS) misc Check BS 2x/day    topiramate (TOPAMAX) 25 mg tablet TAKE 1 TABLET BY MOUTH TWICE A DAY    acetaminophen (TYLENOL EXTRA STRENGTH) 500 mg tablet Take 1,000 mg by mouth every six (6) hours as needed for Pain.  gabapentin (NEURONTIN) 100 mg capsule Take 1 Cap by mouth three (3) times daily. Max Daily Amount: 300 mg.    flash glucose scanning reader (RebtelSTYLE TAYLOR 14 DAY READER) misc Use as directed    flash glucose sensor (FREESTYLE TAYLOR 14 DAY SENSOR) kit Use one every 14 days    lisinopril (PRINIVIL, ZESTRIL) 40 mg tablet Take 1 Tab by mouth daily.  pantoprazole (PROTONIX) 40 mg tablet Take 1 Tab by mouth daily.  SITagliptin-metFORMIN (JANUMET XR) 50-1,000 mg TM24 Take 2 Tabs by mouth daily.  glucose blood VI test strips (BLOOD GLUCOSE TEST) strip Test blood sugar 1 x daily  DX E11.9    triamcinolone acetonide (KENALOG) 0.025 % ointment Apply  to affected area two (2) times a day. use thin layer    halobetasol (ULTRAVATE) 0.05 % topical cream APPLY TO AFFECTED AREA TWICE A DAY AS NEEDED    hydrOXYzine pamoate (VISTARIL) 25 mg capsule Take 1 Cap by mouth three (3) times daily as needed for Itching.  Lancets misc Accu-check  Fastclix Lancets. Test blood sugar daily. DX E11.9    alcohol swabs (BD SINGLE USE SWABS REGULAR) padm Use daily to check blood sugar    Blood Glucose Control, Normal (ACCU-CHEK SMARTVIEW CONTRL SOL) soln Test blood sugar 1 x daily  DX E11.9        1 year supply  Check controls monthy on glucose meter    Blood-Glucose Meter monitoring kit Accu-check Smartview Meter     No current facility-administered medications for this visit.         Lab Results   Component Value Date/Time    Hemoglobin A1c 9.4 (H) 01/03/2020 08:29 AM       Lab Results   Component Value Date/Time    Sodium 137 01/03/2020 08:29 AM    Potassium 4.8 01/03/2020 08:29 AM    Chloride 101 01/03/2020 08:29 AM    CO2 17 (L) 01/03/2020 08:29 AM    Anion gap 8 04/24/2018 07:35 AM    Glucose 256 (H) 01/03/2020 08:29 AM    BUN 19 01/03/2020 08:29 AM    Creatinine 1.30 (H) 01/03/2020 08:29 AM    BUN/Creatinine ratio 15 01/03/2020 08:29 AM    GFR est AA 64 01/03/2020 08:29 AM    GFR est non-AA 56 (L) 01/03/2020 08:29 AM    Calcium 10.1 01/03/2020 08:29 AM       Lab Results   Component Value Date/Time    Cholesterol, total 141 01/03/2020 08:29 AM    HDL Cholesterol 46 01/03/2020 08:29 AM    LDL, calculated 76 01/03/2020 08:29 AM    Triglyceride 93 01/03/2020 08:29 AM    CHOL/HDL Ratio 3.1 04/24/2018 07:35 AM       A/P:    Diabetes:  -Instructed patient to continue Lantus 20 units and Janumet XR  -Reiterated importance of checking BG fasting, before meals, and 2 hours after meals using freestyle arnie  -Discussed importance of continuing to make dietary changes and decrease carb intake  -Will assess BG in 1 week and add medication for post-prandial BG at follow up if needed  -Reiterated importance of medication compliance    Patient verbalized understanding. Will follow up with patient in 1 week for telephone visit. Will route note to PCP.     Patricia Brown, PharmD  Clinical Pharmacist Specialist    CLINICAL PHARMACY CONSULT: MED RECONCILIATION/REVIEW ADDENDUM    For Pharmacy Admin Tracking Only    PHSO: PHSO Patient?: No  Total # of Interventions Recommended: Count: 0  Total Interventions Accepted: 0  Time Spent (min): [address], [facility], PharmD

## 2020-05-23 ENCOUNTER — HOSPITAL ENCOUNTER (EMERGENCY)
Age: 70
Discharge: HOME | End: 2020-05-23
Attending: EMERGENCY MEDICINE
Payer: MEDICARE

## 2020-05-23 ENCOUNTER — APPOINTMENT (OUTPATIENT)
Dept: GENERAL RADIOLOGY | Age: 70
End: 2020-05-23
Attending: EMERGENCY MEDICINE
Payer: MEDICARE

## 2020-05-23 VITALS
WEIGHT: 206 LBS | OXYGEN SATURATION: 96 % | BODY MASS INDEX: 33.11 KG/M2 | SYSTOLIC BLOOD PRESSURE: 142 MMHG | RESPIRATION RATE: 18 BRPM | DIASTOLIC BLOOD PRESSURE: 89 MMHG | TEMPERATURE: 97.7 F | HEIGHT: 66 IN | HEART RATE: 90 BPM

## 2020-05-23 DIAGNOSIS — S43.401A SPRAIN OF RIGHT SHOULDER, UNSPECIFIED SHOULDER SPRAIN TYPE, INITIAL ENCOUNTER: Primary | ICD-10-CM

## 2020-05-23 PROCEDURE — 99284 EMERGENCY DEPT VISIT MOD MDM: CPT

## 2020-05-23 PROCEDURE — 73030 X-RAY EXAM OF SHOULDER: CPT

## 2020-05-23 PROCEDURE — 74011250637 HC RX REV CODE- 250/637: Performed by: EMERGENCY MEDICINE

## 2020-05-23 RX ORDER — NAPROXEN 500 MG/1
500 TABLET ORAL 2 TIMES DAILY WITH MEALS
Qty: 20 TAB | Refills: 0 | Status: SHIPPED | OUTPATIENT
Start: 2020-05-23 | End: 2020-06-02

## 2020-05-23 RX ORDER — IBUPROFEN 400 MG/1
800 TABLET ORAL ONCE
Status: COMPLETED | OUTPATIENT
Start: 2020-05-23 | End: 2020-05-23

## 2020-05-23 RX ADMIN — IBUPROFEN 800 MG: 400 TABLET ORAL at 20:56

## 2020-05-24 NOTE — DISCHARGE INSTRUCTIONS
Patient Education        Shoulder Sprain: Care Instructions  Your Care Instructions    A shoulder sprain occurs when you stretch or tear a ligament in your shoulder. Ligaments are tough tissues that connect one bone to another. A sprain can happen during sports, a fall, or projects around the house. Shoulder sprains usually get better with treatment at home. Follow-up care is a key part of your treatment and safety. Be sure to make and go to all appointments, and call your doctor if you are having problems. It's also a good idea to know your test results and keep a list of the medicines you take. How can you care for yourself at home? · Rest and protect your shoulder. Try to stop or reduce any action that causes pain. · If your doctor gave you a sling or immobilizer, wear it as directed. A sling or immobilizer supports your shoulder and may make you more comfortable. · Put ice or a cold pack on your shoulder for 10 to 20 minutes at a time. Try to do this every 1 to 2 hours for the next 3 days (when you are awake) or until the swelling goes down. Put a thin cloth between the ice and your skin. Some doctors suggest alternating between hot and cold. · Be safe with medicines. Read and follow all instructions on the label. ? If the doctor gave you a prescription medicine for pain, take it as prescribed. ? If you are not taking a prescription pain medicine, ask your doctor if you can take an over-the-counter medicine. · For the first day or two after an injury, avoid things that might increase swelling, such as hot showers, hot tubs, or hot packs. · After 2 or 3 days, if your swelling is gone, apply a heating pad set on low or a warm cloth to your shoulder. This helps keep your shoulder flexible. Some doctors suggest that you go back and forth between hot and cold. Put a thin cloth between the heating pad and your skin. · Follow your doctor's or physical therapist's directions for exercises.   · Return to your usual level of activity slowly. When should you call for help? Call your doctor now or seek immediate medical care if:    · Your pain is worse.     · You cannot move your shoulder.     · Your arm is cool or pale or changes color below the shoulder.     · You have tingling, weakness, or numbness in your arm.    Watch closely for changes in your health, and be sure to contact your doctor if:    · You do not get better as expected. Where can you learn more? Go to http://sumit-uli.info/  Enter M020 in the search box to learn more about \"Shoulder Sprain: Care Instructions. \"  Current as of: June 26, 2019Content Version: 12.4  © 1716-9692 [employer]. Care instructions adapted under license by [employer] (which disclaims liability or warranty for this information). If you have questions about a medical condition or this instruction, always ask your healthcare professional. [address] any warranty or liability for your use of this information.

## 2020-05-24 NOTE — ED NOTES
MD reviewed discharge and prescription instructions with the patient. The patient verbalized understanding. Denies pain, remains alert and ambulatory. No acute distress noted. Needs met.
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
## 2020-05-26 ENCOUNTER — VIRTUAL VISIT (OUTPATIENT)
Dept: INTERNAL MEDICINE CLINIC | Age: 70
End: 2020-05-26

## 2020-05-26 DIAGNOSIS — E11.65 TYPE II OR UNSPECIFIED TYPE DIABETES MELLITUS WITH RENAL MANIFESTATIONS, UNCONTROLLED(250.42) (HCC): Primary | ICD-10-CM

## 2020-05-26 DIAGNOSIS — E11.29 TYPE II OR UNSPECIFIED TYPE DIABETES MELLITUS WITH RENAL MANIFESTATIONS, UNCONTROLLED(250.42) (HCC): Primary | ICD-10-CM

## 2020-05-27 ENCOUNTER — PATIENT OUTREACH (OUTPATIENT)
Dept: CASE MANAGEMENT | Age: 70
End: 2020-05-27

## 2020-05-27 NOTE — PROGRESS NOTES
Date/Time:  5/27/2020 9:45 AM  Attempted to reach parent by telephone. Unable to reach, as numbers listed are out of service. Will close episode.

## 2020-05-29 ENCOUNTER — PATIENT OUTREACH (OUTPATIENT)
Dept: CASE MANAGEMENT | Age: 70
End: 2020-05-29

## 2020-06-01 NOTE — PROGRESS NOTES
Pharmacy Note:  Diabetes Follow-up    S/O: Placed an outgoing call to patient to follow-up on SMBG readings and diabetes. Patient referred by Gaetano Colvin MD for diabetes management. Patient is currently taking the following for diabetes: Lantus 20 units daily every morning, Janumet XR  mg 2 tabs daily. He denies any signs/sx of hypoglycemia. Denies any BG <70. Last A1C was 9.4 on 1/3/2020. Reports polyuria/dipsia/phagia - but has improved. States blurry vision comes and goes.     Patient is checking His blood sugar at least 3 times daily and reports the following values:  Date Fasting After Breakfast Before Lunch After Lunch Before Dinner After Dinner Bedtime   6/2 117         6/1 181, 162      172, 120 (2 AM)   5/31   109 269 273  220   5/30 111 150  134, 140  264    5/29    194 163     5/28 165  236  123 233, 265    5/27 100, 151 157   196  207   5/26 107  171 209 172       Diet/Exercise  -States he is still staying at hotel so he can only cook using microwave; states he hopes to find out about moving into an apartment in the next week or 2  -Breakfast - Cup of oatmeal, sausage and biscuit with coffee  -Lunch - Microwave dinner, sometimes takeout  -Dinner - [employer], sometimes takeout (salad or chicken sandwich without bread)  -Snacks - popcorn or potato chips, sometimes nuts or dried fruit  -Drinks - States cut out juice and soda; sometimes has crystal light  -States he is no longer eating pasta  -States when he gets sandwich, he takes out the bread  -States he usually sleeps during the day and is awake at night   -States he is only asleep for around 3 hours at a time due to shoulder pain    Medication Adherence:  -Patient reports adherence with medications  -Patient denies adverse effects from medications  -States sometimes he gets pain in stomach from injecting insulin over last few days  -States he rotates injection sites in stomach; states he is also using leg sometimes    Screenings:  Diabetic Foot and Eye Exam  Status   Topic Date Due    Diabetic Foot Care  12/19/2020    Eye Exam  05/10/2021       Current Outpatient Medications   Medication Sig    naproxen (Naprosyn) 500 mg tablet Take 1 Tab by mouth two (2) times daily (with meals) for 10 days.  cetirizine (ZYRTEC) 10 mg tablet TAKE 1 TABLET BY MOUTH EVERY DAY    multivit-min/FA/lycopen/lutein (CENTRUM SILVER MEN PO) Take 1 Tab by mouth daily.  insulin glargine (LANTUS,BASAGLAR) 100 unit/mL (3 mL) inpn Inject 20 units daily in AM SQ    Insulin Needles, Disposable, 31 gauge x 5/16\" ndle Inject daily    pravastatin (PRAVACHOL) 40 mg tablet TAKE 1 TAB BY MOUTH NIGHTLY. INDICATIONS: HIGH CHOLESTEROL    lancets (ACCU-CHEK SOFTCLIX LANCETS) misc Check BS 2x/day    topiramate (TOPAMAX) 25 mg tablet TAKE 1 TABLET BY MOUTH TWICE A DAY    acetaminophen (TYLENOL EXTRA STRENGTH) 500 mg tablet Take 1,000 mg by mouth every six (6) hours as needed for Pain.  gabapentin (NEURONTIN) 100 mg capsule Take 1 Cap by mouth three (3) times daily. Max Daily Amount: 300 mg.    flash glucose scanning reader (FREESTYLE TAYLOR 14 DAY READER) misc Use as directed    flash glucose sensor (FREESTYLE TAYLOR 14 DAY SENSOR) kit Use one every 14 days    lisinopril (PRINIVIL, ZESTRIL) 40 mg tablet Take 1 Tab by mouth daily.  pantoprazole (PROTONIX) 40 mg tablet Take 1 Tab by mouth daily.  SITagliptin-metFORMIN (JANUMET XR) 50-1,000 mg TM24 Take 2 Tabs by mouth daily.  glucose blood VI test strips (BLOOD GLUCOSE TEST) strip Test blood sugar 1 x daily  DX E11.9    triamcinolone acetonide (KENALOG) 0.025 % ointment Apply  to affected area two (2) times a day. use thin layer    halobetasol (ULTRAVATE) 0.05 % topical cream APPLY TO AFFECTED AREA TWICE A DAY AS NEEDED    hydrOXYzine pamoate (VISTARIL) 25 mg capsule Take 1 Cap by mouth three (3) times daily as needed for Itching.  Lancets misc Accu-check  Fastclix Lancets. Test blood sugar daily. DX E11.9    alcohol swabs (BD SINGLE USE SWABS REGULAR) padm Use daily to check blood sugar    Blood Glucose Control, Normal (ACCU-CHEK SMARTVIEW CONTRL SOL) soln Test blood sugar 1 x daily  DX E11.9        1 year supply  Check controls monthy on glucose meter    Blood-Glucose Meter monitoring kit Accu-check Smartview Meter     No current facility-administered medications for this visit. Lab Results   Component Value Date/Time    Hemoglobin A1c 9.4 (H) 01/03/2020 08:29 AM       Lab Results   Component Value Date/Time    Sodium 137 01/03/2020 08:29 AM    Potassium 4.8 01/03/2020 08:29 AM    Chloride 101 01/03/2020 08:29 AM    CO2 17 (L) 01/03/2020 08:29 AM    Anion gap 8 04/24/2018 07:35 AM    Glucose 256 (H) 01/03/2020 08:29 AM    BUN 19 01/03/2020 08:29 AM    Creatinine 1.30 (H) 01/03/2020 08:29 AM    BUN/Creatinine ratio 15 01/03/2020 08:29 AM    GFR est AA 64 01/03/2020 08:29 AM    GFR est non-AA 56 (L) 01/03/2020 08:29 AM    Calcium 10.1 01/03/2020 08:29 AM       Lab Results   Component Value Date/Time    Cholesterol, total 141 01/03/2020 08:29 AM    HDL Cholesterol 46 01/03/2020 08:29 AM    LDL, calculated 76 01/03/2020 08:29 AM    Triglyceride 93 01/03/2020 08:29 AM    CHOL/HDL Ratio 3.1 04/24/2018 07:35 AM       A/P:    Diabetes:  -States he doesn't want to make any medication changes right now since he is hoping to move soon and be able to improve diet once he has a kitchen  -Instructed patient to continue Lantus 20 units daily and Janumet XR  -Reiterated importance of continuing to check BG 3-4 times daily  -Instructed him to contact PCP or PharmD if he has any issues with insulin injection sites (e.g. rash, itching, signs of infection)    Reviewed with patient signs/sx/treatment of hypoglycemia. Patient verbalized understanding. Will follow up with patient in 2 weeks for telephone visit. Will route note to PCP.     Tommy Guzman, PharmD  Clinical Pharmacist Specialist    CLINICAL PHARMACY CONSULT: MED RECONCILIATION/REVIEW ADDENDUM    For Pharmacy Admin Tracking Only    PHSO: PHSO Patient?: No  Total # of Interventions Recommended: Count: 0  Total Interventions Accepted: 0  Time Spent (min): 05[address] [facility], PharmD

## 2020-06-02 ENCOUNTER — VIRTUAL VISIT (OUTPATIENT)
Dept: INTERNAL MEDICINE CLINIC | Age: 70
End: 2020-06-02

## 2020-06-02 DIAGNOSIS — E11.29 TYPE II OR UNSPECIFIED TYPE DIABETES MELLITUS WITH RENAL MANIFESTATIONS, UNCONTROLLED(250.42) (HCC): Primary | ICD-10-CM

## 2020-06-02 DIAGNOSIS — E11.65 TYPE II OR UNSPECIFIED TYPE DIABETES MELLITUS WITH RENAL MANIFESTATIONS, UNCONTROLLED(250.42) (HCC): Primary | ICD-10-CM

## 2020-06-15 ENCOUNTER — TELEPHONE (OUTPATIENT)
Dept: INTERNAL MEDICINE CLINIC | Age: 70
End: 2020-06-15

## 2020-06-16 NOTE — TELEPHONE ENCOUNTER
Returned call to patient to reschedule appointment. Patient states he is trying to move and is unsure when he will be available. He states he will call PharmD on Thursday 6/18 to discuss rescheduling appointment.     Carlos Spicer, PharmD  Clinical Pharmacist Specialist

## 2020-06-17 ENCOUNTER — APPOINTMENT (OUTPATIENT)
Dept: INTERNAL MEDICINE CLINIC | Age: 70
End: 2020-06-17

## 2020-06-17 DIAGNOSIS — E11.29 TYPE II OR UNSPECIFIED TYPE DIABETES MELLITUS WITH RENAL MANIFESTATIONS, UNCONTROLLED(250.42) (HCC): ICD-10-CM

## 2020-06-17 DIAGNOSIS — E78.00 HIGH CHOLESTEROL: ICD-10-CM

## 2020-06-17 DIAGNOSIS — I10 ESSENTIAL HYPERTENSION: ICD-10-CM

## 2020-06-17 DIAGNOSIS — E11.65 TYPE II OR UNSPECIFIED TYPE DIABETES MELLITUS WITH RENAL MANIFESTATIONS, UNCONTROLLED(250.42) (HCC): ICD-10-CM

## 2020-06-17 DIAGNOSIS — E11.65 UNCONTROLLED TYPE 2 DIABETES MELLITUS WITH HYPERGLYCEMIA (HCC): ICD-10-CM

## 2020-06-18 LAB
ALBUMIN SERPL-MCNC: 4.1 G/DL (ref 3.8–4.8)
ALBUMIN/CREAT UR: 27 MG/G CREAT (ref 0–29)
ALBUMIN/GLOB SERPL: 1.7 {RATIO} (ref 1.2–2.2)
ALP SERPL-CCNC: 120 IU/L (ref 39–117)
ALT SERPL-CCNC: 19 IU/L (ref 0–44)
AST SERPL-CCNC: 20 IU/L (ref 0–40)
BILIRUB SERPL-MCNC: 0.8 MG/DL (ref 0–1.2)
BUN SERPL-MCNC: 14 MG/DL (ref 8–27)
BUN/CREAT SERPL: 13 (ref 10–24)
CALCIUM SERPL-MCNC: 9.5 MG/DL (ref 8.6–10.2)
CHLORIDE SERPL-SCNC: 104 MMOL/L (ref 96–106)
CHOLEST SERPL-MCNC: 130 MG/DL (ref 100–199)
CO2 SERPL-SCNC: 21 MMOL/L (ref 20–29)
CREAT SERPL-MCNC: 1.08 MG/DL (ref 0.76–1.27)
CREAT UR-MCNC: 224.1 MG/DL
GLOBULIN SER CALC-MCNC: 2.4 G/DL (ref 1.5–4.5)
GLUCOSE SERPL-MCNC: 150 MG/DL (ref 65–99)
HBA1C MFR BLD: 11.2 % (ref 4.8–5.6)
HDLC SERPL-MCNC: 44 MG/DL
INTERPRETATION, 910389: NORMAL
LDLC SERPL CALC-MCNC: 65 MG/DL (ref 0–99)
Lab: NORMAL
MICROALBUMIN UR-MCNC: 61.5 UG/ML
POTASSIUM SERPL-SCNC: 4.2 MMOL/L (ref 3.5–5.2)
PROT SERPL-MCNC: 6.5 G/DL (ref 6–8.5)
SODIUM SERPL-SCNC: 142 MMOL/L (ref 134–144)
TRIGL SERPL-MCNC: 106 MG/DL (ref 0–149)
VLDLC SERPL CALC-MCNC: 21 MG/DL (ref 5–40)

## 2020-06-23 RX ORDER — FLASH GLUCOSE SENSOR
KIT MISCELLANEOUS
Qty: 2 KIT | Refills: 5 | Status: SHIPPED | OUTPATIENT
Start: 2020-06-23 | End: 2020-11-10

## 2020-06-24 ENCOUNTER — VIRTUAL VISIT (OUTPATIENT)
Dept: INTERNAL MEDICINE CLINIC | Age: 70
End: 2020-06-24

## 2020-06-24 DIAGNOSIS — E11.65 TYPE II OR UNSPECIFIED TYPE DIABETES MELLITUS WITH RENAL MANIFESTATIONS, UNCONTROLLED(250.42) (HCC): Primary | ICD-10-CM

## 2020-06-24 DIAGNOSIS — Z12.5 SCREENING PSA (PROSTATE SPECIFIC ANTIGEN): ICD-10-CM

## 2020-06-24 DIAGNOSIS — M25.511 ACUTE PAIN OF RIGHT SHOULDER: ICD-10-CM

## 2020-06-24 DIAGNOSIS — E11.29 TYPE II OR UNSPECIFIED TYPE DIABETES MELLITUS WITH RENAL MANIFESTATIONS, UNCONTROLLED(250.42) (HCC): Primary | ICD-10-CM
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
## 2020-07-06 ENCOUNTER — TELEPHONE (OUTPATIENT)
Dept: INTERNAL MEDICINE CLINIC | Age: 70
End: 2020-07-06

## 2020-07-06 NOTE — TELEPHONE ENCOUNTER
Pt calling says he is on prednisone for a skin rash. His sugar was so low it won't register on his meter. At 8:00am it was at 43    Wants to know what he should do? Says normally it is between 300-400.

## 2020-07-06 NOTE — TELEPHONE ENCOUNTER
Pt has rashaun Pemberton that is probably misreading.  He will use strips to confirm and put in new 2 week sensor

## 2020-07-07 ENCOUNTER — HOSPITAL ENCOUNTER (OUTPATIENT)
Dept: PHYSICAL THERAPY | Age: 70
Discharge: HOME | End: 2020-07-07
Payer: MEDICARE

## 2020-07-07 PROCEDURE — 97161 PT EVAL LOW COMPLEX 20 MIN: CPT

## 2020-07-07 RX ORDER — FLASH GLUCOSE SCANNING READER
EACH MISCELLANEOUS
Qty: 1 EACH | Refills: 0 | Status: CANCELLED | OUTPATIENT
Start: 2020-07-07

## 2020-07-07 RX ORDER — FLASH GLUCOSE SENSOR
KIT MISCELLANEOUS
Qty: 2 KIT | Refills: 5 | Status: CANCELLED | OUTPATIENT
Start: 2020-07-07

## 2020-07-07 RX ORDER — INSULIN PUMP SYRINGE, 3 ML
EACH MISCELLANEOUS
Qty: 3 ML | Refills: 4 | Status: SHIPPED | OUTPATIENT
Start: 2020-07-07 | End: 2021-09-08
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
## 2020-07-07 NOTE — TELEPHONE ENCOUNTER
Prescription for [facility] 14 Kit #2 with 5 refills was sent to [facility] on 06/23/2020. Have patient contact pharmacy. Last visit 06/24/2020 Virtual visit MD Doran   Next appointment 09/30/2020 MD Doran   Previous refill encounter(s) 03/23/2016 Blood Glucose CS #3ml with 4 refills.      Requested Prescriptions     Pending Prescriptions Disp Refills    flash glucose sensor (FreeStyle Bassam 14 Day Sensor) kit 2 Kit 5    Blood Glucose Control, Normal (Accu-Chek SmartView Contrl Sol) soln 3 mL 4     Sig: Test blood sugar 1 x daily  DX E11.9        1 year supply  Check controls monthy on glucose meter

## 2020-07-07 NOTE — PROGRESS NOTES
PT DAILY TREATMENT NOTE - [facility]     Patient Name: Vipul Betancur  Date:2020  : 1950  [x]  Patient  Verified  Payor: [employer]ICARE / Plan: [address]  HMO / Product Type: Managed Care Medicare /    In time:1030  Out time:1105  Total Treatment Time (min): 35  Total Timed Codes (min): 0  1:1 Treatment Time ( [address] only): 35   Visit #: 1 of 8    Treatment Area: Pain in right shoulder [M25.511]    SUBJECTIVE  Pain Level (0-10 scale): 6  Any medication changes, allergies to medications, adverse drug reactions, diagnosis change, or new procedure performed?: [x] No    [] Yes (see summary sheet for update)  Subjective functional status:   [x] See Eval form in paper chart      OBJECTIVE    35 min [x]Eval                  []Re-Eval                         With   [] TE   [] TA   [] neuro   [] other: Patient Education: [x] Review HEP    [] Progressed/Changed HEP based on:   [] positioning   [] body mechanics   [] transfers   [] heat/ice application    [] other:                  Pain Level (0-10 scale) post treatment: 6    ASSESSMENT:   [x]  See Evaluation         Goals:  Short Term Goals: To be accomplished in 1 weeks:  1. Therapist to establish HEP for strength and ROM to improve ease with ADLs. Long Term Goals: To be accomplished in 4 weeks:  1. Patient will be independent with HEP to improve carryover of functional gains with ADLs between visits. Eval Status:n/a  2. Pt will increase left Shoulder flexion/ abduction/ IR/ER strength to grossly 5/5 in order to improve functional lift & carry. Eval Status:    Shoulder flexion: 4/5   Shoulder Abduction: 4/5    Shoulder IR: 4+/5    Shoulder ER: 4+/5  3. Pt will increase FOTO score to 64 points to demostrate improved function. Eval Status: FOTO: 41  4. Pt will report decrease in average pain rating on VAS to <3/10 to improve ease with daily tasks.    Eval Status:6/10 with movement    PLAN      [x]  Continue plan of care    []  Other:_      Franchesca Liz, PT 7/7/2020  11:39 AM

## 2020-07-09 ENCOUNTER — TELEPHONE (OUTPATIENT)
Dept: INTERNAL MEDICINE CLINIC | Age: 70
End: 2020-07-09

## 2020-07-09 DIAGNOSIS — E11.21 TYPE 2 DIABETES WITH NEPHROPATHY (HCC): Primary | ICD-10-CM

## 2020-07-09 RX ORDER — INSULIN PUMP SYRINGE, 3 ML
EACH MISCELLANEOUS
Qty: 1 KIT | Refills: 0 | Status: SHIPPED | OUTPATIENT
Start: 2020-07-09 | End: 2021-09-08

## 2020-07-09 NOTE — TELEPHONE ENCOUNTER
Will send in glucometer so he can use the strips that he already has.  He can increase Latus insulin to 20 units BID if needed to control BS

## 2020-07-09 NOTE — TELEPHONE ENCOUNTER
Pt states he went to [facility] ER last night due to high blood sugar. Said he was given a couple of prescriptions and wants to talk to Dr Luz Centeno about them.  Please call her at [phone]

## 2020-07-15 ENCOUNTER — HOSPITAL ENCOUNTER (OUTPATIENT)
Dept: PHYSICAL THERAPY | Age: 70
Discharge: HOME | End: 2020-07-15
Payer: MEDICARE

## 2020-07-15 PROCEDURE — 97110 THERAPEUTIC EXERCISES: CPT

## 2020-07-15 PROCEDURE — 97112 NEUROMUSCULAR REEDUCATION: CPT

## 2020-07-15 PROCEDURE — 97530 THERAPEUTIC ACTIVITIES: CPT

## 2020-07-15 NOTE — PROGRESS NOTES
PT DAILY TREATMENT NOTE 10-18    Patient Name: Valarie Bonilla  Date:7/15/2020  : 1950  [x]  Patient  Verified  Payor: BLUE CROSS MEDICARE / Plan: [facility] [place]  HMO / Product Type: Managed Care Medicare /    In time:854  Out time:933  Total Treatment Time (min): 39  Visit #: 2 of 8    Medicare/BCBS Only   Total Timed Codes (min):  39 1:1 Treatment Time:  39       Treatment Area: Pain in right shoulder [M25.511]    SUBJECTIVE  Pain Level (0-10 scale): 0  Any medication changes, allergies to medications, adverse drug reactions, diagnosis change, or new procedure performed?: [x] No    [] Yes (see summary sheet for update)  Subjective functional status/changes:   [] No changes reported  \"It doesn't hurt unless I'm using it. \"    OBJECTIVE    Modality rationale: PD   Min Type Additional Details    [] Estim:  []Unatt       []IFC  []Premod                        []Other:  []w/ice   []w/heat  Position:  Location:    [] Estim: []Att    []TENS instruct  []NMES                    []Other:  []w/US   []w/ice   []w/heat  Position:  Location:    []  Traction: [] Cervical       []Lumbar                       [] Prone          []Supine                       []Intermittent   []Continuous Lbs:  [] before manual  [] after manual    []  Ultrasound: []Continuous   [] Pulsed                           []1MHz   []3MHz W/cm2:  Location:    []  Iontophoresis with dexamethasone         Location: [] Take home patch   [] In clinic    []  Ice     []  heat  []  Ice massage  []  Laser   []  Anodyne Position:  Location:    []  Laser with stim  []  Other:  Position:  Location:    []  Vasopneumatic Device Pressure:       [] lo [] med [] hi   Temperature: [] lo [] med [] hi   [] Skin assessment post-treatment:  []intact []redness- no adverse reaction    []redness - adverse reaction:         14 min Therapeutic Exercise:  [x] See flow sheet :   Rationale: increase ROM, increase strength, improve coordination, improve balance and increase proprioception to improve the patients ability to perform ADLs. 15 min Therapeutic Activity:  [x]  See flow sheet :   Rationale: increase ROM, increase strength, improve coordination, improve balance and increase proprioception  to improve the patients ability to reach without pain. 10 min Neuromuscular Re-education:  [x]  See flow sheet :   Rationale: increase ROM, increase strength, improve coordination, improve balance and increase proprioception  to improve the patients ability to perform ADLs. With   [] TE   [] TA   [] neuro   [] other: Patient Education: [x] Review HEP    [] Progressed/Changed HEP based on:   [] positioning   [] body mechanics   [] transfers   [] heat/ice application    [] other:      Other Objective/Functional Measures:      Pain Level (0-10 scale) post treatment: 5    ASSESSMENT/Changes in Function: Mr. Arianna Washburn did well with initiation of exercises but challenged eccentric lowering tasks. Reports exercises have been helping at home. Plan to progress exercises to include 1/2 prone scap re-ed next session. Patient will continue to benefit from skilled PT services to modify and progress therapeutic interventions, address functional mobility deficits, address ROM deficits, address strength deficits, analyze and address soft tissue restrictions, analyze and cue movement patterns, analyze and modify body mechanics/ergonomics, assess and modify postural abnormalities, address imbalance/dizziness and instruct in home and community integration to attain remaining goals. []  See Plan of Care  []  See progress note/recertification  []  See Discharge Summary         Short Term Goals: To be accomplished in 1 weeks:  1. Therapist to establish HEP for strength and ROM to improve ease with ADLs.  MET   Long Term Goals: To be accomplished in 4 weeks:  1.  Patient will be independent with HEP to improve carryover of functional gains with ADLs between visits. Eval Status:n/a  2. Pt will increase left Shoulder flexion/ abduction/ IR/ER strength to grossly 5/5 in order to improve functional lift & carry. Eval Status:              Shoulder flexion: 4/5              Shoulder Abduction: 4/5              Shoulder IR: 4+/5              Shoulder ER: 4+/5  3. Pt will increase FOTO score to 64 points to demostrate improved function. Eval Status: FOTO: 41  4. Pt will report decrease in average pain rating on VAS to <3/10 to improve ease with daily tasks.               Eval Status:6/10 with movement    PLAN  []  Upgrade activities as tolerated     [x]  Continue plan of care  []  Update interventions per flow sheet       []  Discharge due to:_  []  Other:_      Tevin Kauffman, PT 7/15/2020  9:25 AM    Future Appointments   Date Time Provider Yandel [place]field   7/17/2020  2:45 PM [facility]   7/20/2020  8:00 AM Cecilia Leon, PTA [facility]   7/22/2020  8:00 AM Cecilia Leon, LEIA [facility]   7/27/2020  8:00 AM Cecilia Leon, PTA [facility]   7/29/2020  8:00 AM Stacy Harkins, PT [facility]   9/23/2020  8:20 AM [facility] NURSE VISIT [facility] HYUN PEARSON   9/30/2020  8:45 AM Sylvia Doran MD [facility]

## 2020-07-17 ENCOUNTER — HOSPITAL ENCOUNTER (OUTPATIENT)
Dept: PHYSICAL THERAPY | Age: 70
Discharge: HOME | End: 2020-07-17
Payer: MEDICARE

## 2020-07-17 PROCEDURE — 97110 THERAPEUTIC EXERCISES: CPT

## 2020-07-17 PROCEDURE — 97140 MANUAL THERAPY 1/> REGIONS: CPT

## 2020-07-17 NOTE — PROGRESS NOTES
PT DAILY TREATMENT NOTE 10-18    Patient Name: Kimani Lainez  Date:2020  : 1950  [x]  Patient  Verified  Payor: BLUE CROSS MEDICARE / Plan: [facility] HMO / Product Type: Managed Care Medicare /    In time:249  Out time:321  Total Treatment Time (min): 32  Visit #: 3 of 8    Medicare/BCBS Only   Total Timed Codes (min):  32 1:1 Treatment Time:  32       Treatment Area: Pain in right shoulder [M25.511]    SUBJECTIVE  Pain Level (0-10 scale): 6-7  Any medication changes, allergies to medications, adverse drug reactions, diagnosis change, or new procedure performed?: [x] No    [] Yes (see summary sheet for update)  Subjective functional status/changes:   [] No changes reported  Patient reported anterior GHJ pain on right upon arrival    OBJECTIVE      22 min Therapeutic Exercise:  [x] See flow sheet :   Rationale: increase ROM and increase strength to improve the patients ability to perfomr ADLs      10 min Manual Therapy:  Right should GHJ mods posterior and inferior, MFR and UE traction through available right shoulder flexion and scaption, TPR right pec and UT   Rationale: decrease pain, increase ROM and increase tissue extensibility to perform ADLs and shoulder mobility without pain       With   [] TE   [] TA   [] neuro   [] other: Patient Education: [x] Review HEP    [] Progressed/Changed HEP based on:   [] positioning   [] body mechanics   [] transfers   [] heat/ice application    [] other:      Other Objective/Functional Measures: progressed therex per flow sheet. Re-education for scapular setting with right shoulder mobility . Pain Level (0-10 scale) post treatment: 0    ASSESSMENT/Changes in Function: Patient tolerated progressed therex well without c/o increase pain. Patient demonstrated improved mechanics post scapular setting education.     Patient will continue to benefit from skilled PT services to modify and progress therapeutic interventions, address functional mobility deficits, address ROM deficits, address strength deficits and analyze and address soft tissue restrictions to attain remaining goals. [x]  See Plan of Care  []  See progress note/recertification  []  See Discharge Summary         Progress towards goals / Updated goals:  Short Term Goals: To be accomplished in 1 weeks:  1. Therapist to establish HEP for strength and ROM to improve ease with ADLs. [address] be accomplished in 4 weeks:  1. Patient will be independent with HEP to improve carryover of functional gains with ADLs between visits.             Eval Status:n/a  2. Pt will increase left Shoulder flexion/ abduction/ IR/ER strength to grossly 5/5 in order to improve functional lift & carry.              Eval Status:              Shoulder flexion: 4/5              Shoulder Abduction: 4/5              Shoulder IR: 4+/5              Shoulder ER: 4+/5  3. Pt will increase FOTO score to 64 points to demostrate improved function.              Eval Status: FOTO: 41  4.  Pt will report decrease in average pain rating on VAS to <3/10 to improve ease with daily tasks.              Eval Status:6/10 with movement    PLAN  []  Upgrade activities as tolerated     [x]  Continue plan of care  []  Update interventions per flow sheet       []  Discharge due to:_  []  Other:_      Yeny Villegas PTA 7/17/2020  3:26 PM    Future Appointments   Date Time Provider Yandel [place]field   7/20/2020  8:00 AM Deonna Madrid PTA [facility]   7/22/2020  8:00 [facility]   7/27/2020  8:00 AM [facility]   7/29/2020  8:00 AM [facility]   9/23/2020  8:20 AM [facility] NURSE VISIT [facility] HYUN PEARSON   9/30/2020  8:45 AM Yolanda Doran MD [facility] no

## 2020-07-20 ENCOUNTER — HOSPITAL ENCOUNTER (OUTPATIENT)
Dept: PHYSICAL THERAPY | Age: 70
Discharge: HOME | End: 2020-07-20
Payer: MEDICARE

## 2020-07-20 PROCEDURE — 97112 NEUROMUSCULAR REEDUCATION: CPT

## 2020-07-20 PROCEDURE — 97110 THERAPEUTIC EXERCISES: CPT

## 2020-07-20 PROCEDURE — 97530 THERAPEUTIC ACTIVITIES: CPT

## 2020-07-20 NOTE — PROGRESS NOTES
PT DAILY TREATMENT NOTE 10-18    Patient Name: Yesy Villalba  Date:2020  : 1950  [x]  Patient  Verified  Payor: BLUE CROSS MEDICARE / Plan: [facility] [place]  HMO / Product Type: Managed Care Medicare /    In time:800  Out time:838  Total Treatment Time (min): 38  Visit #: 4 of 8    Medicare/BCBS Only   Total Timed Codes (min):  38 1:1 Treatment Time:  38       Treatment Area: Pain in right shoulder [M25.511]    SUBJECTIVE  Pain Level (0-10 scale): 0  Any medication changes, allergies to medications, adverse drug reactions, diagnosis change, or new procedure performed?: [x] No    [] Yes (see summary sheet for update)  Subjective functional status/changes:   [] No changes reported  \"I don't have any pain today. \"    OBJECTIVE    Modality rationale: patient declined   Min Type Additional Details    [] Estim:  []Unatt       []IFC  []Premod                        []Other:  []w/ice   []w/heat  Position:  Location:    [] Estim: []Att    []TENS instruct  []NMES                    []Other:  []w/US   []w/ice   []w/heat  Position:  Location:    []  Traction: [] Cervical       []Lumbar                       [] Prone          []Supine                       []Intermittent   []Continuous Lbs:  [] before manual  [] after manual    []  Ultrasound: []Continuous   [] Pulsed                           []1MHz   []3MHz W/cm2:  Location:    []  Iontophoresis with dexamethasone         Location: [] Take home patch   [] In clinic    []  Ice     []  heat  []  Ice massage  []  Laser   []  Anodyne Position:  Location:    []  Laser with stim  []  Other:  Position:  Location:    []  Vasopneumatic Device Pressure:       [] lo [] med [] hi   Temperature: [] lo [] med [] hi   [] Skin assessment post-treatment:  []intact []redness- no adverse reaction    []redness - adverse reaction:     10 min Therapeutic Exercise:  [x] See flow sheet :   Rationale: increase ROM and increase strength to improve the patients ability to perform ADLs    10 min Therapeutic Activity:  [x]  See flow sheet :    Rationale: increase ROM, increase strength, improve coordination, improve balance and increase proprioception  to improve the patients ability to improve mobility and reaching     18 min Neuromuscular Re-education:  [x]  See flow sheet : postural re-ed activities   Rationale: increase ROM, increase strength, improve coordination, improve balance and increase proprioception  to improve the patients ability to improve mobility and upright posture        With   [x] TE   [x] TA   [x] neuro   [] other: Patient Education: [x] Review HEP    [] Progressed/Changed HEP based on:   [x] positioning   [x] body mechanics   [] transfers   [] heat/ice application    [] other:      Other Objective/Functional Measures:      Pain Level (0-10 scale) post treatment: 0    ASSESSMENT/Changes in Function: Pt is making progress with pain relief, but reports having some discomfort with shoulder in scaption to 90 deg. He is making progress with his active flexion. Needed several cues to improve scapular mechanics. Patient will continue to benefit from skilled PT services to modify and progress therapeutic interventions, address functional mobility deficits, address ROM deficits, address strength deficits, analyze and address soft tissue restrictions, analyze and cue movement patterns, analyze and modify body mechanics/ergonomics, assess and modify postural abnormalities, address imbalance/dizziness and instruct in home and community integration to attain remaining goals. [x]  See Plan of Care  []  See progress note/recertification  []  See Discharge Summary         Progress towards goals / Updated goals:  Short Term Goals: To be accomplished in 1 weeks:  1. Therapist to establish HEP for strength and ROM to improve ease with ADLs.   MET   Long Term Goals: To be accomplished in 4 weeks:  1.  Patient will be independent with HEP to improve carryover of functional gains with ADLs between visits.             Eval Status:n/a   Reports compliance  2. Pt will increase left Shoulder flexion/ abduction/ IR/ER strength to grossly 5/5 in order to improve functional lift & carry.              Eval Status:              Shoulder flexion: 4/5              Shoulder Abduction: 4/5              Shoulder IR: 4+/5              Shoulder ER: 4+/5  3. Pt will increase FOTO score to 64 points to demostrate improved function.              Eval Status: FOTO: 41   Assess at 30 day fredi  4. Pt will report decrease in average pain rating on VAS to <3/10 to improve ease with daily tasks.              Eval Status:6/10 with movement   Pain appears to be declining, but still has discomfort in 90 deg of scaption on the right.     PLAN  []  Upgrade activities as tolerated     [x]  Continue plan of care  []  Update interventions per flow sheet       []  Discharge due to:_  []  Other:_      Julia Weathers PTA 7/20/2020  8:42 AM    Future Appointments   Date Time Provider Yandel Wakefield   7/22/2020  8:00 AM [facility]   7/27/2020  8:00 AM [facility]   7/29/2020  8:00 AM [facility] Gonzalez, [facility]   9/23/2020  8:20 AM [facility] NURSE VISIT [facility] HYUN PEARSON   9/30/2020  8:45 AM Олег Doran MD [facility]

## 2020-07-22 ENCOUNTER — HOSPITAL ENCOUNTER (OUTPATIENT)
Dept: PHYSICAL THERAPY | Age: 70
Discharge: HOME | End: 2020-07-22
Payer: MEDICARE

## 2020-07-22 PROCEDURE — 97112 NEUROMUSCULAR REEDUCATION: CPT

## 2020-07-22 PROCEDURE — 97530 THERAPEUTIC ACTIVITIES: CPT

## 2020-07-22 PROCEDURE — 97110 THERAPEUTIC EXERCISES: CPT

## 2020-07-22 NOTE — PROGRESS NOTES
PT DAILY TREATMENT NOTE 10-18    Patient Name: Kwan Sethi  Date:2020  : 1950  [x]  Patient  Verified  Payor: BLUE CROSS MEDICARE / Plan: [facility] [place]  HMO / Product Type: Managed Care Medicare /    In time:800  Out time:839  Total Treatment Time (min): 39  Visit #: 5 of 8    Medicare/BCBS Only   Total Timed Codes (min):  39 1:1 Treatment Time:  39       Treatment Area: Pain in right shoulder [M25.511]    SUBJECTIVE  Pain Level (0-10 scale): 0  Any medication changes, allergies to medications, adverse drug reactions, diagnosis change, or new procedure performed?: [x] No    [] Yes (see summary sheet for update)  Subjective functional status/changes:   [] No changes reported  \"No pain right now. \"    OBJECTIVE    Modality rationale: patient declined   Min Type Additional Details    [] Estim:  []Unatt       []IFC  []Premod                        []Other:  []w/ice   []w/heat  Position:  Location:    [] Estim: []Att    []TENS instruct  []NMES                    []Other:  []w/US   []w/ice   []w/heat  Position:  Location:    []  Traction: [] Cervical       []Lumbar                       [] Prone          []Supine                       []Intermittent   []Continuous Lbs:  [] before manual  [] after manual    []  Ultrasound: []Continuous   [] Pulsed                           []1MHz   []3MHz W/cm2:  Location:    []  Iontophoresis with dexamethasone         Location: [] Take home patch   [] In clinic    []  Ice     []  heat  []  Ice massage  []  Laser   []  Anodyne Position:  Location:    []  Laser with stim  []  Other:  Position:  Location:    []  Vasopneumatic Device Pressure:       [] lo [] med [] hi   Temperature: [] lo [] med [] hi   [] Skin assessment post-treatment:  []intact []redness- no adverse reaction    []redness - adverse reaction:     10 min Therapeutic Exercise:  [x] See flow sheet :   Rationale: increase ROM and increase strength to improve the patients ability to perform ADLs    10 min Therapeutic Activity:  [x]  See flow sheet : reaching activities   Rationale: increase ROM, increase strength, improve coordination, improve balance and increase proprioception  to improve the patients ability to improve mobility and reaching      19 min Neuromuscular Re-education:  [x]  See flow sheet : scap re-ed activities   Rationale: increase ROM, increase strength, improve coordination, improve balance and increase proprioception  to improve the patients ability to improve mobility and reaching        With   [x] TE   [x] TA   [x] neuro   [] other: Patient Education: [x] Review HEP    [] Progressed/Changed HEP based on:   [x] positioning   [x] body mechanics   [] transfers   [] heat/ice application    [] other:      Other Objective/Functional Measures:      Pain Level (0-10 scale) post treatment: 3    ASSESSMENT/Changes in Function: Pt is guarded at times with overhead reaching. He challenged with performing IR/ER without compensations, but did better with an isometric band step out. We will reassess at . [facility] 144 and send off for insurance authorization. Patient will continue to benefit from skilled PT services to modify and progress therapeutic interventions, address functional mobility deficits, address ROM deficits, address strength deficits, analyze and address soft tissue restrictions, analyze and cue movement patterns, analyze and modify body mechanics/ergonomics, assess and modify postural abnormalities, address imbalance/dizziness and instruct in home and community integration to attain remaining goals. [x]  See Plan of Care  []  See progress note/recertification  []  See Discharge Summary         Progress towards goals / Updated goals:  Short Term Goals: To be accomplished in 1 weeks:  1. Therapist to establish HEP for strength and ROM to improve ease with ADLs.             MET   Long Term Goals: To be accomplished in 4 weeks:  1.  Patient will be independent with HEP to improve carryover of functional gains with ADLs between visits.             Eval Status:n/a              Reports compliance  2. Pt will increase left Shoulder flexion/ abduction/ IR/ER strength to grossly 5/5 in order to improve functional lift & carry.              Eval Status:              Shoulder flexion: 4/5              Shoulder Abduction: 4/5              Shoulder IR: 4+/5              Shoulder ER: 4+/5  3. Pt will increase FOTO score to 64 points to demostrate improved function.              Eval Status: FOTO: 41              Assess at 30 day fredi  4.  Pt will report decrease in average pain rating on VAS to <3/10 to improve ease with daily tasks.              Eval Status:6/10 with movement              Pain appears to be declining, but still has discomfort in 90 deg of scaption on the right.       PLAN  []  Upgrade activities as tolerated     [x]  Continue plan of care  []  Update interventions per flow sheet       []  Discharge due to:_  []  Other:_      Carlitos Parra PTA, CSCS 7/22/2020  8:43 AM    Future Appointments   Date Time Provider Yandel Haywoodi   7/27/2020  8:00 AM Martha Null PTA [facility] [address]   7/29/2020  8:00 AM Angeli Sharma PT [facility] [address]   9/23/2020  8:20 AM [facility] NURSE VISIT [facility] HYUN PEARSON   9/30/2020  8:45 AM Frances Doran MD [facility]

## 2020-07-27 ENCOUNTER — HOSPITAL ENCOUNTER (OUTPATIENT)
Dept: PHYSICAL THERAPY | Age: 70
End: 2020-07-27
Payer: MEDICARE

## 2020-07-29 ENCOUNTER — HOSPITAL ENCOUNTER (OUTPATIENT)
Dept: PHYSICAL THERAPY | Age: 70
Discharge: HOME | End: 2020-07-29
Payer: MEDICARE

## 2020-07-29 PROCEDURE — 97110 THERAPEUTIC EXERCISES: CPT

## 2020-07-29 PROCEDURE — 97112 NEUROMUSCULAR REEDUCATION: CPT

## 2020-07-29 PROCEDURE — 97530 THERAPEUTIC ACTIVITIES: CPT

## 2020-07-29 NOTE — PROGRESS NOTES
PT DAILY TREATMENT NOTE 10-18    Patient Name: Parrish Soto  Date:2020  : 1950  [x]  Patient  Verified  Payor: BLUE CROSS MEDICARE / Plan: [facility] HMO / Product Type: Managed Care Medicare /    In time:800  Out time:840  Total Treatment Time (min): 40  Visit #: 6 of 8    Medicare/BCBS Only   Total Timed Codes (min):  40 1:1 Treatment Time:  40       Treatment Area: Pain in right shoulder [M25.511]    SUBJECTIVE  Pain Level (0-10 scale): 0  Any medication changes, allergies to medications, adverse drug reactions, diagnosis change, or new procedure performed?: [x] No    [] Yes (see summary sheet for update)  Subjective functional status/changes:   [] No changes reported  \"i'm doing better but still having some night pain. \"    OBJECTIVE      Modality rationale: patient declined   Min Type Additional Details      []? Estim:  []? Unatt       []? IFC  []? Premod                        []?Other:  []?w/ice   []?w/heat  Position:  Location:      []? Estim: []? Att    []? TENS instruct  []? NMES                    []?Other:  []?w/US   []?w/ice   []?w/heat  Position:  Location:      []? Traction: []? Cervical       []? Lumbar                       []? Prone          []? Supine                       []?Intermittent   []? Continuous Lbs:  []? before manual  []? after manual      []? Ultrasound: []? Continuous   []? Pulsed                           []? 1MHz   []? 3MHz W/cm2:  Location:      []? Iontophoresis with dexamethasone         Location: []? Take home patch   []? In clinic      []? Ice     []?  heat  []? Ice massage  []? Laser   []? Anodyne Position:  Location:      []? Laser with stim  []? Other:  Position:  Location:      []? Vasopneumatic Device Pressure:       []? lo []? med []? hi   Temperature: []? lo []? med []? hi    []? Skin assessment post-treatment:  []?intact []? redness- no adverse reaction    []? redness - adverse reaction:      10 min Therapeutic Exercise: [x]? See flow sheet :   Rationale: increase ROM and increase strength to improve the patients ability to perform ADLs     10 min Therapeutic Activity:  [x]? See flow sheet : reaching activities   Rationale: increase ROM, increase strength, improve coordination, improve balance and increase proprioception  to improve the patients ability to improve mobility and reaching      20 min Neuromuscular Re-education:  [x]? See flow sheet : scap re-ed activities   Rationale: increase ROM, increase strength, improve coordination, improve balance and increase proprioception  to improve the patients ability to improve mobility and reaching          With   [] TE   [] TA   [] neuro   [] other: Patient Education: [x] Review HEP    [] Progressed/Changed HEP based on:   [] positioning   [] body mechanics   [] transfers   [] heat/ice application    [] other:      Other Objective/Functional Measures:      Pain Level (0-10 scale) post treatment: 0    ASSESSMENT/Changes in Function: see recert    Patient will continue to benefit from skilled PT services to modify and progress therapeutic interventions, address functional mobility deficits, address ROM deficits, address strength deficits, analyze and address soft tissue restrictions, analyze and cue movement patterns, analyze and modify body mechanics/ergonomics, assess and modify postural abnormalities, address imbalance/dizziness and instruct in home and community integration to attain remaining goals. []  See Plan of Care  [x]  See progress note/recertification  []  See Discharge Summary           Goals for this certification period to be accomplished in 4 weeks:  1. Patient will be independent with HEP to improve carryover of functional gains with ADLs between visits.            ES: Patient reports intermittent compliance  2.  Pt will increase left Shoulder flexion/ abduction/ IR/ER strength to grossly 5/5 in order to improve functional lift & carry.             PN: Shoulder flexion: 4+/5              Shoulder Abduction: 4+/5              Shoulder IR: 5/5              Shoulder ER: 4+/5   3. Pt will increase FOTO score to 64 points to demostrate improved function.             PN: 62  4.  Pt will report decrease in average pain rating on VAS to <3/10 to improve ease with daily tasks.              PN: Penny Ethel appears to be declining, but still has discomfort in 90 deg of scaption and at night 5/10         PLAN  [x]  Upgrade activities as tolerated     []  Continue plan of care  []  Update interventions per flow sheet       []  Discharge due to:_  []  Other:_      Kristin Garibay, PT 7/29/2020  8:37 AM    Future Appointments   Date Time Provider Yandel Wakefield   9/23/2020  8:20 AM [facility] NURSE VISIT [address]   9/30/2020  8:45 AM Hunte, Severiano Pi, MD [facility] [address] [facility] [address]

## 2020-07-29 NOTE — PROGRESS NOTES
[facility] - [facility] [address]  [place], [address] [phone] fax    Continued Plan of Care/ Re-certification for Physical Therapy Services    Patient name: Jim Shaw Start of Care: 2020   Referral source: Justice Doran MD : 1950                Medical Diagnosis: Pain in right shoulder [M25.511]  Payor: Luke Moreno / Plan: [address] [facility] HMO / Product Type: Managed Care Medicare /  Onset Date:may 2020                Treatment Diagnosis: right shoulder pain   Prior Hospitalization: see medical history Provider#: [id number]   Medications: Verified on Patient summary List    Comorbidities: DM, HTN, latex allergy, hx of C6/7 fusion    Prior Level of Function: retired     Visits from Start of Care: 6    Missed Visits: 1    The Plan of Care and following information is based on the patient's current status:    Summary of Care:  Short Term Goals: To be accomplished in 1 weeks:  1. Therapist to establish HEP for strength and ROM to improve ease with ADLs.             MET   Long Term Goals: To be accomplished in 4 weeks:  1. Patient will be independent with HEP to improve carryover of functional gains with ADLs between visits.             Eval Status:n/a              NOT MET: Patient reports intermittent compliance  2. Pt will increase left Shoulder flexion/ abduction/ IR/ER strength to grossly 5/5 in order to improve functional lift & carry.              Eval Status:              Shoulder flexion: 4/5              Shoulder Abduction: 4/5              Shoulder IR: 4+/5              Shoulder ER: 4+/5   PROGRESSING:   Shoulder flexion: 4+/5              Shoulder Abduction: 4+/5              Shoulder IR: 5/5              Shoulder ER: 4+/5   3. Pt will increase FOTO score to 64 points to demostrate improved function.              Eval Status: FOTO: 41             PROGRESSING; 62  4.  Pt will report decrease in average pain rating on VAS to <3/10 to improve ease with daily tasks.              Eval Status:6/10 with movement             PROGRESSING:  Pain appears to be declining, but still has discomfort in 90 deg of scaption and at night 5/10       Key functional changes: Functional Gains: pain is better, shoulder ROM, cervical ROM less painful  Functional Deficits: mid-range reaching still comfortable, reaching behind, stiffness in bicep and cervical musculature  % improvement: 85%  Pain   Average: 5-6/10 night                  Best: 0/10                Worst: 8-9/10 - with air conditioning  Patient Goal: \"to keep working on that area that is catching in the shoulder. \"                  Problems/ barriers to goal attainment: pain in mid range, weakness, night pain    Problem List: pain affecting function, decrease ROM, decrease strength, edema affecting function, impaired gait/ balance, decrease ADL/ functional abilitiies, decrease activity tolerance, decrease flexibility/ joint mobility and decrease transfer abilities    Treatment Plan: Therapeutic exercise, Therapeutic activities, Neuromuscular re-education, Physical agent/modality, Gait/balance training, Manual therapy, Aquatic therapy, Patient education, Self Care training, Functional mobility training, Home safety training and Stair training       Goals for this certification period to be accomplished in 4 weeks:  1. Patient will be independent with HEP to improve carryover of functional gains with ADLs between visits.            XW: Patient reports intermittent compliance  2. Pt will increase left Shoulder flexion/ abduction/ IR/ER strength to grossly 5/5 in order to improve functional lift & carry.             PN:    Shoulder flexion: 4+/5              Shoulder Abduction: 4+/5              Shoulder IR: 5/5              Shoulder ER: 4+/5   3. Pt will increase FOTO score to 64 points to demostrate improved function.             PN: 62  4.  Pt will report decrease in average pain rating on VAS to <3/10 to improve ease with daily tasks.              PN:  Pain appears to be declining, but still has discomfort in 90 deg of scaption and at night 5/10         Frequency / Duration: Patient to be seen 2 times per week for 4 weeks:    Assessment / Recommendations:Mr. Kaden Sinclair has seen great response to PT and displays improved shoulder ROM and strength, as well as improved pain. He does continue to display limited overhead strength and pain with mid range shoulder flexion a times. Recommend continued PT to address remaining functional limitations. Certification Period: 7/30/20 - 8/29/20  Franchesca Liz, PT 7/29/2020 8:23 AM    ________________________________________________________________________  I certify that the above Therapy Services are being furnished while the patient is under my care. I agree with the treatment plan and certify that this therapy is necessary. [] I have read the above and request that my patient continue as recommended.   [] I have read the above report and request that my patient continue therapy with the following changes/special instructions: _____________________________________________  [] I have read the above report and request that my patient be discharged from therapy    Physician's Signature:____________Date:_________TIME:________    ** Signature, Date and Time must be completed for valid certification **    Please sign and return to [facility] - [place] [address]  [address] [facility] [address], [address]  [phone] fax

## 2020-08-03 RX ORDER — PRAVASTATIN SODIUM 40 MG/1
40 TABLET ORAL
Qty: 90 TAB | Refills: 1 | Status: SHIPPED | OUTPATIENT
Start: 2020-08-03 | End: 2021-01-30

## 2020-08-03 NOTE — TELEPHONE ENCOUNTER
I do not see the dose increase \"2 tabs nightly\" indicated. Please advise and input missing sig details if appropriate. Last Visit: 6/24/20 with MD Doran  Next Appointment: 9/30/20 with MD Doran    Requested Prescriptions     Pending Prescriptions Disp Refills    pravastatin (PRAVACHOL) 40 mg tablet 90 Tab 1     Sig: Take  by mouth nightly.  Indications: high cholesterol

## 2020-08-03 NOTE — TELEPHONE ENCOUNTER
Requested Prescriptions     Pending Prescriptions Disp Refills    pravastatin (PRAVACHOL) 40 mg tablet 90 Tab 1     Sig: Take 1 Tab by mouth nightly. Indications: high cholesterol       Pit has no refills available at the pharmacy.  he only has 1 pill left  Per pt you changed this and he was told to take 2 pills nightly

## 2020-08-12 ENCOUNTER — APPOINTMENT (OUTPATIENT)
Dept: PHYSICAL THERAPY | Age: 70
End: 2020-08-12
Payer: MEDICARE

## 2020-08-12 ENCOUNTER — HOSPITAL ENCOUNTER (OUTPATIENT)
Dept: PHYSICAL THERAPY | Age: 70
Discharge: HOME | End: 2020-08-12
Payer: MEDICARE

## 2020-08-12 PROCEDURE — 97110 THERAPEUTIC EXERCISES: CPT

## 2020-08-12 PROCEDURE — 97112 NEUROMUSCULAR REEDUCATION: CPT

## 2020-08-12 PROCEDURE — 97530 THERAPEUTIC ACTIVITIES: CPT

## 2020-08-12 NOTE — PROGRESS NOTES
PT DAILY TREATMENT NOTE 10-18    Patient Name: Lola Ibrahim  Date:2020  : 1950  [x]  Patient  Verified  Payor: BLUE CROSS MEDICARE / Plan: [facility] [place]  HMO / Product Type: Managed Care Medicare /    In time:330  Out time:408  Total Treatment Time (min):    Visit #: 1 of 8    Medicare/BCBS Only   Total Timed Codes (min):  38 1:1 Treatment Time:  38       Treatment Area: Pain in right shoulder [M25.511]    SUBJECTIVE  Pain Level (0-10 scale): 0  Any medication changes, allergies to medications, adverse drug reactions, diagnosis change, or new procedure performed?: [x] No    [] Yes (see summary sheet for update)  Subjective functional status/changes:   [] No changes reported  \"no pain right now. \"    OBJECTIVE         10 min Therapeutic Exercise:  [x]? ? See flow sheet :   Rationale: increase ROM and increase strength to improve the patients ability to perform ADLs     10 min Therapeutic Activity:  [x]? ?  See flow sheet : reaching activities   Rationale: increase ROM, increase strength, improve coordination, improve balance and increase proprioception  to improve the patients ability to improve mobility and reaching      18 min Neuromuscular Re-education:  [x]? ?  See flow sheet : scap re-ed activities   Rationale: increase ROM, increase strength, improve coordination, improve balance and increase proprioception  to improve the patients ability to improve mobility and reaching                                                                   With   [] TE   [] TA   [] neuro   [] other: Patient Education: [x] Review HEP    [] Progressed/Changed HEP based on:   [] positioning   [] body mechanics   [] transfers   [] heat/ice application    [] other:      Other Objective/Functional Measures:      Pain Level (0-10 scale) post treatment: 2    ASSESSMENT/Changes in Function: some pain reported with supine wand flexion, states a pinching sensation in the front of shoulder.  Plan to progress weight resistance and lifting activities next session. Patient will continue to benefit from skilled PT services to modify and progress therapeutic interventions, address functional mobility deficits, address ROM deficits, address strength deficits, analyze and address soft tissue restrictions, analyze and cue movement patterns, analyze and modify body mechanics/ergonomics, assess and modify postural abnormalities, address imbalance/dizziness and instruct in home and community integration to attain remaining goals. []  See Plan of Care  []  See progress note/recertification  []  See Discharge Summary         Goals for this certification period to be accomplished in 4 weeks:  1. Patient will be independent with HEP to improve carryover of functional gains with ADLs between visits.            YZ: Patient reports intermittent compliance  2. Pt will increase left Shoulder flexion/ abduction/ IR/ER strength to grossly 5/5 in order to improve functional lift & carry.             PN:                Shoulder flexion: 4+/5              Shoulder Abduction: 4+/5              Shoulder IR: 5/5              Shoulder ER: 4+/5   3. Pt will increase FOTO score to 64 points to demostrate improved function.             PN: 62  4.  Pt will report decrease in average pain rating on VAS to <3/10 to improve ease with daily tasks.              PN:  Pain appears to be declining, but still has discomfort in 90 deg of scaption and at night 5/10          PLAN  []  Upgrade activities as tolerated     [x]  Continue plan of care  []  Update interventions per flow sheet       []  Discharge due to:_  []  Other:_      Jam Madrid, PT 8/12/2020  3:37 PM    Future Appointments   Date Time Provider Yandel Wakefield   8/14/2020  9:30 AM Little Aden, PTA [facility] [address]   8/17/2020  9:30 AM Ariana Kevin, PT [facility] [address]   8/19/2020  9:30 AM Sushma Antonio, PT [facility] [address]   9/23/2020  8:20 AM [facility] NURSE VISIT [facility] HYUN PEARSON   9/30/2020  8:45 AM Hunte, Berl Severin, MD [facility]

## 2020-08-14 ENCOUNTER — APPOINTMENT (OUTPATIENT)
Dept: PHYSICAL THERAPY | Age: 70
End: 2020-08-14
Payer: MEDICARE

## 2020-08-17 ENCOUNTER — HOSPITAL ENCOUNTER (OUTPATIENT)
Dept: PHYSICAL THERAPY | Age: 70
Discharge: HOME | End: 2020-08-17
Payer: MEDICARE

## 2020-08-17 PROCEDURE — 97530 THERAPEUTIC ACTIVITIES: CPT

## 2020-08-17 PROCEDURE — 97110 THERAPEUTIC EXERCISES: CPT

## 2020-08-17 NOTE — PROGRESS NOTES
PT DAILY TREATMENT NOTE 10-18    Patient Name: Holli Nelson  Date:2020  : 1950  [x]  Patient  Verified  Payor: BLUE CROSS MEDICARE / Plan: [facility]  HMO / Product Type: Managed Care Medicare /    In time:930  Out time:1015  Total Treatment Time (min): 45  Visit #: 2 of 4    Medicare/BCBS Only   Total Timed Codes (min):  45 1:1 Treatment Time:  42       Treatment Area: Pain in right shoulder [M25.511]    SUBJECTIVE  Pain Level (0-10 scale): 0  Any medication changes, allergies to medications, adverse drug reactions, diagnosis change, or new procedure performed?: [x] No    [] Yes (see summary sheet for update)  Subjective functional status/changes:   [] No changes reported  Pt has no significant changes to report; continues to have significant pain sleeping at night      OBJECTIVE    35 min Therapeutic Exercise:  [x] See flow sheet :   Rationale: increase ROM and increase strength to improve the patients ability to perform ADLs    10 min Therapeutic Activity:  [x]  See flow sheet : sleep positioning education   Rationale: education  to improve the patients ability to sleep through the night           With   [] TE   [] TA   [] neuro   [] other: Patient Education: [x] Review HEP    [] Progressed/Changed HEP based on:   [] positioning   [] body mechanics   [] transfers   [] heat/ice application    [] other:      Other Objective/Functional Measures: progressed exercises per flow sheet      Pain Level (0-10 scale) post treatment: 2    ASSESSMENT/Changes in Function: Pt educated on propped 3/4 laying sleeping positioning to improve quality of sleep as pt reports this is his primary remaining deficit. Pt complaints of right shoulder pain were reduced during exercise with improved cervical and scapular realignment.     Patient will continue to benefit from skilled PT services to modify and progress therapeutic interventions, address functional mobility deficits, address ROM deficits, address strength deficits, analyze and address soft tissue restrictions, analyze and cue movement patterns, analyze and modify body mechanics/ergonomics, assess and modify postural abnormalities, address imbalance/dizziness and instruct in home and community integration to attain remaining goals. []  See Plan of Care  []  See progress note/recertification  []  See Discharge Summary         Progress towards goals / Updated goals:  1. Patient will be independent with HEP to improve carryover of functional gains with ADLs between visits.            PN: Patient reports intermittent compliance   Reports daily compliance  2. Pt will increase left Shoulder flexion/ abduction/ IR/ER strength to grossly 5/5 in order to improve functional lift & carry.             PN:                Shoulder flexion: 4+/5              Shoulder Abduction: 4+/5              Shoulder IR: 5/5              Shoulder ER: 4+/5    Tolerating progression of exercises  3. Pt will increase FOTO score to 64 points to demostrate improved function.             PN: 62   To be reassessed at CT   4.  Pt will report decrease in average pain rating on VAS to <3/10 to improve ease with daily tasks.              PN:  Pain appears to be declining, but still has discomfort in 90 deg of scaption and at night 5/10    Progressing = 0/10 upon entry, 2/10 with activity     PLAN  [x]  Upgrade activities as tolerated     [x]  Continue plan of care  []  Update interventions per flow sheet       []  Discharge due to:_  []  Other:_      Russell Crum, PT 8/17/2020  9:37 AM    Future Appointments   Date Time Provider Yandel Wakefield   8/19/2020  9:30 AM [facility]   8/24/2020  9:30 AM [facility]   9/23/2020  8:20 AM [facility] NURSE VISIT [facility] HYUN PEARSON   9/30/2020  8:45 AM Frances Doran MD [facility]

## 2020-08-19 ENCOUNTER — HOSPITAL ENCOUNTER (OUTPATIENT)
Dept: PHYSICAL THERAPY | Age: 70
Discharge: HOME | End: 2020-08-19
Payer: MEDICARE

## 2020-08-19 PROCEDURE — 97530 THERAPEUTIC ACTIVITIES: CPT

## 2020-08-19 PROCEDURE — 97110 THERAPEUTIC EXERCISES: CPT

## 2020-08-19 NOTE — PROGRESS NOTES
PT DAILY TREATMENT NOTE 10-18    Patient Name: Lola Ibrahim  Date:2020  : 1950  [x]  Patient  Verified  Payor: BLUE CROSS MEDICARE / Plan: [facility] [place]  HMO / Product Type: Managed Care Medicare /    In time: 9:33 Out time: 10:11  Total Treatment Time (min): 38  Visit #: 3 of 4    Medicare/BCBS Only   Total Timed Codes (min):  38 1:1 Treatment Time:  38       Treatment Area: Pain in right shoulder [M25.511]    SUBJECTIVE  Pain Level (0-10 scale): 2-3  Any medication changes, allergies to medications, adverse drug reactions, diagnosis change, or new procedure performed?: [x] No    [] Yes (see summary sheet for update)  Subjective functional status/changes:   [] No changes reported  \"I had rough time last night. \"\"I always have pain at night. \"    OBJECTIVE    Modality rationale: decrease edema, decrease inflammation, decrease pain and increase tissue extensibility to improve the patients ability to perform ADL    Min Type Additional Details    [] Estim:  []Unatt       []IFC  []Premod                        []Other:  []w/ice   []w/heat  Position:  Location:    [] Estim: []Att    []TENS instruct  []NMES                    []Other:  []w/US   []w/ice   []w/heat  Position:  Location:    []  Traction: [] Cervical       []Lumbar                       [] Prone          []Supine                       []Intermittent   []Continuous Lbs:  [] before manual  [] after manual    []  Ultrasound: []Continuous   [] Pulsed                           []1MHz   []3MHz W/cm2:  Location:    []  Iontophoresis with dexamethasone         Location: [] Take home patch   [] In clinic    []  Ice     []  heat  []  Ice massage  []  Laser   []  Anodyne Position:  Location:    []  Laser with stim  []  Other:  Position:  Location:    []  Vasopneumatic Device Pressure:       [] lo [] med [] hi   Temperature: [] lo [] med [] hi   [x] Skin assessment post-treatment:  [x]intact []redness- no adverse reaction []redness - adverse reaction:      min []Eval                  []Re-Eval       28 min Therapeutic Exercise:  [x] See flow sheet :   Rationale: increase ROM and increase strength to improve the patients ability to perform ADL     10 min Therapeutic Activity:  [x]  See flow sheet :   Rationale: increase ROM and increase strength  to improve the patients ability to perform ADL       min Neuromuscular Re-education:  []  See flow sheet :   Rationale:   to improve the patients ability to      min Manual Therapy:     Rationale: decrease pain, increase ROM and increase tissue extensibility to perform ADL      min Gait Training:  ___ feet with ___ device on level surfaces with ___ level of assist   Rationale: With   [x] TE   [] TA   [] neuro   [] other: Patient Education: [x] Review HEP    [] Progressed/Changed HEP based on:   [] positioning   [] body mechanics   [] transfers   [] heat/ice application    [] other:      Other Objective/Functional Measures:  Increased chest press    Pain Level (0-10 scale) post treatment:1    ASSESSMENT/Changes in Function: Pt experienced slight increased pain with median nerve glides and with horizontal ABduction 1# today. Pt was challenged with standing horizontal Abduction but pt was able to perform in supine position anti-gravity position. Following treatment pain was reduced. Pt demo Full AROM with shoulder F/ABD. Patient will continue to benefit from skilled PT services to address functional mobility deficits, address ROM deficits, address strength deficits, analyze and address soft tissue restrictions and analyze and cue movement patterns to attain remaining goals. [x]  See Plan of Care  []  See progress note/recertification  []  See Discharge Summary         Progress towards goals / Updated goals:  1. Patient will be independent with HEP to improve carryover of functional gains with ADLs between visits.               PN: Patient reports intermittent compliance Reports daily compliance  2. Pt will increase left Shoulder flexion/ abduction/ IR/ER strength to grossly 5/5 in order to improve functional lift & carry.             PN:                Shoulder flexion: 4+/5              Shoulder Abduction: 4+/5              Shoulder IR: 5/5              Shoulder ER: 4+/5               Tolerating progression of exercises  3. Pt will increase FOTO score to 64 points to demostrate improved function.             PN: 62              To be reassessed at NH   4.  Pt will report decrease in average pain rating on VAS to <3/10 to improve ease with daily tasks.              PN:  Pain appears to be declining, but still has discomfort in 90 deg of scaption and at night 5/10              Progressing = 0/10 upon entry, 2/10 with activity        PLAN  []  Upgrade activities as tolerated     [x]  Continue plan of care  []  Update interventions per flow sheet       []  Discharge due to:_  []  Other:_      Noemy Malin, LEIA 8/19/2020  9:39 AM    Future Appointments   Date Time Provider Yandel Haywoodi   8/24/2020  9:30 AM Lulu Roman PT [facility]   9/23/2020  8:20 AM IO NURSE VISIT [facility] HYUN SCHED   9/30/2020  8:45 AM Yolanda Doran MD [facility]

## 2020-08-24 ENCOUNTER — HOSPITAL ENCOUNTER (OUTPATIENT)
Dept: PHYSICAL THERAPY | Age: 70
Discharge: HOME | End: 2020-08-24
Payer: MEDICARE

## 2020-08-24 PROCEDURE — 97112 NEUROMUSCULAR REEDUCATION: CPT

## 2020-08-24 PROCEDURE — 97110 THERAPEUTIC EXERCISES: CPT

## 2020-08-24 NOTE — PROGRESS NOTES
PT DISCHARGE DAILY NOTE AND [id number]    Patient name: Vivien Best Start of Care: 2020   Referral source: Linette Malik MD : 1950                Medical Diagnosis: Pain in right shoulder [M25.511]  Payor: BLUE CROSS MEDICARE / Plan: [employer] / Product Type: Managed Care Medicare /  Onset Date:may 2020                Treatment Diagnosis: right shoulder pain   Prior Hospitalization: see medical history Provider#: [id number]   Medications: Verified on Patient summary List    Comorbidities: DM, HTN, latex allergy, hx of C6/7 fusion   Prior Level of Function: retired       Visits from [place] of Care: 10    Missed Visits: 1    Reporting Period : 20 to 20    Date:2020  : 1950  [x]  Patient  Verified  Payor: Hali Isabel / Plan: [employer] / Product Type: [employer]EDit Care Medicare /    In time:926  Out time:1004  Total Treatment Time (min): 38  Visit #: 4 of 4    Medicare/BCBS Only   Total Timed Codes (min):  38 1:1 Treatment Time:  38       SUBJECTIVE  Pain Level (0-10 scale): 5  Any medication changes, allergies to medications, adverse drug reactions, diagnosis change, or new procedure performed?: [x] No    [] Yes (see summary sheet for update)  Subjective functional status/changes:   [] No changes reported  \"I was having pain all weekend. I've been doing better before this. \"    OBJECTIVE        30 min Therapeutic Exercise:  [x] See flow sheet :   Rationale: increase ROM, increase strength, improve coordination, improve balance and increase proprioception to improve the patients ability to transition to HEP.        8 min Neuromuscular Re-education:  [x]  See flow sheet : review of posturing, scap re-ed exercises to help offload cervical spine   Rationale: increase ROM, increase strength, improve coordination, improve balance and increase proprioception  to improve the patients ability to sit for prolonged periods of time without pain/reduce radicular symptoms. With   [] TE   [] TA   [] neuro   [] other: Patient Education: [x] Review HEP    [] Progressed/Changed HEP based on:   [] positioning   [] body mechanics   [] transfers   [] heat/ice application    [] other:      Other Objective/Functional Measures:   Functional Gains: strength, reaching, lifting  Functional Deficits: sleeping/getting comfortable at night  % improvement: 50-60%  Pain   Average: 2/10       Best: 0/10     Worst: 5/10  Patient Goal: \"to get through this flare up. \"       Pain Level (0-10 scale) post treatment: 5    Summary of Care:  Progress towards goals / Updated goals:  1. Patient will be independent with HEP to improve carryover of functional gains with ADLs between visits.            PN: Patient reports intermittent compliance              MET:l Reports daily compliance  2. Pt will increase left Shoulder flexion/ abduction/ IR/ER strength to grossly 5/5 in order to improve functional lift & carry.             PN:                Shoulder flexion: 4+/5              Shoulder Abduction: 4+/5              Shoulder IR: 5/5              Shoulder ER: 4+/5               MET:5/5 grossly  3. Pt will increase FOTO score to 64 points to demostrate improved function.             PN: 62              MET: 67  4. Pt will report decrease in average pain rating on VAS to <3/10 to improve ease with daily tasks.              PN:  Pain appears to be declining, but still has discomfort in 90 deg of scaption and at night 5/10              MET:  0/10 upon entry, 2/10 with activity     ASSESSMENT/Changes in Function: Mr. Ti Damico has esen good response to PT over the past 2 months. He has seen slight return to fradicular symptoms only over the weekend with sleeping, but had seen full resolution of those symptoms and should pain prior to recent onset.  As he has reached maximum insurance authorization, he will transition to updated HEP provided today to work nadiya recent flare up of symptoms. Strength is full in all planes of the shoulder. Reviewed with him to f/U with MD should symptoms fail to improve over the next few weeks with HEP performance. Thank you for this referral!      PLAN  [x]Discontinue therapy: [x]Patient has reached or is progressing toward set goals/end of insurance authorization      []Patient is non-compliant or has abdicated      []Due to lack of appreciable progress towards set goals    Shereen Luis, PT 8/24/2020  9:34 AM        NOTE TO PHYSICIAN:  Please complete the following and fax to: [facility] at [phone]  . Retain this original for your records. If you are unable to process this request in   24 hours, please contact our office.      [] I have read the above report and request that my patient continue therapy with the following changes/special instructions:  [] I have read the above report and request that my patient be discharged from therapy    Physician's Signature:____________Date:_________TIME:________    ** Signature, Date and Time must be completed for valid certification **

## 2020-08-26 RX ORDER — PANTOPRAZOLE SODIUM 40 MG/1
40 TABLET, DELAYED RELEASE ORAL DAILY
Qty: 90 TAB | Refills: 3 | Status: SHIPPED | OUTPATIENT
Start: 2020-08-26 | End: 2021-07-26

## 2020-08-26 NOTE — TELEPHONE ENCOUNTER
Last Visit: 6/24/20 with MD Doran  Next Appointment: 9/30/20 with MD Doran  Previous Refill Encounter(s): 7/25/19 #90 with 3 refills    Requested Prescriptions     Pending Prescriptions Disp Refills    pantoprazole (PROTONIX) 40 mg tablet 90 Tab 3     Sig: Take 1 Tab by mouth daily.

## 2020-09-23 ENCOUNTER — APPOINTMENT (OUTPATIENT)
Dept: INTERNAL MEDICINE CLINIC | Age: 70
End: 2020-09-23

## 2020-09-23 ENCOUNTER — HOSPITAL ENCOUNTER (OUTPATIENT)
Dept: LAB | Age: 70
Discharge: HOME | End: 2020-09-23

## 2020-09-23 LAB — XX-LABCORP SPECIMEN COL,LCBCF: NORMAL

## 2020-09-23 PROCEDURE — 99001 SPECIMEN HANDLING PT-LAB: CPT

## 2020-09-24 LAB
ALBUMIN SERPL-MCNC: 4.1 G/DL (ref 3.8–4.8)
ALBUMIN/GLOB SERPL: 1.5 {RATIO} (ref 1.2–2.2)
ALP SERPL-CCNC: 232 IU/L (ref 39–117)
ALT SERPL-CCNC: 23 IU/L (ref 0–44)
AST SERPL-CCNC: 27 IU/L (ref 0–40)
BILIRUB SERPL-MCNC: 0.5 MG/DL (ref 0–1.2)
BUN SERPL-MCNC: 17 MG/DL (ref 8–27)
BUN/CREAT SERPL: 17 (ref 10–24)
CALCIUM SERPL-MCNC: 10 MG/DL (ref 8.6–10.2)
CHLORIDE SERPL-SCNC: 100 MMOL/L (ref 96–106)
CHOLEST SERPL-MCNC: 120 MG/DL (ref 100–199)
CO2 SERPL-SCNC: 21 MMOL/L (ref 20–29)
CREAT SERPL-MCNC: 1.01 MG/DL (ref 0.76–1.27)
GLOBULIN SER CALC-MCNC: 2.7 G/DL (ref 1.5–4.5)
GLUCOSE SERPL-MCNC: 144 MG/DL (ref 65–99)
HBA1C MFR BLD: 10.1 % (ref 4.8–5.6)
HDLC SERPL-MCNC: 38 MG/DL
INTERPRETATION, 910389: NORMAL
LDLC SERPL CALC-MCNC: 65 MG/DL (ref 0–99)
Lab: NORMAL
POTASSIUM SERPL-SCNC: 4.7 MMOL/L (ref 3.5–5.2)
PROT SERPL-MCNC: 6.8 G/DL (ref 6–8.5)
PSA SERPL-MCNC: 2.7 NG/ML (ref 0–4)
SODIUM SERPL-SCNC: 137 MMOL/L (ref 134–144)
TRIGL SERPL-MCNC: 87 MG/DL (ref 0–149)
VLDLC SERPL CALC-MCNC: 17 MG/DL (ref 5–40)

## 2020-09-30 ENCOUNTER — OFFICE VISIT (OUTPATIENT)
Dept: INTERNAL MEDICINE CLINIC | Age: 70
End: 2020-09-30
Payer: MEDICARE

## 2020-09-30 VITALS
OXYGEN SATURATION: 97 % | DIASTOLIC BLOOD PRESSURE: 85 MMHG | HEART RATE: 90 BPM | SYSTOLIC BLOOD PRESSURE: 125 MMHG | WEIGHT: 209 LBS | RESPIRATION RATE: 20 BRPM | TEMPERATURE: 97.1 F | BODY MASS INDEX: 33.59 KG/M2 | HEIGHT: 66 IN

## 2020-09-30 DIAGNOSIS — Z79.4 TYPE 2 DIABETES MELLITUS WITH DIABETIC NEUROPATHY, WITH LONG-TERM CURRENT USE OF INSULIN (HCC): ICD-10-CM

## 2020-09-30 DIAGNOSIS — E78.00 HIGH CHOLESTEROL: ICD-10-CM

## 2020-09-30 DIAGNOSIS — E11.40 TYPE 2 DIABETES MELLITUS WITH DIABETIC NEUROPATHY, WITH LONG-TERM CURRENT USE OF INSULIN (HCC): ICD-10-CM

## 2020-09-30 DIAGNOSIS — Z00.00 MEDICARE ANNUAL WELLNESS VISIT, SUBSEQUENT: Primary | ICD-10-CM

## 2020-09-30 DIAGNOSIS — I10 ESSENTIAL HYPERTENSION: ICD-10-CM

## 2020-09-30 DIAGNOSIS — F32.1 CURRENT MODERATE EPISODE OF MAJOR DEPRESSIVE DISORDER WITHOUT PRIOR EPISODE (HCC): ICD-10-CM

## 2020-09-30 DIAGNOSIS — Z23 NEEDS FLU SHOT: ICD-10-CM

## 2020-09-30 PROCEDURE — G8510 SCR DEP NEG, NO PLAN REQD: HCPCS | Performed by: INTERNAL MEDICINE

## 2020-09-30 PROCEDURE — G0444 DEPRESSION SCREEN ANNUAL: HCPCS | Performed by: INTERNAL MEDICINE

## 2020-09-30 PROCEDURE — G0439 PPPS, SUBSEQ VISIT: HCPCS | Performed by: INTERNAL MEDICINE

## 2020-09-30 PROCEDURE — 2022F DILAT RTA XM EVC RTNOPTHY: CPT | Performed by: INTERNAL MEDICINE

## 2020-09-30 PROCEDURE — G8536 NO DOC ELDER MAL SCRN: HCPCS | Performed by: INTERNAL MEDICINE

## 2020-09-30 PROCEDURE — G8754 DIAS BP LESS 90: HCPCS | Performed by: INTERNAL MEDICINE

## 2020-09-30 PROCEDURE — 90694 VACC AIIV4 NO PRSRV 0.5ML IM: CPT | Performed by: INTERNAL MEDICINE

## 2020-09-30 PROCEDURE — G8417 CALC BMI ABV UP PARAM F/U: HCPCS | Performed by: INTERNAL MEDICINE

## 2020-09-30 PROCEDURE — G0008 ADMIN INFLUENZA VIRUS VAC: HCPCS | Performed by: INTERNAL MEDICINE

## 2020-09-30 PROCEDURE — 1101F PT FALLS ASSESS-DOCD LE1/YR: CPT | Performed by: INTERNAL MEDICINE

## 2020-09-30 PROCEDURE — G8752 SYS BP LESS 140: HCPCS | Performed by: INTERNAL MEDICINE

## 2020-09-30 PROCEDURE — 99214 OFFICE O/P EST MOD 30 MIN: CPT | Performed by: INTERNAL MEDICINE

## 2020-09-30 PROCEDURE — 3046F HEMOGLOBIN A1C LEVEL >9.0%: CPT | Performed by: INTERNAL MEDICINE

## 2020-09-30 PROCEDURE — 3017F COLORECTAL CA SCREEN DOC REV: CPT | Performed by: INTERNAL MEDICINE

## 2020-09-30 PROCEDURE — G8427 DOCREV CUR MEDS BY ELIG CLIN: HCPCS | Performed by: INTERNAL MEDICINE

## 2020-09-30 RX ORDER — DULOXETIN HYDROCHLORIDE 60 MG/1
60 CAPSULE, DELAYED RELEASE ORAL DAILY
Qty: 30 CAP | Refills: 5 | Status: SHIPPED | OUTPATIENT
Start: 2020-09-30 | End: 2021-09-08

## 2020-09-30 RX ORDER — DUPILUMAB 300 MG/2ML
INJECTION, SOLUTION SUBCUTANEOUS
COMMUNITY
Start: 2020-09-28 | End: 2021-09-08

## 2020-09-30 NOTE — PROGRESS NOTES
Subjective:       Chief Complaint  The patient presents for follow up of diabetes, hypertension and high cholesterol. MIRTA Chandler is a 79 y.o. male seen for follow up of diabetes. Healso has hypertension and hyperlipidemia. Diabetes poorly controlled on Janumet and Lantus 20 unitst, no significant medication side effects noted, pt needs to work on improving diet and cutting back starches and sweets,  hypertension controlled, no significant medication side effects noted, on lisinopril 40 mg, pt is compliant with taking the medication daily,  hyperlipidemia well controlled, no significant medication side effects noted, on Pravachol 40 mg    Diet and Lifestyle: not attempting to follow a low fat, low cholesterol diet, does not rigorously follow a diabetic diet, sedentary    Home BP Monitoring: is not measured at home. Diabetic Review of Systems - home glucose monitoring: is performed regularly, 1x/day. Other symptoms and concerns: pt continues to work on diet and exercise to lose weight      Patient remains out of work due to [employer]. injury which he sustained over a year ago. He had neck surgery and was doing better but is again having neck pain as well as pain in multiple joints including right shoulder. Pt is very frustrated and anxious about his current life situation since he was injured. He does not know the next steps to take. Depression  Patient complains of depression. He complains of depressed mood, anhedonia, insomnia, psychomotor agitation and hopelessness. Onset was approximately several months ago, unchanged since that time. He denies current suicidal and homicidal plan or intent. Family history significant for nothing. Possible organic causes contributing are: none. Risk factors: negative life event work injury as above and inability to get back into the workforce. Previous treatment includes none and no other therapies.  He complains of the following side effects from the treatment: none. Current Outpatient Medications   Medication Sig    Dupixent Syringe 300 mg/2 mL syrg syringe     DULoxetine (CYMBALTA) 60 mg capsule Take 1 Cap by mouth daily.  pantoprazole (PROTONIX) 40 mg tablet Take 1 Tab by mouth daily.  pravastatin (PRAVACHOL) 40 mg tablet Take 1 Tab by mouth nightly. Indications: high cholesterol    Blood-Glucose Meter monitoring kit Check BS 3x/day Dx E11.21    Blood Glucose Control, Normal (Accu-Chek SmartView Contrl Sol) soln Test blood sugar 3 x daily  DX E11.9        1 year supply  Check controls monthy on glucose meter    FreeStyle Bassam 14 Day Sensor kit USE ONE EVERY 14 DAYS    cetirizine (ZYRTEC) 10 mg tablet TAKE 1 TABLET BY MOUTH EVERY DAY    multivit-min/FA/lycopen/lutein (CENTRUM SILVER MEN PO) Take 1 Tab by mouth daily.  insulin glargine (LANTUS,BASAGLAR) 100 unit/mL (3 mL) inpn Inject 20 units daily in AM SQ    Insulin Needles, Disposable, 31 gauge x 5/16\" ndle Inject daily    lancets (ACCU-CHEK SOFTCLIX LANCETS) misc Check BS 2x/day    topiramate (TOPAMAX) 25 mg tablet TAKE 1 TABLET BY MOUTH TWICE A DAY    acetaminophen (TYLENOL EXTRA STRENGTH) 500 mg tablet Take 1,000 mg by mouth every six (6) hours as needed for Pain.  gabapentin (NEURONTIN) 100 mg capsule Take 1 Cap by mouth three (3) times daily. Max Daily Amount: 300 mg.    flash glucose scanning reader (FREESTYLE BASSAM 14 DAY READER) misc Use as directed    lisinopril (PRINIVIL, ZESTRIL) 40 mg tablet Take 1 Tab by mouth daily.  SITagliptin-metFORMIN (JANUMET XR) 50-1,000 mg TM24 Take 2 Tabs by mouth daily.  glucose blood VI test strips (BLOOD GLUCOSE TEST) strip Test blood sugar 1 x daily  DX E11.9    triamcinolone acetonide (KENALOG) 0.025 % ointment Apply  to affected area two (2) times a day. use thin layer    Lancets misc Accu-check  Fastclix Lancets. Test blood sugar daily.   DX E11.9    alcohol swabs (BD SINGLE USE SWABS REGULAR) padm Use daily to check blood sugar    halobetasol (ULTRAVATE) 0.05 % topical cream APPLY TO AFFECTED AREA TWICE A DAY AS NEEDED    hydrOXYzine pamoate (VISTARIL) 25 mg capsule Take 1 Cap by mouth three (3) times daily as needed for Itching. No current facility-administered medications for this visit. Review of Systems  Respiratory: negative for dyspnea on exertion  Cardiovascular: negative for chest pain    Objective:     Visit Vitals  /85 (BP 1 Location: Left arm, BP Patient Position: Sitting)   Pulse 90   Temp 97.1 °F (36.2 °C) (Temporal)   Resp 20   Ht 5' 6\" (1.676 m)   Wt 209 lb (94.8 kg)   SpO2 97%   BMI 33.73 kg/m²        General appearance - alert, well appearing, and in no distress   Chest - clear to auscultation, no wheezes, rales or rhonchi, symmetric air entry  Heart - normal rate, regular rhythm, normal S1, S2, no murmurs, rubs, clicks or gallops  Extremities - no edema         Labs:   Lab Results   Component Value Date/Time    Hemoglobin A1c 10.1 (H) 09/23/2020 09:07 AM    Hemoglobin A1c 11.2 (H) 06/17/2020 10:45 AM    Hemoglobin A1c 9.4 (H) 01/03/2020 08:29 AM    Glucose 144 (H) 09/23/2020 09:07 AM    Glucose (POC) 217 (H) 07/23/2019 11:24 AM    Microalbumin/Creat ratio (mg/g creat) 8 04/24/2018 07:35 AM    Microalb/Creat ratio (ug/mg creat.) 27 06/17/2020 10:45 AM    Microalbumin,urine random 1.90 04/24/2018 07:35 AM    LDL, calculated 65 06/17/2020 10:45 AM    LDL Chol Calc (NIH) 65 09/23/2020 09:07 AM    Creatinine 1.01 09/23/2020 09:07 AM      Lab Results   Component Value Date/Time    Cholesterol, total 120 09/23/2020 09:07 AM    HDL Cholesterol 38 (L) 09/23/2020 09:07 AM    LDL, calculated 65 06/17/2020 10:45 AM    LDL Chol Calc (NIH) 65 09/23/2020 09:07 AM    Triglyceride 87 09/23/2020 09:07 AM    CHOL/HDL Ratio 3.1 04/24/2018 07:35 AM       Lab Results   Component Value Date/Time    ALT (SGPT) 23 09/23/2020 09:07 AM    Alk.  phosphatase 232 (H) 09/23/2020 09:07 AM    Bilirubin, total 0.5 09/23/2020 09:07 AM       Lab Results   Component Value Date/Time    GFR est AA 87 09/23/2020 09:07 AM    GFR est non-AA 75 09/23/2020 09:07 AM    Creatinine 1.01 09/23/2020 09:07 AM    BUN 17 09/23/2020 09:07 AM    Sodium 137 09/23/2020 09:07 AM    Potassium 4.7 09/23/2020 09:07 AM    Chloride 100 09/23/2020 09:07 AM    CO2 21 09/23/2020 09:07 AM      Lab Results   Component Value Date/Time    Prostate Specific Ag 2.7 09/23/2020 09:07 AM    Prostate Specific Ag 1.9 06/18/2019 10:59 AM    Prostate Specific Ag 4.3 (H) 12/05/2018 07:34 AM    Prostate Specific Ag 1.6 01/08/2016 10:54 AM           Assessment / Plan     Diabetes poorly controlled on Janumet and Lantus insulin which I will increase to 30 units/day    Hypertension well controlled, on lisinopril 40 mg. Hyperlipidemia well controlled, on Pravachol 40 mg      ICD-10-CM ICD-9-CM    1. Medicare annual wellness visit, subsequent  Z00.00 V70.0    2. Type 2 diabetes mellitus with diabetic neuropathy, with long-term current use of insulin ([facility])  E11.40 250.60 HEMOGLOBIN A1C W/O EAG    Z79.4 357.2      V58.67    3. Essential hypertension  B98 989.4 METABOLIC PANEL, COMPREHENSIVE   4. High cholesterol  E78.00 272.0 LIPID PANEL   5. Current moderate episode of major depressive disorder without prior episode ([facility] [address]  F32.1 296.22 Will try DULoxetine (CYMBALTA) 60 mg capsule which may help with his pain also    6. Needs flu shot  Z23 V04.81 FLU (FLUAD QUAD INFLUENZA VACCINE,QUAD,ADJUVANTED)                Diabetic issues reviewed with him: diabetic diet discussed in detail, and low cholesterol diet, weight control and daily exercise discussed. Follow-up and Dispositions    · Return in about 3 months (around 12/30/2020) for labs 1 week before. Reviewed plan of care. Patient has provided input and agrees with goals.

## 2020-09-30 NOTE — PATIENT INSTRUCTIONS
DASH Diet: Care Instructions Your Care Instructions The DASH diet is an eating plan that can help lower your blood pressure. DASH stands for Dietary Approaches to Stop Hypertension. Hypertension is high blood pressure. The DASH diet focuses on eating foods that are high in calcium, potassium, and magnesium. These nutrients can lower blood pressure. The foods that are highest in these nutrients are fruits, vegetables, low-fat dairy products, nuts, seeds, and legumes. But taking calcium, potassium, and magnesium supplements instead of eating foods that are high in those nutrients does not have the same effect. The DASH diet also includes whole grains, fish, and poultry. The DASH diet is one of several lifestyle changes your doctor may recommend to lower your high blood pressure. Your doctor may also want you to decrease the amount of sodium in your diet. Lowering sodium while following the DASH diet can lower blood pressure even further than just the DASH diet alone. Follow-up care is a key part of your treatment and safety. Be sure to make and go to all appointments, and call your doctor if you are having problems. It's also a good idea to know your test results and keep a list of the medicines you take. How can you care for yourself at home? Following the DASH diet · Eat 4 to 5 servings of fruit each day. A serving is 1 medium-sized piece of fruit, ½ cup chopped or canned fruit, 1/4 cup dried fruit, or 4 ounces (½ cup) of fruit juice. Choose fruit more often than fruit juice. · Eat 4 to 5 servings of vegetables each day. A serving is 1 cup of lettuce or raw leafy vegetables, ½ cup of chopped or cooked vegetables, or 4 ounces (½ cup) of vegetable juice. Choose vegetables more often than vegetable juice. · Get 2 to 3 servings of low-fat and fat-free dairy each day. A serving is 8 ounces of milk, 1 cup of yogurt, or 1 ½ ounces of cheese. · Eat 6 to 8 servings of grains each day. A serving is 1 slice of bread, 1 ounce of dry cereal, or ½ cup of cooked rice, pasta, or cooked cereal. Try to choose whole-grain products as much as possible. · Limit lean meat, poultry, and fish to 2 servings each day. A serving is 3 ounces, about the size of a deck of cards. · Eat 4 to 5 servings of nuts, seeds, and legumes (cooked dried beans, lentils, and split peas) each week. A serving is 1/3 cup of nuts, 2 tablespoons of seeds, or ½ cup of cooked beans or peas. · Limit fats and oils to 2 to 3 servings each day. A serving is 1 teaspoon of vegetable oil or 2 tablespoons of salad dressing. · Limit sweets and added sugars to 5 servings or less a week. A serving is 1 tablespoon jelly or jam, ½ cup sorbet, or 1 cup of lemonade. · Eat less than 2,300 milligrams (mg) of sodium a day. If you limit your sodium to 1,500 mg a day, you can lower your blood pressure even more. Tips for success · Start small. Do not try to make dramatic changes to your diet all at once. You might feel that you are missing out on your favorite foods and then be more likely to not follow the plan. Make small changes, and stick with them. Once those changes become habit, add a few more changes. · Try some of the following: ? Make it a goal to eat a fruit or vegetable at every meal and at snacks. This will make it easy to get the recommended amount of fruits and vegetables each day. ? Try yogurt topped with fruit and nuts for a snack or healthy dessert. ? Add lettuce, tomato, cucumber, and onion to sandwiches. ? Combine a ready-made pizza crust with low-fat mozzarella cheese and lots of vegetable toppings. Try using tomatoes, squash, spinach, broccoli, carrots, cauliflower, and onions. ? Have a variety of cut-up vegetables with a low-fat dip as an appetizer instead of chips and dip. ? Sprinkle sunflower seeds or chopped almonds over salads.  Or try adding chopped walnuts or almonds to cooked vegetables. ? Try some vegetarian meals using beans and peas. Add garbanzo or kidney beans to salads. Make burritos and tacos with mashed niño beans or black beans. Where can you learn more? Go to http://www.gray.com/ Enter J728 in the search box to learn more about \"DASH Diet: Care Instructions. \" Current as of: December 16, 2019               Content Version: 12.6 © 3678-4554 [employer]. Care instructions adapted under license by [employer] (which disclaims liability or warranty for this information). If you have questions about a medical condition or this instruction, always ask your healthcare professional. [address] any warranty or liability for your use of this information. Medicare Wellness Visit, Male The best way to live healthy is to have a lifestyle where you eat a well-balanced diet, exercise regularly, limit alcohol use, and quit all forms of tobacco/nicotine, if applicable. Regular preventive services are another way to keep healthy. Preventive services (vaccines, screening tests, monitoring & exams) can help personalize your care plan, which helps you manage your own care. Screening tests can find health problems at the earliest stages, when they are easiest to treat. [facility] follows the current, evidence-based guidelines published by the [place] [employer] Anand (USPSTF) when recommending preventive services for our patients. Because we follow these guidelines, sometimes recommendations change over time as research supports it. (For example, a prostate screening blood test is no longer routinely recommended for men with no symptoms). Of course, you and your doctor may decide to screen more often for some diseases, based on your risk and co-morbidities (chronic disease you are already diagnosed with). Preventive services for you include: - Medicare offers their members a free annual wellness visit, which is time for you and your primary care provider to discuss and plan for your preventive service needs. Take advantage of this benefit every year! 
-All adults over age 72 should receive the recommended pneumonia vaccines. Current USPSTF guidelines recommend a series of two vaccines for the best pneumonia protection.  
-All adults should have a flu vaccine yearly and tetanus vaccine every 10 years. 
-All adults age 48 and older should receive the shingles vaccines (series of two vaccines). -All adults age 38-68 who are overweight should have a diabetes screening test once every three years.  
-Other screening tests & preventive services for persons with diabetes include: an eye exam to screen for diabetic retinopathy, a kidney function test, a foot exam, and stricter control over your cholesterol.  
-Cardiovascular screening for adults with routine risk involves an electrocardiogram (ECG) at intervals determined by the provider.  
-Colorectal cancer screening should be done for adults age 54-65 with no increased risk factors for colorectal cancer. There are a number of acceptable methods of screening for this type of cancer. Each test has its own benefits and drawbacks. Discuss with your provider what is most appropriate for you during your annual wellness visit. The different tests include: colonoscopy (considered the best screening method), a fecal occult blood test, a fecal DNA test, and sigmoidoscopy. 
-All adults born between [facility] should be screened once for Hepatitis C. 
-An Abdominal Aortic Aneurysm (AAA) Screening is recommended for men age 73-68 who has ever smoked in their lifetime. Here is a list of your current Health Maintenance items (your personalized list of preventive services) with a due date: 
Health Maintenance Due Topic Date Due  
 DTaP/Tdap/Td  (1 - Tdap) 03/11/1971  Shingles Vaccine (1 of 2) 03/11/2000

## 2020-09-30 NOTE — PROGRESS NOTES
Patient is in the office today for a follow up, and Medicare wellness visit. 1. Have you been to the ER, urgent care clinic since your last visit? Hospitalized since your last visit? yes,  and [facility]. 2. Have you seen or consulted any other health care providers outside of the [address] [place] [address] since your last visit? Include any pap smears or colon screening. No      Verbal order read back per Dr. Matt Degroot flu vaccine. Patient received flu vaccine in right deltoid. Patient was observed and no signs or symptoms of allergic reaction noted at this time. Patient tolerated well and left without complaints. Patient received flu VIS.

## 2020-09-30 NOTE — PROGRESS NOTES
This is the Subsequent Medicare Annual Wellness Exam, performed 12 months or more after the Initial AWV or the last Subsequent AWV    I have reviewed the patient's medical history in detail and updated the computerized patient record. History     Patient Active Problem List   Diagnosis Code    Essential hypertension I10    DM (diabetes mellitus), type 2, uncontrolled ([facility] E11.65    High cholesterol E78.00    ACP (advance care planning) Z71.89    Type 2 diabetes mellitus with diabetic neuropathy ([facility] E11.40    Severe obesity ([address] E66.01    Type 2 diabetes with nephropathy ([facility] E11.21    Cervical myelopathy ([facility] G95.9    Cervical spinal stenosis M48.02     Past Medical History:   Diagnosis Date    Diabetes ([facility]     GERD (gastroesophageal reflux disease)     Hx of colonic polyps     Hx of gallstones     Hypercholesterolemia     Hypertension     Restless leg syndrome     Wears dentures       Past Surgical History:   Procedure Laterality Date    HX OTHER SURGICAL      gallstones removed    CT COLONOSCOPY FLX DX W/COLLJ SPEC WHEN PFRMD  2-17-16    Dr. Pope People     Current Outpatient Medications   Medication Sig Dispense Refill    Dupixent Syringe 300 mg/2 mL syrg syringe       DULoxetine (CYMBALTA) 60 mg capsule Take 1 Cap by mouth daily. 30 Cap 5    pantoprazole (PROTONIX) 40 mg tablet Take 1 Tab by mouth daily. 90 Tab 3    pravastatin (PRAVACHOL) 40 mg tablet Take 1 Tab by mouth nightly.  Indications: high cholesterol 90 Tab 1    Blood-Glucose Meter monitoring kit Check BS 3x/day Dx E11.21 1 Kit 0    Blood Glucose Control, Normal (Accu-Chek SmartView Contrl Sol) soln Test blood sugar 3 x daily  DX E11.9        1 year supply  Check controls monthy on glucose meter 3 mL 4    FreeStyle Bassam 14 Day Sensor kit USE ONE EVERY 14 DAYS 2 Kit 5    cetirizine (ZYRTEC) 10 mg tablet TAKE 1 TABLET BY MOUTH EVERY DAY 30 Tab 2    multivit-min/FA/lycopen/lutein (CENTRUM SILVER MEN PO) Take 1 Tab by mouth daily.  insulin glargine (LANTUS,BASAGLAR) 100 unit/mL (3 mL) inpn Inject 20 units daily in AM SQ 15 mL 2    Insulin Needles, Disposable, 31 gauge x 5/16\" ndle Inject daily 50 Pen Needle 5    lancets (ACCU-CHEK SOFTCLIX LANCETS) misc Check BS 2x/day 100 Each 5    topiramate (TOPAMAX) 25 mg tablet TAKE 1 TABLET BY MOUTH TWICE A DAY 60 Tab 4    acetaminophen (TYLENOL EXTRA STRENGTH) 500 mg tablet Take 1,000 mg by mouth every six (6) hours as needed for Pain.  gabapentin (NEURONTIN) 100 mg capsule Take 1 Cap by mouth three (3) times daily. Max Daily Amount: 300 mg. 90 Cap 2    flash glucose scanning reader (RFinity TAYLOR 14 DAY READER) misc Use as directed 1 Each 0    lisinopril (PRINIVIL, ZESTRIL) 40 mg tablet Take 1 Tab by mouth daily. 90 Tab 3    SITagliptin-metFORMIN (JANUMET XR) 50-1,000 mg TM24 Take 2 Tabs by mouth daily. 180 Tab 3    glucose blood VI test strips (BLOOD GLUCOSE TEST) strip Test blood sugar 1 x daily  DX E11.9 100 Strip 3    triamcinolone acetonide (KENALOG) 0.025 % ointment Apply  to affected area two (2) times a day. use thin layer 30 g 0    Lancets misc Accu-check  Fastclix Lancets. Test blood sugar daily. DX E11.9 100 Each 3    alcohol swabs (BD SINGLE USE SWABS REGULAR) padm Use daily to check blood sugar 100 Pad 4    halobetasol (ULTRAVATE) 0.05 % topical cream APPLY TO AFFECTED AREA TWICE A DAY AS NEEDED  1    hydrOXYzine pamoate (VISTARIL) 25 mg capsule Take 1 Cap by mouth three (3) times daily as needed for Itching.  60 Cap 3     Allergies   Allergen Reactions    Latex Sneezing    Benzalkonium Chloride Hives     Pt denies    Neosporin [Hydrocortisone] Rash       Family History   Problem Relation Age of Onset    Heart Attack Mother     Prostate Cancer Father     Colon Cancer Father     Stroke Sister      Social History     Tobacco Use    Smoking status: Current Every Day Smoker     Packs/day: 0.50    Smokeless tobacco: Never Used   Substance Use Topics    Alcohol use: Yes     Comment: occas. Depression Risk Factor Screening:     3 most recent PHQ Screens 9/30/2020   Little interest or pleasure in doing things Not at all   Feeling down, depressed, irritable, or hopeless Not at all   Total Score PHQ 2 0       Alcohol Risk Screen   Do you average more than 1 drink per night or more than 7 drinks a week: No    In the past three months have you have had more than 4 drinks containing alcohol on one occasion: No        Functional Ability and Level of Safety:   Hearing: Hearing is good. Activities of Daily Living: The home contains: no safety equipment. Patient does total self care     Ambulation: with no difficulty     Fall Risk:  Fall Risk Assessment, last 12 mths 9/30/2020   Able to walk? Yes   Fall in past 12 months? No   Fall with injury? -   Number of falls in past 12 months -   Fall Risk Score -     Abuse Screen:  Patient is not abused       Cognitive Screening   Has your family/caregiver stated any concerns about your memory: no     Cognitive Screening: Normal - . Patient Care Team   Patient Care Team:  Barak Rubin MD as PCP - General (Internal Medicine)  Barak Rubin MD as PCP - [facility] Empaneled Provider  Dr. Juliann Del Real as Physician (Ophthalmology)  Lesvia Blandon MD (Gastroenterology)  Srinivas Teran MD (Orthopedic Surgery)  Walter Wagner MD (Pain Management)  Nicole Reyes MD (Neurology)    Glaucoma Screening- UTD  Pneumonia Vaccine- UTD  Shingles Vaccine- 9/2015 pt aware of Shingrinx  Tdap Vaccine- not UTD   Colonoscopy 4/2019 Dr Pallavi Damon, f/u 4/2024  PSA- 9/2020 2.7   Advance Directive- Does not have. Working on [employer] and counseling provided:  Are appropriate based on today's review and evaluation  End-of-Life planning (with patient's consent)    Diagnoses and all orders for this visit:    1. Medicare annual wellness visit, subsequent    2.  Type 2 diabetes mellitus with diabetic neuropathy, with long-term current use of insulin (HCC)  -     HEMOGLOBIN A1C W/O EAG; Future    3. Essential hypertension  -     METABOLIC PANEL, COMPREHENSIVE; Future    4. High cholesterol  -     LIPID PANEL; Future    5. Current moderate episode of major depressive disorder without prior episode (HCC)  -     DULoxetine (CYMBALTA) 60 mg capsule; Take 1 Cap by mouth daily. 6. Needs flu shot  -     FLU (FLUAD QUAD INFLUENZA VACCINE,QUAD,ADJUVANTED)        Health Maintenance Due   Topic Date Due    DTaP/Tdap/Td series (1 - Tdap) 03/11/1971    Shingrix Vaccine Age 50> (1 of 2) 03/11/2000     A comprehensive 5 year plan for medical care and screening exams was reviewed with pt and they received a copy of it.

## 2020-10-14 DIAGNOSIS — Z79.4 TYPE 2 DIABETES MELLITUS WITH DIABETIC NEUROPATHY, WITH LONG-TERM CURRENT USE OF INSULIN (HCC): Primary | ICD-10-CM

## 2020-10-14 DIAGNOSIS — I10 ESSENTIAL HYPERTENSION: ICD-10-CM

## 2020-10-14 DIAGNOSIS — E11.65 UNCONTROLLED TYPE 2 DIABETES MELLITUS WITH HYPERGLYCEMIA (HCC): ICD-10-CM

## 2020-10-14 DIAGNOSIS — E11.40 TYPE 2 DIABETES MELLITUS WITH DIABETIC NEUROPATHY, WITH LONG-TERM CURRENT USE OF INSULIN (HCC): Primary | ICD-10-CM

## 2020-10-14 RX ORDER — LISINOPRIL 40 MG/1
40 TABLET ORAL DAILY
Qty: 90 TAB | Refills: 3 | Status: SHIPPED | OUTPATIENT
Start: 2020-10-14 | End: 2021-07-26

## 2020-10-14 RX ORDER — SITAGLIPTIN AND METFORMIN HYDROCHLORIDE 50; 1000 MG/1; MG/1
2 TABLET, FILM COATED, EXTENDED RELEASE ORAL DAILY
Qty: 180 TAB | Refills: 3 | Status: SHIPPED | OUTPATIENT
Start: 2020-10-14 | End: 2021-09-14 | Stop reason: SINTOL

## 2020-10-14 NOTE — TELEPHONE ENCOUNTER
Last Visit: 9/30/20 with MD Doran  Next Appointment: 12/30/20 with MD Doran  Previous Refill Encounter(s): 7/25/19 90 d/s with 3 refills    Requested Prescriptions     Pending Prescriptions Disp Refills    lisinopriL (PRINIVIL, ZESTRIL) 40 mg tablet 90 Tab 3     Sig: Take 1 Tab by mouth daily.  SITagliptin-metFORMIN (Janumet XR) 50-1,000 mg TM24 180 Tab 3     Sig: Take 2 Tabs by mouth daily.

## 2020-10-14 NOTE — TELEPHONE ENCOUNTER
Requested Prescriptions     Pending Prescriptions Disp Refills    lisinopriL (PRINIVIL, ZESTRIL) 40 mg tablet 90 Tab 3     Sig: Take 1 Tab by mouth daily. Pt also request the following: and he took his last pill last night.     SITagliptin-metFORMIN (JANUMET XR) 50-1,000 mg TM24 2 Tab, DAILY

## 2020-11-10 RX ORDER — FLASH GLUCOSE SENSOR
KIT MISCELLANEOUS
Qty: 2 KIT | Refills: 5 | Status: SHIPPED | OUTPATIENT
Start: 2020-11-10 | End: 2021-05-20

## 2020-11-25 ENCOUNTER — TELEPHONE (OUTPATIENT)
Dept: INTERNAL MEDICINE CLINIC | Age: 70
End: 2020-11-25

## 2020-11-25 DIAGNOSIS — E11.29 TYPE II OR UNSPECIFIED TYPE DIABETES MELLITUS WITH RENAL MANIFESTATIONS, UNCONTROLLED(250.42) (HCC): ICD-10-CM

## 2020-11-25 DIAGNOSIS — E11.65 TYPE II OR UNSPECIFIED TYPE DIABETES MELLITUS WITH RENAL MANIFESTATIONS, UNCONTROLLED(250.42) (HCC): ICD-10-CM

## 2020-11-25 RX ORDER — INSULIN GLARGINE 100 [IU]/ML
INJECTION, SOLUTION SUBCUTANEOUS
Qty: 15 ML | Refills: 2 | Status: SHIPPED | OUTPATIENT
Start: 2020-11-25 | End: 2021-10-17

## 2020-11-25 NOTE — TELEPHONE ENCOUNTER
Patient stating [facility] needs a dx code for the [address] test kit in order to bill his insurance.

## 2020-11-30 ENCOUNTER — TELEPHONE (OUTPATIENT)
Dept: INTERNAL MEDICINE CLINIC | Age: 70
End: 2020-11-30

## 2020-11-30 NOTE — TELEPHONE ENCOUNTER
Pt calling because pharmacy hasn't received diagnosis code for his [employer]. Please send so he can get it.

## 2020-12-23 ENCOUNTER — APPOINTMENT (OUTPATIENT)
Dept: INTERNAL MEDICINE CLINIC | Age: 70
End: 2020-12-23

## 2020-12-23 DIAGNOSIS — I10 ESSENTIAL HYPERTENSION: ICD-10-CM

## 2020-12-23 DIAGNOSIS — E78.00 HIGH CHOLESTEROL: ICD-10-CM

## 2020-12-23 DIAGNOSIS — E11.40 TYPE 2 DIABETES MELLITUS WITH DIABETIC NEUROPATHY, WITH LONG-TERM CURRENT USE OF INSULIN (HCC): ICD-10-CM

## 2020-12-23 DIAGNOSIS — Z79.4 TYPE 2 DIABETES MELLITUS WITH DIABETIC NEUROPATHY, WITH LONG-TERM CURRENT USE OF INSULIN (HCC): ICD-10-CM

## 2020-12-24 LAB
ALBUMIN SERPL-MCNC: 3.3 G/DL (ref 3.8–4.8)
ALBUMIN/GLOB SERPL: 0.9 {RATIO} (ref 1.2–2.2)
ALP SERPL-CCNC: 283 IU/L (ref 39–117)
ALT SERPL-CCNC: 30 IU/L (ref 0–44)
AST SERPL-CCNC: 35 IU/L (ref 0–40)
BILIRUB SERPL-MCNC: 0.4 MG/DL (ref 0–1.2)
BUN SERPL-MCNC: 15 MG/DL (ref 8–27)
BUN/CREAT SERPL: 16 (ref 10–24)
CALCIUM SERPL-MCNC: 10 MG/DL (ref 8.6–10.2)
CHLORIDE SERPL-SCNC: 100 MMOL/L (ref 96–106)
CHOLEST SERPL-MCNC: 124 MG/DL (ref 100–199)
CO2 SERPL-SCNC: 22 MMOL/L (ref 20–29)
CREAT SERPL-MCNC: 0.95 MG/DL (ref 0.76–1.27)
GLOBULIN SER CALC-MCNC: 3.7 G/DL (ref 1.5–4.5)
GLUCOSE SERPL-MCNC: 150 MG/DL (ref 65–99)
HBA1C MFR BLD: 9.4 % (ref 4.8–5.6)
HDLC SERPL-MCNC: 33 MG/DL
INTERPRETATION, 910389: NORMAL
LDLC SERPL CALC-MCNC: 68 MG/DL (ref 0–99)
Lab: NORMAL
POTASSIUM SERPL-SCNC: 4.6 MMOL/L (ref 3.5–5.2)
PROT SERPL-MCNC: 7 G/DL (ref 6–8.5)
SODIUM SERPL-SCNC: 135 MMOL/L (ref 134–144)
TRIGL SERPL-MCNC: 127 MG/DL (ref 0–149)
VLDLC SERPL CALC-MCNC: 23 MG/DL (ref 5–40)

## 2020-12-30 ENCOUNTER — OFFICE VISIT (OUTPATIENT)
Dept: INTERNAL MEDICINE CLINIC | Age: 70
End: 2020-12-30
Payer: MEDICARE

## 2020-12-30 VITALS
DIASTOLIC BLOOD PRESSURE: 85 MMHG | OXYGEN SATURATION: 95 % | TEMPERATURE: 97.2 F | SYSTOLIC BLOOD PRESSURE: 138 MMHG | HEART RATE: 97 BPM | WEIGHT: 207 LBS | BODY MASS INDEX: 32.49 KG/M2 | HEIGHT: 67 IN | RESPIRATION RATE: 20 BRPM

## 2020-12-30 DIAGNOSIS — F33.41 RECURRENT MAJOR DEPRESSIVE DISORDER, IN PARTIAL REMISSION (HCC): ICD-10-CM

## 2020-12-30 DIAGNOSIS — I10 ESSENTIAL HYPERTENSION: ICD-10-CM

## 2020-12-30 DIAGNOSIS — E11.29 TYPE II OR UNSPECIFIED TYPE DIABETES MELLITUS WITH RENAL MANIFESTATIONS, UNCONTROLLED(250.42) (HCC): Primary | ICD-10-CM

## 2020-12-30 DIAGNOSIS — E78.00 HIGH CHOLESTEROL: ICD-10-CM

## 2020-12-30 DIAGNOSIS — R74.8 ALKALINE PHOSPHATASE ELEVATION: ICD-10-CM

## 2020-12-30 DIAGNOSIS — E11.65 TYPE II OR UNSPECIFIED TYPE DIABETES MELLITUS WITH RENAL MANIFESTATIONS, UNCONTROLLED(250.42) (HCC): Primary | ICD-10-CM

## 2020-12-30 PROCEDURE — 99214 OFFICE O/P EST MOD 30 MIN: CPT | Performed by: INTERNAL MEDICINE

## 2020-12-30 PROCEDURE — 1101F PT FALLS ASSESS-DOCD LE1/YR: CPT | Performed by: INTERNAL MEDICINE

## 2020-12-30 PROCEDURE — G8752 SYS BP LESS 140: HCPCS | Performed by: INTERNAL MEDICINE

## 2020-12-30 PROCEDURE — G8417 CALC BMI ABV UP PARAM F/U: HCPCS | Performed by: INTERNAL MEDICINE

## 2020-12-30 PROCEDURE — 2022F DILAT RTA XM EVC RTNOPTHY: CPT | Performed by: INTERNAL MEDICINE

## 2020-12-30 PROCEDURE — 3046F HEMOGLOBIN A1C LEVEL >9.0%: CPT | Performed by: INTERNAL MEDICINE

## 2020-12-30 PROCEDURE — G8510 SCR DEP NEG, NO PLAN REQD: HCPCS | Performed by: INTERNAL MEDICINE

## 2020-12-30 PROCEDURE — 3017F COLORECTAL CA SCREEN DOC REV: CPT | Performed by: INTERNAL MEDICINE

## 2020-12-30 PROCEDURE — G8536 NO DOC ELDER MAL SCRN: HCPCS | Performed by: INTERNAL MEDICINE

## 2020-12-30 PROCEDURE — G8427 DOCREV CUR MEDS BY ELIG CLIN: HCPCS | Performed by: INTERNAL MEDICINE

## 2020-12-30 PROCEDURE — G8754 DIAS BP LESS 90: HCPCS | Performed by: INTERNAL MEDICINE

## 2020-12-30 NOTE — PATIENT INSTRUCTIONS
Goals of Care:    Patient is decisional: Yes  Patient has POA documents in the chart: No  Code Status: Full Code  Rationale behind code status: s/p AVR  Goals of care: continue to recover from open heart surgery    [facility] High Blood Pressure: Care Instructions Overview It's normal for blood pressure to go up and down throughout the day. But if it stays up, you have high blood pressure. Another name for high blood pressure is hypertension. Despite what a lot of people think, high blood pressure usually doesn't cause headaches or make you feel dizzy or lightheaded. It usually has no symptoms. But it does increase your risk of stroke, heart attack, and other problems. You and your doctor will talk about your risks of these problems based on your blood pressure. Your doctor will give you a goal for your blood pressure. Your goal will be based on your health and your age. Lifestyle changes, such as eating healthy and being active, are always important to help lower blood pressure. You might also take medicine to reach your blood pressure goal. 
Follow-up care is a key part of your treatment and safety. Be sure to make and go to all appointments, and call your doctor if you are having problems. It's also a good idea to know your test results and keep a list of the medicines you take. How can you care for yourself at home? Medical treatment · If you stop taking your medicine, your blood pressure will go back up. You may take one or more types of medicine to lower your blood pressure. Be safe with medicines. Take your medicine exactly as prescribed. Call your doctor if you think you are having a problem with your medicine. · Talk to your doctor before you start taking aspirin every day. Aspirin can help certain people lower their risk of a heart attack or stroke. But taking aspirin isn't right for everyone, because it can cause serious bleeding. · See your doctor regularly. You may need to see the doctor more often at first or until your blood pressure comes down. · If you are taking blood pressure medicine, talk to your doctor before you take decongestants or anti-inflammatory medicine, such as ibuprofen. Some of these medicines can raise blood pressure. · Learn how to check your blood pressure at home. Lifestyle changes · Stay at a healthy weight. This is especially important if you put on weight around the waist. Losing even 10 pounds can help you lower your blood pressure. · If your doctor recommends it, get more exercise. Walking is a good choice. Bit by bit, increase the amount you walk every day. Try for at least 30 minutes on most days of the week. You also may want to swim, bike, or do other activities. · Avoid or limit alcohol. Talk to your doctor about whether you can drink any alcohol. · Try to limit how much sodium you eat to less than 2,300 milligrams (mg) a day. Your doctor may ask you to try to eat less than 1,500 mg a day. · Eat plenty of fruits (such as bananas and oranges), vegetables, legumes, whole grains, and low-fat dairy products. · Lower the amount of saturated fat in your diet. Saturated fat is found in animal products such as milk, cheese, and meat. Limiting these foods may help you lose weight and also lower your risk for heart disease. · Do not smoke. Smoking increases your risk for heart attack and stroke. If you need help quitting, talk to your doctor about stop-smoking programs and medicines. These can increase your chances of quitting for good. When should you call for help? Call  911 anytime you think you may need emergency care. This may mean having symptoms that suggest that your blood pressure is causing a serious heart or blood vessel problem. Your blood pressure may be over 180/120. For example, call 911 if: 
  · You have symptoms of a heart attack. These may include: 
? Chest pain or pressure, or a strange feeling in the chest. 
? Sweating. ? Shortness of breath. ? Nausea or vomiting. ? Pain, pressure, or a strange feeling in the back, neck, jaw, or upper belly or in one or both shoulders or arms. ? Lightheadedness or sudden weakness. ? A fast or irregular heartbeat.  
  · You have symptoms of a stroke. These may include: 
? Sudden numbness, tingling, weakness, or loss of movement in your face, arm, or leg, especially on only one side of your body. ? Sudden vision changes. ? Sudden trouble speaking. ? Sudden confusion or trouble understanding simple statements. ? Sudden problems with walking or balance. ? A sudden, severe headache that is different from past headaches.  
  · You have severe back or belly pain. Do not wait until your blood pressure comes down on its own. Get help right away. Call your doctor now or seek immediate care if: 
  · Your blood pressure is much higher than normal (such as 180/120 or higher), but you don't have symptoms.  
  · You think high blood pressure is causing symptoms, such as: 
? Severe headache. 
? Blurry vision. Watch closely for changes in your health, and be sure to contact your doctor if: 
  · Your blood pressure measures higher than your doctor recommends at least 2 times. That means the top number is higher or the bottom number is higher, or both.  
  · You think you may be having side effects from your blood pressure medicine. Where can you learn more? Go to http://www.gray.com/ Enter X456 in the search box to learn more about \"High Blood Pressure: Care Instructions. \" Current as of: December 16, 2019               Content Version: 12.6 © 1029-2003 [employer]. Care instructions adapted under license by [employer] (which disclaims liability or warranty for this information). If you have questions about a medical condition or this instruction, always ask your healthcare professional. [address] any warranty or liability for your use of this information.

## 2020-12-30 NOTE — PROGRESS NOTES
Patient is in the office today for a 3 month follow up. Patient states he has generalized neck and shoulder pain 7/10. Patient also states he gives himself a dupixent injection monthly and this time when he gave himself the vaccine his whole leg hurt. 1. Have you been to the ER, urgent care clinic since your last visit? Hospitalized since your last visit? No    2. Have you seen or consulted any other health care providers outside of the [address] [place] [address] since your last visit? Include any pap smears or colon screening. yes, Dr.Dehart contreras.

## 2020-12-30 NOTE — PROGRESS NOTES
Subjective:       Chief Complaint  The patient presents for follow up of diabetes, hypertension and high cholesterol. HPI  Patrizia Watkins is a 79 y.o. male seen for follow up of diabetes. Healso has hypertension and hyperlipidemia. Diabetes poorly controlled on Janumet and Lantus 20 units, no significant medication side effects noted, pt needs to work on improving diet and cutting back starches and sweets,  hypertension controlled, no significant medication side effects noted, on lisinopril 40 mg, pt is compliant with taking the medication daily,  hyperlipidemia well controlled, no significant medication side effects noted, on Pravachol 40 mg    Diet and Lifestyle: not attempting to follow a low fat, low cholesterol diet, does not rigorously follow a diabetic diet, sedentary    Home BP Monitoring: is not measured at home. Diabetic Review of Systems - home glucose monitoring: is performed regularly, 1x/day. Other symptoms and concerns: pt continues to work on diet and exercise to lose weight      Patient remains out of work due to [employer] injury which he sustained over a year ago. He had neck surgery and was doing better but is again having neck pain as well as pain in multiple joints including right shoulder. Pt is very frustrated and anxious about his current life situation since he was injured. Patient has eczema and is using Dupixent through his dermatologist but seems to be having multiple side effects including fatigue for which she will follow up with dermatology to see if he can stop it especially since it is not really helping his rash as much as he would think it should    Patient is using Cymbalta for his depression which seems to be helping. Patient's alk phos has been increasing over the last few blood tests so we will check ultrasound of liver and fractionate his alk phos his neck next office visit.         Current Outpatient Medications   Medication Sig    FreeStyle Bassam 14 Day Sensor kit USE ONE EVERY 14 DAYS    lisinopriL (PRINIVIL, ZESTRIL) 40 mg tablet Take 1 Tab by mouth daily.  SITagliptin-metFORMIN (Janumet XR) 50-1,000 mg TM24 Take 2 Tabs by mouth daily.  Dupixent Syringe 300 mg/2 mL syrg syringe     DULoxetine (CYMBALTA) 60 mg capsule Take 1 Cap by mouth daily.  pantoprazole (PROTONIX) 40 mg tablet Take 1 Tab by mouth daily.  pravastatin (PRAVACHOL) 40 mg tablet Take 1 Tab by mouth nightly. Indications: high cholesterol    Blood-Glucose Meter monitoring kit Check BS 3x/day Dx E11.21    Blood Glucose Control, Normal (Accu-Chek SmartView Contrl Sol) soln Test blood sugar 3 x daily  DX E11.9        1 year supply  Check controls monthy on glucose meter    cetirizine (ZYRTEC) 10 mg tablet TAKE 1 TABLET BY MOUTH EVERY DAY    multivit-min/FA/lycopen/lutein (CENTRUM SILVER MEN PO) Take 1 Tab by mouth daily.  Insulin Needles, Disposable, 31 gauge x 5/16\" ndle Inject daily    lancets (ACCU-CHEK SOFTCLIX LANCETS) misc Check BS 2x/day    acetaminophen (TYLENOL EXTRA STRENGTH) 500 mg tablet Take 1,000 mg by mouth every six (6) hours as needed for Pain.  flash glucose scanning reader ([employer] BASSAM 14 DAY READER) misc Use as directed    glucose blood VI test strips (BLOOD GLUCOSE TEST) strip Test blood sugar 1 x daily  DX E11.9    triamcinolone acetonide (KENALOG) 0.025 % ointment Apply  to affected area two (2) times a day. use thin layer    halobetasol (ULTRAVATE) 0.05 % topical cream APPLY TO AFFECTED AREA TWICE A DAY AS NEEDED    hydrOXYzine pamoate (VISTARIL) 25 mg capsule Take 1 Cap by mouth three (3) times daily as needed for Itching.  Lancets misc Accu-check  Fastclix Lancets. Test blood sugar daily.   DX E11.9    alcohol swabs (BD SINGLE USE SWABS REGULAR) padm Use daily to check blood sugar    insulin glargine (Lantus Solostar U-100 Insulin) 100 unit/mL (3 mL) inpn INJECT 20 UNITS SUBCUTANEOUSLY IN THE MORNING    topiramate (TOPAMAX) 25 mg tablet TAKE 1 TABLET BY MOUTH TWICE A DAY    gabapentin (NEURONTIN) 100 mg capsule Take 1 Cap by mouth three (3) times daily. Max Daily Amount: 300 mg. No current facility-administered medications for this visit. Review of Systems  Respiratory: negative for dyspnea on exertion  Cardiovascular: negative for chest pain    Objective:     Visit Vitals  /85 (BP 1 Location: Left arm, BP Patient Position: Sitting)   Pulse 97   Temp 97.2 °F (36.2 °C) (Temporal)   Resp 20   Ht 5' 6.5\" (1.689 m)   Wt 207 lb (93.9 kg)   SpO2 95%   BMI 32.91 kg/m²        General appearance - alert, well appearing, and in no distress   Chest - clear to auscultation, no wheezes, rales or rhonchi, symmetric air entry  Heart - normal rate, regular rhythm, normal S1, S2, no murmurs, rubs, clicks or gallops  Extremities - no edema         Labs:   Lab Results   Component Value Date/Time    Hemoglobin A1c 9.4 (H) 12/23/2020 08:05 AM    Hemoglobin A1c 10.1 (H) 09/23/2020 09:07 AM    Hemoglobin A1c 11.2 (H) 06/17/2020 10:45 AM    Glucose 150 (H) 12/23/2020 08:05 AM    Glucose (POC) 217 (H) 07/23/2019 11:24 AM    Microalbumin/Creat ratio (mg/g creat) 8 04/24/2018 07:35 AM    Microalb/Creat ratio (ug/mg creat.) 27 06/17/2020 10:45 AM    Microalbumin,urine random 1.90 04/24/2018 07:35 AM    LDL, calculated 68 12/23/2020 08:05 AM    LDL, calculated 65 06/17/2020 10:45 AM    Creatinine 0.95 12/23/2020 08:05 AM      Lab Results   Component Value Date/Time    Cholesterol, total 124 12/23/2020 08:05 AM    HDL Cholesterol 33 (L) 12/23/2020 08:05 AM    LDL, calculated 68 12/23/2020 08:05 AM    LDL, calculated 65 06/17/2020 10:45 AM    Triglyceride 127 12/23/2020 08:05 AM    CHOL/HDL Ratio 3.1 04/24/2018 07:35 AM       Lab Results   Component Value Date/Time    ALT (SGPT) 30 12/23/2020 08:05 AM    Alk.  phosphatase 283 (H) 12/23/2020 08:05 AM    Bilirubin, total 0.4 12/23/2020 08:05 AM       Lab Results   Component Value Date/Time    GFR est AA 93 12/23/2020 08:05 AM    GFR est non-AA 81 12/23/2020 08:05 AM    Creatinine 0.95 12/23/2020 08:05 AM    BUN 15 12/23/2020 08:05 AM    Sodium 135 12/23/2020 08:05 AM    Potassium 4.6 12/23/2020 08:05 AM    Chloride 100 12/23/2020 08:05 AM    CO2 22 12/23/2020 08:05 AM      Lab Results   Component Value Date/Time    Prostate Specific Ag 2.7 09/23/2020 09:07 AM    Prostate Specific Ag 1.9 06/18/2019 10:59 AM    Prostate Specific Ag 4.3 (H) 12/05/2018 07:34 AM    Prostate Specific Ag 1.6 01/08/2016 10:54 AM           Assessment / Plan     Diabetes poorly controlled on Janumet and Lantus insulin which I will increase to 30 units/day    Hypertension well controlled, on lisinopril 40 mg. Hyperlipidemia well controlled, on Pravachol 40 mg      ICD-10-CM ICD-9-CM    1. Type II or unspecified type diabetes mellitus with renal manifestations, uncontrolled(250.42) (Formerly [facility])  E11.29 250.42 HEMOGLOBIN A1C W/O EAG    E11.65     2. Essential hypertension  Y01 775.1 METABOLIC PANEL, COMPREHENSIVE   3. High cholesterol  E78.00 272.0 LIPID PANEL   4. Alkaline phosphatase elevation  R74.8 790.5 US ABD LTD      ALK PHOS ISOENZYMES   5. Recurrent major depressive disorder, in partial remission (Formerly [facility])  F33.41 296.35  improved on Cymbalta 60 mg daily we will continue to monitor for now                Diabetic issues reviewed with him: diabetic diet discussed in detail, and low cholesterol diet, weight control and daily exercise discussed. Follow-up and Dispositions    · Return in about 3 months (around 3/30/2021) for labs 1 week before. Reviewed plan of care. Patient has provided input and agrees with goals.

## 2021-01-06 DIAGNOSIS — R74.8 ALKALINE PHOSPHATASE ELEVATION: ICD-10-CM

## 2021-01-28 ENCOUNTER — TELEPHONE (OUTPATIENT)
Dept: INTERNAL MEDICINE CLINIC | Age: 71
End: 2021-01-28

## 2021-01-28 ENCOUNTER — HOSPITAL ENCOUNTER (OUTPATIENT)
Dept: ULTRASOUND IMAGING | Age: 71
Discharge: HOME | End: 2021-01-28
Attending: INTERNAL MEDICINE
Payer: MEDICARE

## 2021-01-28 DIAGNOSIS — K76.89 LIVER CYST: Primary | ICD-10-CM

## 2021-01-28 DIAGNOSIS — R93.5 ABNORMAL US (ULTRASOUND) OF ABDOMEN: ICD-10-CM

## 2021-01-28 PROCEDURE — 76705 ECHO EXAM OF ABDOMEN: CPT

## 2021-01-28 NOTE — TELEPHONE ENCOUNTER
Pt with large cyst 13 cm in epigastric region LUQ. Unclear if it is from Liver or GI but pt is having N/V and unable to eat large meals. He is drinking mostly liquids. Will order CT abd/pelvis for better evaluation.

## 2021-01-30 RX ORDER — PRAVASTATIN SODIUM 40 MG/1
40 TABLET ORAL
Qty: 90 TAB | Refills: 1 | Status: SHIPPED | OUTPATIENT
Start: 2021-01-30 | End: 2021-08-05

## 2021-02-04 DIAGNOSIS — K76.89 LIVER CYST: ICD-10-CM

## 2021-02-04 DIAGNOSIS — R93.5 ABNORMAL US (ULTRASOUND) OF ABDOMEN: ICD-10-CM

## 2021-02-10 ENCOUNTER — HOSPITAL ENCOUNTER (OUTPATIENT)
Dept: CT IMAGING | Age: 71
Discharge: HOME | End: 2021-02-10
Attending: INTERNAL MEDICINE
Payer: MEDICARE

## 2021-02-10 ENCOUNTER — HOSPITAL ENCOUNTER (OUTPATIENT)
Dept: LAB | Age: 71
Discharge: HOME | End: 2021-02-10

## 2021-02-10 LAB
CREAT UR-MCNC: 0.8 MG/DL (ref 0.6–1.3)
XX-LABCORP SPECIMEN COL,LCBCF: NORMAL

## 2021-02-10 PROCEDURE — 74011000636 HC RX REV CODE- 636: Performed by: INTERNAL MEDICINE

## 2021-02-10 PROCEDURE — 74177 CT ABD & PELVIS W/CONTRAST: CPT

## 2021-02-10 PROCEDURE — 99001 SPECIMEN HANDLING PT-LAB: CPT

## 2021-02-10 PROCEDURE — 82565 ASSAY OF CREATININE: CPT

## 2021-02-10 RX ADMIN — IOPAMIDOL 100 ML: 612 INJECTION, SOLUTION INTRAVENOUS at 11:02

## 2021-02-12 ENCOUNTER — TELEPHONE (OUTPATIENT)
Dept: INTERNAL MEDICINE CLINIC | Age: 71
End: 2021-02-12

## 2021-02-12 DIAGNOSIS — D49.0 GASTRIC TUMOR: Primary | ICD-10-CM

## 2021-02-12 NOTE — TELEPHONE ENCOUNTER
----- Message from Snehal Jacosben MD sent at 2/12/2021  1:40 PM EST -----  Pt needs in office visit next week

## 2021-02-12 NOTE — TELEPHONE ENCOUNTER
Patient aware of the following appointment:    Dr. Kumar [facility] [phone]  Feb. 18, 2021 at 1:30  [address]

## 2021-02-12 NOTE — TELEPHONE ENCOUNTER
Pt has gastric tumor and would like to get him in with Dr Tahmina Mueller or anyone in the group ASAP.

## 2021-02-16 ENCOUNTER — HOSPITAL ENCOUNTER (OUTPATIENT)
Dept: LAB | Age: 71
Discharge: HOME | End: 2021-02-16

## 2021-02-16 ENCOUNTER — OFFICE VISIT (OUTPATIENT)
Dept: INTERNAL MEDICINE CLINIC | Age: 71
End: 2021-02-16
Payer: MEDICARE

## 2021-02-16 VITALS
TEMPERATURE: 97.6 F | HEART RATE: 90 BPM | BODY MASS INDEX: 31.71 KG/M2 | WEIGHT: 202 LBS | OXYGEN SATURATION: 98 % | DIASTOLIC BLOOD PRESSURE: 81 MMHG | HEIGHT: 67 IN | SYSTOLIC BLOOD PRESSURE: 123 MMHG | RESPIRATION RATE: 20 BRPM

## 2021-02-16 DIAGNOSIS — I10 ESSENTIAL HYPERTENSION: ICD-10-CM

## 2021-02-16 DIAGNOSIS — E78.00 HIGH CHOLESTEROL: ICD-10-CM

## 2021-02-16 DIAGNOSIS — F33.41 RECURRENT MAJOR DEPRESSIVE DISORDER, IN PARTIAL REMISSION (HCC): ICD-10-CM

## 2021-02-16 DIAGNOSIS — D49.0 GASTRIC TUMOR: ICD-10-CM

## 2021-02-16 DIAGNOSIS — E11.21 TYPE 2 DIABETES WITH NEPHROPATHY (HCC): Primary | ICD-10-CM

## 2021-02-16 LAB
ALBUMIN SERPL-MCNC: 3.7 G/DL (ref 3.8–4.8)
ALBUMIN/GLOB SERPL: 1.1 {RATIO} (ref 1.2–2.2)
ALP BONE CFR SERPL: 32 % (ref 12–68)
ALP INTEST CFR SERPL: 3 % (ref 0–18)
ALP LIVER CFR SERPL: 65 % (ref 13–88)
ALP SERPL-CCNC: 330 IU/L (ref 39–117)
ALT SERPL-CCNC: 34 IU/L (ref 0–44)
AST SERPL-CCNC: 41 IU/L (ref 0–40)
BILIRUB SERPL-MCNC: 0.3 MG/DL (ref 0–1.2)
BUN SERPL-MCNC: 10 MG/DL (ref 8–27)
BUN/CREAT SERPL: 11 (ref 10–24)
CALCIUM SERPL-MCNC: 9.4 MG/DL (ref 8.6–10.2)
CHLORIDE SERPL-SCNC: 97 MMOL/L (ref 96–106)
CHOLEST SERPL-MCNC: 136 MG/DL (ref 100–199)
CO2 SERPL-SCNC: 21 MMOL/L (ref 20–29)
CREAT SERPL-MCNC: 0.92 MG/DL (ref 0.76–1.27)
GLOBULIN SER CALC-MCNC: 3.4 G/DL (ref 1.5–4.5)
GLUCOSE SERPL-MCNC: 117 MG/DL (ref 65–99)
HBA1C MFR BLD: 8.1 % (ref 4.8–5.6)
HDLC SERPL-MCNC: 35 MG/DL
INTERPRETATION, 910389: NORMAL
LDLC SERPL CALC-MCNC: 86 MG/DL (ref 0–99)
Lab: NORMAL
POTASSIUM SERPL-SCNC: 4.5 MMOL/L (ref 3.5–5.2)
PROT SERPL-MCNC: 7.1 G/DL (ref 6–8.5)
SODIUM SERPL-SCNC: 137 MMOL/L (ref 134–144)
TRIGL SERPL-MCNC: 75 MG/DL (ref 0–149)
VLDLC SERPL CALC-MCNC: 15 MG/DL (ref 5–40)
XX-LABCORP SPECIMEN COL,LCBCF: NORMAL

## 2021-02-16 PROCEDURE — 99001 SPECIMEN HANDLING PT-LAB: CPT

## 2021-02-16 PROCEDURE — G8417 CALC BMI ABV UP PARAM F/U: HCPCS | Performed by: INTERNAL MEDICINE

## 2021-02-16 PROCEDURE — G8427 DOCREV CUR MEDS BY ELIG CLIN: HCPCS | Performed by: INTERNAL MEDICINE

## 2021-02-16 PROCEDURE — 99214 OFFICE O/P EST MOD 30 MIN: CPT | Performed by: INTERNAL MEDICINE

## 2021-02-16 PROCEDURE — 3017F COLORECTAL CA SCREEN DOC REV: CPT | Performed by: INTERNAL MEDICINE

## 2021-02-16 PROCEDURE — G8754 DIAS BP LESS 90: HCPCS | Performed by: INTERNAL MEDICINE

## 2021-02-16 PROCEDURE — 3052F HG A1C>EQUAL 8.0%<EQUAL 9.0%: CPT | Performed by: INTERNAL MEDICINE

## 2021-02-16 PROCEDURE — 1101F PT FALLS ASSESS-DOCD LE1/YR: CPT | Performed by: INTERNAL MEDICINE

## 2021-02-16 PROCEDURE — 2022F DILAT RTA XM EVC RTNOPTHY: CPT | Performed by: INTERNAL MEDICINE

## 2021-02-16 PROCEDURE — G8536 NO DOC ELDER MAL SCRN: HCPCS | Performed by: INTERNAL MEDICINE

## 2021-02-16 PROCEDURE — G8510 SCR DEP NEG, NO PLAN REQD: HCPCS | Performed by: INTERNAL MEDICINE

## 2021-02-16 PROCEDURE — G8752 SYS BP LESS 140: HCPCS | Performed by: INTERNAL MEDICINE

## 2021-02-16 NOTE — PROGRESS NOTES
Patient is in the office today for a 2 month follow up. Patient is asking for a referral to [facility] states he did not like Dr. Debra Dinh office. Patient states he has nausea and vomiting. 1. Have you been to the ER, urgent care clinic since your last visit? Hospitalized since your last visit? No    2. Have you seen or consulted any other health care providers outside of the [address] [place] [address] since your last visit? Include any pap smears or colon screening.  No

## 2021-02-17 LAB
BASOPHILS # BLD AUTO: 0 X10E3/UL (ref 0–0.2)
BASOPHILS NFR BLD AUTO: 0 %
EOSINOPHIL # BLD AUTO: 0.4 X10E3/UL (ref 0–0.4)
EOSINOPHIL NFR BLD AUTO: 4 %
ERYTHROCYTE [DISTWIDTH] IN BLOOD BY AUTOMATED COUNT: 14.6 % (ref 11.6–15.4)
HCT VFR BLD AUTO: 36 % (ref 37.5–51)
HGB BLD-MCNC: 11.1 G/DL (ref 13–17.7)
IMM GRANULOCYTES # BLD AUTO: 0 X10E3/UL (ref 0–0.1)
IMM GRANULOCYTES NFR BLD AUTO: 0 %
LYMPHOCYTES # BLD AUTO: 1.7 X10E3/UL (ref 0.7–3.1)
LYMPHOCYTES NFR BLD AUTO: 19 %
MCH RBC QN AUTO: 23.2 PG (ref 26.6–33)
MCHC RBC AUTO-ENTMCNC: 30.8 G/DL (ref 31.5–35.7)
MCV RBC AUTO: 75 FL (ref 79–97)
MONOCYTES # BLD AUTO: 0.6 X10E3/UL (ref 0.1–0.9)
MONOCYTES NFR BLD AUTO: 7 %
NEUTROPHILS # BLD AUTO: 6.2 X10E3/UL (ref 1.4–7)
NEUTROPHILS NFR BLD AUTO: 70 %
PLATELET # BLD AUTO: 321 X10E3/UL (ref 150–450)
RBC # BLD AUTO: 4.79 X10E6/UL (ref 4.14–5.8)
WBC # BLD AUTO: 8.9 X10E3/UL (ref 3.4–10.8)

## 2021-02-20 NOTE — PROGRESS NOTES
Subjective:       Chief Complaint  The patient presents for follow up of diabetes, hypertension and high cholesterol. MIRTA Michael is a 79 y.o. male seen for follow up of diabetes. Healso has hypertension and hyperlipidemia. Diabetes poorly controlled on Janumet and Lantus 20 units, no significant medication side effects noted, pt unfortunately has had a gastric tumor that is causing nausea and vomiting therefore he has poor appetite and feels full all the time and has been losing weight, hypertension controlled, no significant medication side effects noted, on lisinopril 40 mg,   hyperlipidemia well controlled, no significant medication side effects noted, on Pravachol 40 mg    Diet and Lifestyle: not attempting to follow a low fat, low cholesterol diet, does not rigorously follow a diabetic diet, sedentary    Home BP Monitoring: is not measured at home. Diabetic Review of Systems - home glucose monitoring: is performed regularly, 3x/day. Other symptoms and concerns: pt currently losing weight due to the gastric tumor unfortunately      Patient has a gastric tumor for which he will see GI for further evaluation and biopsy either through EGD, endoscopic ultrasound or through surgery. Patient's increase in alk phos is most likely due to the gastric tube. Patient is using Cymbalta for his depression which seems to be helping. Current Outpatient Medications   Medication Sig    pravastatin (PRAVACHOL) 40 mg tablet TAKE 1 TAB BY MOUTH NIGHTLY. INDICATIONS: HIGH CHOLESTEROL    insulin glargine (Lantus Solostar U-100 Insulin) 100 unit/mL (3 mL) inpn INJECT 20 UNITS SUBCUTANEOUSLY IN THE MORNING    FreeStyle Bassam 14 Day Sensor kit USE ONE EVERY 14 DAYS    lisinopriL (PRINIVIL, ZESTRIL) 40 mg tablet Take 1 Tab by mouth daily.  SITagliptin-metFORMIN (Janumet XR) 50-1,000 mg TM24 Take 2 Tabs by mouth daily.  DULoxetine (CYMBALTA) 60 mg capsule Take 1 Cap by mouth daily.  pantoprazole (PROTONIX) 40 mg tablet Take 1 Tab by mouth daily.  Blood-Glucose Meter monitoring kit Check BS 3x/day Dx E11.21    Blood Glucose Control, Normal (Accu-Chek SmartView Contrl Sol) soln Test blood sugar 3 x daily  DX E11.9        1 year supply  Check controls monthy on glucose meter    cetirizine (ZYRTEC) 10 mg tablet TAKE 1 TABLET BY MOUTH EVERY DAY    Insulin Needles, Disposable, 31 gauge x 5/16\" ndle Inject daily    lancets (ACCU-CHEK SOFTCLIX LANCETS) misc Check BS 2x/day    topiramate (TOPAMAX) 25 mg tablet TAKE 1 TABLET BY MOUTH TWICE A DAY    acetaminophen (TYLENOL EXTRA STRENGTH) 500 mg tablet Take 1,000 mg by mouth every six (6) hours as needed for Pain.  flash glucose scanning reader ([employer] [facility] 14 DAY READER) misc Use as directed    glucose blood VI test strips (BLOOD GLUCOSE TEST) strip Test blood sugar 1 x daily  DX E11.9    triamcinolone acetonide (KENALOG) 0.025 % ointment Apply  to affected area two (2) times a day. use thin layer    halobetasol (ULTRAVATE) 0.05 % topical cream APPLY TO AFFECTED AREA TWICE A DAY AS NEEDED    hydrOXYzine pamoate (VISTARIL) 25 mg capsule Take 1 Cap by mouth three (3) times daily as needed for Itching.  Lancets misc Accu-check  Fastclix Lancets. Test blood sugar daily. DX E11.9    alcohol swabs (BD SINGLE USE SWABS REGULAR) padm Use daily to check blood sugar    Dupixent Syringe 300 mg/2 mL syrg syringe     multivit-min/FA/lycopen/lutein (CENTRUM SILVER MEN PO) Take 1 Tab by mouth daily.  gabapentin (NEURONTIN) 100 mg capsule Take 1 Cap by mouth three (3) times daily. Max Daily Amount: 300 mg. No current facility-administered medications for this visit.               Review of Systems  Respiratory: negative for dyspnea on exertion  Cardiovascular: negative for chest pain    Objective:     Visit Vitals  /81 (BP 1 Location: Left arm, BP Patient Position: Sitting, BP Cuff Size: Adult)   Pulse 90   Temp 97.6 °F (36.4 °C) (Temporal)   Resp 20   Ht 5' 6.5\" (1.689 m)   Wt 202 lb (91.6 kg)   SpO2 98%   BMI 32.12 kg/m²        General appearance - alert, well appearing, and in no distress   Chest - clear to auscultation, no wheezes, rales or rhonchi, symmetric air entry  Heart - normal rate, regular rhythm, normal S1, S2, no murmurs, rubs, clicks or gallops  Extremities - no edema         Labs:   Lab Results   Component Value Date/Time    Hemoglobin A1c 8.1 (H) 02/10/2021 12:00 AM    Hemoglobin A1c 9.4 (H) 12/23/2020 08:05 AM    Hemoglobin A1c 10.1 (H) 09/23/2020 09:07 AM    Glucose 117 (H) 02/10/2021 12:00 AM    Glucose (POC) 217 (H) 07/23/2019 11:24 AM    Microalbumin/Creat ratio (mg/g creat) 8 04/24/2018 07:35 AM    Microalb/Creat ratio (ug/mg creat.) 27 06/17/2020 10:45 AM    Microalbumin,urine random 1.90 04/24/2018 07:35 AM    LDL, calculated 86 02/10/2021 12:00 AM    LDL, calculated 65 06/17/2020 10:45 AM    Creatinine, POC 0.8 02/10/2021 10:42 AM    Creatinine 0.92 02/10/2021 12:00 AM      Lab Results   Component Value Date/Time    Cholesterol, total 136 02/10/2021 12:00 AM    HDL Cholesterol 35 (L) 02/10/2021 12:00 AM    LDL, calculated 86 02/10/2021 12:00 AM    LDL, calculated 65 06/17/2020 10:45 AM    Triglyceride 75 02/10/2021 12:00 AM    CHOL/HDL Ratio 3.1 04/24/2018 07:35 AM       Lab Results   Component Value Date/Time    ALT (SGPT) 34 02/10/2021 12:00 AM    Alk.  phosphatase 330 (H) 02/10/2021 12:00 AM    Bilirubin, total 0.3 02/10/2021 12:00 AM       Lab Results   Component Value Date/Time    GFR est AA 97 02/10/2021 12:00 AM    GFR est non-AA 84 02/10/2021 12:00 AM    Creatinine, POC 0.8 02/10/2021 10:42 AM    Creatinine 0.92 02/10/2021 12:00 AM    BUN 10 02/10/2021 12:00 AM    Sodium 137 02/10/2021 12:00 AM    Potassium 4.5 02/10/2021 12:00 AM    Chloride 97 02/10/2021 12:00 AM    CO2 21 02/10/2021 12:00 AM      Lab Results   Component Value Date/Time    Prostate Specific Ag 2.7 09/23/2020 09:07 AM    Prostate Specific Ag 1.9 06/18/2019 10:59 AM    Prostate Specific Ag 4.3 (H) 12/05/2018 07:34 AM    Prostate Specific Ag 1.6 01/08/2016 10:54 AM           Assessment / Plan     Diabetes poorly controlled.  Will stop Janumet since it is difficult for patient to take pills currently and increase Lantus to 25 units daily if needed   hypertension well controlled, on lisinopril 40 mg.    Hyperlipidemia well controlled, on Pravachol 40 mg      ICD-10-CM ICD-9-CM    1. Type 2 diabetes with nephropathy (HCC)  E11.21 250.40 HEMOGLOBIN A1C W/O EAG     583.81 MICROALBUMIN, UR, RAND W/ MICROALB/CREAT RATIO   2. Essential hypertension  I10 401.9 METABOLIC PANEL, COMPREHENSIVE   3. High cholesterol  E78.00 272.0 LIPID PANEL   4. Recurrent major depressive disorder, in partial remission (HCC)  F33.41 296.35  patient doing better on Cymbalta 60 mg daily   5. Gastric tumor  D49.0 239.0  patient has follow-up with GI and will check CBC WITH [employer] for anemia since patient having problems with fatigue                Diabetic issues reviewed with him: diabetic diet discussed in detail, and low cholesterol diet, weight control and daily exercise discussed.     Follow-up and Dispositions    · Return in about 3 months (around 5/16/2021) for labs 1 week before.           Reviewed plan of care. Patient has provided input and agrees with goals.

## 2021-03-15 ENCOUNTER — TELEPHONE (OUTPATIENT)
Dept: INTERNAL MEDICINE CLINIC | Age: 71
End: 2021-03-15

## 2021-03-16 NOTE — TELEPHONE ENCOUNTER
Kiki Aguilera  to MD Jeanie Garcia, LPANGEL LUIS [facility]          3/15/21 1:12 PM  Pt is having a hard time keeping his sugar down.  Sugar is running 300 and 400, but its normally around

## 2021-03-16 NOTE — TELEPHONE ENCOUNTER
Spoke with patient and spouse. Pt stated his blood sugar his been running in the 300 to 400 range for about 1 week or maybe when when of his meds was discontinued for diabetes. Pt stated currently for diabetes he is only doing 20 units of insulin at night. Patient also stated he has a stomach mass and always feels nauseated. Wife mentioned at [facility] he was given amoxicillin 500,  Clarithromycin 500 and omeprazole 20mg. All these medication are daily, Antiobiotics are a 15 day supply. Wife concerned because she's not sure why is taking these and she tried calling them multiple times but no response. She was wondering if  was notified of anything. I did advise her to contact [facility] again because  was out of office this week.

## 2021-03-16 NOTE — TELEPHONE ENCOUNTER
Please advise him to increase his dose of Lantus to 25 units nightly. He should monitor his fasting morning blood sugars and call back with readings in one week for Dr. Emily Cervantes to review. Omeprazole with amoxicillin and clarithromycin is the regimen given for H.pylori infection in the stomach. This is the likely reason for the treatment.

## 2021-03-22 ENCOUNTER — TELEPHONE (OUTPATIENT)
Dept: INTERNAL MEDICINE CLINIC | Age: 71
End: 2021-03-22

## 2021-03-22 NOTE — TELEPHONE ENCOUNTER
Patient states he has stomach issues and is going out of town this weekend and wanted to know if he needed to be seen by Dr. Laron Grande  before he goes out of town.

## 2021-03-22 NOTE — TELEPHONE ENCOUNTER
Patient is aware he can follow up with Dr. Doran next week if needed or he can follow up with Dr. Ram GI who is following him for his stomach issues.  Patient verbalizes understanding.

## 2021-03-22 NOTE — TELEPHONE ENCOUNTER
I can see him next week if needed.  He can always f/u with GI Dr Marcia Oliver who is treating him for his stomach problems

## 2021-04-03 DIAGNOSIS — E11.65 TYPE II OR UNSPECIFIED TYPE DIABETES MELLITUS WITH RENAL MANIFESTATIONS, UNCONTROLLED(250.42) (HCC): ICD-10-CM

## 2021-04-03 DIAGNOSIS — R74.8 ALKALINE PHOSPHATASE ELEVATION: ICD-10-CM

## 2021-04-03 DIAGNOSIS — E78.00 HIGH CHOLESTEROL: ICD-10-CM

## 2021-04-03 DIAGNOSIS — E11.29 TYPE II OR UNSPECIFIED TYPE DIABETES MELLITUS WITH RENAL MANIFESTATIONS, UNCONTROLLED(250.42) (HCC): ICD-10-CM

## 2021-04-03 DIAGNOSIS — I10 ESSENTIAL HYPERTENSION: ICD-10-CM

## 2021-04-12 DIAGNOSIS — R21 RASH: ICD-10-CM

## 2021-04-12 RX ORDER — CETIRIZINE HCL 10 MG
TABLET ORAL
Qty: 90 TAB | Refills: 3 | Status: SHIPPED | OUTPATIENT
Start: 2021-04-12

## 2021-05-12 NOTE — PATIENT INSTRUCTIONS
High Blood Pressure: Care Instructions Overview It's normal for blood pressure to go up and down throughout the day. But if it stays up, you have high blood pressure. Another name for high blood pressure is hypertension. Despite what a lot of people think, high blood pressure usually doesn't cause headaches or make you feel dizzy or lightheaded. It usually has no symptoms. But it does increase your risk of stroke, heart attack, and other problems. You and your doctor will talk about your risks of these problems based on your blood pressure. Your doctor will give you a goal for your blood pressure. Your goal will be based on your health and your age. Lifestyle changes, such as eating healthy and being active, are always important to help lower blood pressure. You might also take medicine to reach your blood pressure goal. 
Follow-up care is a key part of your treatment and safety. Be sure to make and go to all appointments, and call your doctor if you are having problems. It's also a good idea to know your test results and keep a list of the medicines you take. How can you care for yourself at home? Medical treatment · If you stop taking your medicine, your blood pressure will go back up. You may take one or more types of medicine to lower your blood pressure. Be safe with medicines. Take your medicine exactly as prescribed. Call your doctor if you think you are having a problem with your medicine. · Talk to your doctor before you start taking aspirin every day. Aspirin can help certain people lower their risk of a heart attack or stroke. But taking aspirin isn't right for everyone, because it can cause serious bleeding. · See your doctor regularly. You may need to see the doctor more often at first or until your blood pressure comes down. · If you are taking blood pressure medicine, talk to your doctor before you take decongestants or anti-inflammatory medicine, such as ibuprofen. Some of these medicines can raise blood pressure. · Learn how to check your blood pressure at home. Lifestyle changes · Stay at a healthy weight. This is especially important if you put on weight around the waist. Losing even 10 pounds can help you lower your blood pressure. · If your doctor recommends it, get more exercise. Walking is a good choice. Bit by bit, increase the amount you walk every day. Try for at least 30 minutes on most days of the week. You also may want to swim, bike, or do other activities. · Avoid or limit alcohol. Talk to your doctor about whether you can drink any alcohol. · Try to limit how much sodium you eat to less than 2,300 milligrams (mg) a day. Your doctor may ask you to try to eat less than 1,500 mg a day. · Eat plenty of fruits (such as bananas and oranges), vegetables, legumes, whole grains, and low-fat dairy products. · Lower the amount of saturated fat in your diet. Saturated fat is found in animal products such as milk, cheese, and meat. Limiting these foods may help you lose weight and also lower your risk for heart disease. · Do not smoke. Smoking increases your risk for heart attack and stroke. If you need help quitting, talk to your doctor about stop-smoking programs and medicines. These can increase your chances of quitting for good. When should you call for help? Call  911 anytime you think you may need emergency care. This may mean having symptoms that suggest that your blood pressure is causing a serious heart or blood vessel problem. Your blood pressure may be over 180/120. For example, call 911 if: 
  · You have symptoms of a heart attack. These may include: 
? Chest pain or pressure, or a strange feeling in the chest. 
? Sweating. ? Shortness of breath. ? Nausea or vomiting. ? Pain, pressure, or a strange feeling in the back, neck, jaw, or upper belly or in one or both shoulders or arms. ? Lightheadedness or sudden weakness. ? A fast or irregular heartbeat.  
  · You have symptoms of a stroke. These may include: 
? Sudden numbness, tingling, weakness, or loss of movement in your face, arm, or leg, especially on only one side of your body. ? Sudden vision changes. ? Sudden trouble speaking. ? Sudden confusion or trouble understanding simple statements. ? Sudden problems with walking or balance. ? A sudden, severe headache that is different from past headaches.  
  · You have severe back or belly pain. Do not wait until your blood pressure comes down on its own. Get help right away. Call your doctor now or seek immediate care if: 
  · Your blood pressure is much higher than normal (such as 180/120 or higher), but you don't have symptoms.  
  · You think high blood pressure is causing symptoms, such as: 
? Severe headache. 
? Blurry vision. Watch closely for changes in your health, and be sure to contact your doctor if: 
  · Your blood pressure measures higher than your doctor recommends at least 2 times. That means the top number is higher or the bottom number is higher, or both.  
  · You think you may be having side effects from your blood pressure medicine. Where can you learn more? Go to http://www.gray.com/ Enter I927 in the search box to learn more about \"High Blood Pressure: Care Instructions. \" Current as of: December 16, 2019               Content Version: 12.6 © 8896-6400 [employer]. Care instructions adapted under license by [employer] (which disclaims liability or warranty for this information). If you have questions about a medical condition or this instruction, always ask your healthcare professional. [address] any warranty or liability for your use of this information. Admission

## 2021-05-20 ENCOUNTER — HOSPITAL ENCOUNTER (OUTPATIENT)
Dept: LAB | Age: 71
Discharge: HOME | End: 2021-05-20

## 2021-05-20 ENCOUNTER — APPOINTMENT (OUTPATIENT)
Dept: INTERNAL MEDICINE CLINIC | Age: 71
End: 2021-05-20

## 2021-05-20 LAB — XX-LABCORP SPECIMEN COL,LCBCF: NORMAL

## 2021-05-20 PROCEDURE — 99001 SPECIMEN HANDLING PT-LAB: CPT

## 2021-05-20 RX ORDER — FLASH GLUCOSE SENSOR
KIT MISCELLANEOUS
Qty: 2 KIT | Refills: 5 | Status: SHIPPED | OUTPATIENT
Start: 2021-05-20 | End: 2021-11-15

## 2021-05-27 ENCOUNTER — OFFICE VISIT (OUTPATIENT)
Dept: INTERNAL MEDICINE CLINIC | Age: 71
End: 2021-05-27
Payer: MEDICARE

## 2021-05-27 VITALS
OXYGEN SATURATION: 98 % | WEIGHT: 195 LBS | HEIGHT: 66 IN | SYSTOLIC BLOOD PRESSURE: 156 MMHG | DIASTOLIC BLOOD PRESSURE: 104 MMHG | TEMPERATURE: 97.3 F | RESPIRATION RATE: 16 BRPM | BODY MASS INDEX: 31.34 KG/M2 | HEART RATE: 86 BPM

## 2021-05-27 DIAGNOSIS — C49.A2 MALIGNANT GASTROINTESTINAL STROMAL TUMOR (GIST) OF STOMACH (HCC): ICD-10-CM

## 2021-05-27 DIAGNOSIS — F33.41 RECURRENT MAJOR DEPRESSIVE DISORDER, IN PARTIAL REMISSION (HCC): ICD-10-CM

## 2021-05-27 DIAGNOSIS — I10 ESSENTIAL HYPERTENSION: ICD-10-CM

## 2021-05-27 DIAGNOSIS — E11.21 TYPE 2 DIABETES WITH NEPHROPATHY (HCC): Primary | ICD-10-CM

## 2021-05-27 DIAGNOSIS — E78.00 HIGH CHOLESTEROL: ICD-10-CM

## 2021-05-27 PROBLEM — E66.01 SEVERE OBESITY (HCC): Status: RESOLVED | Noted: 2019-05-17 | Resolved: 2021-05-27

## 2021-05-27 PROBLEM — G95.9 CERVICAL MYELOPATHY (HCC): Status: RESOLVED | Noted: 2019-07-22 | Resolved: 2021-05-27

## 2021-05-27 PROCEDURE — G8510 SCR DEP NEG, NO PLAN REQD: HCPCS | Performed by: INTERNAL MEDICINE

## 2021-05-27 PROCEDURE — 3017F COLORECTAL CA SCREEN DOC REV: CPT | Performed by: INTERNAL MEDICINE

## 2021-05-27 PROCEDURE — G8755 DIAS BP > OR = 90: HCPCS | Performed by: INTERNAL MEDICINE

## 2021-05-27 PROCEDURE — G8536 NO DOC ELDER MAL SCRN: HCPCS | Performed by: INTERNAL MEDICINE

## 2021-05-27 PROCEDURE — G8417 CALC BMI ABV UP PARAM F/U: HCPCS | Performed by: INTERNAL MEDICINE

## 2021-05-27 PROCEDURE — 2022F DILAT RTA XM EVC RTNOPTHY: CPT | Performed by: INTERNAL MEDICINE

## 2021-05-27 PROCEDURE — G8427 DOCREV CUR MEDS BY ELIG CLIN: HCPCS | Performed by: INTERNAL MEDICINE

## 2021-05-27 PROCEDURE — 99214 OFFICE O/P EST MOD 30 MIN: CPT | Performed by: INTERNAL MEDICINE

## 2021-05-27 PROCEDURE — G8753 SYS BP > OR = 140: HCPCS | Performed by: INTERNAL MEDICINE

## 2021-05-27 PROCEDURE — 3046F HEMOGLOBIN A1C LEVEL >9.0%: CPT | Performed by: INTERNAL MEDICINE

## 2021-05-27 PROCEDURE — 1101F PT FALLS ASSESS-DOCD LE1/YR: CPT | Performed by: INTERNAL MEDICINE

## 2021-05-27 RX ORDER — LANOLIN ALCOHOL/MO/W.PET/CERES
CREAM (GRAM) TOPICAL
COMMUNITY
Start: 2021-03-30

## 2021-05-27 RX ORDER — AMLODIPINE BESYLATE 5 MG/1
5 TABLET ORAL DAILY
Qty: 90 TABLET | Refills: 2 | Status: SHIPPED | OUTPATIENT
Start: 2021-05-27 | End: 2022-04-05

## 2021-05-27 NOTE — PROGRESS NOTES
Subjective:       Chief Complaint  The patient presents for follow up of diabetes, hypertension and high cholesterol. HPI  Lola Ibrahim is a 70 y.o. male seen for follow up of diabetes. Healso has hypertension and hyperlipidemia. Diabetes poorly controlled on Lantus 30 units, no significant medication side effects noted, pt admits he is using a lot of sugar and starches in his diet,  hypertension uncontrolled, no significant medication side effects noted, on lisinopril 40 mg,   hyperlipidemia well controlled, no significant medication side effects noted, on Pravachol 40 mg    Diet and Lifestyle: not attempting to follow a low fat, low cholesterol diet, does not rigorously follow a diabetic diet, sedentary    Home BP Monitoring: is not measured at home. Diabetic Review of Systems - home glucose monitoring: is performed regularly, 3x/day. Other symptoms and concerns: Patient has a gastric intestinal stromal tumor/H. Pylori for which he saw GI and surgical Oncology. He was treated for the H. Pylori and feels better but CT shows the tumor is increasing in size. He has f/u with multiple specialists. Patient is using Cymbalta for his depression which seems to be helping. Current Outpatient Medications   Medication Sig    amLODIPine (NORVASC) 5 mg tablet Take 1 Tablet by mouth daily.  FreeStyle Bassam 14 Day Sensor kit USE ONE EVERY 14 DAYS    pravastatin (PRAVACHOL) 40 mg tablet TAKE 1 TAB BY MOUTH NIGHTLY. INDICATIONS: HIGH CHOLESTEROL    insulin glargine (Lantus Solostar U-100 Insulin) 100 unit/mL (3 mL) inpn INJECT 20 UNITS SUBCUTANEOUSLY IN THE MORNING    lisinopriL (PRINIVIL, ZESTRIL) 40 mg tablet Take 1 Tab by mouth daily.  SITagliptin-metFORMIN (Janumet XR) 50-1,000 mg TM24 Take 2 Tabs by mouth daily.  Dupixent Syringe 300 mg/2 mL syrg syringe     pantoprazole (PROTONIX) 40 mg tablet Take 1 Tab by mouth daily.     Blood-Glucose Meter monitoring kit Check BS 3x/day Dx E11.21    Blood Glucose Control, Normal (Accu-Chek SmartView Contrl Sol) soln Test blood sugar 3 x daily  DX E11.9        1 year supply  Check controls monthy on glucose meter    multivit-min/FA/lycopen/lutein (CENTRUM SILVER MEN PO) Take 1 Tab by mouth daily.  Insulin Needles, Disposable, 31 gauge x 5/16\" ndle Inject daily    lancets (ACCU-CHEK SOFTCLIX LANCETS) misc Check BS 2x/day    topiramate (TOPAMAX) 25 mg tablet TAKE 1 TABLET BY MOUTH TWICE A DAY    acetaminophen (TYLENOL EXTRA STRENGTH) 500 mg tablet Take 1,000 mg by mouth every six (6) hours as needed for Pain.  flash glucose scanning reader ([employer] TAYLOR 14 DAY READER) misc Use as directed    triamcinolone acetonide (KENALOG) 0.025 % ointment Apply  to affected area two (2) times a day. use thin layer    halobetasol (ULTRAVATE) 0.05 % topical cream APPLY TO AFFECTED AREA TWICE A DAY AS NEEDED    hydrOXYzine pamoate (VISTARIL) 25 mg capsule Take 1 Cap by mouth three (3) times daily as needed for Itching.  Lancets misc Accu-check  Fastclix Lancets. Test blood sugar daily. DX E11.9    alcohol swabs (BD SINGLE USE SWABS REGULAR) padm Use daily to check blood sugar    ferrous sulfate 325 mg (65 mg iron) tablet TAKE 1 TABLET BY MOUTH DAILY    cetirizine (ZYRTEC) 10 mg tablet TAKE 1 TABLET BY MOUTH EVERY DAY (Patient not taking: Reported on 5/27/2021)    DULoxetine (CYMBALTA) 60 mg capsule Take 1 Cap by mouth daily. (Patient not taking: Reported on 5/27/2021)    gabapentin (NEURONTIN) 100 mg capsule Take 1 Cap by mouth three (3) times daily. Max Daily Amount: 300 mg. (Patient not taking: Reported on 5/27/2021)    glucose blood VI test strips (BLOOD GLUCOSE TEST) strip Test blood sugar 1 x daily  DX E11.9 (Patient not taking: Reported on 5/27/2021)     No current facility-administered medications for this visit.              Review of Systems  Respiratory: negative for dyspnea on exertion  Cardiovascular: negative for chest pain    Objective:     Visit Vitals  BP (!) 156/104 (BP 1 Location: Left arm, BP Patient Position: Sitting, BP Cuff Size: Adult)   Pulse 86   Temp 97.3 °F (36.3 °C) (Temporal)   Resp 16   Ht 5' 6\" (1.676 m)   Wt 195 lb (88.5 kg)   SpO2 98%   BMI 31.47 kg/m²        General appearance - alert, well appearing, and in no distress   Chest - clear to auscultation, no wheezes, rales or rhonchi, symmetric air entry  Heart - normal rate, regular rhythm, normal S1, S2, no murmurs, rubs, clicks or gallops  Extremities - no edema         Labs:   Lab Results   Component Value Date/Time    Hemoglobin A1c 10.1 (H) 05/20/2021 12:00 AM    Hemoglobin A1c 8.1 (H) 02/10/2021 12:00 AM    Hemoglobin A1c 9.4 (H) 12/23/2020 08:05 AM    Glucose 167 (H) 05/20/2021 12:00 AM    Glucose (POC) 161 (H) 04/26/2021 01:38 PM    Microalbumin/Creat ratio (mg/g creat) 8 04/24/2018 07:35 AM    Microalb/Creat ratio (ug/mg creat.) 27 05/20/2021 12:00 AM    Microalbumin,urine random 1.90 04/24/2018 07:35 AM    LDL, calculated 78 05/20/2021 12:00 AM    LDL, calculated 65 06/17/2020 10:45 AM    Creatinine, POC 0.8 02/10/2021 10:42 AM    Creatinine 0.93 05/20/2021 12:00 AM      Lab Results   Component Value Date/Time    Cholesterol, total 132 05/20/2021 12:00 AM    HDL Cholesterol 38 (L) 05/20/2021 12:00 AM    LDL, calculated 78 05/20/2021 12:00 AM    LDL, calculated 65 06/17/2020 10:45 AM    Triglyceride 81 05/20/2021 12:00 AM    CHOL/HDL Ratio 3.1 04/24/2018 07:35 AM       Lab Results   Component Value Date/Time    ALT (SGPT) 38 05/20/2021 12:00 AM    Alk.  phosphatase 256 (H) 05/20/2021 12:00 AM    Bilirubin, total 0.3 05/20/2021 12:00 AM       Lab Results   Component Value Date/Time    GFR est AA 95 05/20/2021 12:00 AM    GFR est non-AA 82 05/20/2021 12:00 AM    Creatinine, POC 0.8 02/10/2021 10:42 AM    Creatinine 0.93 05/20/2021 12:00 AM    BUN 11 05/20/2021 12:00 AM    Sodium 141 05/20/2021 12:00 AM    Potassium 4.0 05/20/2021 12:00 AM    Chloride 104 05/20/2021 12:00 AM    CO2 24 05/20/2021 12:00 AM      Lab Results   Component Value Date/Time    Prostate Specific Ag 2.7 09/23/2020 09:07 AM    Prostate Specific Ag 1.9 06/18/2019 10:59 AM    Prostate Specific Ag 4.3 (H) 12/05/2018 07:34 AM    Prostate Specific Ag 1.6 01/08/2016 10:54 AM           Assessment / Plan     Diabetes poorly controlled. Will conitue Lantus to 30 units daily for now and try to work on cutting back starches and sweets in is diet. hypertension poorly controlled, on lisinopril 40 mg.  WIll add Norvasc 5 mg daily   Hyperlipidemia well controlled, on Pravachol 40 mg      ICD-10-CM ICD-9-CM    1. Type 2 diabetes with nephropathy (HCC)  E11.21 250.40 HEMOGLOBIN A1C W/O EAG     583.81    2. Essential hypertension  X30 416.2 METABOLIC PANEL, COMPREHENSIVE   3. High cholesterol  E78.00 272.0 LIPID PANEL   4. Recurrent major depressive disorder, in partial remission (HCC)  F33.41 296.35 Controlled on Cymbalta    5. Malignant gastrointestinal stromal tumor (GIST) of stomach ([facility] [address]  C49. A2 151.9 Pt will f/u with GI and Surgical Onc for further management. Diabetic issues reviewed with him: diabetic diet discussed in detail, and low cholesterol diet, weight control and daily exercise discussed. Follow-up and Dispositions    · Return in about 3 months (around 8/27/2021) for labs 1 week before. Reviewed plan of care. Patient has provided input and agrees with goals.

## 2021-05-27 NOTE — PROGRESS NOTES
Patient is in the office today for a 3 month follow up. Do you have an Advance Directive no  Do you want more information : information given     1. Have you been to the ER, urgent care clinic since your last visit? Hospitalized since your last visit? No    2. Have you seen or consulted any other health care providers outside of the [address] [place] [address] since your last visit? Include any pap smears or colon screening. Yes, Dr. Ludy Roman and Dr. [facility].

## 2021-05-27 NOTE — PATIENT INSTRUCTIONS
High Blood Pressure: Care Instructions Overview It's normal for blood pressure to go up and down throughout the day. But if it stays up, you have high blood pressure. Another name for high blood pressure is hypertension. Despite what a lot of people think, high blood pressure usually doesn't cause headaches or make you feel dizzy or lightheaded. It usually has no symptoms. But it does increase your risk of stroke, heart attack, and other problems. You and your doctor will talk about your risks of these problems based on your blood pressure. Your doctor will give you a goal for your blood pressure. Your goal will be based on your health and your age. Lifestyle changes, such as eating healthy and being active, are always important to help lower blood pressure. You might also take medicine to reach your blood pressure goal. 
Follow-up care is a key part of your treatment and safety. Be sure to make and go to all appointments, and call your doctor if you are having problems. It's also a good idea to know your test results and keep a list of the medicines you take. How can you care for yourself at home? Medical treatment · If you stop taking your medicine, your blood pressure will go back up. You may take one or more types of medicine to lower your blood pressure. Be safe with medicines. Take your medicine exactly as prescribed. Call your doctor if you think you are having a problem with your medicine. · Talk to your doctor before you start taking aspirin every day. Aspirin can help certain people lower their risk of a heart attack or stroke. But taking aspirin isn't right for everyone, because it can cause serious bleeding. · See your doctor regularly. You may need to see the doctor more often at first or until your blood pressure comes down. · If you are taking blood pressure medicine, talk to your doctor before you take decongestants or anti-inflammatory medicine, such as ibuprofen.  Some of these medicines can raise blood pressure. · Learn how to check your blood pressure at home. Lifestyle changes · Stay at a healthy weight. This is especially important if you put on weight around the waist. Losing even 10 pounds can help you lower your blood pressure. · If your doctor recommends it, get more exercise. Walking is a good choice. Bit by bit, increase the amount you walk every day. Try for at least 30 minutes on most days of the week. You also may want to swim, bike, or do other activities. · Avoid or limit alcohol. Talk to your doctor about whether you can drink any alcohol. · Try to limit how much sodium you eat to less than 2,300 milligrams (mg) a day. Your doctor may ask you to try to eat less than 1,500 mg a day. · Eat plenty of fruits (such as bananas and oranges), vegetables, legumes, whole grains, and low-fat dairy products. · Lower the amount of saturated fat in your diet. Saturated fat is found in animal products such as milk, cheese, and meat. Limiting these foods may help you lose weight and also lower your risk for heart disease. · Do not smoke. Smoking increases your risk for heart attack and stroke. If you need help quitting, talk to your doctor about stop-smoking programs and medicines. These can increase your chances of quitting for good. When should you call for help? Call  911 anytime you think you may need emergency care. This may mean having symptoms that suggest that your blood pressure is causing a serious heart or blood vessel problem. Your blood pressure may be over 180/120. For example, call 911 if: 
  · You have symptoms of a heart attack. These may include: 
? Chest pain or pressure, or a strange feeling in the chest. 
? Sweating. ? Shortness of breath. ? Nausea or vomiting. ? Pain, pressure, or a strange feeling in the back, neck, jaw, or upper belly or in one or both shoulders or arms. ? Lightheadedness or sudden weakness.  
? A fast or irregular heartbeat.  
  · You have symptoms of a stroke. These may include: 
? Sudden numbness, tingling, weakness, or loss of movement in your face, arm, or leg, especially on only one side of your body. ? Sudden vision changes. ? Sudden trouble speaking. ? Sudden confusion or trouble understanding simple statements. ? Sudden problems with walking or balance. ? A sudden, severe headache that is different from past headaches.  
  · You have severe back or belly pain. Do not wait until your blood pressure comes down on its own. Get help right away. Call your doctor now or seek immediate care if: 
  · Your blood pressure is much higher than normal (such as 180/120 or higher), but you don't have symptoms.  
  · You think high blood pressure is causing symptoms, such as: 
? Severe headache. 
? Blurry vision. Watch closely for changes in your health, and be sure to contact your doctor if: 
  · Your blood pressure measures higher than your doctor recommends at least 2 times. That means the top number is higher or the bottom number is higher, or both.  
  · You think you may be having side effects from your blood pressure medicine. Where can you learn more? Go to http://www.gray.com/ Enter N948 in the search box to learn more about \"High Blood Pressure: Care Instructions. \" Current as of: August 31, 2020               Content Version: 12.8 © 2006-2021 [employer]. Care instructions adapted under license by [employer] (which disclaims liability or warranty for this information). If you have questions about a medical condition or this instruction, always ask your healthcare professional. [place] [address] any warranty or liability for your use of this information.

## 2021-07-18 ENCOUNTER — HOME HEALTH ADMISSION (OUTPATIENT)
Dept: HOME HEALTH SERVICES | Facility: HOME HEALTH | Age: 71
End: 2021-07-18
Payer: MEDICARE

## 2021-07-20 ENCOUNTER — HOME CARE VISIT (OUTPATIENT)
Dept: HOME HEALTH SERVICES | Facility: HOME HEALTH | Age: 71
End: 2021-07-20

## 2021-07-21 ENCOUNTER — HOME CARE VISIT (OUTPATIENT)
Dept: SCHEDULING | Facility: HOME HEALTH | Age: 71
End: 2021-07-21
Payer: MEDICARE

## 2021-07-21 ENCOUNTER — HOME CARE VISIT (OUTPATIENT)
Dept: HOME HEALTH SERVICES | Facility: HOME HEALTH | Age: 71
End: 2021-07-21

## 2021-07-21 PROCEDURE — 400013 HH SOC

## 2021-07-21 PROCEDURE — G0151 HHCP-SERV OF PT,EA 15 MIN: HCPCS

## 2021-07-23 VITALS
RESPIRATION RATE: 17 BRPM | HEART RATE: 75 BPM | DIASTOLIC BLOOD PRESSURE: 76 MMHG | SYSTOLIC BLOOD PRESSURE: 128 MMHG | OXYGEN SATURATION: 98 % | TEMPERATURE: 98 F

## 2021-07-26 DIAGNOSIS — I10 ESSENTIAL HYPERTENSION: ICD-10-CM

## 2021-07-26 RX ORDER — LISINOPRIL 40 MG/1
TABLET ORAL
Qty: 90 TABLET | Refills: 3 | Status: SHIPPED | OUTPATIENT
Start: 2021-07-26 | End: 2022-10-07

## 2021-07-26 RX ORDER — PANTOPRAZOLE SODIUM 40 MG/1
TABLET, DELAYED RELEASE ORAL
Qty: 90 TABLET | Refills: 3 | Status: SHIPPED | OUTPATIENT
Start: 2021-07-26 | End: 2022-05-19

## 2021-07-26 RX ORDER — PANTOPRAZOLE SODIUM 40 MG/1
TABLET, DELAYED RELEASE ORAL
Qty: 90 TABLET | Refills: 3 | Status: CANCELLED | OUTPATIENT
Start: 2021-07-26

## 2021-07-26 RX ORDER — LISINOPRIL 40 MG/1
TABLET ORAL
Qty: 90 TABLET | Refills: 3 | Status: CANCELLED | OUTPATIENT
Start: 2021-07-26

## 2021-08-03 ENCOUNTER — HOME CARE VISIT (OUTPATIENT)
Dept: HOME HEALTH SERVICES | Facility: HOME HEALTH | Age: 71
End: 2021-08-03
Payer: MEDICARE

## 2021-08-05 RX ORDER — PRAVASTATIN SODIUM 40 MG/1
40 TABLET ORAL
Qty: 90 TABLET | Refills: 1 | Status: SHIPPED | OUTPATIENT
Start: 2021-08-05

## 2021-08-27 DIAGNOSIS — E11.21 TYPE 2 DIABETES WITH NEPHROPATHY (HCC): ICD-10-CM

## 2021-08-27 DIAGNOSIS — I10 ESSENTIAL HYPERTENSION: ICD-10-CM

## 2021-08-27 DIAGNOSIS — E78.00 HIGH CHOLESTEROL: ICD-10-CM

## 2021-08-30 ENCOUNTER — APPOINTMENT (OUTPATIENT)
Dept: INTERNAL MEDICINE CLINIC | Age: 71
End: 2021-08-30

## 2021-08-31 LAB
ALBUMIN SERPL-MCNC: 3.5 G/DL (ref 3.7–4.7)
ALBUMIN/GLOB SERPL: 1.1 {RATIO} (ref 1.2–2.2)
ALP SERPL-CCNC: 468 IU/L (ref 48–121)
ALT SERPL-CCNC: 43 IU/L (ref 0–44)
AST SERPL-CCNC: 62 IU/L (ref 0–40)
BILIRUB SERPL-MCNC: 0.4 MG/DL (ref 0–1.2)
BUN SERPL-MCNC: 12 MG/DL (ref 8–27)
BUN/CREAT SERPL: 13 (ref 10–24)
CALCIUM SERPL-MCNC: 9.3 MG/DL (ref 8.6–10.2)
CHLORIDE SERPL-SCNC: 102 MMOL/L (ref 96–106)
CHOLEST SERPL-MCNC: 116 MG/DL (ref 100–199)
CO2 SERPL-SCNC: 26 MMOL/L (ref 20–29)
CREAT SERPL-MCNC: 0.91 MG/DL (ref 0.76–1.27)
GLOBULIN SER CALC-MCNC: 3.3 G/DL (ref 1.5–4.5)
GLUCOSE SERPL-MCNC: 162 MG/DL (ref 65–99)
HBA1C MFR BLD: 9.4 % (ref 4.8–5.6)
HDLC SERPL-MCNC: 39 MG/DL
IMP & REVIEW OF LAB RESULTS: NORMAL
LDLC SERPL CALC-MCNC: 65 MG/DL (ref 0–99)
POTASSIUM SERPL-SCNC: 4.2 MMOL/L (ref 3.5–5.2)
PROT SERPL-MCNC: 6.8 G/DL (ref 6–8.5)
SODIUM SERPL-SCNC: 141 MMOL/L (ref 134–144)
TRIGL SERPL-MCNC: 54 MG/DL (ref 0–149)
VLDLC SERPL CALC-MCNC: 12 MG/DL (ref 5–40)

## 2021-09-08 ENCOUNTER — OFFICE VISIT (OUTPATIENT)
Dept: INTERNAL MEDICINE CLINIC | Age: 71
End: 2021-09-08
Payer: MEDICARE

## 2021-09-08 VITALS
OXYGEN SATURATION: 100 % | SYSTOLIC BLOOD PRESSURE: 131 MMHG | HEART RATE: 80 BPM | TEMPERATURE: 97.9 F | WEIGHT: 183 LBS | HEIGHT: 66 IN | DIASTOLIC BLOOD PRESSURE: 80 MMHG | RESPIRATION RATE: 16 BRPM | BODY MASS INDEX: 29.41 KG/M2

## 2021-09-08 DIAGNOSIS — C49.A2 MALIGNANT GASTROINTESTINAL STROMAL TUMOR (GIST) OF STOMACH (HCC): ICD-10-CM

## 2021-09-08 DIAGNOSIS — Z12.5 PROSTATE CANCER SCREENING: ICD-10-CM

## 2021-09-08 DIAGNOSIS — E78.00 HIGH CHOLESTEROL: ICD-10-CM

## 2021-09-08 DIAGNOSIS — I10 ESSENTIAL HYPERTENSION: ICD-10-CM

## 2021-09-08 DIAGNOSIS — E11.21 TYPE 2 DIABETES WITH NEPHROPATHY (HCC): Primary | ICD-10-CM

## 2021-09-08 PROCEDURE — 2022F DILAT RTA XM EVC RTNOPTHY: CPT | Performed by: INTERNAL MEDICINE

## 2021-09-08 PROCEDURE — G8417 CALC BMI ABV UP PARAM F/U: HCPCS | Performed by: INTERNAL MEDICINE

## 2021-09-08 PROCEDURE — G8427 DOCREV CUR MEDS BY ELIG CLIN: HCPCS | Performed by: INTERNAL MEDICINE

## 2021-09-08 PROCEDURE — G8752 SYS BP LESS 140: HCPCS | Performed by: INTERNAL MEDICINE

## 2021-09-08 PROCEDURE — G8510 SCR DEP NEG, NO PLAN REQD: HCPCS | Performed by: INTERNAL MEDICINE

## 2021-09-08 PROCEDURE — 3046F HEMOGLOBIN A1C LEVEL >9.0%: CPT | Performed by: INTERNAL MEDICINE

## 2021-09-08 PROCEDURE — 1101F PT FALLS ASSESS-DOCD LE1/YR: CPT | Performed by: INTERNAL MEDICINE

## 2021-09-08 PROCEDURE — 3017F COLORECTAL CA SCREEN DOC REV: CPT | Performed by: INTERNAL MEDICINE

## 2021-09-08 PROCEDURE — G8536 NO DOC ELDER MAL SCRN: HCPCS | Performed by: INTERNAL MEDICINE

## 2021-09-08 PROCEDURE — G8754 DIAS BP LESS 90: HCPCS | Performed by: INTERNAL MEDICINE

## 2021-09-08 PROCEDURE — 99214 OFFICE O/P EST MOD 30 MIN: CPT | Performed by: INTERNAL MEDICINE

## 2021-09-08 NOTE — PROGRESS NOTES
Patient is in the office today for a 3 month follow up. Do you have an Advance Directive no  Do you want more information : information given    1. Have you been to the ER, urgent care clinic since your last visit? Hospitalized since your last visit? No    2. Have you seen or consulted any other health care providers outside of the [address] [place] [address] since your last visit? Include any pap smears or colon screening. Yes, [facility].

## 2021-09-08 NOTE — PROGRESS NOTES
Subjective:       Chief Complaint  The patient presents for follow up of diabetes, hypertension and high cholesterol. HPI  Nicolette Fay is a 70 y.o. male seen for follow up of diabetes. Healso has hypertension and hyperlipidemia. Diabetes poorly controlled on Lantus 30 units, no significant medication side effects noted, pt is s/p gastrectomy with Billroth II reconstruction which I believe is causing some hypoglycemia when he eats too much sugar in his diet or not enough protein. He is currently not taking Janumet. He had diarrhea when he took Metformin by itself. Hypertension well controlled, no significant medication side effects noted, on lisinopril 40 mg and Norvasc 5 mg,   hyperlipidemia well controlled, no significant medication side effects noted, on Pravachol 40 mg    Diet and Lifestyle: not attempting to follow a low fat, low cholesterol diet, does not rigorously follow a diabetic diet, sedentary    Home BP Monitoring: is not measured at home. Diabetic Review of Systems - home glucose monitoring: is performed regularly, 3x/day. 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 He will follow up with oncologist for further treatment. Patient is no longer using Cymbalta for his depression. Current Outpatient Medications   Medication Sig    pravastatin (PRAVACHOL) 40 mg tablet TAKE 1 TAB BY MOUTH NIGHTLY. INDICATIONS: HIGH CHOLESTEROL    pantoprazole (PROTONIX) 40 mg tablet TAKE 1 TABLET BY MOUTH EVERY DAY    lisinopriL (PRINIVIL, ZESTRIL) 40 mg tablet TAKE 1 TABLET BY MOUTH EVERY DAY    senna (Senna) 8.6 mg tablet Take 1 Tablet by mouth daily.  ferrous sulfate 325 mg (65 mg iron) tablet TAKE 1 TABLET BY MOUTH DAILY    amLODIPine (NORVASC) 5 mg tablet Take 1 Tablet by mouth daily.     FreeStyle Bassam 14 Day Sensor kit USE ONE EVERY 14 DAYS    cetirizine (ZYRTEC) 10 mg tablet TAKE 1 TABLET BY MOUTH EVERY DAY    insulin glargine (Lantus Solostar U-100 Insulin) 100 unit/mL (3 mL) inpn INJECT 20 UNITS SUBCUTANEOUSLY IN THE MORNING (Patient taking differently: INJECT 30 UNITS SUBCUTANEOUSLY EVERY NIGHT AT BEDTIME)    multivit-min/FA/lycopen/lutein (CENTRUM SILVER MEN PO) Take 1 Tab by mouth daily.  Insulin Needles, Disposable, 31 gauge x 5/16\" ndle Inject daily    acetaminophen (TYLENOL EXTRA STRENGTH) 500 mg tablet Take 1,000 mg by mouth every six (6) hours as needed for Pain.  flash glucose scanning reader ([employer] TAYLOR 14 DAY READER) misc Use as directed    triamcinolone acetonide (KENALOG) 0.025 % ointment Apply  to affected area two (2) times a day. use thin layer (Patient taking differently: Apply  to affected area two (2) times a day. use thin layer topical)    halobetasol (ULTRAVATE) 0.05 % topical cream APPLY TO AFFECTED AREA TWICE A DAY AS NEEDED Topical  for dermatitis/rash    hydrOXYzine pamoate (VISTARIL) 25 mg capsule Take 1 Cap by mouth three (3) times daily as needed for Itching.  alcohol swabs (BD SINGLE USE SWABS REGULAR) padm Use daily to check blood sugar    SITagliptin-metFORMIN (Janumet XR) 50-1,000 mg TM24 Take 2 Tabs by mouth daily. (Patient not taking: Reported on 7/21/2021)     No current facility-administered medications for this visit.              Review of Systems  Respiratory: negative for dyspnea on exertion  Cardiovascular: negative for chest pain    Objective:     Visit Vitals  /80 (BP 1 Location: Right arm, BP Patient Position: Sitting, BP Cuff Size: Adult)   Pulse 80   Temp 97.9 °F (36.6 °C) (Temporal)   Resp 16   Ht 5' 6\" (1.676 m)   Wt 183 lb (83 kg)   SpO2 100%   BMI 29.54 kg/m²        General appearance - alert, well appearing, and in no distress   Chest - clear to auscultation, no wheezes, rales or rhonchi, symmetric air entry  Heart - normal rate, regular rhythm, normal S1, S2, no murmurs, rubs, clicks or gallops  Extremities - no edema         Labs:   Lab Results   Component Value Date/Time    Hemoglobin A1c 9.4 (H) 08/30/2021 09:29 AM    Hemoglobin A1c 10.1 (H) 05/20/2021 12:00 AM    Hemoglobin A1c 8.1 (H) 02/10/2021 12:00 AM    Hemoglobin A1c, External 11.0 07/14/2021 12:00 AM    Glucose 162 (H) 08/30/2021 09:29 AM    Glucose (POC) 161 (H) 04/26/2021 01:38 PM    Microalbumin/Creat ratio (mg/g creat) 8 04/24/2018 07:35 AM    Microalb/Creat ratio (ug/mg creat.) 27 05/20/2021 12:00 AM    Microalbumin,urine random 1.90 04/24/2018 07:35 AM    LDL, calculated 65 08/30/2021 09:29 AM    LDL, calculated 65 06/17/2020 10:45 AM    Creatinine, POC 0.8 02/10/2021 10:42 AM    Creatinine 0.91 08/30/2021 09:29 AM      Lab Results   Component Value Date/Time    Cholesterol, total 116 08/30/2021 09:29 AM    HDL Cholesterol 39 (L) 08/30/2021 09:29 AM    LDL, calculated 65 08/30/2021 09:29 AM    LDL, calculated 65 06/17/2020 10:45 AM    Triglyceride 54 08/30/2021 09:29 AM    CHOL/HDL Ratio 3.1 04/24/2018 07:35 AM       Lab Results   Component Value Date/Time    ALT (SGPT) 43 08/30/2021 09:29 AM    Alk. phosphatase 468 (H) 08/30/2021 09:29 AM    Bilirubin, total 0.4 08/30/2021 09:29 AM       Lab Results   Component Value Date/Time    GFR est AA 98 08/30/2021 09:29 AM    GFR est non-AA 84 08/30/2021 09:29 AM    Creatinine, POC 0.8 02/10/2021 10:42 AM    Creatinine 0.91 08/30/2021 09:29 AM    BUN 12 08/30/2021 09:29 AM    Sodium 141 08/30/2021 09:29 AM    Potassium 4.2 08/30/2021 09:29 AM    Chloride 102 08/30/2021 09:29 AM    CO2 26 08/30/2021 09:29 AM      Lab Results   Component Value Date/Time    Prostate Specific Ag 2.7 09/23/2020 09:07 AM    Prostate Specific Ag 1.9 06/18/2019 10:59 AM    Prostate Specific Ag 4.3 (H) 12/05/2018 07:34 AM    Prostate Specific Ag 1.6 01/08/2016 10:54 AM           Assessment / Plan     Diabetes poorly controlled. Will conitue Lantus to 30 units daily and restart Janumet.   Patient to increase protein in his diet and stay away from extremely high carbohydrates like pasta and potatoes which can cause hypoglycemia now that he has had a distal gastrectomy with Billroth II reconstruction  hypertension well controlled, on lisinopril 40 mg and Norvasc 5 mg daily   Hyperlipidemia well controlled, on Pravachol 40 mg      ICD-10-CM ICD-9-CM    1. Type 2 diabetes with nephropathy (HCC)  E11.21 250.40 HEMOGLOBIN A1C W/O EAG     583.81    2. High cholesterol  E78.00 272.0 LIPID PANEL   3. Essential hypertension  T91 277.9 METABOLIC PANEL, COMPREHENSIVE   4. Malignant gastrointestinal stromal tumor (GIST) of stomach ([facility] [address]  C49. A2 151.9  patient to follow-up with oncology for long-term management and treatment. 5. Prostate cancer screening  Z12.5 V76.44 PSA SCREENING (SCREENING)                Diabetic issues reviewed with him: diabetic diet discussed in detail, and low cholesterol diet, weight control and daily exercise discussed. Follow-up and Dispositions    · Return in about 3 months (around 12/8/2021) for , and Medicare Wellness Visit, labs 1 week before. Reviewed plan of care. Patient has provided input and agrees with goals.

## 2021-09-08 NOTE — PATIENT INSTRUCTIONS
High Blood Pressure: Care Instructions  Overview     It's normal for blood pressure to go up and down throughout the day. But if it stays up, you have high blood pressure. Another name for high blood pressure is hypertension. Despite what a lot of people think, high blood pressure usually doesn't cause headaches or make you feel dizzy or lightheaded. It usually has no symptoms. But it does increase your risk of stroke, heart attack, and other problems. You and your doctor will talk about your risks of these problems based on your blood pressure. Your doctor will give you a goal for your blood pressure. Your goal will be based on your health and your age. Lifestyle changes, such as eating healthy and being active, are always important to help lower blood pressure. You might also take medicine to reach your blood pressure goal.  Follow-up care is a key part of your treatment and safety. Be sure to make and go to all appointments, and call your doctor if you are having problems. It's also a good idea to know your test results and keep a list of the medicines you take. How can you care for yourself at home? Medical treatment  · If you stop taking your medicine, your blood pressure will go back up. You may take one or more types of medicine to lower your blood pressure. Be safe with medicines. Take your medicine exactly as prescribed. Call your doctor if you think you are having a problem with your medicine. · Talk to your doctor before you start taking aspirin every day. Aspirin can help certain people lower their risk of a heart attack or stroke. But taking aspirin isn't right for everyone, because it can cause serious bleeding. · See your doctor regularly. You may need to see the doctor more often at first or until your blood pressure comes down. · If you are taking blood pressure medicine, talk to your doctor before you take decongestants or anti-inflammatory medicine, such as ibuprofen.  Some of these medicines can raise blood pressure. · Learn how to check your blood pressure at home. Lifestyle changes  · Stay at a healthy weight. This is especially important if you put on weight around the waist. Losing even 10 pounds can help you lower your blood pressure. · If your doctor recommends it, get more exercise. Walking is a good choice. Bit by bit, increase the amount you walk every day. Try for at least 30 minutes on most days of the week. You also may want to swim, bike, or do other activities. · Avoid or limit alcohol. Talk to your doctor about whether you can drink any alcohol. · Try to limit how much sodium you eat to less than 2,300 milligrams (mg) a day. Your doctor may ask you to try to eat less than 1,500 mg a day. · Eat plenty of fruits (such as bananas and oranges), vegetables, legumes, whole grains, and low-fat dairy products. · Lower the amount of saturated fat in your diet. Saturated fat is found in animal products such as milk, cheese, and meat. Limiting these foods may help you lose weight and also lower your risk for heart disease. · Do not smoke. Smoking increases your risk for heart attack and stroke. If you need help quitting, talk to your doctor about stop-smoking programs and medicines. These can increase your chances of quitting for good. When should you call for help? Call  911 anytime you think you may need emergency care. This may mean having symptoms that suggest that your blood pressure is causing a serious heart or blood vessel problem. Your blood pressure may be over 180/120. For example, call 911 if:    · You have symptoms of a heart attack. These may include:  ? Chest pain or pressure, or a strange feeling in the chest.  ? Sweating. ? Shortness of breath. ? Nausea or vomiting. ? Pain, pressure, or a strange feeling in the back, neck, jaw, or upper belly or in one or both shoulders or arms. ? Lightheadedness or sudden weakness.   ? A fast or irregular heartbeat.     · You have symptoms of a stroke. These may include:  ? Sudden numbness, tingling, weakness, or loss of movement in your face, arm, or leg, especially on only one side of your body. ? Sudden vision changes. ? Sudden trouble speaking. ? Sudden confusion or trouble understanding simple statements. ? Sudden problems with walking or balance. ? A sudden, severe headache that is different from past headaches.     · You have severe back or belly pain. Do not wait until your blood pressure comes down on its own. Get help right away. Call your doctor now or seek immediate care if:    · Your blood pressure is much higher than normal (such as 180/120 or higher), but you don't have symptoms.     · You think high blood pressure is causing symptoms, such as:  ? Severe headache.  ? Blurry vision. Watch closely for changes in your health, and be sure to contact your doctor if:    · Your blood pressure measures higher than your doctor recommends at least 2 times. That means the top number is higher or the bottom number is higher, or both.     · You think you may be having side effects from your blood pressure medicine. Where can you learn more? Go to http://www.gray.com/  Enter [id number] in the search box to learn more about \"High Blood Pressure: Care Instructions. \"  Current as of: August 31, 2020               Content Version: 12.8  © 2006-2021 [employer]. Care instructions adapted under license by [employer] (which disclaims liability or warranty for this information). If you have questions about a medical condition or this instruction, always ask your healthcare professional. [place] [address] any warranty or liability for your use of this information.

## 2021-09-14 ENCOUNTER — TELEPHONE (OUTPATIENT)
Dept: INTERNAL MEDICINE CLINIC | Age: 71
End: 2021-09-14

## 2021-09-14 NOTE — TELEPHONE ENCOUNTER
Go ahead and stop the janumet and work on increasing protein and decreasing starches and sugar in his diet for now. Continues Lantus 30 units daily for now and lets us know if his BS go over 250.

## 2021-09-14 NOTE — TELEPHONE ENCOUNTER
Pt called because he started taking Janumet and it is making him sick, and it is not working. He wants to know what does Dr. Ledesma Mess recommend. Please advise.  Thank you!!!

## 2021-10-17 DIAGNOSIS — E11.65 TYPE II OR UNSPECIFIED TYPE DIABETES MELLITUS WITH RENAL MANIFESTATIONS, UNCONTROLLED(250.42) (HCC): ICD-10-CM

## 2021-10-17 DIAGNOSIS — E11.29 TYPE II OR UNSPECIFIED TYPE DIABETES MELLITUS WITH RENAL MANIFESTATIONS, UNCONTROLLED(250.42) (HCC): ICD-10-CM

## 2021-10-17 RX ORDER — INSULIN GLARGINE 100 [IU]/ML
INJECTION, SOLUTION SUBCUTANEOUS
Qty: 15 ML | Refills: 5 | Status: SHIPPED | OUTPATIENT
Start: 2021-10-17

## 2021-11-15 RX ORDER — FLASH GLUCOSE SENSOR
KIT MISCELLANEOUS
Qty: 2 KIT | Refills: 5 | Status: SHIPPED | OUTPATIENT
Start: 2021-11-15 | End: 2022-01-26 | Stop reason: SDUPTHER

## 2021-11-16 NOTE — PATIENT INSTRUCTIONS
High Blood Pressure: Care Instructions Overview It's normal for blood pressure to go up and down throughout the day. But if it stays up, you have high blood pressure. Another name for high blood pressure is hypertension. Despite what a lot of people think, high blood pressure usually doesn't cause headaches or make you feel dizzy or lightheaded. It usually has no symptoms. But it does increase your risk of stroke, heart attack, and other problems. You and your doctor will talk about your risks of these problems based on your blood pressure. Your doctor will give you a goal for your blood pressure. Your goal will be based on your health and your age. Lifestyle changes, such as eating healthy and being active, are always important to help lower blood pressure. You might also take medicine to reach your blood pressure goal. 
Follow-up care is a key part of your treatment and safety. Be sure to make and go to all appointments, and call your doctor if you are having problems. It's also a good idea to know your test results and keep a list of the medicines you take. How can you care for yourself at home? Medical treatment · If you stop taking your medicine, your blood pressure will go back up. You may take one or more types of medicine to lower your blood pressure. Be safe with medicines. Take your medicine exactly as prescribed. Call your doctor if you think you are having a problem with your medicine. · Talk to your doctor before you start taking aspirin every day. Aspirin can help certain people lower their risk of a heart attack or stroke. But taking aspirin isn't right for everyone, because it can cause serious bleeding. · See your doctor regularly. You may need to see the doctor more often at first or until your blood pressure comes down. · If you are taking blood pressure medicine, talk to your doctor before you take decongestants or anti-inflammatory medicine, such as ibuprofen. Some of these medicines can raise blood pressure. · Learn how to check your blood pressure at home. Lifestyle changes · Stay at a healthy weight. This is especially important if you put on weight around the waist. Losing even 10 pounds can help you lower your blood pressure. · If your doctor recommends it, get more exercise. Walking is a good choice. Bit by bit, increase the amount you walk every day. Try for at least 30 minutes on most days of the week. You also may want to swim, bike, or do other activities. · Avoid or limit alcohol. Talk to your doctor about whether you can drink any alcohol. · Try to limit how much sodium you eat to less than 2,300 milligrams (mg) a day. Your doctor may ask you to try to eat less than 1,500 mg a day. · Eat plenty of fruits (such as bananas and oranges), vegetables, legumes, whole grains, and low-fat dairy products. · Lower the amount of saturated fat in your diet. Saturated fat is found in animal products such as milk, cheese, and meat. Limiting these foods may help you lose weight and also lower your risk for heart disease. · Do not smoke. Smoking increases your risk for heart attack and stroke. If you need help quitting, talk to your doctor about stop-smoking programs and medicines. These can increase your chances of quitting for good. When should you call for help? Call  911 anytime you think you may need emergency care. This may mean having symptoms that suggest that your blood pressure is causing a serious heart or blood vessel problem. Your blood pressure may be over 180/120. 
 For example, call  911 if: 
  · You have symptoms of a heart attack. These may include: 
? Chest pain or pressure, or a strange feeling in the chest. 
? Sweating. ? Shortness of breath. ? Nausea or vomiting. ? Pain, pressure, or a strange feeling in the back, neck, jaw, or upper belly or in one or both shoulders or arms. ? Lightheadedness or sudden weakness. ? A fast or irregular heartbeat.  
  · You have symptoms of a stroke. These may include: 
? Sudden numbness, tingling, weakness, or loss of movement in your face, arm, or leg, especially on only one side of your body. ? Sudden vision changes. ? Sudden trouble speaking. ? Sudden confusion or trouble understanding simple statements. ? Sudden problems with walking or balance. ? A sudden, severe headache that is different from past headaches.  
  · You have severe back or belly pain.  
 Do not wait until your blood pressure comes down on its own. Get help right away. 
 Call your doctor now or seek immediate care if: 
  · Your blood pressure is much higher than normal (such as 180/120 or higher), but you don't have symptoms.  
  · You think high blood pressure is causing symptoms, such as: 
? Severe headache. 
? Blurry vision.  
 Watch closely for changes in your health, and be sure to contact your doctor if: 
  · Your blood pressure measures higher than your doctor recommends at least 2 times. That means the top number is higher or the bottom number is higher, or both.  
  · You think you may be having side effects from your blood pressure medicine. Where can you learn more? Go to http://sumit-uli.info/. Enter Q625 in the search box to learn more about \"High Blood Pressure: Care Instructions. \" Current as of: April 9, 2019 Content Version: 12.2 © 0366-9020 [employer]. Care instructions adapted under license by [employer] (which disclaims liability or warranty for this information). If you have questions about a medical condition or this instruction, always ask your healthcare professional. [place] [address] any warranty or liability for your use of this information. [FreeTextEntry3] : This note was written by aSmara Jenkins on 11/16/2021, acting as a scribe for Dr. Karmen MD.\par \par All medical record entries were at my, Dr. Kianna Peraza MD, direction and personally dictated by me in 11/16/2021. I have personally reviewed the chart and agree that the record accurately reflects my personal performance of the history, physical exam, assessment, and plan.\par

## 2021-12-03 ENCOUNTER — APPOINTMENT (OUTPATIENT)
Dept: INTERNAL MEDICINE CLINIC | Age: 71
End: 2021-12-03

## 2021-12-06 LAB
ALBUMIN SERPL-MCNC: 3.6 G/DL (ref 3.7–4.7)
ALBUMIN/GLOB SERPL: 1.3 {RATIO} (ref 1.2–2.2)
ALP SERPL-CCNC: 163 IU/L (ref 44–121)
ALT SERPL-CCNC: 15 IU/L (ref 0–44)
AST SERPL-CCNC: 31 IU/L (ref 0–40)
BILIRUB SERPL-MCNC: 0.2 MG/DL (ref 0–1.2)
BUN SERPL-MCNC: 13 MG/DL (ref 8–27)
BUN/CREAT SERPL: 13 (ref 10–24)
CALCIUM SERPL-MCNC: 9 MG/DL (ref 8.6–10.2)
CHLORIDE SERPL-SCNC: 103 MMOL/L (ref 96–106)
CHOLEST SERPL-MCNC: 116 MG/DL (ref 100–199)
CO2 SERPL-SCNC: 23 MMOL/L (ref 20–29)
CREAT SERPL-MCNC: 1 MG/DL (ref 0.76–1.27)
GLOBULIN SER CALC-MCNC: 2.7 G/DL (ref 1.5–4.5)
GLUCOSE SERPL-MCNC: 159 MG/DL (ref 65–99)
HBA1C MFR BLD: 7 % (ref 4.8–5.6)
HDLC SERPL-MCNC: 39 MG/DL
IMP & REVIEW OF LAB RESULTS: NORMAL
LDLC SERPL CALC-MCNC: 62 MG/DL (ref 0–99)
POTASSIUM SERPL-SCNC: 4.1 MMOL/L (ref 3.5–5.2)
PROT SERPL-MCNC: 6.3 G/DL (ref 6–8.5)
PSA SERPL-MCNC: 5.2 NG/ML (ref 0–4)
SODIUM SERPL-SCNC: 140 MMOL/L (ref 134–144)
TRIGL SERPL-MCNC: 70 MG/DL (ref 0–149)
VLDLC SERPL CALC-MCNC: 15 MG/DL (ref 5–40)

## 2021-12-09 ENCOUNTER — OFFICE VISIT (OUTPATIENT)
Dept: INTERNAL MEDICINE CLINIC | Age: 71
End: 2021-12-09
Payer: MEDICARE

## 2021-12-09 VITALS
RESPIRATION RATE: 20 BRPM | OXYGEN SATURATION: 98 % | HEIGHT: 66 IN | SYSTOLIC BLOOD PRESSURE: 125 MMHG | BODY MASS INDEX: 33.27 KG/M2 | TEMPERATURE: 97.7 F | WEIGHT: 207 LBS | DIASTOLIC BLOOD PRESSURE: 81 MMHG | HEART RATE: 87 BPM

## 2021-12-09 DIAGNOSIS — E11.29 TYPE II OR UNSPECIFIED TYPE DIABETES MELLITUS WITH RENAL MANIFESTATIONS, UNCONTROLLED(250.42) (HCC): ICD-10-CM

## 2021-12-09 DIAGNOSIS — E78.00 HIGH CHOLESTEROL: ICD-10-CM

## 2021-12-09 DIAGNOSIS — R05.9 COUGH: ICD-10-CM

## 2021-12-09 DIAGNOSIS — Z00.00 MEDICARE ANNUAL WELLNESS VISIT, SUBSEQUENT: Primary | ICD-10-CM

## 2021-12-09 DIAGNOSIS — G63 POLYNEUROPATHY ASSOCIATED WITH UNDERLYING DISEASE (HCC): ICD-10-CM

## 2021-12-09 DIAGNOSIS — R97.20 ELEVATED PSA: ICD-10-CM

## 2021-12-09 DIAGNOSIS — E11.65 TYPE II OR UNSPECIFIED TYPE DIABETES MELLITUS WITH RENAL MANIFESTATIONS, UNCONTROLLED(250.42) (HCC): ICD-10-CM

## 2021-12-09 DIAGNOSIS — C49.A2 MALIGNANT GASTROINTESTINAL STROMAL TUMOR (GIST) OF STOMACH (HCC): ICD-10-CM

## 2021-12-09 DIAGNOSIS — I10 ESSENTIAL HYPERTENSION: ICD-10-CM

## 2021-12-09 PROCEDURE — 99214 OFFICE O/P EST MOD 30 MIN: CPT | Performed by: INTERNAL MEDICINE

## 2021-12-09 PROCEDURE — 3017F COLORECTAL CA SCREEN DOC REV: CPT | Performed by: INTERNAL MEDICINE

## 2021-12-09 PROCEDURE — 3051F HG A1C>EQUAL 7.0%<8.0%: CPT | Performed by: INTERNAL MEDICINE

## 2021-12-09 PROCEDURE — G8427 DOCREV CUR MEDS BY ELIG CLIN: HCPCS | Performed by: INTERNAL MEDICINE

## 2021-12-09 PROCEDURE — G8417 CALC BMI ABV UP PARAM F/U: HCPCS | Performed by: INTERNAL MEDICINE

## 2021-12-09 PROCEDURE — 2022F DILAT RTA XM EVC RTNOPTHY: CPT | Performed by: INTERNAL MEDICINE

## 2021-12-09 PROCEDURE — G8754 DIAS BP LESS 90: HCPCS | Performed by: INTERNAL MEDICINE

## 2021-12-09 PROCEDURE — 1101F PT FALLS ASSESS-DOCD LE1/YR: CPT | Performed by: INTERNAL MEDICINE

## 2021-12-09 PROCEDURE — G8536 NO DOC ELDER MAL SCRN: HCPCS | Performed by: INTERNAL MEDICINE

## 2021-12-09 PROCEDURE — G8510 SCR DEP NEG, NO PLAN REQD: HCPCS | Performed by: INTERNAL MEDICINE

## 2021-12-09 PROCEDURE — G8752 SYS BP LESS 140: HCPCS | Performed by: INTERNAL MEDICINE

## 2021-12-09 PROCEDURE — G0439 PPPS, SUBSEQ VISIT: HCPCS | Performed by: INTERNAL MEDICINE

## 2021-12-09 RX ORDER — GABAPENTIN 100 MG/1
100 CAPSULE ORAL
Qty: 30 CAPSULE | Refills: 2 | Status: SHIPPED | OUTPATIENT
Start: 2021-12-09

## 2021-12-09 RX ORDER — IMATINIB MESYLATE 400 MG/1
TABLET, FILM COATED ORAL
COMMUNITY
Start: 2021-11-30

## 2021-12-09 NOTE — PROGRESS NOTES
Patient is in the office today for a 3 month follow up, and Medicare Wellness visit. Do you have an Advance Directive no  Do you want more information : information given     1. \"Have you been to the ER, urgent care clinic since your last visit? Hospitalized since your last visit? \" No    2. \"Have you seen or consulted any other health care providers outside of the [address] [place] [address] since your last visit? \" No     3. For patients aged 39-70: Has the patient had a colonoscopy? Yes, HM satisfied with blue hyperlink     If the patient is female:    4. For patients aged 41-77: Has the patient had a mammogram within the past 2 years? NA based on age or sex    11. For patients aged 21-65: Has the patient had a pap smear?  NA based on age or sex

## 2021-12-09 NOTE — PROGRESS NOTES
Subjective:       Chief Complaint  The patient presents for follow up of diabetes, hypertension and high cholesterol. HPI  Bridger Weiss is a 70 y.o. male seen for follow up of diabetes. Healso has hypertension and hyperlipidemia. Diabetes well controlled on Lantus 30 units, no significant medication side effects noted, pt is s/p gastrectomy with Billroth II reconstruction which I believe is causing some hypoglycemia when he eats too much sugar in his diet or not enough protein. Hypertension well controlled, no significant medication side effects noted, on lisinopril 40 mg and Norvasc 5 mg,   hyperlipidemia well controlled, no significant medication side effects noted, on Pravachol 40 mg    Diet and Lifestyle: generally follows a low fat low cholesterol diet, follows a diabetic diet regularly, exercises sporadically    Home BP Monitoring: is not measured at home. Diabetic Review of Systems - home glucose monitoring: is performed regularly, 3x/day. 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 Followed by oncology and is on [address]. His PSA is elevated we will go ahead and refer him to urology for further evaluation. Current Outpatient Medications   Medication Sig    imatinib (GLEEVEC) 400 mg tablet     gabapentin (NEURONTIN) 100 mg capsule Take 1 Capsule by mouth nightly as needed for Pain. Max Daily Amount: 100 mg. Indications: neuropathic pain    FreeStyle Bassam 14 Day Sensor kit USE ONE EVERY 14 DAYS    Insulin Needles, Disposable, (BD Ultra-Fine Short Pen Needle) 31 gauge x 5/16\" ndle USE TO INJECT DAILY    insulin glargine (Lantus Solostar U-100 Insulin) 100 unit/mL (3 mL) inpn INJECT 30 UNITS SUBCUTANEOUSLY EVERY NIGHT AT BEDTIME    pravastatin (PRAVACHOL) 40 mg tablet TAKE 1 TAB BY MOUTH NIGHTLY.  INDICATIONS: HIGH CHOLESTEROL    pantoprazole (PROTONIX) 40 mg tablet TAKE 1 TABLET BY MOUTH EVERY DAY    lisinopriL (PRINIVIL, ZESTRIL) 40 mg tablet TAKE 1 TABLET BY MOUTH EVERY DAY    ferrous sulfate 325 mg (65 mg iron) tablet TAKE 1 TABLET BY MOUTH DAILY    amLODIPine (NORVASC) 5 mg tablet Take 1 Tablet by mouth daily.  cetirizine (ZYRTEC) 10 mg tablet TAKE 1 TABLET BY MOUTH EVERY DAY    multivit-min/FA/lycopen/lutein (CENTRUM SILVER MEN PO) Take 1 Tab by mouth daily.  acetaminophen (TYLENOL EXTRA STRENGTH) 500 mg tablet Take 1,000 mg by mouth every six (6) hours as needed for Pain.  flash glucose scanning reader ([employer] TAYLOR 14 DAY READER) misc Use as directed    alcohol swabs (BD SINGLE USE SWABS REGULAR) padm Use daily to check blood sugar    senna (Senna) 8.6 mg tablet Take 1 Tablet by mouth daily. (Patient not taking: Reported on 12/9/2021)    triamcinolone acetonide (KENALOG) 0.025 % ointment Apply  to affected area two (2) times a day. use thin layer (Patient not taking: Reported on 12/9/2021)    halobetasol (ULTRAVATE) 0.05 % topical cream APPLY TO AFFECTED AREA TWICE A DAY AS NEEDED Topical  for dermatitis/rash (Patient not taking: Reported on 12/9/2021)    hydrOXYzine pamoate (VISTARIL) 25 mg capsule Take 1 Cap by mouth three (3) times daily as needed for Itching. (Patient not taking: Reported on 12/9/2021)     No current facility-administered medications for this visit.              Review of Systems  Respiratory: negative for dyspnea on exertion  Cardiovascular: negative for chest pain    Objective:     Visit Vitals  /81 (BP 1 Location: Left arm, BP Patient Position: Sitting, BP Cuff Size: Adult)   Pulse 87   Temp 97.7 °F (36.5 °C) (Temporal)   Resp 20   Ht 5' 6\" (1.676 m)   Wt 207 lb (93.9 kg)   SpO2 98%   BMI 33.41 kg/m²        General appearance - alert, well appearing, and in no distress   Chest - clear to auscultation, no wheezes, rales or rhonchi, symmetric air entry  Heart - normal rate, regular rhythm, normal S1, S2, no murmurs, rubs, clicks or gallops  Extremities - no edema         Labs:   Lab Results   Component Value Date/Time    Hemoglobin A1c 7.0 (H) 12/03/2021 12:00 AM    Hemoglobin A1c 9.4 (H) 08/30/2021 09:29 AM    Hemoglobin A1c 10.1 (H) 05/20/2021 12:00 AM    Hemoglobin A1c, External 11.0 07/14/2021 12:00 AM    Glucose 159 (H) 12/03/2021 12:00 AM    Glucose (POC) 161 (H) 04/26/2021 01:38 PM    Microalbumin/Creat ratio (mg/g creat) 8 04/24/2018 07:35 AM    Microalb/Creat ratio (ug/mg creat.) 27 05/20/2021 12:00 AM    Microalbumin,urine random 1.90 04/24/2018 07:35 AM    LDL, calculated 62 12/03/2021 12:00 AM    LDL, calculated 65 06/17/2020 10:45 AM    Creatinine, POC 0.8 02/10/2021 10:42 AM    Creatinine 1.00 12/03/2021 12:00 AM      Lab Results   Component Value Date/Time    Cholesterol, total 116 12/03/2021 12:00 AM    HDL Cholesterol 39 (L) 12/03/2021 12:00 AM    LDL, calculated 62 12/03/2021 12:00 AM    LDL, calculated 65 06/17/2020 10:45 AM    Triglyceride 70 12/03/2021 12:00 AM    CHOL/HDL Ratio 3.1 04/24/2018 07:35 AM       Lab Results   Component Value Date/Time    ALT (SGPT) 15 12/03/2021 12:00 AM    Alk. phosphatase 163 (H) 12/03/2021 12:00 AM    Bilirubin, total 0.2 12/03/2021 12:00 AM       Lab Results   Component Value Date/Time    GFR est AA 87 12/03/2021 12:00 AM    GFR est non-AA 75 12/03/2021 12:00 AM    Creatinine, POC 0.8 02/10/2021 10:42 AM    Creatinine 1.00 12/03/2021 12:00 AM    BUN 13 12/03/2021 12:00 AM    Sodium 140 12/03/2021 12:00 AM    Potassium 4.1 12/03/2021 12:00 AM    Chloride 103 12/03/2021 12:00 AM    CO2 23 12/03/2021 12:00 AM      Lab Results   Component Value Date/Time    Prostate Specific Ag 5.2 (H) 12/03/2021 12:00 AM    Prostate Specific Ag 2.7 09/23/2020 09:07 AM    Prostate Specific Ag 1.9 06/18/2019 10:59 AM    Prostate Specific Ag 1.6 01/08/2016 10:54 AM           Assessment / Plan     Diabetes well controlled. Will decrease to Lantus to 25 units daily.   Patient to increase protein in his diet and stay away from extremely high carbohydrates like pasta and potatoes which can cause hypoglycemia now that he has had a distal gastrectomy with Billroth II reconstruction  hypertension well controlled, on lisinopril 40 mg and Norvasc 5 mg daily   Hyperlipidemia well controlled, on Pravachol 40 mg      ICD-10-CM ICD-9-CM    1. Medicare annual wellness visit, subsequent  Z00.00 V70.0    2. 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 High cholesterol  E78.00 272.0 LIPID PANEL   4. Essential hypertension  E51 369.7 METABOLIC PANEL, COMPREHENSIVE   5. Elevated PSA  R97.20 790.93 REFERRAL TO UROLOGY   6. Polyneuropathy associated with underlying disease (HCC)  G63 357.4 gabapentin (NEURONTIN) 100 mg capsule   7. Cough  R05.9 786.2 XR CHEST PA LAT   8. Malignant gastrointestinal stromal tumor (GIST) of stomach ([facility] [address]  C49. A2 151.9  patient continues on [address] and will follow up with oncology for further management                Diabetic issues reviewed with him: diabetic diet discussed in detail, and low cholesterol diet, weight control and daily exercise discussed. Follow-up and Dispositions    · Return in about 4 months (around 4/9/2022) for labs 1 week before. Reviewed plan of care. Patient has provided input and agrees with goals.

## 2021-12-09 NOTE — PATIENT INSTRUCTIONS
High Blood Pressure: Care Instructions  Overview     It's normal for blood pressure to go up and down throughout the day. But if it stays up, you have high blood pressure. Another name for high blood pressure is hypertension. Despite what a lot of people think, high blood pressure usually doesn't cause headaches or make you feel dizzy or lightheaded. It usually has no symptoms. But it does increase your risk of stroke, heart attack, and other problems. You and your doctor will talk about your risks of these problems based on your blood pressure. Your doctor will give you a goal for your blood pressure. Your goal will be based on your health and your age. Lifestyle changes, such as eating healthy and being active, are always important to help lower blood pressure. You might also take medicine to reach your blood pressure goal.  Follow-up care is a key part of your treatment and safety. Be sure to make and go to all appointments, and call your doctor if you are having problems. It's also a good idea to know your test results and keep a list of the medicines you take. How can you care for yourself at home? Medical treatment  · If you stop taking your medicine, your blood pressure will go back up. You may take one or more types of medicine to lower your blood pressure. Be safe with medicines. Take your medicine exactly as prescribed. Call your doctor if you think you are having a problem with your medicine. · Talk to your doctor before you start taking aspirin every day. Aspirin can help certain people lower their risk of a heart attack or stroke. But taking aspirin isn't right for everyone, because it can cause serious bleeding. · See your doctor regularly. You may need to see the doctor more often at first or until your blood pressure comes down. · If you are taking blood pressure medicine, talk to your doctor before you take decongestants or anti-inflammatory medicine, such as ibuprofen.  Some of these medicines can raise blood pressure. · Learn how to check your blood pressure at home. Lifestyle changes  · Stay at a healthy weight. This is especially important if you put on weight around the waist. Losing even 10 pounds can help you lower your blood pressure. · If your doctor recommends it, get more exercise. Walking is a good choice. Bit by bit, increase the amount you walk every day. Try for at least 30 minutes on most days of the week. You also may want to swim, bike, or do other activities. · Avoid or limit alcohol. Talk to your doctor about whether you can drink any alcohol. · Try to limit how much sodium you eat to less than 2,300 milligrams (mg) a day. Your doctor may ask you to try to eat less than 1,500 mg a day. · Eat plenty of fruits (such as bananas and oranges), vegetables, legumes, whole grains, and low-fat dairy products. · Lower the amount of saturated fat in your diet. Saturated fat is found in animal products such as milk, cheese, and meat. Limiting these foods may help you lose weight and also lower your risk for heart disease. · Do not smoke. Smoking increases your risk for heart attack and stroke. If you need help quitting, talk to your doctor about stop-smoking programs and medicines. These can increase your chances of quitting for good. When should you call for help? Call 911  anytime you think you may need emergency care. This may mean having symptoms that suggest that your blood pressure is causing a serious heart or blood vessel problem. Your blood pressure may be over 180/120. For example, call 911 if:    · You have symptoms of a heart attack. These may include:  ? Chest pain or pressure, or a strange feeling in the chest.  ? Sweating. ? Shortness of breath. ? Nausea or vomiting. ? Pain, pressure, or a strange feeling in the back, neck, jaw, or upper belly or in one or both shoulders or arms. ? Lightheadedness or sudden weakness.   ? A fast or irregular heartbeat.     · You have symptoms of a stroke. These may include:  ? Sudden numbness, tingling, weakness, or loss of movement in your face, arm, or leg, especially on only one side of your body. ? Sudden vision changes. ? Sudden trouble speaking. ? Sudden confusion or trouble understanding simple statements. ? Sudden problems with walking or balance. ? A sudden, severe headache that is different from past headaches.     · You have severe back or belly pain. Do not wait until your blood pressure comes down on its own. Get help right away. Call your doctor now or seek immediate care if:    · Your blood pressure is much higher than normal (such as 180/120 or higher), but you don't have symptoms.     · You think high blood pressure is causing symptoms, such as:  ? Severe headache.  ? Blurry vision. Watch closely for changes in your health, and be sure to contact your doctor if:    · Your blood pressure measures higher than your doctor recommends at least 2 times. That means the top number is higher or the bottom number is higher, or both.     · You think you may be having side effects from your blood pressure medicine. Where can you learn more? Go to http://www.gray.com/  Enter [id number] in the search box to learn more about \"High Blood Pressure: Care Instructions. \"  Current as of: April 29, 2021               Content Version: 13.0  © 2006-2021 [employer]. Care instructions adapted under license by [employer] (which disclaims liability or warranty for this information). If you have questions about a medical condition or this instruction, always ask your healthcare professional. [place] [address] any warranty or liability for your use of this information.       Medicare Wellness Visit, Male    The best way to live healthy is to have a lifestyle where you eat a well-balanced diet, exercise regularly, limit alcohol use, and quit all forms of tobacco/nicotine, if applicable. Regular preventive services are another way to keep healthy. Preventive services (vaccines, screening tests, monitoring & exams) can help personalize your care plan, which helps you manage your own care. Screening tests can find health problems at the earliest stages, when they are easiest to treat. [facility] follows the current, evidence-based guidelines published by the [facility] ([facility]) when recommending preventive services for our patients. Because we follow these guidelines, sometimes recommendations change over time as research supports it. (For example, a prostate screening blood test is no longer routinely recommended for men with no symptoms). Of course, you and your doctor may decide to screen more often for some diseases, based on your risk and co-morbidities (chronic disease you are already diagnosed with). Preventive services for you include:  - Medicare offers their members a free annual wellness visit, which is time for you and your primary care provider to discuss and plan for your preventive service needs. Take advantage of this benefit every year!  -All adults over age 72 should receive the recommended pneumonia vaccines. Current USPSTF guidelines recommend a series of two vaccines for the best pneumonia protection.   -All adults should have a flu vaccine yearly and tetanus vaccine every 10 years.  -All adults age 48 and older should receive the shingles vaccines (series of two vaccines).        -All adults age 38-68 who are overweight should have a diabetes screening test once every three years.   -Other screening tests & preventive services for persons with diabetes include: an eye exam to screen for diabetic retinopathy, a kidney function test, a foot exam, and stricter control over your cholesterol.   -Cardiovascular screening for adults with routine risk involves an electrocardiogram (ECG) at intervals determined by the provider.   -Colorectal cancer screening should be done for adults age 54-65 with no increased risk factors for colorectal cancer. There are a number of acceptable methods of screening for this type of cancer. Each test has its own benefits and drawbacks. Discuss with your provider what is most appropriate for you during your annual wellness visit. The different tests include: colonoscopy (considered the best screening method), a fecal occult blood test, a fecal DNA test, and sigmoidoscopy.  -All adults born between [facility] should be screened once for Hepatitis C.  -An Abdominal Aortic Aneurysm (AAA) Screening is recommended for men age 73-68 who has ever smoked in their lifetime.      Here is a list of your current Health Maintenance items (your personalized list of preventive services) with a due date:  Health Maintenance Due   Topic Date Due    COVID-19 Vaccine (1) Never done    DTaP/Tdap/Td  (1 - Tdap) Never done    Shingles Vaccine (1 of 2) Never done    Diabetic Foot Care  09/17/2021

## 2021-12-09 NOTE — PROGRESS NOTES
This is the Subsequent Medicare Annual Wellness Exam, performed 12 months or more after the Initial AWV or the last Subsequent AWV    I have reviewed the patient's medical history in detail and updated the computerized patient record. Assessment/Plan   Education and counseling provided:  Are appropriate based on today's review and evaluation  End-of-Life planning (with patient's consent)    1. Medicare annual wellness visit, subsequent  2. 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 High cholesterol  -     LIPID PANEL; Future  4. Essential hypertension  -     METABOLIC PANEL, COMPREHENSIVE; Future  5. Elevated PSA  -     REFERRAL TO UROLOGY  6. Polyneuropathy associated with underlying disease ([facility])  -     gabapentin (NEURONTIN) 100 mg capsule; Take 1 Capsule by mouth nightly as needed for Pain. Max Daily Amount: 100 mg. Indications: neuropathic pain, Normal, Disp-30 Capsule, R-2  7. Cough  -     XR CHEST PA LAT; Future  8. Malignant gastrointestinal stromal tumor (GIST) of stomach ([facility])       Depression Risk Factor Screening     3 most recent PHQ Screens 12/9/2021   Little interest or pleasure in doing things Not at all   Feeling down, depressed, irritable, or hopeless Not at all   Total Score PHQ 2 0       Alcohol Risk Screen    Do you average more than 1 drink per night or more than 7 drinks a week: No    In the past three months have you have had more than 4 drinks containing alcohol on one occasion: No        Functional Ability and Level of Safety    Hearing: Hearing is good. Activities of Daily Living: The home contains: no safety equipment. Patient does total self care      Ambulation: with no difficulty     Fall Risk:  Fall Risk Assessment, last 12 mths 12/9/2021   Able to walk? Yes   Fall in past 12 months? 0   Do you feel unsteady?  0 Are you worried about falling 0   Number of falls in past 12 months -   Fall with injury? -      Abuse Screen:  Patient is not abused       Cognitive Screening    Has your family/caregiver stated any concerns about your memory: no     Cognitive Screening: Normal - . Health Maintenance Due     Health Maintenance Due   Topic Date Due    COVID-19 Vaccine (1) Never done    DTaP/Tdap/Td series (1 - Tdap) Never done    Shingrix Vaccine Age 50> (1 of 2) Never done    Foot Exam Q1  09/17/2021       Patient Care Team   Patient Care Team:  Ishaan Ovalles MD as PCP - General (Internal Medicine)  Ishaan Ovalles MD as PCP - [facility] Empaneled Provider  Dr. Sheryl Hawkins as Physician (Ophthalmology)  Monie Cabrera MD (Gastroenterology)  Justin Galaviz MD (Orthopedic Surgery)  Kaylah Brown MD (Pain Management)  Jonathan Calloway MD (Neurology)  Anne Fisher MD (Hematology and Oncology)  Sylvia Sanchez MD (Gastroenterology)  Shayne Dodge MD (Ophthalmology)    Glaucoma Screening- UTD  Pneumonia Vaccine- UTD  Shingles Vaccine- 9/2015 pt aware of Shingrinx  Tdap Vaccine- not UTD   Colonoscopy 4/2019 Dr Babar Dyson, f/u 4/2024  PSA- 12/2021 5.2   Advance Directive- Does not have.  Working on [employer]     History     Patient Active Problem List   Diagnosis Code    Essential hypertension I10    DM (diabetes mellitus), type 2, uncontrolled ([address] E11.65    High cholesterol E78.00    ACP (advance care planning) Z71.89    Type 2 diabetes mellitus with diabetic neuropathy ([address] E11.40    Type 2 diabetes with nephropathy ([address] E11.21    Cervical spinal stenosis M48.02     Past Medical History:   Diagnosis Date    Diabetes ([address]     GERD (gastroesophageal reflux disease)     Hx of colonic polyps     Hx of gallstones     Hypercholesterolemia     Hypertension     Restless leg syndrome     Wears dentures       Past Surgical History:   Procedure Laterality Date    HX GASTRECTOMY  7/13/202    HX OTHER SURGICAL      gallstones removed    MD COLONOSCOPY FLX DX W/COLLJ SPEC WHEN PFRMD  2-17-16    Dr. Paulo Deluca     Current Outpatient Medications   Medication Sig Dispense Refill    imatinib (GLEEVEC) 400 mg tablet       gabapentin (NEURONTIN) 100 mg capsule Take 1 Capsule by mouth nightly as needed for Pain. Max Daily Amount: 100 mg. Indications: neuropathic pain 30 Capsule 2    FreeStyle Bassam 14 Day Sensor kit USE ONE EVERY 14 DAYS 2 Kit 5    Insulin Needles, Disposable, (BD Ultra-Fine Short Pen Needle) 31 gauge x 5/16\" ndle USE TO INJECT DAILY 100 Each 5    insulin glargine (Lantus Solostar U-100 Insulin) 100 unit/mL (3 mL) inpn INJECT 30 UNITS SUBCUTANEOUSLY EVERY NIGHT AT BEDTIME 15 mL 5    pravastatin (PRAVACHOL) 40 mg tablet TAKE 1 TAB BY MOUTH NIGHTLY. INDICATIONS: HIGH CHOLESTEROL 90 Tablet 1    pantoprazole (PROTONIX) 40 mg tablet TAKE 1 TABLET BY MOUTH EVERY DAY 90 Tablet 3    lisinopriL (PRINIVIL, ZESTRIL) 40 mg tablet TAKE 1 TABLET BY MOUTH EVERY DAY 90 Tablet 3    ferrous sulfate 325 mg (65 mg iron) tablet TAKE 1 TABLET BY MOUTH DAILY      amLODIPine (NORVASC) 5 mg tablet Take 1 Tablet by mouth daily. 90 Tablet 2    cetirizine (ZYRTEC) 10 mg tablet TAKE 1 TABLET BY MOUTH EVERY DAY 90 Tab 3    multivit-min/FA/lycopen/lutein (CENTRUM SILVER MEN PO) Take 1 Tab by mouth daily.  acetaminophen (TYLENOL EXTRA STRENGTH) 500 mg tablet Take 1,000 mg by mouth every six (6) hours as needed for Pain.  flash glucose scanning reader (ChamelicSTYLE BASSAM 14 DAY READER) misc Use as directed 1 Each 0    alcohol swabs (BD SINGLE USE SWABS REGULAR) padm Use daily to check blood sugar 100 Pad 4    senna (Senna) 8.6 mg tablet Take 1 Tablet by mouth daily. (Patient not taking: Reported on 12/9/2021)      triamcinolone acetonide (KENALOG) 0.025 % ointment Apply  to affected area two (2) times a day.  use thin layer (Patient not taking: Reported on 12/9/2021) 30 g 0    halobetasol (ULTRAVATE) 0.05 % topical cream APPLY TO AFFECTED AREA TWICE A DAY AS NEEDED Topical  for dermatitis/rash (Patient not taking: Reported on 12/9/2021)  1    hydrOXYzine pamoate (VISTARIL) 25 mg capsule Take 1 Cap by mouth three (3) times daily as needed for Itching. (Patient not taking: Reported on 12/9/2021) 60 Cap 3     Allergies   Allergen Reactions    Latex Sneezing    Benzalkonium Chloride Hives     Pt denies    Neosporin [Hydrocortisone] Rash       Family History   Problem Relation Age of Onset    Heart Attack Mother     Prostate Cancer Father     Colon Cancer Father     Stroke Sister      Social History     Tobacco Use    Smoking status: Current Every Day Smoker     Packs/day: 0.50    Smokeless tobacco: Never Used   Substance Use Topics    Alcohol use: Yes     Comment: occas. A comprehensive 5 year plan for medical care and screening exams was reviewed with pt and they received a copy of it.         James Bruner LPN

## 2021-12-11 DIAGNOSIS — Z12.5 PROSTATE CANCER SCREENING: ICD-10-CM

## 2022-01-03 ENCOUNTER — TELEPHONE (OUTPATIENT)
Dept: INTERNAL MEDICINE CLINIC | Age: 72
End: 2022-01-03

## 2022-01-09 DIAGNOSIS — E78.00 HIGH CHOLESTEROL: ICD-10-CM

## 2022-01-09 DIAGNOSIS — I10 ESSENTIAL HYPERTENSION: ICD-10-CM

## 2022-01-09 DIAGNOSIS — E11.21 TYPE 2 DIABETES WITH NEPHROPATHY (HCC): ICD-10-CM

## 2022-01-25 ENCOUNTER — TELEPHONE (OUTPATIENT)
Dept: INTERNAL MEDICINE CLINIC | Age: 72
End: 2022-01-25

## 2022-01-25 NOTE — ACP (ADVANCE CARE PLANNING)
Pt has information Banner Transposition Flap Text: The defect edges were debeveled with a #15 scalpel blade.  Given the location of the defect and the proximity to free margins a Banner transposition flap was deemed most appropriate.  Using a sterile surgical marker, an appropriate flap drawn around the defect. The area thus outlined was incised deep to adipose tissue with a #15 scalpel blade.  The skin margins were undermined to an appropriate distance in all directions utilizing iris scissors.

## 2022-01-25 NOTE — TELEPHONE ENCOUNTER
Patient stating the Fee Style [facility] is requiring prior auth. He now has [employer]. I have update is record and advised patient to bring the card for us to copy at his next appt.

## 2022-01-26 RX ORDER — FLASH GLUCOSE SCANNING READER
EACH MISCELLANEOUS
Qty: 1 EACH | Refills: 0 | Status: SHIPPED | OUTPATIENT
Start: 2022-01-26

## 2022-01-26 RX ORDER — FLASH GLUCOSE SENSOR
KIT MISCELLANEOUS
Qty: 2 KIT | Refills: 5 | Status: SHIPPED | OUTPATIENT
Start: 2022-01-26 | End: 2022-05-12 | Stop reason: SDUPTHER

## 2022-01-26 NOTE — TELEPHONE ENCOUNTER
Pt called stating he spoke with his insurance co . [facility] and was told he no longer can get his 14 day freestyle arnie  Through his pharmcy , Dr Gwendolyn Luna has to call it into   [employer]   [phone]

## 2022-01-26 NOTE — TELEPHONE ENCOUNTER
[facility] is requesting Order Demographics and chart notes to be faxed to [phone].

## 2022-03-18 PROBLEM — E11.21 TYPE 2 DIABETES WITH NEPHROPATHY (HCC): Status: ACTIVE | Noted: 2019-06-25

## 2022-03-19 PROBLEM — M48.02 CERVICAL SPINAL STENOSIS: Status: ACTIVE | Noted: 2019-07-22

## 2022-03-20 PROBLEM — E11.40 TYPE 2 DIABETES MELLITUS WITH DIABETIC NEUROPATHY (HCC): Status: ACTIVE | Noted: 2018-06-11

## 2022-04-05 RX ORDER — AMLODIPINE BESYLATE 5 MG/1
TABLET ORAL
Qty: 90 TABLET | Refills: 2 | Status: SHIPPED | OUTPATIENT
Start: 2022-04-05 | End: 2022-05-12 | Stop reason: DRUGHIGH

## 2022-04-06 ENCOUNTER — PATIENT MESSAGE (OUTPATIENT)
Dept: INTERNAL MEDICINE CLINIC | Age: 72
End: 2022-04-06

## 2022-04-07 ENCOUNTER — APPOINTMENT (OUTPATIENT)
Dept: INTERNAL MEDICINE CLINIC | Age: 72
End: 2022-04-07

## 2022-04-08 LAB
ALBUMIN SERPL-MCNC: 3.7 G/DL (ref 3.7–4.7)
ALBUMIN/CREAT UR: 15 MG/G CREAT (ref 0–29)
ALBUMIN/GLOB SERPL: 1.3 {RATIO} (ref 1.2–2.2)
ALP SERPL-CCNC: 158 IU/L (ref 44–121)
ALT SERPL-CCNC: 18 IU/L (ref 0–44)
AST SERPL-CCNC: 27 IU/L (ref 0–40)
BILIRUB SERPL-MCNC: 0.6 MG/DL (ref 0–1.2)
BUN SERPL-MCNC: 14 MG/DL (ref 8–27)
BUN/CREAT SERPL: 13 (ref 10–24)
CALCIUM SERPL-MCNC: 9.2 MG/DL (ref 8.6–10.2)
CHLORIDE SERPL-SCNC: 102 MMOL/L (ref 96–106)
CHOLEST SERPL-MCNC: 115 MG/DL (ref 100–199)
CO2 SERPL-SCNC: 24 MMOL/L (ref 20–29)
CREAT SERPL-MCNC: 1.1 MG/DL (ref 0.76–1.27)
CREAT UR-MCNC: 127.7 MG/DL
EGFR: 71 ML/MIN/1.73
GLOBULIN SER CALC-MCNC: 2.9 G/DL (ref 1.5–4.5)
GLUCOSE SERPL-MCNC: 198 MG/DL (ref 65–99)
HBA1C MFR BLD: 7.3 % (ref 4.8–5.6)
HDLC SERPL-MCNC: 42 MG/DL
IMP & REVIEW OF LAB RESULTS: NORMAL
LDLC SERPL CALC-MCNC: 60 MG/DL (ref 0–99)
MICROALBUMIN UR-MCNC: 19.1 UG/ML
POTASSIUM SERPL-SCNC: 4.1 MMOL/L (ref 3.5–5.2)
PROT SERPL-MCNC: 6.6 G/DL (ref 6–8.5)
SODIUM SERPL-SCNC: 141 MMOL/L (ref 134–144)
TRIGL SERPL-MCNC: 58 MG/DL (ref 0–149)
VLDLC SERPL CALC-MCNC: 13 MG/DL (ref 5–40)

## 2022-04-10 DIAGNOSIS — E11.65 TYPE II OR UNSPECIFIED TYPE DIABETES MELLITUS WITH RENAL MANIFESTATIONS, UNCONTROLLED(250.42) (HCC): ICD-10-CM

## 2022-04-10 DIAGNOSIS — I10 ESSENTIAL HYPERTENSION: ICD-10-CM

## 2022-04-10 DIAGNOSIS — E78.00 HIGH CHOLESTEROL: ICD-10-CM

## 2022-04-10 DIAGNOSIS — E11.29 TYPE II OR UNSPECIFIED TYPE DIABETES MELLITUS WITH RENAL MANIFESTATIONS, UNCONTROLLED(250.42) (HCC): ICD-10-CM

## 2022-04-14 ENCOUNTER — OFFICE VISIT (OUTPATIENT)
Dept: INTERNAL MEDICINE CLINIC | Age: 72
End: 2022-04-14
Payer: MEDICARE

## 2022-04-14 VITALS
DIASTOLIC BLOOD PRESSURE: 94 MMHG | OXYGEN SATURATION: 99 % | BODY MASS INDEX: 32.14 KG/M2 | WEIGHT: 200 LBS | HEIGHT: 66 IN | SYSTOLIC BLOOD PRESSURE: 146 MMHG | RESPIRATION RATE: 20 BRPM | HEART RATE: 85 BPM | TEMPERATURE: 97.4 F

## 2022-04-14 DIAGNOSIS — C49.A2 MALIGNANT GASTROINTESTINAL STROMAL TUMOR (GIST) OF STOMACH (HCC): ICD-10-CM

## 2022-04-14 DIAGNOSIS — J30.1 SEASONAL ALLERGIC RHINITIS DUE TO POLLEN: ICD-10-CM

## 2022-04-14 DIAGNOSIS — E78.00 HIGH CHOLESTEROL: ICD-10-CM

## 2022-04-14 DIAGNOSIS — I10 ESSENTIAL HYPERTENSION: ICD-10-CM

## 2022-04-14 DIAGNOSIS — E11.29 TYPE II OR UNSPECIFIED TYPE DIABETES MELLITUS WITH RENAL MANIFESTATIONS, UNCONTROLLED(250.42) (HCC): Primary | ICD-10-CM

## 2022-04-14 DIAGNOSIS — G63 POLYNEUROPATHY ASSOCIATED WITH UNDERLYING DISEASE (HCC): ICD-10-CM

## 2022-04-14 DIAGNOSIS — E11.65 TYPE II OR UNSPECIFIED TYPE DIABETES MELLITUS WITH RENAL MANIFESTATIONS, UNCONTROLLED(250.42) (HCC): Primary | ICD-10-CM

## 2022-04-14 PROCEDURE — 1101F PT FALLS ASSESS-DOCD LE1/YR: CPT | Performed by: INTERNAL MEDICINE

## 2022-04-14 PROCEDURE — 3051F HG A1C>EQUAL 7.0%<8.0%: CPT | Performed by: INTERNAL MEDICINE

## 2022-04-14 PROCEDURE — G8753 SYS BP > OR = 140: HCPCS | Performed by: INTERNAL MEDICINE

## 2022-04-14 PROCEDURE — 2022F DILAT RTA XM EVC RTNOPTHY: CPT | Performed by: INTERNAL MEDICINE

## 2022-04-14 PROCEDURE — G8417 CALC BMI ABV UP PARAM F/U: HCPCS | Performed by: INTERNAL MEDICINE

## 2022-04-14 PROCEDURE — 99214 OFFICE O/P EST MOD 30 MIN: CPT | Performed by: INTERNAL MEDICINE

## 2022-04-14 PROCEDURE — G8427 DOCREV CUR MEDS BY ELIG CLIN: HCPCS | Performed by: INTERNAL MEDICINE

## 2022-04-14 PROCEDURE — G8536 NO DOC ELDER MAL SCRN: HCPCS | Performed by: INTERNAL MEDICINE

## 2022-04-14 PROCEDURE — G8510 SCR DEP NEG, NO PLAN REQD: HCPCS | Performed by: INTERNAL MEDICINE

## 2022-04-14 PROCEDURE — G8754 DIAS BP LESS 90: HCPCS | Performed by: INTERNAL MEDICINE

## 2022-04-14 PROCEDURE — 3017F COLORECTAL CA SCREEN DOC REV: CPT | Performed by: INTERNAL MEDICINE

## 2022-04-14 RX ORDER — FLUTICASONE PROPIONATE 50 MCG
2 SPRAY, SUSPENSION (ML) NASAL DAILY
Qty: 1 EACH | Refills: 3 | Status: SHIPPED | OUTPATIENT
Start: 2022-04-14 | End: 2022-08-17

## 2022-04-14 NOTE — PROGRESS NOTES
Subjective:       Chief Complaint  The patient presents for follow up of diabetes, hypertension and high cholesterol. MIRTA Hernandez is a 67 y.o. male seen for follow up of diabetes. Jonn has hypertension and hyperlipidemia. Diabetes borderline controlled on Lantus 20 units every other day, no significant medication side effects noted, pt is s/p gastrectomy with Billroth II reconstruction which I believe is causing some hypoglycemia when he eats too much sugar in his diet or not enough protein. Hypertension uncontrolled, no significant medication side effects noted, on lisinopril 40 mg and Norvasc 5 mg, pt by mistake may not have been taking Norvasc,   hyperlipidemia well controlled, no significant medication side effects noted, on Pravachol 40 mg    Diet and Lifestyle: generally follows a low fat low cholesterol diet, follows a diabetic diet regularly, exercises sporadically    Home BP Monitoring: is not measured at home. Diabetic Review of Systems - home glucose monitoring: is performed regularly, 3x/day. He has CCM. 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 Followed by oncology and is on [address]. Pt will have an MRI of Prostate for his elevated PSA. He will f/u with urology for further management. Pt is having a lot off mucus in the AM which could be due to allergic rhinitis vs COPD. Will treat allergies with Flonase and if not improving consider medication like Anoro Ellipta. Current Outpatient Medications   Medication Sig    fluticasone propionate (FLONASE) 50 mcg/actuation nasal spray 2 Sprays by Both Nostrils route daily.     amLODIPine (NORVASC) 5 mg tablet TAKE 1 TABLET BY MOUTH EVERY DAY    flash glucose sensor (FreeStyle Bassam 14 Day Sensor) kit USE ONE EVERY 14 DAYS    flash glucose scanning reader (FreeStyle Bassam 14 Day [place]) misc Use as directed    imatinib (GLEEVEC) 400 mg tablet     gabapentin (NEURONTIN) 100 mg capsule Take 1 Capsule by mouth nightly as needed for Pain. Max Daily Amount: 100 mg. Indications: neuropathic pain    Insulin Needles, Disposable, (BD Ultra-Fine Short Pen Needle) 31 gauge x 5/16\" ndle USE TO INJECT DAILY    insulin glargine (Lantus Solostar U-100 Insulin) 100 unit/mL (3 mL) inpn INJECT 30 UNITS SUBCUTANEOUSLY EVERY NIGHT AT BEDTIME    pravastatin (PRAVACHOL) 40 mg tablet TAKE 1 TAB BY MOUTH NIGHTLY. INDICATIONS: HIGH CHOLESTEROL    pantoprazole (PROTONIX) 40 mg tablet TAKE 1 TABLET BY MOUTH EVERY DAY    lisinopriL (PRINIVIL, ZESTRIL) 40 mg tablet TAKE 1 TABLET BY MOUTH EVERY DAY    ferrous sulfate 325 mg (65 mg iron) tablet TAKE 1 TABLET BY MOUTH DAILY    cetirizine (ZYRTEC) 10 mg tablet TAKE 1 TABLET BY MOUTH EVERY DAY    multivit-min/FA/lycopen/lutein (CENTRUM SILVER MEN PO) Take 1 Tab by mouth daily.  alcohol swabs (BD SINGLE USE SWABS REGULAR) padm Use daily to check blood sugar    senna (Senna) 8.6 mg tablet Take 1 Tablet by mouth daily. (Patient not taking: Reported on 12/9/2021)    acetaminophen (TYLENOL EXTRA STRENGTH) 500 mg tablet Take 1,000 mg by mouth every six (6) hours as needed for Pain. (Patient not taking: Reported on 4/14/2022)    triamcinolone acetonide (KENALOG) 0.025 % ointment Apply  to affected area two (2) times a day. use thin layer (Patient not taking: Reported on 12/9/2021)    halobetasol (ULTRAVATE) 0.05 % topical cream APPLY TO AFFECTED AREA TWICE A DAY AS NEEDED Topical  for dermatitis/rash (Patient not taking: Reported on 12/9/2021)    hydrOXYzine pamoate (VISTARIL) 25 mg capsule Take 1 Cap by mouth three (3) times daily as needed for Itching. (Patient not taking: Reported on 12/9/2021)     No current facility-administered medications for this visit.              Review of Systems  Respiratory: negative for dyspnea on exertion  Cardiovascular: negative for chest pain    Objective:     Visit Vitals  BP Graciela Rios ) 146/94 (BP 1 Location: Left arm, BP Patient Position: Sitting, BP Cuff Size: Adult)   Pulse 85   Temp 97.4 °F (36.3 °C) (Temporal)   Resp 20   Ht 5' 6\" (1.676 m)   Wt 200 lb (90.7 kg)   SpO2 99%   BMI 32.28 kg/m²        General appearance - alert, well appearing, and in no distress   Chest - clear to auscultation, no wheezes, rales or rhonchi, symmetric air entry  Heart - normal rate, regular rhythm, normal S1, S2, no murmurs, rubs, clicks or gallops  Extremities - no edema         Labs:   Lab Results   Component Value Date/Time    Hemoglobin A1c 7.3 (H) 04/07/2022 10:26 AM    Hemoglobin A1c 7.0 (H) 12/03/2021 12:00 AM    Hemoglobin A1c 9.4 (H) 08/30/2021 09:29 AM    Hemoglobin A1c, External 11.0 07/14/2021 12:00 AM    Glucose 198 (H) 04/07/2022 10:26 AM    Glucose (POC) 161 (H) 04/26/2021 01:38 PM    Microalbumin/Creat ratio (mg/g creat) 8 04/24/2018 07:35 AM    Microalb/Creat ratio (ug/mg creat.) 15 04/07/2022 10:26 AM    Microalbumin,urine random 1.90 04/24/2018 07:35 AM    LDL, calculated 60 04/07/2022 10:26 AM    LDL, calculated 65 06/17/2020 10:45 AM    Creatinine, POC 0.8 02/10/2021 10:42 AM    Creatinine 1.10 04/07/2022 10:26 AM      Lab Results   Component Value Date/Time    Cholesterol, total 115 04/07/2022 10:26 AM    HDL Cholesterol 42 04/07/2022 10:26 AM    LDL, calculated 60 04/07/2022 10:26 AM    LDL, calculated 65 06/17/2020 10:45 AM    Triglyceride 58 04/07/2022 10:26 AM    CHOL/HDL Ratio 3.1 04/24/2018 07:35 AM       Lab Results   Component Value Date/Time    ALT (SGPT) 18 04/07/2022 10:26 AM    Alk.  phosphatase 158 (H) 04/07/2022 10:26 AM    Bilirubin, total 0.6 04/07/2022 10:26 AM       Lab Results   Component Value Date/Time    GFR est AA 87 12/03/2021 12:00 AM    GFR est non-AA 75 12/03/2021 12:00 AM    Creatinine, POC 0.8 02/10/2021 10:42 AM    Creatinine 1.10 04/07/2022 10:26 AM    BUN 14 04/07/2022 10:26 AM    Sodium 141 04/07/2022 10:26 AM    Potassium 4.1 04/07/2022 10:26 AM Chloride 102 04/07/2022 10:26 AM    CO2 24 04/07/2022 10:26 AM      Lab Results   Component Value Date/Time    Prostate Specific Ag 5.3 (H) 03/09/2022 01:13 PM    Prostate Specific Ag 5.2 (H) 12/03/2021 12:00 AM    Prostate Specific Ag 2.7 09/23/2020 09:07 AM    Prostate Specific Ag 1.6 01/08/2016 10:54 AM           Assessment / Plan     Diabetes borderline controlled. Pt will decrease to Lantus insulin 10 units daily and can titrate up a few units if needed  hypertension uncontrolled, on lisinopril 40 mg and Norvasc 5 mg daily. Pt will try to take both consistently for the next few weeks to see if any improvement. Hyperlipidemia well controlled, on Pravachol 40 mg      ICD-10-CM ICD-9-CM    1. Type II or unspecified type diabetes mellitus with renal manifestations, uncontrolled(250.42) (HCC)  E11.29 250.42 HEMOGLOBIN A1C W/O EAG    E11.65     2. Essential hypertension  Z58 850.7 METABOLIC PANEL, COMPREHENSIVE   3. High cholesterol  E78.00 272.0 LIPID PANEL   4. Malignant gastrointestinal stromal tumor (GIST) of stomach ([facility] [address]  C49. A2 151.9 Pt doing well s/p surgery and now on Gleevec through oncology    5. Polyneuropathy associated with underlying disease ([facility] [address]  G63 357.4 Stable on Neurontin as needed    6. Seasonal allergic rhinitis due to pollen  J30.1 477.0 Will treat with fluticasone propionate (FLONASE) 50 mcg/actuation nasal spray                Diabetic issues reviewed with him: diabetic diet discussed in detail, and low cholesterol diet, weight control and daily exercise discussed. Follow-up and Dispositions    · Return in about 4 weeks (around 5/12/2022) for BP check, increased Mucus. Reviewed plan of care. Patient has provided input and agrees with goals.

## 2022-04-14 NOTE — PROGRESS NOTES
Patient is in the office today for a 4 month follow up. Do you have an Advance Directive no  Do you want more information : information given     1. \"Have you been to the ER, urgent care clinic since your last visit? Hospitalized since your last visit? \" No    2. \"Have you seen or consulted any other health care providers outside of the [address] [place] [address] since your last visit? \" No     3. For patients aged 39-70: Has the patient had a colonoscopy / FIT/ Cologuard? Yes - no Care Gap present      If the patient is female:    4. For patients aged 41-77: Has the patient had a mammogram within the past 2 years? NA - based on age or sex      11. For patients aged 21-65: Has the patient had a pap smear?  NA - based on age or sex

## 2022-04-14 NOTE — PATIENT INSTRUCTIONS
High Blood Pressure: Care Instructions  Overview     It's normal for blood pressure to go up and down throughout the day. But if it stays up, you have high blood pressure. Another name for high blood pressure is hypertension. Despite what a lot of people think, high blood pressure usually doesn't cause headaches or make you feel dizzy or lightheaded. It usually has no symptoms. But it does increase your risk of stroke, heart attack, and other problems. You and your doctor will talk about your risks of these problems based on your blood pressure. Your doctor will give you a goal for your blood pressure. Your goal will be based on your health and your age. Lifestyle changes, such as eating healthy and being active, are always important to help lower blood pressure. You might also take medicine to reach your blood pressure goal.  Follow-up care is a key part of your treatment and safety. Be sure to make and go to all appointments, and call your doctor if you are having problems. It's also a good idea to know your test results and keep a list of the medicines you take. How can you care for yourself at home? Medical treatment  · If you stop taking your medicine, your blood pressure will go back up. You may take one or more types of medicine to lower your blood pressure. Be safe with medicines. Take your medicine exactly as prescribed. Call your doctor if you think you are having a problem with your medicine. · Talk to your doctor before you start taking aspirin every day. Aspirin can help certain people lower their risk of a heart attack or stroke. But taking aspirin isn't right for everyone, because it can cause serious bleeding. · See your doctor regularly. You may need to see the doctor more often at first or until your blood pressure comes down. · If you are taking blood pressure medicine, talk to your doctor before you take decongestants or anti-inflammatory medicine, such as ibuprofen.  Some of these medicines can raise blood pressure. · Learn how to check your blood pressure at home. Lifestyle changes  · Stay at a healthy weight. This is especially important if you put on weight around the waist. Losing even 10 pounds can help you lower your blood pressure. · If your doctor recommends it, get more exercise. Walking is a good choice. Bit by bit, increase the amount you walk every day. Try for at least 30 minutes on most days of the week. You also may want to swim, bike, or do other activities. · Avoid or limit alcohol. Talk to your doctor about whether you can drink any alcohol. · Try to limit how much sodium you eat to less than 2,300 milligrams (mg) a day. Your doctor may ask you to try to eat less than 1,500 mg a day. · Eat plenty of fruits (such as bananas and oranges), vegetables, legumes, whole grains, and low-fat dairy products. · Lower the amount of saturated fat in your diet. Saturated fat is found in animal products such as milk, cheese, and meat. Limiting these foods may help you lose weight and also lower your risk for heart disease. · Do not smoke. Smoking increases your risk for heart attack and stroke. If you need help quitting, talk to your doctor about stop-smoking programs and medicines. These can increase your chances of quitting for good. When should you call for help? Call 911  anytime you think you may need emergency care. This may mean having symptoms that suggest that your blood pressure is causing a serious heart or blood vessel problem. Your blood pressure may be over 180/120. For example, call 911 if:    · You have symptoms of a heart attack. These may include:  ? Chest pain or pressure, or a strange feeling in the chest.  ? Sweating. ? Shortness of breath. ? Nausea or vomiting. ? Pain, pressure, or a strange feeling in the back, neck, jaw, or upper belly or in one or both shoulders or arms. ? Lightheadedness or sudden weakness.   ? A fast or irregular heartbeat.     · You have symptoms of a stroke. These may include:  ? Sudden numbness, tingling, weakness, or loss of movement in your face, arm, or leg, especially on only one side of your body. ? Sudden vision changes. ? Sudden trouble speaking. ? Sudden confusion or trouble understanding simple statements. ? Sudden problems with walking or balance. ? A sudden, severe headache that is different from past headaches.     · You have severe back or belly pain. Do not wait until your blood pressure comes down on its own. Get help right away. Call your doctor now or seek immediate care if:    · Your blood pressure is much higher than normal (such as 180/120 or higher), but you don't have symptoms.     · You think high blood pressure is causing symptoms, such as:  ? Severe headache.  ? Blurry vision. Watch closely for changes in your health, and be sure to contact your doctor if:    · Your blood pressure measures higher than your doctor recommends at least 2 times. That means the top number is higher or the bottom number is higher, or both.     · You think you may be having side effects from your blood pressure medicine. Where can you learn more? Go to http://www.gray.[employer]/  Enter [id number] in the search box to learn more about \"High Blood Pressure: Care Instructions. \"  Current as of: January 10, 2022               Content Version: 13.2  © 2006-2022 [employer]. Care instructions adapted under license by [employer] (which disclaims liability or warranty for this information). If you have questions about a medical condition or this instruction, always ask your healthcare professional. [place] [address] any warranty or liability for your use of this information.

## 2022-05-12 ENCOUNTER — OFFICE VISIT (OUTPATIENT)
Dept: INTERNAL MEDICINE CLINIC | Age: 72
End: 2022-05-12
Payer: MEDICARE

## 2022-05-12 VITALS
DIASTOLIC BLOOD PRESSURE: 94 MMHG | HEART RATE: 85 BPM | WEIGHT: 198 LBS | HEIGHT: 66 IN | RESPIRATION RATE: 20 BRPM | BODY MASS INDEX: 31.82 KG/M2 | OXYGEN SATURATION: 98 % | SYSTOLIC BLOOD PRESSURE: 142 MMHG | TEMPERATURE: 97.1 F

## 2022-05-12 DIAGNOSIS — E11.65 TYPE II OR UNSPECIFIED TYPE DIABETES MELLITUS WITH RENAL MANIFESTATIONS, UNCONTROLLED(250.42) (HCC): ICD-10-CM

## 2022-05-12 DIAGNOSIS — I10 ESSENTIAL HYPERTENSION: Primary | ICD-10-CM

## 2022-05-12 DIAGNOSIS — E11.29 TYPE II OR UNSPECIFIED TYPE DIABETES MELLITUS WITH RENAL MANIFESTATIONS, UNCONTROLLED(250.42) (HCC): ICD-10-CM

## 2022-05-12 PROCEDURE — 1101F PT FALLS ASSESS-DOCD LE1/YR: CPT | Performed by: INTERNAL MEDICINE

## 2022-05-12 PROCEDURE — G8510 SCR DEP NEG, NO PLAN REQD: HCPCS | Performed by: INTERNAL MEDICINE

## 2022-05-12 PROCEDURE — G8417 CALC BMI ABV UP PARAM F/U: HCPCS | Performed by: INTERNAL MEDICINE

## 2022-05-12 PROCEDURE — G8427 DOCREV CUR MEDS BY ELIG CLIN: HCPCS | Performed by: INTERNAL MEDICINE

## 2022-05-12 PROCEDURE — 2022F DILAT RTA XM EVC RTNOPTHY: CPT | Performed by: INTERNAL MEDICINE

## 2022-05-12 PROCEDURE — 99214 OFFICE O/P EST MOD 30 MIN: CPT | Performed by: INTERNAL MEDICINE

## 2022-05-12 PROCEDURE — G8753 SYS BP > OR = 140: HCPCS | Performed by: INTERNAL MEDICINE

## 2022-05-12 PROCEDURE — G8754 DIAS BP LESS 90: HCPCS | Performed by: INTERNAL MEDICINE

## 2022-05-12 PROCEDURE — 3051F HG A1C>EQUAL 7.0%<8.0%: CPT | Performed by: INTERNAL MEDICINE

## 2022-05-12 PROCEDURE — G8536 NO DOC ELDER MAL SCRN: HCPCS | Performed by: INTERNAL MEDICINE

## 2022-05-12 PROCEDURE — 3017F COLORECTAL CA SCREEN DOC REV: CPT | Performed by: INTERNAL MEDICINE

## 2022-05-12 RX ORDER — FLASH GLUCOSE SENSOR
KIT MISCELLANEOUS
Qty: 2 KIT | Refills: 5 | Status: SHIPPED | OUTPATIENT
Start: 2022-05-12

## 2022-05-12 RX ORDER — AMLODIPINE BESYLATE 10 MG/1
10 TABLET ORAL DAILY
Qty: 90 TABLET | Refills: 1 | Status: SHIPPED | OUTPATIENT
Start: 2022-05-12 | End: 2022-08-17

## 2022-05-12 RX ORDER — FLASH GLUCOSE SCANNING READER
EACH MISCELLANEOUS
Qty: 1 EACH | Refills: 5 | Status: CANCELLED | OUTPATIENT
Start: 2022-05-12

## 2022-05-12 NOTE — PROGRESS NOTES
Patient is in the office today for a 4 week follow up. Do you have an Advance Directive no  Do you want more information : information given     1. \"Have you been to the ER, urgent care clinic since your last visit? Hospitalized since your last visit? \" No    2. \"Have you seen or consulted any other health care providers outside of the [address] [place] [address] since your last visit? \" No     3. For patients aged 39-70: Has the patient had a colonoscopy / FIT/ Cologuard? Yes - no Care Gap present      If the patient is female:    4. For patients aged 41-77: Has the patient had a mammogram within the past 2 years? NA - based on age or sex      11. For patients aged 21-65: Has the patient had a pap smear?  NA - based on age or sex

## 2022-05-12 NOTE — PATIENT INSTRUCTIONS
High Blood Pressure: Care Instructions  Overview     It's normal for blood pressure to go up and down throughout the day. But if it stays up, you have high blood pressure. Another name for high blood pressure is hypertension. Despite what a lot of people think, high blood pressure usually doesn't cause headaches or make you feel dizzy or lightheaded. It usually has no symptoms. But it does increase your risk of stroke, heart attack, and other problems. You and your doctor will talk about your risks of these problems based on your blood pressure. Your doctor will give you a goal for your blood pressure. Your goal will be based on your health and your age. Lifestyle changes, such as eating healthy and being active, are always important to help lower blood pressure. You might also take medicine to reach your blood pressure goal.  Follow-up care is a key part of your treatment and safety. Be sure to make and go to all appointments, and call your doctor if you are having problems. It's also a good idea to know your test results and keep a list of the medicines you take. How can you care for yourself at home? Medical treatment  · If you stop taking your medicine, your blood pressure will go back up. You may take one or more types of medicine to lower your blood pressure. Be safe with medicines. Take your medicine exactly as prescribed. Call your doctor if you think you are having a problem with your medicine. · Talk to your doctor before you start taking aspirin every day. Aspirin can help certain people lower their risk of a heart attack or stroke. But taking aspirin isn't right for everyone, because it can cause serious bleeding. · See your doctor regularly. You may need to see the doctor more often at first or until your blood pressure comes down. · If you are taking blood pressure medicine, talk to your doctor before you take decongestants or anti-inflammatory medicine, such as ibuprofen.  Some of these medicines can raise blood pressure. · Learn how to check your blood pressure at home. Lifestyle changes  · Stay at a healthy weight. This is especially important if you put on weight around the waist. Losing even 10 pounds can help you lower your blood pressure. · If your doctor recommends it, get more exercise. Walking is a good choice. Bit by bit, increase the amount you walk every day. Try for at least 30 minutes on most days of the week. You also may want to swim, bike, or do other activities. · Avoid or limit alcohol. Talk to your doctor about whether you can drink any alcohol. · Try to limit how much sodium you eat to less than 2,300 milligrams (mg) a day. Your doctor may ask you to try to eat less than 1,500 mg a day. · Eat plenty of fruits (such as bananas and oranges), vegetables, legumes, whole grains, and low-fat dairy products. · Lower the amount of saturated fat in your diet. Saturated fat is found in animal products such as milk, cheese, and meat. Limiting these foods may help you lose weight and also lower your risk for heart disease. · Do not smoke. Smoking increases your risk for heart attack and stroke. If you need help quitting, talk to your doctor about stop-smoking programs and medicines. These can increase your chances of quitting for good. When should you call for help? Call 911  anytime you think you may need emergency care. This may mean having symptoms that suggest that your blood pressure is causing a serious heart or blood vessel problem. Your blood pressure may be over 180/120. For example, call 911 if:    · You have symptoms of a heart attack. These may include:  ? Chest pain or pressure, or a strange feeling in the chest.  ? Sweating. ? Shortness of breath. ? Nausea or vomiting. ? Pain, pressure, or a strange feeling in the back, neck, jaw, or upper belly or in one or both shoulders or arms. ? Lightheadedness or sudden weakness.   ? A fast or irregular heartbeat.     · You have symptoms of a stroke. These may include:  ? Sudden numbness, tingling, weakness, or loss of movement in your face, arm, or leg, especially on only one side of your body. ? Sudden vision changes. ? Sudden trouble speaking. ? Sudden confusion or trouble understanding simple statements. ? Sudden problems with walking or balance. ? A sudden, severe headache that is different from past headaches.     · You have severe back or belly pain. Do not wait until your blood pressure comes down on its own. Get help right away. Call your doctor now or seek immediate care if:    · Your blood pressure is much higher than normal (such as 180/120 or higher), but you don't have symptoms.     · You think high blood pressure is causing symptoms, such as:  ? Severe headache.  ? Blurry vision. Watch closely for changes in your health, and be sure to contact your doctor if:    · Your blood pressure measures higher than your doctor recommends at least 2 times. That means the top number is higher or the bottom number is higher, or both.     · You think you may be having side effects from your blood pressure medicine. Where can you learn more? Go to http://sumit-uli.info/  Enter [id number] in the search box to learn more about \"High Blood Pressure: Care Instructions. \"  Current as of: January 10, 2022               Content Version: 13.2  © 0151-4142 [employer]. Care instructions adapted under license by [employer] (which disclaims liability or warranty for this information). If you have questions about a medical condition or this instruction, always ask your healthcare professional. [place] [address] any warranty or liability for your use of this information.

## 2022-05-12 NOTE — PROGRESS NOTES
Bhavin Mcgill is a 67 y.o.  male and presents with Blood Pressure Check and Diabetes      SUBJECTIVE:    Pt's HTN is still uncontrolled on Norvasc 5 mg and lisinopril 40 mg. Pt continues to work on his diet by cutting back starches and sugar in his diet. His DM is better controlled with no low blood sugars on Lantus insulin 10 units daily. Pt has cyst on his scrotum which he has noticed over the last few weeks. He denies any drainage from it. He will use warm compresses to try and improve the cyst. If any progression would have f/u with Urology. Respiratory ROS: negative for - shortness of breath  Cardiovascular ROS: negative for - chest pain    Current Outpatient Medications   Medication Sig    flash glucose sensor ([employer] Bassam 14 Day Sensor) kit USE ONE EVERY 14 DAYS    amLODIPine (NORVASC) 10 mg tablet Take 1 Tablet by mouth daily.  fluticasone propionate (FLONASE) 50 mcg/actuation nasal spray 2 Sprays by Both Nostrils route daily.  flash glucose scanning reader (FreeStyle Bassam 14 Day [place]) misc Use as directed    imatinib (GLEEVEC) 400 mg tablet     gabapentin (NEURONTIN) 100 mg capsule Take 1 Capsule by mouth nightly as needed for Pain. Max Daily Amount: 100 mg. Indications: neuropathic pain    Insulin Needles, Disposable, (BD Ultra-Fine Short Pen Needle) 31 gauge x 5/16\" ndle USE TO INJECT DAILY    insulin glargine (Lantus Solostar U-100 Insulin) 100 unit/mL (3 mL) inpn INJECT 30 UNITS SUBCUTANEOUSLY EVERY NIGHT AT BEDTIME    pravastatin (PRAVACHOL) 40 mg tablet TAKE 1 TAB BY MOUTH NIGHTLY.  INDICATIONS: HIGH CHOLESTEROL    pantoprazole (PROTONIX) 40 mg tablet TAKE 1 TABLET BY MOUTH EVERY DAY    lisinopriL (PRINIVIL, ZESTRIL) 40 mg tablet TAKE 1 TABLET BY MOUTH EVERY DAY    ferrous sulfate 325 mg (65 mg iron) tablet TAKE 1 TABLET BY MOUTH DAILY    cetirizine (ZYRTEC) 10 mg tablet TAKE 1 TABLET BY MOUTH EVERY DAY    multivit-min/FA/lycopen/lutein (CENTRUM SILVER MEN PO) Take 1 Tab by mouth daily.  alcohol swabs (BD SINGLE USE SWABS REGULAR) padm Use daily to check blood sugar    senna (Senna) 8.6 mg tablet Take 1 Tablet by mouth daily. (Patient not taking: Reported on 12/9/2021)    acetaminophen (TYLENOL EXTRA STRENGTH) 500 mg tablet Take 1,000 mg by mouth every six (6) hours as needed for Pain. (Patient not taking: Reported on 4/14/2022)    triamcinolone acetonide (KENALOG) 0.025 % ointment Apply  to affected area two (2) times a day. use thin layer (Patient not taking: Reported on 12/9/2021)    halobetasol (ULTRAVATE) 0.05 % topical cream APPLY TO AFFECTED AREA TWICE A DAY AS NEEDED Topical  for dermatitis/rash (Patient not taking: Reported on 12/9/2021)    hydrOXYzine pamoate (VISTARIL) 25 mg capsule Take 1 Cap by mouth three (3) times daily as needed for Itching. (Patient not taking: Reported on 12/9/2021)     No current facility-administered medications for this visit. OBJECTIVE:  alert, well appearing, and in no distress  Visit Vitals  BP (!) 142/94 (BP 1 Location: Left arm, BP Patient Position: Sitting, BP Cuff Size: Adult)   Pulse 85   Temp 97.1 °F (36.2 °C) (Temporal)   Resp 20   Ht 5' 6\" (1.676 m)   Wt 198 lb (89.8 kg)   SpO2 98%   BMI 31.96 kg/m²      well developed and well nourished   Male - SCROTUM: about 5 mm cyst on skin off scrotum that is currently not drianing. Assessment/Plan      ICD-10-CM ICD-9-CM    1. Essential hypertension  I10 401.9 Uncontrolled. Will increase to Norvasc 10 mg and continue lisinopril 40 mg. Pt to monitor for any increased LE edema. METABOLIC PANEL, COMPREHENSIVE      LIPID PANEL   2.  Type II or unspecified type diabetes mellitus with renal manifestations, uncontrolled(250.42) (HCC)  E11.29 250.42 Controlled on Lantus 10 units since pt  underwent distal gastrectomy and Billroth II reconstruction on 7/13/2021.  for  GIST  HEMOGLOBIN A1C W/O EAG    E11.65       Follow-up and Dispositions    · Return in about 3 months (around 8/12/2022) for labs 1 week before. Reviewed plan of care. Patient has provided input and agrees with goals.

## 2022-05-19 RX ORDER — PANTOPRAZOLE SODIUM 40 MG/1
TABLET, DELAYED RELEASE ORAL
Qty: 90 TABLET | Refills: 3 | Status: SHIPPED | OUTPATIENT
Start: 2022-05-19

## 2022-08-09 ENCOUNTER — APPOINTMENT (OUTPATIENT)
Dept: INTERNAL MEDICINE CLINIC | Age: 72
End: 2022-08-09

## 2022-08-10 LAB
ALBUMIN SERPL-MCNC: 4 G/DL (ref 3.7–4.7)
ALBUMIN/GLOB SERPL: 1.8 {RATIO} (ref 1.2–2.2)
ALP SERPL-CCNC: 227 IU/L (ref 44–121)
ALT SERPL-CCNC: 25 IU/L (ref 0–44)
AST SERPL-CCNC: 44 IU/L (ref 0–40)
BILIRUB SERPL-MCNC: 0.4 MG/DL (ref 0–1.2)
BUN SERPL-MCNC: 12 MG/DL (ref 8–27)
BUN/CREAT SERPL: 11 (ref 10–24)
CALCIUM SERPL-MCNC: 9.1 MG/DL (ref 8.6–10.2)
CHLORIDE SERPL-SCNC: 104 MMOL/L (ref 96–106)
CHOLEST SERPL-MCNC: 121 MG/DL (ref 100–199)
CO2 SERPL-SCNC: 22 MMOL/L (ref 20–29)
CREAT SERPL-MCNC: 1.06 MG/DL (ref 0.76–1.27)
EGFR: 75 ML/MIN/1.73
GLOBULIN SER CALC-MCNC: 2.2 G/DL (ref 1.5–4.5)
GLUCOSE SERPL-MCNC: 200 MG/DL (ref 65–99)
HBA1C MFR BLD: 8.1 % (ref 4.8–5.6)
HDLC SERPL-MCNC: 43 MG/DL
IMP & REVIEW OF LAB RESULTS: NORMAL
LDLC SERPL CALC-MCNC: 66 MG/DL (ref 0–99)
POTASSIUM SERPL-SCNC: 4.1 MMOL/L (ref 3.5–5.2)
PROT SERPL-MCNC: 6.2 G/DL (ref 6–8.5)
SODIUM SERPL-SCNC: 140 MMOL/L (ref 134–144)
TRIGL SERPL-MCNC: 52 MG/DL (ref 0–149)
VLDLC SERPL CALC-MCNC: 12 MG/DL (ref 5–40)

## 2022-08-12 DIAGNOSIS — E11.29 TYPE II OR UNSPECIFIED TYPE DIABETES MELLITUS WITH RENAL MANIFESTATIONS, UNCONTROLLED(250.42) (HCC): ICD-10-CM

## 2022-08-12 DIAGNOSIS — I10 ESSENTIAL HYPERTENSION: ICD-10-CM

## 2022-08-12 DIAGNOSIS — E11.65 TYPE II OR UNSPECIFIED TYPE DIABETES MELLITUS WITH RENAL MANIFESTATIONS, UNCONTROLLED(250.42) (HCC): ICD-10-CM

## 2022-08-16 ENCOUNTER — OFFICE VISIT (OUTPATIENT)
Dept: INTERNAL MEDICINE CLINIC | Age: 72
End: 2022-08-16
Payer: MEDICARE

## 2022-08-16 VITALS
WEIGHT: 201 LBS | SYSTOLIC BLOOD PRESSURE: 128 MMHG | RESPIRATION RATE: 20 BRPM | HEIGHT: 66 IN | BODY MASS INDEX: 32.3 KG/M2 | TEMPERATURE: 97.5 F | OXYGEN SATURATION: 97 % | DIASTOLIC BLOOD PRESSURE: 71 MMHG | HEART RATE: 82 BPM

## 2022-08-16 DIAGNOSIS — C49.A2 MALIGNANT GASTROINTESTINAL STROMAL TUMOR (GIST) OF STOMACH (HCC): ICD-10-CM

## 2022-08-16 DIAGNOSIS — I10 ESSENTIAL HYPERTENSION: ICD-10-CM

## 2022-08-16 DIAGNOSIS — L73.9 FOLLICULITIS: ICD-10-CM

## 2022-08-16 DIAGNOSIS — E78.00 HIGH CHOLESTEROL: ICD-10-CM

## 2022-08-16 DIAGNOSIS — E11.65 TYPE II OR UNSPECIFIED TYPE DIABETES MELLITUS WITH RENAL MANIFESTATIONS, UNCONTROLLED(250.42) (HCC): Primary | ICD-10-CM

## 2022-08-16 DIAGNOSIS — E11.29 TYPE II OR UNSPECIFIED TYPE DIABETES MELLITUS WITH RENAL MANIFESTATIONS, UNCONTROLLED(250.42) (HCC): Primary | ICD-10-CM

## 2022-08-16 PROCEDURE — 3052F HG A1C>EQUAL 8.0%<EQUAL 9.0%: CPT | Performed by: INTERNAL MEDICINE

## 2022-08-16 PROCEDURE — G8427 DOCREV CUR MEDS BY ELIG CLIN: HCPCS | Performed by: INTERNAL MEDICINE

## 2022-08-16 PROCEDURE — 99214 OFFICE O/P EST MOD 30 MIN: CPT | Performed by: INTERNAL MEDICINE

## 2022-08-16 PROCEDURE — 1123F ACP DISCUSS/DSCN MKR DOCD: CPT | Performed by: INTERNAL MEDICINE

## 2022-08-16 PROCEDURE — 3017F COLORECTAL CA SCREEN DOC REV: CPT | Performed by: INTERNAL MEDICINE

## 2022-08-16 PROCEDURE — G8510 SCR DEP NEG, NO PLAN REQD: HCPCS | Performed by: INTERNAL MEDICINE

## 2022-08-16 PROCEDURE — G8536 NO DOC ELDER MAL SCRN: HCPCS | Performed by: INTERNAL MEDICINE

## 2022-08-16 PROCEDURE — G8417 CALC BMI ABV UP PARAM F/U: HCPCS | Performed by: INTERNAL MEDICINE

## 2022-08-16 PROCEDURE — G8752 SYS BP LESS 140: HCPCS | Performed by: INTERNAL MEDICINE

## 2022-08-16 PROCEDURE — G8754 DIAS BP LESS 90: HCPCS | Performed by: INTERNAL MEDICINE

## 2022-08-16 PROCEDURE — 2022F DILAT RTA XM EVC RTNOPTHY: CPT | Performed by: INTERNAL MEDICINE

## 2022-08-16 PROCEDURE — 1101F PT FALLS ASSESS-DOCD LE1/YR: CPT | Performed by: INTERNAL MEDICINE

## 2022-08-16 RX ORDER — CEPHALEXIN 500 MG/1
500 CAPSULE ORAL 3 TIMES DAILY
Qty: 21 CAPSULE | Refills: 0 | Status: SHIPPED | OUTPATIENT
Start: 2022-08-16 | End: 2022-08-23

## 2022-08-16 RX ORDER — METOCLOPRAMIDE 10 MG/1
10 TABLET ORAL
COMMUNITY

## 2022-08-16 NOTE — PROGRESS NOTES
Subjective:       Chief Complaint  The patient presents for follow up of diabetes, hypertension and high cholesterol. HPI  Mallory Platt is a 67 y.o. male seen for follow up of diabetes. Healso has hypertension and hyperlipidemia. Diabetes uncontrolled on Lantus 10 units every  day, no significant medication side effects noted, pt is s/p gastrectomy with Billroth II reconstruction. He is no longer having hypoglycemic episodes. Hypertension well controlled, no significant medication side effects noted, on lisinopril 40 mg and Norvasc 10 mg,   hyperlipidemia well controlled, no significant medication side effects noted, on Pravachol 40 mg    Diet and Lifestyle: generally follows a low fat low cholesterol diet, follows a diabetic diet regularly, exercises sporadically    Home BP Monitoring: is not measured at home. Diabetic Review of Systems - home glucose monitoring: is performed regularly, 3x/day. He has CCM. 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 Followed by oncology and is on [address]. Pt will have an MRI of Prostate for his elevated PSA. He will f/u with urology for further management. Pt is having a lot off mucus in the AM which could be due to allergic rhinitis vs COPD. Will treat allergies with Flonase and if not improving consider medication like Anoro Ellipta. Pt with folliculitis on his right cheek that requires Keflex. Current Outpatient Medications   Medication Sig    metoclopramide HCl (REGLAN) 10 mg tablet Take 10 mg by mouth Before breakfast, lunch, dinner and at bedtime. SITagliptin (JANUVIA) 100 mg tablet Take 1 Tablet by mouth in the morning. cephALEXin (KEFLEX) 500 mg capsule Take 1 Capsule by mouth three (3) times daily for 7 days.     pantoprazole (PROTONIX) 40 mg tablet TAKE 1 TABLET BY MOUTH EVERY DAY    flash glucose sensor (FreeStyle Bassam 14 Day Sensor) kit USE ONE EVERY 14 DAYS    flash glucose scanning reader (FreeStyle Bassam 14 Day [place]) misc Use as directed    imatinib (GLEEVEC) 400 mg tablet     gabapentin (NEURONTIN) 100 mg capsule Take 1 Capsule by mouth nightly as needed for Pain. Max Daily Amount: 100 mg. Indications: neuropathic pain    Insulin Needles, Disposable, (BD Ultra-Fine Short Pen Needle) 31 gauge x 5/16\" ndle USE TO INJECT DAILY    insulin glargine (Lantus Solostar U-100 Insulin) 100 unit/mL (3 mL) inpn INJECT 30 UNITS SUBCUTANEOUSLY EVERY NIGHT AT BEDTIME    pravastatin (PRAVACHOL) 40 mg tablet TAKE 1 TAB BY MOUTH NIGHTLY. INDICATIONS: HIGH CHOLESTEROL    lisinopriL (PRINIVIL, ZESTRIL) 40 mg tablet TAKE 1 TABLET BY MOUTH EVERY DAY    ferrous sulfate 325 mg (65 mg iron) tablet TAKE 1 TABLET BY MOUTH DAILY    cetirizine (ZYRTEC) 10 mg tablet TAKE 1 TABLET BY MOUTH EVERY DAY    multivit-min/FA/lycopen/lutein (CENTRUM SILVER MEN PO) Take 1 Tab by mouth daily. alcohol swabs (BD SINGLE USE SWABS REGULAR) padm Use daily to check blood sugar    fluticasone propionate (FLONASE) 50 mcg/actuation nasal spray SPRAY 2 SPRAYS INTO EACH NOSTRIL EVERY DAY    amLODIPine (NORVASC) 10 mg tablet TAKE 1 TABLET BY MOUTH EVERY DAY    ALPRAZolam (XANAX) 1 mg tablet Take single tablet 30 min prior to procedure (Patient not taking: Reported on 8/16/2022)    senna (SENOKOT) 8.6 mg tablet Take 1 Tablet by mouth daily. (Patient not taking: No sig reported)    acetaminophen (TYLENOL) 500 mg tablet Take 1,000 mg by mouth every six (6) hours as needed for Pain. (Patient not taking: No sig reported)    triamcinolone acetonide (KENALOG) 0.025 % ointment Apply  to affected area two (2) times a day.  use thin layer (Patient not taking: No sig reported)    halobetasol (ULTRAVATE) 0.05 % topical cream APPLY TO AFFECTED AREA TWICE A DAY AS NEEDED Topical  for dermatitis/rash (Patient not taking: No sig reported)    hydrOXYzine pamoate (VISTARIL) 25 mg capsule Take 1 Cap by mouth three (3) times daily as needed for Itching. (Patient not taking: No sig reported)     No current facility-administered medications for this visit. Review of Systems  Respiratory: negative for dyspnea on exertion  Cardiovascular: negative for chest pain    Objective:     Visit Vitals  /71 (BP 1 Location: Left arm, BP Patient Position: Sitting, BP Cuff Size: Adult)   Pulse 82   Temp 97.5 °F (36.4 °C) (Temporal)   Resp 20   Ht 5' 6\" (1.676 m)   Wt 201 lb (91.2 kg)   SpO2 97%   BMI 32.44 kg/m²        General appearance - alert, well appearing, and in no distress   Chest - clear to auscultation, no wheezes, rales or rhonchi, symmetric air entry  Heart - normal rate, regular rhythm, normal S1, S2, no murmurs, rubs, clicks or gallops  Extremities - no edema         Labs:   Lab Results   Component Value Date/Time    Hemoglobin A1c 8.1 (H) 08/09/2022 10:43 AM    Hemoglobin A1c 7.3 (H) 04/07/2022 10:26 AM    Hemoglobin A1c 7.0 (H) 12/03/2021 12:00 AM    Hemoglobin A1c, External 11.0 07/14/2021 12:00 AM    Glucose 200 (H) 08/09/2022 10:43 AM    Glucose (POC) 161 (H) 04/26/2021 01:38 PM    Microalbumin/Creat ratio (mg/g creat) 8 04/24/2018 07:35 AM    Microalb/Creat ratio (ug/mg creat.) 15 04/07/2022 10:26 AM    Microalbumin,urine random 1.90 04/24/2018 07:35 AM    LDL, calculated 66 08/09/2022 10:43 AM    LDL, calculated 65 06/17/2020 10:45 AM    Creatinine, POC 0.8 02/10/2021 10:42 AM    Creatinine 1.06 08/09/2022 10:43 AM      Lab Results   Component Value Date/Time    Cholesterol, total 121 08/09/2022 10:43 AM    HDL Cholesterol 43 08/09/2022 10:43 AM    LDL, calculated 66 08/09/2022 10:43 AM    LDL, calculated 65 06/17/2020 10:45 AM    Triglyceride 52 08/09/2022 10:43 AM    CHOL/HDL Ratio 3.1 04/24/2018 07:35 AM       Lab Results   Component Value Date/Time    ALT (SGPT) 25 08/09/2022 10:43 AM    Alk.  phosphatase 227 (H) 08/09/2022 10:43 AM    Bilirubin, total 0.4 08/09/2022 10:43 AM       Lab Results Component Value Date/Time    GFR est AA 87 12/03/2021 12:00 AM    GFR est non-AA 75 12/03/2021 12:00 AM    Creatinine, POC 0.8 02/10/2021 10:42 AM    Creatinine 1.06 08/09/2022 10:43 AM    BUN 12 08/09/2022 10:43 AM    Sodium 140 08/09/2022 10:43 AM    Potassium 4.1 08/09/2022 10:43 AM    Chloride 104 08/09/2022 10:43 AM    CO2 22 08/09/2022 10:43 AM      Lab Results   Component Value Date/Time    Prostate Specific Ag 5.3 (H) 03/09/2022 01:13 PM    Prostate Specific Ag 5.2 (H) 12/03/2021 12:00 AM    Prostate Specific Ag 2.7 09/23/2020 09:07 AM    Prostate Specific Ag 1.6 01/08/2016 10:54 AM           Assessment / Plan     Diabetes uncontrolled on Lantus insulin 10 units daily. Will add Januvia 100 mg daily. hypertension well controlled, on lisinopril 40 mg and Norvasc 10 mg daily. Hyperlipidemia well controlled, on Pravachol 40 mg    ICD-10-CM ICD-9-CM    1. Type II or unspecified type diabetes mellitus with renal manifestations, uncontrolled(250.42) (HCC)  E11.29 250.42 SITagliptin (JANUVIA) 100 mg tablet    E11.65  HEMOGLOBIN A1C W/O EAG      2. Essential hypertension  X11 153.8 METABOLIC PANEL, COMPREHENSIVE      3. High cholesterol  E78.00 272.0 LIPID PANEL      4. Malignant gastrointestinal stromal [facility]  C49. A2 151.9 Stable s/p Surgery and on Gleevec. Pt followed by Oncology       5. Folliculitis  S85.8 217.1 cephALEXin (KEFLEX) 500 mg capsule                       Diabetic issues reviewed with him: diabetic diet discussed in detail, and low cholesterol diet, weight control and daily exercise discussed. Follow-up and Dispositions    Return in about 3 months (around 11/16/2022) for labs 1 week before. Reviewed plan of care. Patient has provided input and agrees with goals.

## 2022-08-16 NOTE — PROGRESS NOTES
Patient is in the office today for a 3 month follow up. Do you have an Advance Directive no  Do you want more information : information given   1. \"Have you been to the ER, urgent care clinic since your last visit? Hospitalized since your last visit? \" No    2. \"Have you seen or consulted any other health care providers outside of the [address] [place] [address] since your last visit? \" No     3. For patients aged 39-70: Has the patient had a colonoscopy / FIT/ Cologuard? Yes - no Care Gap present      If the patient is female:    4. For patients aged 41-77: Has the patient had a mammogram within the past 2 years? NA - based on age or sex      11. For patients aged 21-65: Has the patient had a pap smear?  NA - based on age or sex

## 2022-10-07 DIAGNOSIS — I10 ESSENTIAL HYPERTENSION: ICD-10-CM

## 2022-10-07 RX ORDER — LISINOPRIL 40 MG/1
TABLET ORAL
Qty: 90 TABLET | Refills: 3 | Status: SHIPPED | OUTPATIENT
Start: 2022-10-07

## 2022-11-16 ENCOUNTER — APPOINTMENT (OUTPATIENT)
Dept: INTERNAL MEDICINE CLINIC | Age: 72
End: 2022-11-16

## 2022-11-17 LAB
ALBUMIN SERPL-MCNC: 3.9 G/DL (ref 3.7–4.7)
ALBUMIN/GLOB SERPL: 1.6 {RATIO} (ref 1.2–2.2)
ALP SERPL-CCNC: 156 IU/L (ref 44–121)
ALT SERPL-CCNC: 18 IU/L (ref 0–44)
AST SERPL-CCNC: 23 IU/L (ref 0–40)
BILIRUB SERPL-MCNC: 0.4 MG/DL (ref 0–1.2)
BUN SERPL-MCNC: 10 MG/DL (ref 8–27)
BUN/CREAT SERPL: 10 (ref 10–24)
CALCIUM SERPL-MCNC: 9 MG/DL (ref 8.6–10.2)
CHLORIDE SERPL-SCNC: 106 MMOL/L (ref 96–106)
CHOLEST SERPL-MCNC: 108 MG/DL (ref 100–199)
CO2 SERPL-SCNC: 23 MMOL/L (ref 20–29)
CREAT SERPL-MCNC: 0.99 MG/DL (ref 0.76–1.27)
EGFR: 81 ML/MIN/1.73
GLOBULIN SER CALC-MCNC: 2.4 G/DL (ref 1.5–4.5)
GLUCOSE SERPL-MCNC: 144 MG/DL (ref 70–99)
HBA1C MFR BLD: 7.2 % (ref 4.8–5.6)
HDLC SERPL-MCNC: 42 MG/DL
IMP & REVIEW OF LAB RESULTS: NORMAL
LDLC SERPL CALC-MCNC: 54 MG/DL (ref 0–99)
POTASSIUM SERPL-SCNC: 4.1 MMOL/L (ref 3.5–5.2)
PROT SERPL-MCNC: 6.3 G/DL (ref 6–8.5)
SODIUM SERPL-SCNC: 144 MMOL/L (ref 134–144)
TRIGL SERPL-MCNC: 51 MG/DL (ref 0–149)
VLDLC SERPL CALC-MCNC: 12 MG/DL (ref 5–40)

## 2022-11-21 ENCOUNTER — OFFICE VISIT (OUTPATIENT)
Dept: INTERNAL MEDICINE CLINIC | Age: 72
End: 2022-11-21
Payer: MEDICARE

## 2022-11-21 VITALS
OXYGEN SATURATION: 98 % | WEIGHT: 205 LBS | DIASTOLIC BLOOD PRESSURE: 64 MMHG | SYSTOLIC BLOOD PRESSURE: 107 MMHG | BODY MASS INDEX: 32.95 KG/M2 | HEIGHT: 66 IN | RESPIRATION RATE: 16 BRPM | HEART RATE: 84 BPM | TEMPERATURE: 98.9 F

## 2022-11-21 DIAGNOSIS — E11.65 TYPE 2 DIABETES MELLITUS WITH HYPERGLYCEMIA, WITH LONG-TERM CURRENT USE OF INSULIN (HCC): Primary | ICD-10-CM

## 2022-11-21 DIAGNOSIS — E78.00 HIGH CHOLESTEROL: ICD-10-CM

## 2022-11-21 DIAGNOSIS — C49.A2 MALIGNANT GASTROINTESTINAL STROMAL TUMOR (GIST) OF STOMACH (HCC): ICD-10-CM

## 2022-11-21 DIAGNOSIS — I10 ESSENTIAL HYPERTENSION: ICD-10-CM

## 2022-11-21 DIAGNOSIS — Z79.4 TYPE 2 DIABETES MELLITUS WITH HYPERGLYCEMIA, WITH LONG-TERM CURRENT USE OF INSULIN (HCC): Primary | ICD-10-CM

## 2022-11-21 PROCEDURE — 3051F HG A1C>EQUAL 7.0%<8.0%: CPT | Performed by: INTERNAL MEDICINE

## 2022-11-21 PROCEDURE — 2022F DILAT RTA XM EVC RTNOPTHY: CPT | Performed by: INTERNAL MEDICINE

## 2022-11-21 PROCEDURE — 1123F ACP DISCUSS/DSCN MKR DOCD: CPT | Performed by: INTERNAL MEDICINE

## 2022-11-21 PROCEDURE — 99214 OFFICE O/P EST MOD 30 MIN: CPT | Performed by: INTERNAL MEDICINE

## 2022-11-21 PROCEDURE — G8752 SYS BP LESS 140: HCPCS | Performed by: INTERNAL MEDICINE

## 2022-11-21 PROCEDURE — G8427 DOCREV CUR MEDS BY ELIG CLIN: HCPCS | Performed by: INTERNAL MEDICINE

## 2022-11-21 PROCEDURE — 3017F COLORECTAL CA SCREEN DOC REV: CPT | Performed by: INTERNAL MEDICINE

## 2022-11-21 PROCEDURE — G8417 CALC BMI ABV UP PARAM F/U: HCPCS | Performed by: INTERNAL MEDICINE

## 2022-11-21 PROCEDURE — 1101F PT FALLS ASSESS-DOCD LE1/YR: CPT | Performed by: INTERNAL MEDICINE

## 2022-11-21 PROCEDURE — G8536 NO DOC ELDER MAL SCRN: HCPCS | Performed by: INTERNAL MEDICINE

## 2022-11-21 PROCEDURE — G8510 SCR DEP NEG, NO PLAN REQD: HCPCS | Performed by: INTERNAL MEDICINE

## 2022-11-21 PROCEDURE — G8754 DIAS BP LESS 90: HCPCS | Performed by: INTERNAL MEDICINE

## 2022-11-21 PROCEDURE — 3078F DIAST BP <80 MM HG: CPT | Performed by: INTERNAL MEDICINE

## 2022-11-21 PROCEDURE — 3074F SYST BP LT 130 MM HG: CPT | Performed by: INTERNAL MEDICINE

## 2022-11-21 RX ORDER — METOCLOPRAMIDE 10 MG/1
10 TABLET ORAL
Qty: 90 TABLET | Refills: 2 | Status: SHIPPED | OUTPATIENT
Start: 2022-11-21

## 2022-11-21 NOTE — PROGRESS NOTES
Subjective:       Chief Complaint  The patient presents for follow up of diabetes, hypertension and high cholesterol. MIRTA Mcclellan is a 67 y.o. male seen for follow up of diabetes. Healso has hypertension and hyperlipidemia. Diabetes uncontrolled on Lantus 10 units every  day, no significant medication side effects noted, pt is s/p gastrectomy with Billroth II reconstruction. He is no longer having hypoglycemic episodes. He does have some mild gastroparesis so would hesitate to give him GLP-1 at this time. He has episodes of feeling full then vomiting. This may also be due to him eating too much with his stomach surgery as mentioned above. Hypertension well controlled, no significant medication side effects noted, on lisinopril 40 mg and Norvasc 10 mg,   hyperlipidemia well controlled, no significant medication side effects noted, on Pravachol 40 mg    Diet and Lifestyle: generally follows a low fat low cholesterol diet, follows a diabetic diet regularly, exercises sporadically    Home BP Monitoring: is not measured at home. Diabetic Review of Systems - home glucose monitoring: is performed regularly, 3x/day. He has CCM. 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 Followed by oncology and is on [address]. Pt recently diagnosed with Prostate Cancer but has decided to do active surveillance. Pt is having a lot off mucus in the AM which could be due to allergic rhinitis vs COPD. Will treat allergies with Flonase and if not improving consider medication like Anoro Ellipta. Pt has Tinea Cruris and multiple skin problems for which she will f/up with Dermatology. Current Outpatient Medications   Medication Sig    metoclopramide HCl (REGLAN) 10 mg tablet Take 1 Tablet by mouth Before breakfast, lunch, dinner and at bedtime.  Indications: gastroesophageal reflux disease    lisinopriL (PRINIVIL, ZESTRIL) 40 mg tablet TAKE 1 TABLET BY MOUTH EVERY DAY    alclometasone (ACLOVATE) 0.05 % ointment USE 1 (ONE) APPLICATION 2 TIMES DAILY TO THE GENITAL AREA    clobetasoL (TEMOVATE) 0.05 % ointment Apply  to affected area two (2) times a day. Ferrous Fumarate 325 mg (106 mg iron) tab Take  by mouth daily. pimecrolimus (ELIDEL) 1 % topical cream Apply  to affected area two (2) times a day. fluticasone propionate (FLONASE) 50 mcg/actuation nasal spray SPRAY 2 SPRAYS INTO EACH NOSTRIL EVERY DAY    amLODIPine (NORVASC) 10 mg tablet TAKE 1 TABLET BY MOUTH EVERY DAY    pantoprazole (PROTONIX) 40 mg tablet TAKE 1 TABLET BY MOUTH EVERY DAY    flash glucose sensor (FreeStyle Bassam 14 Day Sensor) kit USE ONE EVERY 14 DAYS    flash glucose scanning reader (FreeStyle Bassam 14 Day [place]) misc Use as directed    imatinib (GLEEVEC) 400 mg tablet     gabapentin (NEURONTIN) 100 mg capsule Take 1 Capsule by mouth nightly as needed for Pain. Max Daily Amount: 100 mg. Indications: neuropathic pain    Insulin Needles, Disposable, (BD Ultra-Fine Short Pen Needle) 31 gauge x 5/16\" ndle USE TO INJECT DAILY    insulin glargine (Lantus Solostar U-100 Insulin) 100 unit/mL (3 mL) inpn INJECT 30 UNITS SUBCUTANEOUSLY EVERY NIGHT AT BEDTIME    pravastatin (PRAVACHOL) 40 mg tablet TAKE 1 TAB BY MOUTH NIGHTLY. INDICATIONS: HIGH CHOLESTEROL    ferrous sulfate 325 mg (65 mg iron) tablet TAKE 1 TABLET BY MOUTH DAILY    cetirizine (ZYRTEC) 10 mg tablet TAKE 1 TABLET BY MOUTH EVERY DAY    multivit-min/FA/lycopen/lutein (CENTRUM SILVER MEN PO) Take 1 Tab by mouth daily. alcohol swabs (BD SINGLE USE SWABS REGULAR) padm Use daily to check blood sugar    HYDROcodone-acetaminophen (NORCO) 5-325 mg per tablet TAKE 1 TABLET BY MOUTH EVERY 4 HOURS AS NEEDED    ondansetron hcl (ZOFRAN) 4 mg tablet Take 1 Tablet by mouth. senna (SENOKOT) 8.6 mg tablet Take 1 Tablet by mouth daily.  (Patient not taking: No sig reported)    halobetasol (ULTRAVATE) 0.05 % topical cream APPLY TO AFFECTED AREA TWICE A DAY AS NEEDED Topical  for dermatitis/rash (Patient not taking: No sig reported)     No current facility-administered medications for this visit. Review of Systems  Respiratory: negative for dyspnea on exertion  Cardiovascular: negative for chest pain    Objective:     Visit Vitals  /64 (BP 1 Location: Left arm, BP Patient Position: Sitting, BP Cuff Size: Adult)   Pulse 84   Temp 98.9 °F (37.2 °C) (Temporal)   Resp 16   Ht 5' 6\" (1.676 m)   Wt 205 lb (93 kg)   SpO2 98%   BMI 33.09 kg/m²        General appearance - alert, well appearing, and in no distress   Chest - clear to auscultation, no wheezes, rales or rhonchi, symmetric air entry  Heart - normal rate, regular rhythm, normal S1, S2, no murmurs, rubs, clicks or gallops  Extremities - no edema         Labs:   Lab Results   Component Value Date/Time    Hemoglobin A1c 7.2 (H) 11/16/2022 10:36 AM    Hemoglobin A1c 8.1 (H) 08/09/2022 10:43 AM    Hemoglobin A1c 7.3 (H) 04/07/2022 10:26 AM    Hemoglobin A1c, External 11.0 07/14/2021 12:00 AM    Glucose 144 (H) 11/16/2022 10:36 AM    Glucose (POC) 161 (H) 04/26/2021 01:38 PM    Microalbumin/Creat ratio (mg/g creat) 8 04/24/2018 07:35 AM    Microalb/Creat ratio (ug/mg creat.) 15 04/07/2022 10:26 AM    Microalbumin,urine random 1.90 04/24/2018 07:35 AM    LDL, calculated 54 11/16/2022 10:36 AM    LDL, calculated 65 06/17/2020 10:45 AM    Creatinine, POC 0.8 02/10/2021 10:42 AM    Creatinine 0.99 11/16/2022 10:36 AM      Lab Results   Component Value Date/Time    Cholesterol, total 108 11/16/2022 10:36 AM    HDL Cholesterol 42 11/16/2022 10:36 AM    LDL, calculated 54 11/16/2022 10:36 AM    LDL, calculated 65 06/17/2020 10:45 AM    Triglyceride 51 11/16/2022 10:36 AM    CHOL/HDL Ratio 3.1 04/24/2018 07:35 AM       Lab Results   Component Value Date/Time    ALT (SGPT) 18 11/16/2022 10:36 AM    Alk.  phosphatase 156 (H) 11/16/2022 10:36 AM    Bilirubin, total 0.4 11/16/2022 10:36 AM       Lab Results   Component Value Date/Time    GFR est AA 87 12/03/2021 12:00 AM    GFR est non-AA 75 12/03/2021 12:00 AM    Creatinine, POC 0.8 02/10/2021 10:42 AM    Creatinine 0.99 11/16/2022 10:36 AM    BUN 10 11/16/2022 10:36 AM    Sodium 144 11/16/2022 10:36 AM    Potassium 4.1 11/16/2022 10:36 AM    Chloride 106 11/16/2022 10:36 AM    CO2 23 11/16/2022 10:36 AM      Lab Results   Component Value Date/Time    Prostate Specific Ag 6.6 (H) 09/02/2022 11:17 AM    Prostate Specific Ag 5.3 (H) 03/09/2022 01:13 PM    Prostate Specific Ag 5.2 (H) 12/03/2021 12:00 AM    Prostate Specific Ag 1.6 01/08/2016 10:54 AM           Assessment / Plan     Diabetes borderline controlled on Lantus insulin 10 units daily. Due to previous GI surgery GLP-1 may not be option. hypertension well controlled, on lisinopril 40 mg and Norvasc 10 mg daily. Hyperlipidemia well controlled, on Pravachol 40 mg    ICD-10-CM ICD-9-CM    1. Type 2 diabetes mellitus with hyperglycemia, with long-term current use of insulin ([facility])  E11.65 250.00 HEMOGLOBIN A1C W/O EAG    Z79.4 790.29      V58.67       2. Essential hypertension  D47 650.9 METABOLIC PANEL, COMPREHENSIVE      3. High cholesterol  E78.00 272.0 LIPID PANEL      4. Malignant gastrointestinal stromal tumor (GIST) of stomach ([facility]  C49. A2 151.9 Pt stable on Gleevec and will f/up with oncology. Diabetic issues reviewed with him: diabetic diet discussed in detail, and low cholesterol diet, weight control and daily exercise discussed. Follow-up and Dispositions    Return in about 4 months (around 3/21/2023) for , and Medicare Wellness Visit, labs 1 week before. Reviewed plan of care. Patient has provided input and agrees with goals.

## 2022-11-21 NOTE — PROGRESS NOTES
Patient is in the office today for a 3 month follow up. Do you have an Advance Directive no  Do you want more information : information given     1. \"Have you been to the ER, urgent care clinic since your last visit? Hospitalized since your last visit? \" No    2. \"Have you seen or consulted any other health care providers outside of the [address] [place] [address] since your last visit? \" No     3. For patients aged 39-70: Has the patient had a colonoscopy / FIT/ Cologuard? Yes - no Care Gap present      If the patient is female:    4. For patients aged 41-77: Has the patient had a mammogram within the past 2 years? NA - based on age or sex      11. For patients aged 21-65: Has the patient had a pap smear?  NA - based on age or sex

## 2022-11-22 DIAGNOSIS — E11.65 TYPE II OR UNSPECIFIED TYPE DIABETES MELLITUS WITH RENAL MANIFESTATIONS, UNCONTROLLED(250.42) (HCC): ICD-10-CM

## 2022-11-22 DIAGNOSIS — E78.00 HIGH CHOLESTEROL: ICD-10-CM

## 2022-11-22 DIAGNOSIS — E11.29 TYPE II OR UNSPECIFIED TYPE DIABETES MELLITUS WITH RENAL MANIFESTATIONS, UNCONTROLLED(250.42) (HCC): ICD-10-CM

## 2022-11-22 DIAGNOSIS — I10 ESSENTIAL HYPERTENSION: ICD-10-CM

## 2023-01-30 RX ORDER — FLASH GLUCOSE SENSOR
KIT MISCELLANEOUS
Qty: 2 KIT | Refills: 5 | Status: SHIPPED | OUTPATIENT
Start: 2023-01-30

## 2023-02-04 DIAGNOSIS — I10 ESSENTIAL HYPERTENSION: Primary | ICD-10-CM

## 2023-02-05 DIAGNOSIS — E78.00 HIGH CHOLESTEROL: Primary | ICD-10-CM

## 2023-02-05 DIAGNOSIS — Z79.4 TYPE 2 DIABETES MELLITUS WITH HYPERGLYCEMIA, WITH LONG-TERM CURRENT USE OF INSULIN (HCC): Primary | ICD-10-CM

## 2023-02-05 DIAGNOSIS — E11.65 TYPE 2 DIABETES MELLITUS WITH HYPERGLYCEMIA, WITH LONG-TERM CURRENT USE OF INSULIN (HCC): Primary | ICD-10-CM

## 2023-03-15 LAB
ALBUMIN SERPL-MCNC: 4 G/DL (ref 3.7–4.7)
ALBUMIN/GLOB SERPL: 1.7 {RATIO} (ref 1.2–2.2)
ALP SERPL-CCNC: 213 IU/L (ref 44–121)
ALT SERPL-CCNC: 47 IU/L (ref 0–44)
AST SERPL-CCNC: 60 IU/L (ref 0–40)
BILIRUB SERPL-MCNC: 0.6 MG/DL (ref 0–1.2)
BUN SERPL-MCNC: 9 MG/DL (ref 8–27)
BUN/CREAT SERPL: 9 (ref 10–24)
CALCIUM SERPL-MCNC: 8.8 MG/DL (ref 8.6–10.2)
CHLORIDE SERPL-SCNC: 102 MMOL/L (ref 96–106)
CHOLEST SERPL-MCNC: 117 MG/DL (ref 100–199)
CO2 SERPL-SCNC: 24 MMOL/L (ref 20–29)
CREAT SERPL-MCNC: 0.99 MG/DL (ref 0.76–1.27)
EGFRCR SERPLBLD CKD-EPI 2021: 80 ML/MIN/1.73
GLOBULIN SER CALC-MCNC: 2.3 G/DL (ref 1.5–4.5)
GLUCOSE SERPL-MCNC: 239 MG/DL (ref 70–99)
HBA1C MFR BLD: 9.2 % (ref 4.8–5.6)
HDLC SERPL-MCNC: 42 MG/DL
LDLC SERPL CALC-MCNC: 57 MG/DL (ref 0–99)
POTASSIUM SERPL-SCNC: 4.2 MMOL/L (ref 3.5–5.2)
PROT SERPL-MCNC: 6.3 G/DL (ref 6–8.5)
SODIUM SERPL-SCNC: 139 MMOL/L (ref 134–144)
SPECIMEN STATUS REPORT: NORMAL
TRIGL SERPL-MCNC: 91 MG/DL (ref 0–149)
VLDLC SERPL CALC-MCNC: 18 MG/DL (ref 5–40)

## 2023-03-21 DIAGNOSIS — I10 ESSENTIAL HYPERTENSION: ICD-10-CM

## 2023-03-21 DIAGNOSIS — Z79.4 TYPE 2 DIABETES MELLITUS WITH HYPERGLYCEMIA, WITH LONG-TERM CURRENT USE OF INSULIN (HCC): ICD-10-CM

## 2023-03-21 DIAGNOSIS — E78.00 HIGH CHOLESTEROL: ICD-10-CM

## 2023-03-21 DIAGNOSIS — E11.65 TYPE 2 DIABETES MELLITUS WITH HYPERGLYCEMIA, WITH LONG-TERM CURRENT USE OF INSULIN (HCC): ICD-10-CM

## 2023-04-06 RX ORDER — PRAVASTATIN SODIUM 40 MG
TABLET ORAL
Qty: 90 TABLET | Refills: 1 | Status: SHIPPED | OUTPATIENT
Start: 2023-04-06

## 2023-05-09 ENCOUNTER — OFFICE VISIT (OUTPATIENT)
Age: 73
End: 2023-05-09
Payer: MEDICARE

## 2023-05-09 VITALS
HEART RATE: 93 BPM | WEIGHT: 190 LBS | OXYGEN SATURATION: 97 % | HEIGHT: 66 IN | DIASTOLIC BLOOD PRESSURE: 68 MMHG | BODY MASS INDEX: 30.53 KG/M2 | SYSTOLIC BLOOD PRESSURE: 128 MMHG | RESPIRATION RATE: 20 BRPM | TEMPERATURE: 98.9 F

## 2023-05-09 DIAGNOSIS — Z79.4 TYPE 2 DIABETES MELLITUS WITH HYPERGLYCEMIA, WITH LONG-TERM CURRENT USE OF INSULIN (HCC): Primary | ICD-10-CM

## 2023-05-09 DIAGNOSIS — E11.65 TYPE 2 DIABETES MELLITUS WITH HYPERGLYCEMIA, WITH LONG-TERM CURRENT USE OF INSULIN (HCC): Primary | ICD-10-CM

## 2023-05-09 PROCEDURE — 1123F ACP DISCUSS/DSCN MKR DOCD: CPT | Performed by: INTERNAL MEDICINE

## 2023-05-09 PROCEDURE — 3078F DIAST BP <80 MM HG: CPT | Performed by: INTERNAL MEDICINE

## 2023-05-09 PROCEDURE — 99211 OFF/OP EST MAY X REQ PHY/QHP: CPT | Performed by: INTERNAL MEDICINE

## 2023-05-09 PROCEDURE — 3074F SYST BP LT 130 MM HG: CPT | Performed by: INTERNAL MEDICINE

## 2023-05-09 PROCEDURE — 3046F HEMOGLOBIN A1C LEVEL >9.0%: CPT | Performed by: INTERNAL MEDICINE

## 2023-05-09 PROCEDURE — 99214 OFFICE O/P EST MOD 30 MIN: CPT | Performed by: INTERNAL MEDICINE

## 2023-05-09 RX ORDER — INSULIN GLARGINE 100 [IU]/ML
INJECTION, SOLUTION SUBCUTANEOUS
Qty: 5 ADJUSTABLE DOSE PRE-FILLED PEN SYRINGE | Refills: 2 | Status: SHIPPED | OUTPATIENT
Start: 2023-05-09

## 2023-05-17 ASSESSMENT — ENCOUNTER SYMPTOMS: SHORTNESS OF BREATH: 0

## 2023-05-17 NOTE — PROGRESS NOTES
Farshad Morales (:  1950) is a 68 y.o. male,Established patient, here for evaluation of the following chief complaint(s):  Diabetes         ASSESSMENT/PLAN:    ICD-10-CM    1. Type 2 diabetes mellitus with hyperglycemia, with long-term current use of insulin (HCC)  E11.65 Uncontrolled due to recent initiation of prednisone for eczema by his dermatologist.  We will start back on insulin glargine (LANTUS SOLOSTAR) 100 UNIT/ML injection pen 50 units daily and will titrate as needed to keep blood sugars under 150. Patient will continue on Trulicity 2.80 mg weekly for now    Z79.4              Return if symptoms worsen or fail to improve. Subjective   SUBJECTIVE/OBJECTIVE:  Patient who has a history of severe eczema and dermatitis was recently started on prednisone by his dermatologist and his blood sugars went up into the 300-400 range. Patient had previously come off of insulin after having a gastrectomy and was just using Trulicity. Review of Systems   Respiratory:  Negative for shortness of breath. Cardiovascular:  Negative for chest pain. Genitourinary:  Positive for frequency and urgency. Objective   Physical Exam  Cardiovascular:      Rate and Rhythm: Normal rate and regular rhythm. Pulmonary:      Effort: Pulmonary effort is normal.      Breath sounds: Normal breath sounds. Visit Vitals  /68 (Site: Left Upper Arm, Position: Sitting, Cuff Size: Small Adult)   Pulse 93   Temp 98.9 °F (37.2 °C) (Temporal)   Resp 20   Ht 5' 6\" (1.676 m)   Wt 190 lb (86.2 kg)   SpO2 97%   BMI 30.67 kg/m²         Current Outpatient Medications   Medication Sig    insulin glargine (LANTUS SOLOSTAR) 100 UNIT/ML injection pen INJECT 50 UNITS SUBCUTANEOUSLY EVERY NIGHT AT BEDTIME    pravastatin (PRAVACHOL) 40 MG tablet TAKE 1 TAB BY MOUTH NIGHTLY.  INDICATIONS: HIGH CHOLESTEROL    ibuprofen (ADVIL;MOTRIN) 200 MG tablet Take 1 tablet by mouth every 6 hours as needed for Pain    Dulaglutide

## 2023-05-22 PROBLEM — D50.9 IRON DEFICIENCY ANEMIA: Status: ACTIVE | Noted: 2023-05-22

## 2023-05-22 PROBLEM — R79.89 ABNORMAL LIVER FUNCTION TESTS: Status: ACTIVE | Noted: 2023-05-22

## 2023-05-22 PROBLEM — R63.4 WEIGHT LOSS: Status: ACTIVE | Noted: 2023-05-22

## 2023-05-22 PROBLEM — K29.80 DUODENITIS: Status: ACTIVE | Noted: 2023-05-22

## 2023-05-22 PROBLEM — D21.4 BENIGN GASTROINTESTINAL STROMAL TUMOR (GIST): Status: ACTIVE | Noted: 2021-07-13

## 2023-05-22 PROBLEM — L98.9 INFLAMMATORY DERMATOSIS: Status: ACTIVE | Noted: 2023-05-22

## 2023-05-22 RX ORDER — DUPILUMAB 300 MG/2ML
INJECTION, SOLUTION SUBCUTANEOUS
COMMUNITY
Start: 2023-05-04

## 2023-05-22 RX ORDER — METOCLOPRAMIDE 10 MG/1
TABLET ORAL
COMMUNITY
Start: 2023-04-05

## 2023-05-23 ENCOUNTER — HOSPITAL ENCOUNTER (OUTPATIENT)
Facility: HOSPITAL | Age: 73
Discharge: HOME | End: 2023-05-26
Payer: MEDICARE

## 2023-05-23 DIAGNOSIS — R79.89 ELEVATED LFTS: ICD-10-CM

## 2023-05-23 PROCEDURE — 76705 ECHO EXAM OF ABDOMEN: CPT

## 2023-06-07 RX ORDER — PANTOPRAZOLE SODIUM 40 MG/1
TABLET, DELAYED RELEASE ORAL
Qty: 90 TABLET | Refills: 3 | Status: SHIPPED | OUTPATIENT
Start: 2023-06-07

## 2023-06-07 NOTE — TELEPHONE ENCOUNTER
PCP:  Nito Turner MD    Last appt: [unfilled]  Future Appointments   Date Time Provider Nubia Ellis   6/8/2023  2:00 PM Anastasiya Ramos DO [facility]   6/28/2023 10:55 AM [facility] LAB VISIT [facility]   7/12/2023  8:40 AM Fernando Munguia MD [facility]   8/14/2023 10:50 AM [facility] NURSE [facility] Johanna Arango   8/21/2023  9:15 AM MD Garry Wei [facility]       Requested Prescriptions     Pending Prescriptions Disp Refills    pantoprazole (PROTONIX) 40 MG tablet [Pharmacy Med Name: PANTOPRAZOLE SOD DR 40 MG TAB] 90 tablet 3     Sig: TAKE 1 TABLET BY MOUTH EVERY DAY

## 2023-06-08 ENCOUNTER — OFFICE VISIT (OUTPATIENT)
Age: 73
End: 2023-06-08
Payer: MEDICARE

## 2023-06-08 ENCOUNTER — TELEPHONE (OUTPATIENT)
Dept: SLEEP MEDICINE | Facility: HOSPITAL | Age: 73
End: 2023-06-08

## 2023-06-08 VITALS
TEMPERATURE: 98.1 F | SYSTOLIC BLOOD PRESSURE: 110 MMHG | HEART RATE: 82 BPM | HEIGHT: 61 IN | BODY MASS INDEX: 35.8 KG/M2 | RESPIRATION RATE: 18 BRPM | DIASTOLIC BLOOD PRESSURE: 61 MMHG | OXYGEN SATURATION: 96 % | WEIGHT: 189.6 LBS

## 2023-06-08 DIAGNOSIS — G25.81 RESTLESS LEG SYNDROME: ICD-10-CM

## 2023-06-08 DIAGNOSIS — R06.83 SNORING: Primary | ICD-10-CM

## 2023-06-08 DIAGNOSIS — G47.19 EXCESSIVE DAYTIME SLEEPINESS: ICD-10-CM

## 2023-06-08 DIAGNOSIS — F12.90 MARIJUANA USE: ICD-10-CM

## 2023-06-08 DIAGNOSIS — J44.9 CHRONIC OBSTRUCTIVE PULMONARY DISEASE, UNSPECIFIED COPD TYPE (HCC): ICD-10-CM

## 2023-06-08 DIAGNOSIS — R29.818 SUSPECTED SLEEP APNEA: ICD-10-CM

## 2023-06-08 DIAGNOSIS — F17.209 NICOTINE DEPENDENCE WITH NICOTINE-INDUCED DISORDER, UNSPECIFIED NICOTINE PRODUCT TYPE: ICD-10-CM

## 2023-06-08 PROCEDURE — 99204 OFFICE O/P NEW MOD 45 MIN: CPT | Performed by: INTERNAL MEDICINE

## 2023-06-08 PROCEDURE — 1123F ACP DISCUSS/DSCN MKR DOCD: CPT | Performed by: INTERNAL MEDICINE

## 2023-06-08 PROCEDURE — 3078F DIAST BP <80 MM HG: CPT | Performed by: INTERNAL MEDICINE

## 2023-06-08 PROCEDURE — 3074F SYST BP LT 130 MM HG: CPT | Performed by: INTERNAL MEDICINE

## 2023-06-08 ASSESSMENT — SLEEP AND FATIGUE QUESTIONNAIRES
NECK CIRCUMFERENCE (INCHES): 17
HOW LIKELY ARE YOU TO NOD OFF OR FALL ASLEEP WHILE LYING DOWN TO REST IN THE AFTERNOON WHEN CIRCUMSTANCES PERMIT: 3
HOW LIKELY ARE YOU TO NOD OFF OR FALL ASLEEP WHEN YOU ARE A PASSENGER IN A CAR FOR AN HOUR WITHOUT A BREAK: 3
HOW LIKELY ARE YOU TO NOD OFF OR FALL ASLEEP IN A CAR, WHILE STOPPED FOR A FEW MINUTES IN TRAFFIC: 1
HOW LIKELY ARE YOU TO NOD OFF OR FALL ASLEEP WHILE SITTING INACTIVE IN A PUBLIC PLACE: 2
HOW LIKELY ARE YOU TO NOD OFF OR FALL ASLEEP WHILE SITTING AND TALKING TO SOMEONE: 0
HOW LIKELY ARE YOU TO NOD OFF OR FALL ASLEEP WHILE WATCHING TV: 3
ESS TOTAL SCORE: 15
HOW LIKELY ARE YOU TO NOD OFF OR FALL ASLEEP WHILE SITTING QUIETLY AFTER LUNCH WITHOUT ALCOHOL: 2
HOW LIKELY ARE YOU TO NOD OFF OR FALL ASLEEP WHILE SITTING AND READING: 1

## 2023-06-08 ASSESSMENT — PATIENT HEALTH QUESTIONNAIRE - PHQ9
SUM OF ALL RESPONSES TO PHQ QUESTIONS 1-9: 2
1. LITTLE INTEREST OR PLEASURE IN DOING THINGS: 1
SUM OF ALL RESPONSES TO PHQ QUESTIONS 1-9: 2
SUM OF ALL RESPONSES TO PHQ QUESTIONS 1-9: 2
SUM OF ALL RESPONSES TO PHQ9 QUESTIONS 1 & 2: 2
SUM OF ALL RESPONSES TO PHQ QUESTIONS 1-9: 2
2. FEELING DOWN, DEPRESSED OR HOPELESS: 1

## 2023-06-08 NOTE — PROGRESS NOTES
[facility]  Pulmonary, Critical Care, and Sleep Medicine    Office Progress Note- Initial Evaluation      Primary Care Physician: Haider Dave MD     Reason for Visit:  Evaluation for snoring and report of apneas while sleeping    Assessment:  Snoring with report of apneas while sleeping: Patient has symptoms and exam findings highly suggestive of a sleep breathing disorder, such as JAVIER. Given severity of symptoms and comorbidities additional sleep testing is indicated. Excessive Daytime Sleepiness (EDS): Most likely due to several causes: probable JAVIER, COPD, cancer, sleep times, RLS  Restless Leg Syndrome (RLS): Does disrupt sleep. Currently on iron supplementation from Oncologist- Will reassess after sleep testing  COPD: Emphysematous changes noted on CT imaging. On Anoro. No acute exacerbation  Gastrointestinal Stromal Cancer: On Gleevac therapy- Dr. Kristy BOATENG  Nicotine Dependence: 1PPD, Since age 15- current  MJ use: wraps, daily use            Plan:  Smoking cessation strongly encouraged  Continue Anoro  Continue PRN albuterol MDI  Trigger avoidance    Continue with iron supplementation-follow up with hematologist.  May consider a trial of gabapentin    Schedule patient for Split sleep study for further evaluation. Potential consequences of untreated sleep apnea, and/or excessive daytime sleepiness were discussed with the patient. Educational materials provided. Treatment options including CPAP, dental appliance, weight reduction measures, positional therapy, surgeries etc were discussed. Healthy lifestyle changes to include weight loss and exercise discussed. Healthy sleep habits were reviewed and encouraged. Patients are encouraged to obtain 7-9 hours of sleep per day.  and workplace safety reviewed and discussed as appropriate. Drowsy and/or inattentive driving should be avoided.     Follow up with Primary Care Provider (PCP) as directed and for routine health care

## 2023-06-08 NOTE — PROGRESS NOTES
Manuela Galdamez presents today for   Chief Complaint   Patient presents with    Sleep Problem    Snoring    Fatigue       Is someone accompanying this pt? Yes, wife    Is the patient using any DME equipment during OV? no    -DME Company N/A    Have you ever had a sleep study done before? no    Depression Screening:  PHQ-9  6/8/2023   Little interest or pleasure in doing things 1   Little interest or pleasure in doing things -   Feeling down, depressed, or hopeless 1   Trouble falling or staying asleep, or sleeping too much -   Feeling tired or having little energy -   Poor appetite or overeating -   Feeling bad about yourself - or that you are a failure or have let yourself or your family down -   Trouble concentrating on things, such as reading the newspaper or watching television -   Moving or speaking so slowly that other people could have noticed. Or the opposite - being so fidgety or restless that you have been moving around a lot more than usual -   Thoughts that you would be better off dead, or of hurting yourself in some way -   PHQ-2 Score 2   Total Score PHQ 2 -   PHQ-9 Total Score 2   If you checked off any problems, how difficult have these problems made it for you to do your work, take care of things at home, or get along with other people? -        Sherman Sleepiness Scale:  Sleep Medicine 6/8/2023   Sitting and reading 1   Watching TV 3   Sitting, inactive in a public place (e.g. a theatre or a meeting) 2   As a passenger in a car for an hour without a break 3   Lying down to rest in the afternoon when circumstances permit 3   Sitting and talking to someone 0   Sitting quietly after a lunch without alcohol 2   In a car, while stopped for a few minutes in traffic 1   Sherman Sleepiness Score 15   Neck circumference (Inches) 17       Stop-Bang:  STOP-BANG QUESTIONNAIRE 6/8/2023   Are you a loud and/or regular snorer?  1   Do you often feel tired or groggy upon awakening or do you awaken with a headache? 1

## 2023-06-20 NOTE — TELEPHONE ENCOUNTER
Patient aware letter is ready for  at the front office. Alert and oriented to person, place and time/Awake

## 2023-06-28 ENCOUNTER — NURSE ONLY (OUTPATIENT)
Age: 73
End: 2023-06-28

## 2023-06-28 DIAGNOSIS — E11.65 TYPE 2 DIABETES MELLITUS WITH HYPERGLYCEMIA, WITH LONG-TERM CURRENT USE OF INSULIN (HCC): ICD-10-CM

## 2023-06-28 DIAGNOSIS — I10 ESSENTIAL (PRIMARY) HYPERTENSION: ICD-10-CM

## 2023-06-28 DIAGNOSIS — Z79.4 TYPE 2 DIABETES MELLITUS WITH HYPERGLYCEMIA, WITH LONG-TERM CURRENT USE OF INSULIN (HCC): ICD-10-CM

## 2023-06-28 DIAGNOSIS — E78.00 PURE HYPERCHOLESTEROLEMIA, UNSPECIFIED: ICD-10-CM

## 2023-06-29 LAB
ALBUMIN SERPL-MCNC: 3.7 G/DL (ref 3.7–4.7)
ALBUMIN/GLOB SERPL: 1.4 {RATIO} (ref 1.2–2.2)
ALP SERPL-CCNC: 113 IU/L (ref 44–121)
ALT SERPL-CCNC: 13 IU/L (ref 0–44)
AST SERPL-CCNC: 16 IU/L (ref 0–40)
BILIRUB SERPL-MCNC: 0.3 MG/DL (ref 0–1.2)
BUN SERPL-MCNC: 12 MG/DL (ref 8–27)
BUN/CREAT SERPL: 10 (ref 10–24)
CALCIUM SERPL-MCNC: 9.1 MG/DL (ref 8.6–10.2)
CHLORIDE SERPL-SCNC: 103 MMOL/L (ref 96–106)
CHOLEST SERPL-MCNC: 115 MG/DL (ref 100–199)
CO2 SERPL-SCNC: 22 MMOL/L (ref 20–29)
CREAT SERPL-MCNC: 1.19 MG/DL (ref 0.76–1.27)
EGFRCR SERPLBLD CKD-EPI 2021: 64 ML/MIN/1.73
GLOBULIN SER CALC-MCNC: 2.7 G/DL (ref 1.5–4.5)
GLUCOSE SERPL-MCNC: 175 MG/DL (ref 70–99)
HBA1C MFR BLD: 8.8 % (ref 4.8–5.6)
HDLC SERPL-MCNC: 43 MG/DL
LDLC SERPL CALC-MCNC: 56 MG/DL (ref 0–99)
POTASSIUM SERPL-SCNC: 4 MMOL/L (ref 3.5–5.2)
PROT SERPL-MCNC: 6.4 G/DL (ref 6–8.5)
SODIUM SERPL-SCNC: 140 MMOL/L (ref 134–144)
TRIGL SERPL-MCNC: 83 MG/DL (ref 0–149)
VLDLC SERPL CALC-MCNC: 16 MG/DL (ref 5–40)

## 2023-07-12 ENCOUNTER — OFFICE VISIT (OUTPATIENT)
Age: 73
End: 2023-07-12
Payer: MEDICARE

## 2023-07-12 VITALS
TEMPERATURE: 97.6 F | BODY MASS INDEX: 34.74 KG/M2 | SYSTOLIC BLOOD PRESSURE: 105 MMHG | WEIGHT: 184 LBS | HEART RATE: 72 BPM | OXYGEN SATURATION: 98 % | HEIGHT: 61 IN | RESPIRATION RATE: 14 BRPM | DIASTOLIC BLOOD PRESSURE: 63 MMHG

## 2023-07-12 DIAGNOSIS — E11.65 TYPE 2 DIABETES MELLITUS WITH HYPERGLYCEMIA, WITH LONG-TERM CURRENT USE OF INSULIN (HCC): Primary | ICD-10-CM

## 2023-07-12 DIAGNOSIS — E78.00 PURE HYPERCHOLESTEROLEMIA, UNSPECIFIED: ICD-10-CM

## 2023-07-12 DIAGNOSIS — I10 ESSENTIAL (PRIMARY) HYPERTENSION: ICD-10-CM

## 2023-07-12 DIAGNOSIS — Z79.4 TYPE 2 DIABETES MELLITUS WITH HYPERGLYCEMIA, WITH LONG-TERM CURRENT USE OF INSULIN (HCC): Primary | ICD-10-CM

## 2023-07-12 PROCEDURE — 99214 OFFICE O/P EST MOD 30 MIN: CPT | Performed by: INTERNAL MEDICINE

## 2023-07-12 PROCEDURE — 3074F SYST BP LT 130 MM HG: CPT | Performed by: INTERNAL MEDICINE

## 2023-07-12 PROCEDURE — 99211 OFF/OP EST MAY X REQ PHY/QHP: CPT | Performed by: INTERNAL MEDICINE

## 2023-07-12 PROCEDURE — 1123F ACP DISCUSS/DSCN MKR DOCD: CPT | Performed by: INTERNAL MEDICINE

## 2023-07-12 PROCEDURE — 3052F HG A1C>EQUAL 8.0%<EQUAL 9.0%: CPT | Performed by: INTERNAL MEDICINE

## 2023-07-12 PROCEDURE — 3078F DIAST BP <80 MM HG: CPT | Performed by: INTERNAL MEDICINE

## 2023-07-12 ASSESSMENT — ANXIETY QUESTIONNAIRES
6. BECOMING EASILY ANNOYED OR IRRITABLE: 0
2. NOT BEING ABLE TO STOP OR CONTROL WORRYING: 0
3. WORRYING TOO MUCH ABOUT DIFFERENT THINGS: 0
1. FEELING NERVOUS, ANXIOUS, OR ON EDGE: 0
5. BEING SO RESTLESS THAT IT IS HARD TO SIT STILL: 0
4. TROUBLE RELAXING: 0
7. FEELING AFRAID AS IF SOMETHING AWFUL MIGHT HAPPEN: 0
GAD7 TOTAL SCORE: 0

## 2023-07-12 ASSESSMENT — PATIENT HEALTH QUESTIONNAIRE - PHQ9
1. LITTLE INTEREST OR PLEASURE IN DOING THINGS: 0
SUM OF ALL RESPONSES TO PHQ QUESTIONS 1-9: 0
SUM OF ALL RESPONSES TO PHQ9 QUESTIONS 1 & 2: 0
SUM OF ALL RESPONSES TO PHQ QUESTIONS 1-9: 0
SUM OF ALL RESPONSES TO PHQ QUESTIONS 1-9: 0
2. FEELING DOWN, DEPRESSED OR HOPELESS: 0
SUM OF ALL RESPONSES TO PHQ QUESTIONS 1-9: 0

## 2023-07-17 ENCOUNTER — HOSPITAL ENCOUNTER (OUTPATIENT)
Dept: SLEEP MEDICINE | Facility: HOSPITAL | Age: 73
Discharge: HOME | End: 2023-07-20
Attending: INTERNAL MEDICINE
Payer: MEDICARE

## 2023-07-17 VITALS
DIASTOLIC BLOOD PRESSURE: 79 MMHG | BODY MASS INDEX: 30.31 KG/M2 | HEART RATE: 85 BPM | SYSTOLIC BLOOD PRESSURE: 126 MMHG | WEIGHT: 188.6 LBS | HEIGHT: 66 IN

## 2023-07-17 DIAGNOSIS — R06.83 SNORING: ICD-10-CM

## 2023-07-17 DIAGNOSIS — J44.9 CHRONIC OBSTRUCTIVE PULMONARY DISEASE, UNSPECIFIED COPD TYPE (HCC): ICD-10-CM

## 2023-07-17 DIAGNOSIS — G47.19 EXCESSIVE DAYTIME SLEEPINESS: ICD-10-CM

## 2023-07-17 DIAGNOSIS — F12.90 MARIJUANA USE: ICD-10-CM

## 2023-07-17 DIAGNOSIS — G25.81 RESTLESS LEG SYNDROME: ICD-10-CM

## 2023-07-17 DIAGNOSIS — F17.209 NICOTINE DEPENDENCE WITH NICOTINE-INDUCED DISORDER, UNSPECIFIED NICOTINE PRODUCT TYPE: ICD-10-CM

## 2023-07-17 DIAGNOSIS — R29.818 SUSPECTED SLEEP APNEA: ICD-10-CM

## 2023-07-17 PROCEDURE — 95810 POLYSOM 6/> YRS 4/> PARAM: CPT

## 2023-07-25 RX ORDER — AMLODIPINE BESYLATE 10 MG/1
TABLET ORAL
Qty: 90 TABLET | Refills: 1 | Status: SHIPPED | OUTPATIENT
Start: 2023-07-25 | End: 2023-08-29 | Stop reason: SDUPTHER

## 2023-07-25 RX ORDER — METOCLOPRAMIDE 10 MG/1
TABLET ORAL
Qty: 90 TABLET | Refills: 2 | Status: SHIPPED | OUTPATIENT
Start: 2023-07-25 | End: 2023-08-29 | Stop reason: SDUPTHER

## 2023-07-25 NOTE — TELEPHONE ENCOUNTER
PCP:  OPAL CARPENTER MD    amLODIPine (NORVASC) 10 MG tablet  Edit       Summary: TAKE 1 TABLET BY MOUTH EVERY DAY   Start: 8/17/2022  Ord/Sold: 1/23/2023 (O)  Report  Taking:   Long-term:   Med Dose History       Patient Sig: TAKE 1 TABLET BY MOUTH EVERY DAY       Ordered on: 1/23/2023       Authorized by: [employer], [place]        metoclopramide (REGLAN) 10 MG tablet  Edit       Summary: TAKE 1 TABLET BY MOUTH BEFORE BREAKFAST, LUNCH, DINNER AND AT BEDTIME FOR REFLUX DISEASE   Start: 4/5/2023  Ord/Sold: 5/22/2023 (O)  Report  Taking:   Long-term:   Med Dose History       Patient Sig: TAKE 1 TABLET BY MOUTH BEFORE BREAKFAST, LUNCH, DINNER AND AT BEDTIME FOR REFLUX DISEASE       Ordered on: 5/22/2023       Authorized by: PROVIDER, HISTORICAL          Future Appointments   Date Time Provider [address]   8/14/2023 10:50 AM Freddie Lawrence Sched   8/21/2023  9:15 AM MD Ashley Fuller Sched   9/22/2023  4:00 PM Herb Rivers DO [facility] BS AMB   10/13/2023 10:55 AM [facility] LAB VISIT IO BS AMB   10/18/2023  9:00 AM Jose Alfredo Lamas MD [facility] BS AMB

## 2023-07-28 ENCOUNTER — TELEPHONE (OUTPATIENT)
Age: 73
End: 2023-07-28

## 2023-07-28 NOTE — TELEPHONE ENCOUNTER
Jasper Bull from [facility] [phone] wants to know if MP can do req of continuing care for pt.  Please call above number

## 2023-07-31 NOTE — TELEPHONE ENCOUNTER
Called Meagan as requested.  No note placed in patients folder indicating anything needed for this patient

## 2023-08-02 RX ORDER — FLASH GLUCOSE SENSOR
KIT MISCELLANEOUS
Qty: 2 EACH | Refills: 5 | Status: SHIPPED | OUTPATIENT
Start: 2023-08-02

## 2023-08-02 NOTE — TELEPHONE ENCOUNTER
PCP:  OPAL Manning MD    Last refill per chart:    Continuous Blood Gluc Sensor (FREESTYLE RAKESH 14 DAY SENSOR) [facility]  Edit       Summary: USE ONE EVERY 14 DAYS   Start: 1/30/2023  Ord/Sold: 2/20/2023           Future Appointments   Date Time Provider [address]   8/14/2023 10:50 AM [facility] NURSE Radha Timothy Sched   8/21/2023  9:15 AM MD Radha Slater Timothy Sched   9/22/2023  4:00 PM Artem Alvarado DO [facility] BS AMB   10/13/2023 10:55 AM IO LAB VISIT IO BS AMB   10/18/2023  9:00 AM Kristal Erickson MD [facility] BS AMB

## 2023-08-14 ENCOUNTER — TELEPHONE (OUTPATIENT)
Age: 73
End: 2023-08-14

## 2023-08-25 NOTE — TELEPHONE ENCOUNTER
Spoke to patient and he would like the original mailed to him. Scanning is sending the original back to the office after it is scanned and will mail original back to patient. Patient is in agreement to this plan.

## 2023-08-29 RX ORDER — FERROUS SULFATE 325(65) MG
TABLET ORAL
Qty: 90 TABLET | Refills: 2 | Status: SHIPPED | OUTPATIENT
Start: 2023-08-29

## 2023-08-29 RX ORDER — AMLODIPINE BESYLATE 10 MG/1
10 TABLET ORAL DAILY
Qty: 90 TABLET | Refills: 2 | Status: SHIPPED | OUTPATIENT
Start: 2023-08-29

## 2023-08-29 RX ORDER — PRAVASTATIN SODIUM 40 MG
TABLET ORAL
Qty: 90 TABLET | Refills: 1 | Status: SHIPPED | OUTPATIENT
Start: 2023-08-29

## 2023-08-29 RX ORDER — METOCLOPRAMIDE 10 MG/1
TABLET ORAL
Qty: 90 TABLET | Refills: 2 | Status: SHIPPED | OUTPATIENT
Start: 2023-08-29

## 2023-08-29 NOTE — TELEPHONE ENCOUNTER
metoclopramide (REGLAN) 10 MG tablet    amLODIPine (NORVASC) 10 MG tablet    ferrous sulfate (IRON 325) 325 (65 Fe) MG tablet    metoclopramide (REGLAN) 10 MG tablet    [facility] [address] [place] [address],Building [facility] [address]   Phone:  [phone]  Fax:  [phone]

## 2023-08-29 NOTE — TELEPHONE ENCOUNTER
Last OV: 7/12/2023  Next OV: 10/18/2023  Last refill:   7/25/2023  Amlodipine, Reglan  3/30/2021 Ferrous sulfate

## 2023-09-06 ENCOUNTER — TELEPHONE (OUTPATIENT)
Age: 73
End: 2023-09-06

## 2023-09-06 DIAGNOSIS — Z79.4 TYPE 2 DIABETES MELLITUS WITH HYPERGLYCEMIA, WITH LONG-TERM CURRENT USE OF INSULIN (HCC): Primary | ICD-10-CM

## 2023-09-06 DIAGNOSIS — E11.65 TYPE 2 DIABETES MELLITUS WITH HYPERGLYCEMIA, WITH LONG-TERM CURRENT USE OF INSULIN (HCC): Primary | ICD-10-CM

## 2023-09-06 RX ORDER — FLASH GLUCOSE SENSOR
KIT MISCELLANEOUS
Qty: 2 EACH | Refills: 5 | Status: SHIPPED | OUTPATIENT
Start: 2023-09-06 | End: 2023-09-08 | Stop reason: SDUPTHER

## 2023-09-06 NOTE — TELEPHONE ENCOUNTER
[facility]. Stating there was an issue with [facility] not filling it because [employer] wouldn't pay. She has spoken with [facility] and provided them with the info they need so that [facility] can fill it.     Continuous Blood Gluc Sensor (FREESTYLE RAKESH 14 DAY SENSOR) [facility] 2 each 5 8/2/2023     Sig: USE ONE EVERY 14 DAYS

## 2023-09-08 RX ORDER — FLASH GLUCOSE SENSOR
KIT MISCELLANEOUS
Qty: 2 EACH | Refills: 5 | Status: SHIPPED | OUTPATIENT
Start: 2023-09-08

## 2023-09-08 NOTE — TELEPHONE ENCOUNTER
Pt needs an diagnoses code sent to [facility] in order for [employer] to pay for it. Pt wife states he has not had a reading in over a week and really need this.      Cb [phone]  Please advise

## 2023-10-02 ENCOUNTER — TELEPHONE (OUTPATIENT)
Age: 73
End: 2023-10-02

## 2023-10-02 NOTE — TELEPHONE ENCOUNTER
Bibiana Chavarria with [facility] called and states patient is requesting a CGM and they need an order and clinical notes. They are calling to see if Dr Hilary Goldberg would order that? They can be reached at [phone]. Fax: [phone]. Please advise, thank you.

## 2023-10-13 ENCOUNTER — NURSE ONLY (OUTPATIENT)
Age: 73
End: 2023-10-13

## 2023-10-13 DIAGNOSIS — E78.00 PURE HYPERCHOLESTEROLEMIA, UNSPECIFIED: ICD-10-CM

## 2023-10-13 DIAGNOSIS — E11.65 TYPE 2 DIABETES MELLITUS WITH HYPERGLYCEMIA, WITH LONG-TERM CURRENT USE OF INSULIN (HCC): ICD-10-CM

## 2023-10-13 DIAGNOSIS — I10 ESSENTIAL (PRIMARY) HYPERTENSION: ICD-10-CM

## 2023-10-13 DIAGNOSIS — Z79.4 TYPE 2 DIABETES MELLITUS WITH HYPERGLYCEMIA, WITH LONG-TERM CURRENT USE OF INSULIN (HCC): ICD-10-CM

## 2023-10-14 LAB
ALBUMIN SERPL-MCNC: 3.9 G/DL (ref 3.8–4.8)
ALBUMIN/CREAT UR: 8 MG/G CREAT (ref 0–29)
ALBUMIN/GLOB SERPL: 2 {RATIO} (ref 1.2–2.2)
ALP SERPL-CCNC: 120 IU/L (ref 44–121)
ALT SERPL-CCNC: 15 IU/L (ref 0–44)
AST SERPL-CCNC: 25 IU/L (ref 0–40)
BILIRUB SERPL-MCNC: 0.4 MG/DL (ref 0–1.2)
BUN SERPL-MCNC: 13 MG/DL (ref 8–27)
BUN/CREAT SERPL: 12 (ref 10–24)
CALCIUM SERPL-MCNC: 9.1 MG/DL (ref 8.6–10.2)
CHLORIDE SERPL-SCNC: 105 MMOL/L (ref 96–106)
CHOLEST SERPL-MCNC: 105 MG/DL (ref 100–199)
CO2 SERPL-SCNC: 26 MMOL/L (ref 20–29)
CREAT SERPL-MCNC: 1.08 MG/DL (ref 0.76–1.27)
CREAT UR-MCNC: 187.6 MG/DL
EGFRCR SERPLBLD CKD-EPI 2021: 72 ML/MIN/1.73
GLOBULIN SER CALC-MCNC: 2 G/DL (ref 1.5–4.5)
GLUCOSE SERPL-MCNC: 97 MG/DL (ref 70–99)
HBA1C MFR BLD: 7.3 % (ref 4.8–5.6)
HDLC SERPL-MCNC: 44 MG/DL
LDLC SERPL CALC-MCNC: 49 MG/DL (ref 0–99)
MICROALBUMIN UR-MCNC: 15.5 UG/ML
POTASSIUM SERPL-SCNC: 4.2 MMOL/L (ref 3.5–5.2)
PROT SERPL-MCNC: 5.9 G/DL (ref 6–8.5)
SODIUM SERPL-SCNC: 143 MMOL/L (ref 134–144)
TRIGL SERPL-MCNC: 49 MG/DL (ref 0–149)
VLDLC SERPL CALC-MCNC: 12 MG/DL (ref 5–40)

## 2023-10-16 ENCOUNTER — OFFICE VISIT (OUTPATIENT)
Age: 73
End: 2023-10-16
Payer: MEDICARE

## 2023-10-16 VITALS
HEIGHT: 66 IN | DIASTOLIC BLOOD PRESSURE: 78 MMHG | OXYGEN SATURATION: 98 % | RESPIRATION RATE: 16 BRPM | HEART RATE: 73 BPM | BODY MASS INDEX: 30.98 KG/M2 | TEMPERATURE: 97.7 F | WEIGHT: 192.8 LBS | SYSTOLIC BLOOD PRESSURE: 128 MMHG

## 2023-10-16 DIAGNOSIS — R06.02 SOB (SHORTNESS OF BREATH): ICD-10-CM

## 2023-10-16 DIAGNOSIS — J44.9 CHRONIC OBSTRUCTIVE PULMONARY DISEASE, UNSPECIFIED COPD TYPE (HCC): Primary | ICD-10-CM

## 2023-10-16 PROCEDURE — 94729 DIFFUSING CAPACITY: CPT | Performed by: INTERNAL MEDICINE

## 2023-10-16 PROCEDURE — 3017F COLORECTAL CA SCREEN DOC REV: CPT | Performed by: INTERNAL MEDICINE

## 2023-10-16 PROCEDURE — G8417 CALC BMI ABV UP PARAM F/U: HCPCS | Performed by: INTERNAL MEDICINE

## 2023-10-16 PROCEDURE — 99215 OFFICE O/P EST HI 40 MIN: CPT | Performed by: INTERNAL MEDICINE

## 2023-10-16 PROCEDURE — 3078F DIAST BP <80 MM HG: CPT | Performed by: INTERNAL MEDICINE

## 2023-10-16 PROCEDURE — 3074F SYST BP LT 130 MM HG: CPT | Performed by: INTERNAL MEDICINE

## 2023-10-16 PROCEDURE — G8427 DOCREV CUR MEDS BY ELIG CLIN: HCPCS | Performed by: INTERNAL MEDICINE

## 2023-10-16 PROCEDURE — 3023F SPIROM DOC REV: CPT | Performed by: INTERNAL MEDICINE

## 2023-10-16 PROCEDURE — G8484 FLU IMMUNIZE NO ADMIN: HCPCS | Performed by: INTERNAL MEDICINE

## 2023-10-16 PROCEDURE — 1123F ACP DISCUSS/DSCN MKR DOCD: CPT | Performed by: INTERNAL MEDICINE

## 2023-10-16 PROCEDURE — 4004F PT TOBACCO SCREEN RCVD TLK: CPT | Performed by: INTERNAL MEDICINE

## 2023-10-16 NOTE — PROGRESS NOTES
Danika Short presents today for   Chief Complaint   Patient presents with    Results     Venous Duplex studies,Echo, SS       Is someone accompanying this pt? No    Is the patient using any DME equipment during OV? No    -DME Company NA    Depression Screenin/12/2023     8:54 AM   PHQ-9 Questionaire   Little interest or pleasure in doing things 0   Feeling down, depressed, or hopeless 0   PHQ-9 Total Score 0       Learning Needs Questionnaire:     No question data found. Fall Risk:         2023     8:53 AM 2023     1:59 PM 2023     9:52 AM   Fall Risk   2 or more falls in past year? no no no   Fall with injury in past year? no no no        Abuse Screenin/12/2023     8:00 AM   AMB Abuse Screening   Do you ever feel afraid of your partner? N   Are you in a relationship with someone who physically or mentally threatens you? N   Is it safe for you to go home? Y         Coordination of Care:    1. Have you been to the ER, urgent care clinic since your last visit? Hospitalized since your last visit? No    2. Have you seen or consulted any other health care providers outside of the [address] [place] [address] since your last visit? Include any pap smears or colon screening. No    Medication list has been update per patient.
A notification that a wet reading is available was posted to the ED track board. The \"Impression\" above reflects any changes made by the attending physician at the time of report signing. Dictated ByElissa Oh on 6/1/2022 9:14 PM    As attending radiologist, I have assessed the study images, and dictated or reviewed/edited the final report as needed. Signed By: Cherry San MD on 6/1/2022 11:30 PM     Historical Sleep Testing Data:  Sleep study 7/17/2023  Results-overall this was a nondiagnostic sleep study. Limited sleep was achieved during the study. The majority of sleep achieved was in stage II. No stage III or REM sleep was observed. Diagnosis #1 insomnia. #2 leg pain.   #3 premature ventricular contractions      Anuel Padilla MD  10/16/23  Pulmonary, Critical Care Medicine  [address] [facility]

## 2023-10-18 ENCOUNTER — OFFICE VISIT (OUTPATIENT)
Age: 73
End: 2023-10-18
Payer: MEDICARE

## 2023-10-18 VITALS
HEIGHT: 66 IN | RESPIRATION RATE: 20 BRPM | DIASTOLIC BLOOD PRESSURE: 71 MMHG | WEIGHT: 190 LBS | OXYGEN SATURATION: 100 % | TEMPERATURE: 97.6 F | HEART RATE: 62 BPM | BODY MASS INDEX: 30.53 KG/M2 | SYSTOLIC BLOOD PRESSURE: 125 MMHG

## 2023-10-18 DIAGNOSIS — G63 POLYNEUROPATHY IN DISEASES CLASSIFIED ELSEWHERE (HCC): ICD-10-CM

## 2023-10-18 DIAGNOSIS — F33.41 MAJOR DEPRESSIVE DISORDER, RECURRENT, IN PARTIAL REMISSION (HCC): ICD-10-CM

## 2023-10-18 DIAGNOSIS — J41.0 SIMPLE CHRONIC BRONCHITIS (HCC): ICD-10-CM

## 2023-10-18 DIAGNOSIS — I10 ESSENTIAL (PRIMARY) HYPERTENSION: ICD-10-CM

## 2023-10-18 DIAGNOSIS — E11.65 TYPE 2 DIABETES MELLITUS WITH HYPERGLYCEMIA, WITH LONG-TERM CURRENT USE OF INSULIN (HCC): Primary | ICD-10-CM

## 2023-10-18 DIAGNOSIS — E78.00 PURE HYPERCHOLESTEROLEMIA, UNSPECIFIED: ICD-10-CM

## 2023-10-18 DIAGNOSIS — Z79.4 TYPE 2 DIABETES MELLITUS WITH HYPERGLYCEMIA, WITH LONG-TERM CURRENT USE OF INSULIN (HCC): Primary | ICD-10-CM

## 2023-10-18 DIAGNOSIS — C49.A2 GASTROINTESTINAL STROMAL TUMOR OF STOMACH (HCC): ICD-10-CM

## 2023-10-18 PROBLEM — J44.9 CHRONIC OBSTRUCTIVE PULMONARY DISEASE, UNSPECIFIED (HCC): Status: ACTIVE | Noted: 2023-10-18

## 2023-10-18 PROCEDURE — 90694 VACC AIIV4 NO PRSRV 0.5ML IM: CPT | Performed by: INTERNAL MEDICINE

## 2023-10-18 PROCEDURE — 3017F COLORECTAL CA SCREEN DOC REV: CPT | Performed by: INTERNAL MEDICINE

## 2023-10-18 PROCEDURE — G8427 DOCREV CUR MEDS BY ELIG CLIN: HCPCS | Performed by: INTERNAL MEDICINE

## 2023-10-18 PROCEDURE — 1123F ACP DISCUSS/DSCN MKR DOCD: CPT | Performed by: INTERNAL MEDICINE

## 2023-10-18 PROCEDURE — 2022F DILAT RTA XM EVC RTNOPTHY: CPT | Performed by: INTERNAL MEDICINE

## 2023-10-18 PROCEDURE — 3023F SPIROM DOC REV: CPT | Performed by: INTERNAL MEDICINE

## 2023-10-18 PROCEDURE — 99214 OFFICE O/P EST MOD 30 MIN: CPT | Performed by: INTERNAL MEDICINE

## 2023-10-18 PROCEDURE — 4004F PT TOBACCO SCREEN RCVD TLK: CPT | Performed by: INTERNAL MEDICINE

## 2023-10-18 PROCEDURE — G8417 CALC BMI ABV UP PARAM F/U: HCPCS | Performed by: INTERNAL MEDICINE

## 2023-10-18 PROCEDURE — 3051F HG A1C>EQUAL 7.0%<8.0%: CPT | Performed by: INTERNAL MEDICINE

## 2023-10-18 PROCEDURE — 3078F DIAST BP <80 MM HG: CPT | Performed by: INTERNAL MEDICINE

## 2023-10-18 PROCEDURE — G8484 FLU IMMUNIZE NO ADMIN: HCPCS | Performed by: INTERNAL MEDICINE

## 2023-10-18 PROCEDURE — PBSHW INFLUENZA, FLUAD, (AGE 65 Y+), IM, PF, 0.5 ML: Performed by: INTERNAL MEDICINE

## 2023-10-18 PROCEDURE — 3074F SYST BP LT 130 MM HG: CPT | Performed by: INTERNAL MEDICINE

## 2023-10-18 RX ORDER — LANCETS 30 GAUGE
EACH MISCELLANEOUS
Qty: 100 EACH | Refills: 1 | Status: SHIPPED | OUTPATIENT
Start: 2023-10-18

## 2023-10-18 RX ORDER — BLOOD-GLUCOSE METER
KIT MISCELLANEOUS
Qty: 1 KIT | Refills: 0 | Status: SHIPPED | OUTPATIENT
Start: 2023-10-18

## 2023-10-18 NOTE — PROGRESS NOTES
Irvin Mcleod presents today for   Chief Complaint   Patient presents with    Diabetes    Hypertension    Cholesterol Problem     3 month follow up          Verbal order read back per Dr. Jeff Salazar flu vaccine. Patient received flu vaccine in left deltoid. Patient was observed and no signs or symptoms of an allergic reaction noted at this time. Patient tolerated well and left without complaints. Patient received flu VIS. 1. \"Have you been to the ER, urgent care clinic since your last visit? Hospitalized since your last visit? \" no    2. \"Have you seen or consulted any other health care providers outside of the [address] [place] [address] since your last visit? \" no     3. For patients aged 43-73: Has the patient had a colonoscopy / FIT/ Cologuard? Yes - no Care Gap present      If the patient is female:    4. For patients aged 43-66: Has the patient had a mammogram within the past 2 years? NA - based on age or sex      11. For patients aged 21-65: Has the patient had a pap smear?  NA - based on age or sex

## 2023-10-18 NOTE — PROGRESS NOTES
Subjective:       Chief Complaint  The patient presents for follow up of diabetes, hypertension and high cholesterol. MIK Mcleod is a 68 y.o. male seen for follow up of diabetes. Healso has hypertension and hyperlipidemia. Diabetes uncontrolled on Trulicity 8.20 mg, patient has not been taking it consistently every week due to concern that he might have low blood sugars but he was educated that this medication will not cause low blood sugars. Patient is no longer on insulin. Pt is s/p gastrectomy with Billroth II reconstruction which most likely resolved his need for insulin. Hypertension well controlled, no significant medication side effects noted, on lisinopril 40 mg and Norvasc 10 mg,   hyperlipidemia well controlled, no significant medication side effects noted, on Pravachol 40 mg    Diet and Lifestyle: generally follows a low fat low cholesterol diet, follows a diabetic diet regularly, exercises sporadically    Home BP Monitoring: is not measured at home. Diabetic Review of Systems - home glucose monitoring: is performed regularly, 3x/day. He has CCM. 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 Followed by oncology and is on [address] Pt recently diagnosed with Prostate Cancer but has decided to do active surveillance and has been followed closely by urology. Patient has COPD that has improved with Anoro Ellipta. He is now followed by Dr. Katherin Wells and has PFTs for tomorrow. He is seeing Dr. Bernarda Oliver for sleep apnea evaluation and is to get his sleep study before he can get his CPAP machine. Patient has fatty liver which is confirmed with ultrasound of liver.      Current Outpatient Medications   Medication Sig    glucose monitoring kit Check BS 1x/day E11.29    Lancets MISC Check BS 1x/day E11.29    Continuous Blood Gluc Sensor (FREESTYLE RAKESH 14 DAY SENSOR) MISC USE ONE EVERY 14

## 2023-10-19 ENCOUNTER — PROCEDURE VISIT (OUTPATIENT)
Age: 73
End: 2023-10-19

## 2023-10-19 VITALS — HEIGHT: 66 IN | BODY MASS INDEX: 30.53 KG/M2 | WEIGHT: 190 LBS

## 2023-10-19 DIAGNOSIS — J44.9 CHRONIC OBSTRUCTIVE PULMONARY DISEASE, UNSPECIFIED COPD TYPE (HCC): Primary | ICD-10-CM

## 2023-10-31 ENCOUNTER — TELEPHONE (OUTPATIENT)
Age: 73
End: 2023-10-31

## 2023-10-31 NOTE — TELEPHONE ENCOUNTER
ASHLEE with [address] called and states they faxed over an order for a CGM. It was sent back that patient is not on insulin. She states that because patient is already on a glucose monitor that the insurance will automatically approve it without him being on insulin. She is sending over another order ,she is calling to see if that can be signed? She can be reached at [phone]. Please advise, thank you.

## 2023-11-29 ENCOUNTER — OFFICE VISIT (OUTPATIENT)
Age: 73
End: 2023-11-29
Payer: MEDICARE

## 2023-11-29 VITALS
DIASTOLIC BLOOD PRESSURE: 78 MMHG | RESPIRATION RATE: 18 BRPM | WEIGHT: 195 LBS | BODY MASS INDEX: 31.34 KG/M2 | HEIGHT: 66 IN | SYSTOLIC BLOOD PRESSURE: 135 MMHG | HEART RATE: 79 BPM | TEMPERATURE: 97.5 F | OXYGEN SATURATION: 99 %

## 2023-11-29 DIAGNOSIS — F51.01 PRIMARY INSOMNIA: ICD-10-CM

## 2023-11-29 DIAGNOSIS — J30.1 ALLERGIC RHINITIS DUE TO POLLEN: ICD-10-CM

## 2023-11-29 DIAGNOSIS — G47.19 EXCESSIVE DAYTIME SLEEPINESS: ICD-10-CM

## 2023-11-29 DIAGNOSIS — R06.83 SNORING: Primary | ICD-10-CM

## 2023-11-29 DIAGNOSIS — F17.209 NICOTINE DEPENDENCE WITH NICOTINE-INDUCED DISORDER, UNSPECIFIED NICOTINE PRODUCT TYPE: ICD-10-CM

## 2023-11-29 DIAGNOSIS — D21.4 BENIGN GASTROINTESTINAL STROMAL TUMOR (GIST): ICD-10-CM

## 2023-11-29 DIAGNOSIS — R29.818 SUSPECTED SLEEP APNEA: ICD-10-CM

## 2023-11-29 DIAGNOSIS — E11.9 TYPE 2 DIABETES MELLITUS WITHOUT COMPLICATION, WITHOUT LONG-TERM CURRENT USE OF INSULIN (HCC): ICD-10-CM

## 2023-11-29 DIAGNOSIS — E11.9 DIABETES MELLITUS TYPE II, NON INSULIN DEPENDENT (HCC): ICD-10-CM

## 2023-11-29 DIAGNOSIS — I10 ESSENTIAL (PRIMARY) HYPERTENSION: ICD-10-CM

## 2023-11-29 DIAGNOSIS — G25.81 RESTLESS LEG SYNDROME: ICD-10-CM

## 2023-11-29 DIAGNOSIS — I10 ESSENTIAL HYPERTENSION: ICD-10-CM

## 2023-11-29 DIAGNOSIS — F12.90 MARIJUANA USE: ICD-10-CM

## 2023-11-29 PROCEDURE — G8427 DOCREV CUR MEDS BY ELIG CLIN: HCPCS | Performed by: OTOLARYNGOLOGY

## 2023-11-29 PROCEDURE — 3051F HG A1C>EQUAL 7.0%<8.0%: CPT | Performed by: OTOLARYNGOLOGY

## 2023-11-29 PROCEDURE — 3078F DIAST BP <80 MM HG: CPT | Performed by: OTOLARYNGOLOGY

## 2023-11-29 PROCEDURE — 1123F ACP DISCUSS/DSCN MKR DOCD: CPT | Performed by: OTOLARYNGOLOGY

## 2023-11-29 PROCEDURE — 2022F DILAT RTA XM EVC RTNOPTHY: CPT | Performed by: OTOLARYNGOLOGY

## 2023-11-29 PROCEDURE — 99205 OFFICE O/P NEW HI 60 MIN: CPT | Performed by: OTOLARYNGOLOGY

## 2023-11-29 PROCEDURE — G8417 CALC BMI ABV UP PARAM F/U: HCPCS | Performed by: OTOLARYNGOLOGY

## 2023-11-29 PROCEDURE — 3075F SYST BP GE 130 - 139MM HG: CPT | Performed by: OTOLARYNGOLOGY

## 2023-11-29 PROCEDURE — 4004F PT TOBACCO SCREEN RCVD TLK: CPT | Performed by: OTOLARYNGOLOGY

## 2023-11-29 PROCEDURE — 3017F COLORECTAL CA SCREEN DOC REV: CPT | Performed by: OTOLARYNGOLOGY

## 2023-11-29 PROCEDURE — G8484 FLU IMMUNIZE NO ADMIN: HCPCS | Performed by: OTOLARYNGOLOGY

## 2023-11-29 RX ORDER — LISINOPRIL 40 MG/1
TABLET ORAL
Qty: 90 TABLET | Refills: 3 | Status: SHIPPED | OUTPATIENT
Start: 2023-11-29

## 2023-11-29 RX ORDER — ESZOPICLONE 2 MG/1
2 TABLET, FILM COATED ORAL ONCE AS NEEDED
Qty: 1 TABLET | Refills: 0 | Status: SHIPPED | OUTPATIENT
Start: 2023-11-29 | End: 2023-12-29

## 2023-11-29 RX ORDER — FLUTICASONE PROPIONATE 50 MCG
SPRAY, SUSPENSION (ML) NASAL
Qty: 3 EACH | Refills: 3 | Status: SHIPPED | OUTPATIENT
Start: 2023-11-29

## 2023-11-29 ASSESSMENT — PATIENT HEALTH QUESTIONNAIRE - PHQ9
SUM OF ALL RESPONSES TO PHQ QUESTIONS 1-9: 2
2. FEELING DOWN, DEPRESSED OR HOPELESS: 2
SUM OF ALL RESPONSES TO PHQ QUESTIONS 1-9: 2

## 2023-11-29 ASSESSMENT — SLEEP AND FATIGUE QUESTIONNAIRES
HOW LIKELY ARE YOU TO NOD OFF OR FALL ASLEEP WHILE LYING DOWN TO REST IN THE AFTERNOON WHEN CIRCUMSTANCES PERMIT: 3
HOW LIKELY ARE YOU TO NOD OFF OR FALL ASLEEP IN A CAR, WHILE STOPPED FOR A FEW MINUTES IN TRAFFIC: 0
HOW LIKELY ARE YOU TO NOD OFF OR FALL ASLEEP WHILE SITTING AND TALKING TO SOMEONE: 0
NECK CIRCUMFERENCE (INCHES): 14
HOW LIKELY ARE YOU TO NOD OFF OR FALL ASLEEP WHEN YOU ARE A PASSENGER IN A CAR FOR AN HOUR WITHOUT A BREAK: 3
HOW LIKELY ARE YOU TO NOD OFF OR FALL ASLEEP WHILE SITTING AND READING: 3
HOW LIKELY ARE YOU TO NOD OFF OR FALL ASLEEP WHILE SITTING INACTIVE IN A PUBLIC PLACE: 0
HOW LIKELY ARE YOU TO NOD OFF OR FALL ASLEEP WHILE SITTING QUIETLY AFTER LUNCH WITHOUT ALCOHOL: 2
HOW LIKELY ARE YOU TO NOD OFF OR FALL ASLEEP WHILE WATCHING TV: 2
ESS TOTAL SCORE: 13

## 2023-11-29 NOTE — PATIENT INSTRUCTIONS
Please make a follow up appointment to discuss the results of your sleep study. If this is impossible for some reason, please send me a \"My Chart\" message so that I may get back with you in a timely manner. The [facility] is located in the [address], adjacent to [facility]. The lab is on the second floor. The direct number to call for sleep study related questions is: [phone]. Please call our clinic back at [phone] or send a message on [employer] if you have any questions or concerns or if you are experiencing any of the following: You have not received a follow up appointment within 30 days prior the recommended follow up time. If you are not tolerating treatment plan and/or not able to obtain equipment or prescribed medication(s). if you are experiencing any difficulties with the [address] [place] [address] [facility]  (DME) Company you may be using or is assigned to you. Two weeks have passed and you have not received an appointment for a scheduled procedure. Two weeks have passed since you underwent a test and/or procedure and you have not received your results. If you are using a CPAP/BIPAP, or Home Ventilator Device- Please note the following. Currently, many DMEs are experiencing supply chain difficulties and orders for equipment may be back logged several weeks. Your  Durable Medical Equipment (DME ) company is supposed to provide you with replacement filters, tubing and masks. You can either call your DME when you need new supplies or you can arrange for an automatic shipment schedule. Your need to be seen by our office at lat minimum of every 12 months in order to renew the prescription for these supplies. Please make note of who your DME company is and their phone number. Please make sure that you clean your mask and hosing on a regular basis.   Your DME can provide you with additional information regarding proper care and cleaning of your

## 2023-11-29 NOTE — PROGRESS NOTES
Olvin Armenta presents today for   Chief Complaint   Patient presents with    Follow-up    Results       Is someone accompanying this pt? yes, wife    Is the patient using any DME equipment during OV? no    -DME Company: N/A    Have you ever had a sleep study done before? yes,     Depression Screenin/29/2023     1:58 PM   PHQ-9    Feeling down, depressed, or hopeless 2   PHQ-9 Total Score 2        Murdock Sleepiness Scale:      2023     2:02 PM   Sleep Medicine   Sitting and reading 3   Watching TV 2   Sitting, inactive in a public place (e.g. a theatre or a meeting) 0   As a passenger in a car for an hour without a break 3   Lying down to rest in the afternoon when circumstances permit 3   Sitting and talking to someone 0   Sitting quietly after a lunch without alcohol 2   In a car, while stopped for a few minutes in traffic 0   Murdock Sleepiness Score 13   Neck circumference (Inches) 14       Stop-Ban/29/2023     2:00 PM   STOP-BANG QUESTIONNAIRE   Are you a loud and/or regular snorer? 1   Do you often feel tired or groggy upon awakening or do you awaken with a headache? 1   Have you been observed to gasp or stop breathing during sleep? 0   Are you often tired or fatigued during wake time hours? 0   Do you fall asleep sitting, reading, watching TV or driving? 0   Do you often have problems with memory or concentration? 0   Do you have or are you being treated for high blood pressure? 1   Recent BMI (Calculated) 31.5   Is BMI greater than 35 kg/m2? 0=No   Age older than 48years old? 1=Yes   Is your neck circumference greater than 17 inches (Male) or 16 inches (Female)? 0   Gender - Male 1=Yes   STOP-Bang Total Score 36.5         Coordination of Care:  1. Have you been to the ER, urgent care clinic since your last visit? Hospitalized since your last visit? no    2. Have you seen or consulted any other health care providers outside of the [address] [place] [address] since your last visit?
JAVIER, COPD, cancer, sleep times, RLS  Restless Leg Syndrome (RLS): Does disrupt sleep. Currently on iron supplementation from Oncologist- Will reassess after sleep testing  COPD: Emphysematous changes noted on CT imaging. On Anoro. No acute exacerbation. Has chronic cough and shortness of breath symptoms. Patient has not had PFTs and not able to quantitate stage of COPD  Gastrointestinal Stromal Cancer: On Gleevac therapy- Dr. Isabel BOATENG  Nicotine Dependence: 1PPD, Since age 15- current total of 50 pack years  MJ use: wraps, daily use             10/16/23   Here for follow-up  I have discussed the results of his sleep study and he is aware about it being a nondiagnostic study  Complains of shortness of breath with activity-has been using the Anoro which has been prescribed to him but not ever being aware of having PFTs  Complains of cough productive of mucus on a daily basis. Unfortunately continues to smoke half pack of cigarettes per day  States that he has not been as active as he would like since he had surgery, chemotherapy  Denies chest pain  Denies any weight loss     History of Present Illness: Mr. Francisca Desouza is a 68 y.o. male patient who was referred by his PCP for report of snoring. The history was provided by the patient, and his wife. The patient has a Past Medical History notable for nicotine dependence, COPD, MJ use, gastric cancer, prostate cancer, and DM  Occupation:   Retired-                     Driving:  Yes  Drowsy Driving: is not reported. Motor vehicle accident(s) associated with drowsy driving have not occurred. Snoring:  is a problem. This is a Chronic problem which has been ongoing for years. The patient reports that he is unaware that he snores. His wife reports that his snoring is very loud and disruptive. Snoring is much worse when the patient sleeps on his back. Witnessed apneas are reported by his wife. Fatigue:   is a problem.   This is a Chronic problem

## 2023-12-27 ENCOUNTER — OFFICE VISIT (OUTPATIENT)
Age: 73
End: 2023-12-27
Payer: MEDICARE

## 2023-12-27 VITALS
RESPIRATION RATE: 16 BRPM | BODY MASS INDEX: 31.08 KG/M2 | SYSTOLIC BLOOD PRESSURE: 135 MMHG | TEMPERATURE: 97.5 F | DIASTOLIC BLOOD PRESSURE: 80 MMHG | OXYGEN SATURATION: 98 % | HEIGHT: 66 IN | WEIGHT: 193.4 LBS | HEART RATE: 87 BPM

## 2023-12-27 DIAGNOSIS — J43.1 PANLOBULAR EMPHYSEMA (HCC): Primary | ICD-10-CM

## 2023-12-27 DIAGNOSIS — G47.19 EXCESSIVE DAYTIME SLEEPINESS: ICD-10-CM

## 2023-12-27 DIAGNOSIS — G25.81 RESTLESS LEG SYNDROME: ICD-10-CM

## 2023-12-27 DIAGNOSIS — R29.818 SUSPECTED SLEEP APNEA: ICD-10-CM

## 2023-12-27 PROCEDURE — 1123F ACP DISCUSS/DSCN MKR DOCD: CPT | Performed by: INTERNAL MEDICINE

## 2023-12-27 PROCEDURE — 3023F SPIROM DOC REV: CPT | Performed by: INTERNAL MEDICINE

## 2023-12-27 PROCEDURE — 99214 OFFICE O/P EST MOD 30 MIN: CPT | Performed by: INTERNAL MEDICINE

## 2023-12-27 PROCEDURE — 3079F DIAST BP 80-89 MM HG: CPT | Performed by: INTERNAL MEDICINE

## 2023-12-27 PROCEDURE — 3017F COLORECTAL CA SCREEN DOC REV: CPT | Performed by: INTERNAL MEDICINE

## 2023-12-27 PROCEDURE — G8417 CALC BMI ABV UP PARAM F/U: HCPCS | Performed by: INTERNAL MEDICINE

## 2023-12-27 PROCEDURE — 4004F PT TOBACCO SCREEN RCVD TLK: CPT | Performed by: INTERNAL MEDICINE

## 2023-12-27 PROCEDURE — 3075F SYST BP GE 130 - 139MM HG: CPT | Performed by: INTERNAL MEDICINE

## 2023-12-27 PROCEDURE — G8427 DOCREV CUR MEDS BY ELIG CLIN: HCPCS | Performed by: INTERNAL MEDICINE

## 2023-12-27 PROCEDURE — G8484 FLU IMMUNIZE NO ADMIN: HCPCS | Performed by: INTERNAL MEDICINE

## 2023-12-27 NOTE — PROGRESS NOTES
Giuseppe Patel presents today for   Chief Complaint   Patient presents with    Follow-up    COPD    Fatigue       Is someone accompanying this pt? No    Is the patient using any DME equipment during OV? No    -DME Company NA    Depression Screenin/29/2023     1:58 PM   PHQ-9 Questionaire   Feeling down, depressed, or hopeless 2   PHQ-9 Total Score 2       Learning Needs Questionnaire:     No question data found. Fall Risk:         2023     1:58 PM 2023     8:53 AM 2023     1:59 PM 2023     9:52 AM   Fall Risk   2 or more falls in past year? no no no no   Fall with injury in past year? no no no no        Abuse Screenin/12/2023     8:00 AM   AMB Abuse Screening   Do you ever feel afraid of your partner? N   Are you in a relationship with someone who physically or mentally threatens you? N   Is it safe for you to go home? Y         Coordination of Care:    1. Have you been to the ER, urgent care clinic since your last visit? Hospitalized since your last visit? No    2. Have you seen or consulted any other health care providers outside of the [address] [place] [address] since your last visit? Include any pap smears or colon screening. No    Medication list has been update per patient.
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
## 2024-01-09 ENCOUNTER — OFFICE VISIT (OUTPATIENT)
Age: 74
End: 2024-01-09
Payer: MEDICAID

## 2024-01-09 VITALS
TEMPERATURE: 98.4 F | WEIGHT: 196 LBS | SYSTOLIC BLOOD PRESSURE: 115 MMHG | BODY MASS INDEX: 31.5 KG/M2 | HEIGHT: 66 IN | DIASTOLIC BLOOD PRESSURE: 73 MMHG | HEART RATE: 75 BPM | OXYGEN SATURATION: 98 % | RESPIRATION RATE: 20 BRPM

## 2024-01-09 DIAGNOSIS — M54.12 CERVICAL RADICULOPATHY: Primary | ICD-10-CM

## 2024-01-09 DIAGNOSIS — Z79.4 TYPE 2 DIABETES MELLITUS WITH HYPERGLYCEMIA, WITH LONG-TERM CURRENT USE OF INSULIN (HCC): ICD-10-CM

## 2024-01-09 DIAGNOSIS — S39.012A BACK STRAIN, INITIAL ENCOUNTER: ICD-10-CM

## 2024-01-09 DIAGNOSIS — S16.1XXA STRAIN OF NECK MUSCLE, INITIAL ENCOUNTER: ICD-10-CM

## 2024-01-09 DIAGNOSIS — E11.65 TYPE 2 DIABETES MELLITUS WITH HYPERGLYCEMIA, WITH LONG-TERM CURRENT USE OF INSULIN (HCC): ICD-10-CM

## 2024-01-09 PROCEDURE — 1123F ACP DISCUSS/DSCN MKR DOCD: CPT | Performed by: INTERNAL MEDICINE

## 2024-01-09 PROCEDURE — 3078F DIAST BP <80 MM HG: CPT | Performed by: INTERNAL MEDICINE

## 2024-01-09 PROCEDURE — 3074F SYST BP LT 130 MM HG: CPT | Performed by: INTERNAL MEDICINE

## 2024-01-09 PROCEDURE — 99214 OFFICE O/P EST MOD 30 MIN: CPT | Performed by: INTERNAL MEDICINE

## 2024-01-09 RX ORDER — GLUCOSAMINE HCL/CHONDROITIN SU 500-400 MG
CAPSULE ORAL
Qty: 100 STRIP | Refills: 3 | Status: SHIPPED | OUTPATIENT
Start: 2024-01-09

## 2024-01-09 RX ORDER — BLOOD-GLUCOSE METER
KIT MISCELLANEOUS
Qty: 1 KIT | Refills: 0 | Status: SHIPPED | OUTPATIENT
Start: 2024-01-09

## 2024-01-09 RX ORDER — LANCETS 30 GAUGE
EACH MISCELLANEOUS
Qty: 100 EACH | Refills: 5 | Status: SHIPPED | OUTPATIENT
Start: 2024-01-09

## 2024-01-09 RX ORDER — TIZANIDINE 4 MG/1
4 TABLET ORAL EVERY 8 HOURS PRN
Qty: 40 TABLET | Refills: 1 | Status: SHIPPED | OUTPATIENT
Start: 2024-01-09

## 2024-01-09 RX ORDER — PREDNISONE 10 MG/1
TABLET ORAL
Qty: 1 EACH | Refills: 0 | Status: SHIPPED | OUTPATIENT
Start: 2024-01-09

## 2024-01-09 ASSESSMENT — PATIENT HEALTH QUESTIONNAIRE - PHQ9
6. FEELING BAD ABOUT YOURSELF - OR THAT YOU ARE A FAILURE OR HAVE LET YOURSELF OR YOUR FAMILY DOWN: 0
5. POOR APPETITE OR OVEREATING: 0
SUM OF ALL RESPONSES TO PHQ QUESTIONS 1-9: 0
9. THOUGHTS THAT YOU WOULD BE BETTER OFF DEAD, OR OF HURTING YOURSELF: 0
3. TROUBLE FALLING OR STAYING ASLEEP: 0
SUM OF ALL RESPONSES TO PHQ QUESTIONS 1-9: 0
1. LITTLE INTEREST OR PLEASURE IN DOING THINGS: 0
SUM OF ALL RESPONSES TO PHQ9 QUESTIONS 1 & 2: 0
7. TROUBLE CONCENTRATING ON THINGS, SUCH AS READING THE NEWSPAPER OR WATCHING TELEVISION: 0
10. IF YOU CHECKED OFF ANY PROBLEMS, HOW DIFFICULT HAVE THESE PROBLEMS MADE IT FOR YOU TO DO YOUR WORK, TAKE CARE OF THINGS AT HOME, OR GET ALONG WITH OTHER PEOPLE: 0
4. FEELING TIRED OR HAVING LITTLE ENERGY: 0
2. FEELING DOWN, DEPRESSED OR HOPELESS: 0
SUM OF ALL RESPONSES TO PHQ QUESTIONS 1-9: 0
8. MOVING OR SPEAKING SO SLOWLY THAT OTHER PEOPLE COULD HAVE NOTICED. OR THE OPPOSITE, BEING SO FIGETY OR RESTLESS THAT YOU HAVE BEEN MOVING AROUND A LOT MORE THAN USUAL: 0
SUM OF ALL RESPONSES TO PHQ QUESTIONS 1-9: 0
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
## 2024-01-17 ENCOUNTER — NURSE ONLY (OUTPATIENT)
Age: 74
End: 2024-01-17

## 2024-01-17 DIAGNOSIS — E78.00 PURE HYPERCHOLESTEROLEMIA, UNSPECIFIED: ICD-10-CM

## 2024-01-17 DIAGNOSIS — Z79.4 TYPE 2 DIABETES MELLITUS WITH HYPERGLYCEMIA, WITH LONG-TERM CURRENT USE OF INSULIN (HCC): ICD-10-CM

## 2024-01-17 DIAGNOSIS — E11.65 TYPE 2 DIABETES MELLITUS WITH HYPERGLYCEMIA, WITH LONG-TERM CURRENT USE OF INSULIN (HCC): ICD-10-CM

## 2024-01-17 DIAGNOSIS — I10 ESSENTIAL (PRIMARY) HYPERTENSION: ICD-10-CM

## 2024-01-18 LAB
ALBUMIN SERPL-MCNC: 3.9 G/DL (ref 3.8–4.8)
ALBUMIN/GLOB SERPL: 2 {RATIO} (ref 1.2–2.2)
ALP SERPL-CCNC: 124 IU/L (ref 44–121)
ALT SERPL-CCNC: 44 IU/L (ref 0–44)
AST SERPL-CCNC: 40 IU/L (ref 0–40)
BILIRUB SERPL-MCNC: 0.5 MG/DL (ref 0–1.2)
BUN SERPL-MCNC: 12 MG/DL (ref 8–27)
BUN/CREAT SERPL: 12 (ref 10–24)
CALCIUM SERPL-MCNC: 9.1 MG/DL (ref 8.6–10.2)
CHLORIDE SERPL-SCNC: 101 MMOL/L (ref 96–106)
CHOLEST SERPL-MCNC: 112 MG/DL (ref 100–199)
CO2 SERPL-SCNC: 24 MMOL/L (ref 20–29)
CREAT SERPL-MCNC: 1 MG/DL (ref 0.76–1.27)
EGFRCR SERPLBLD CKD-EPI 2021: 79 ML/MIN/1.73
GLOBULIN SER CALC-MCNC: 2 G/DL (ref 1.5–4.5)
GLUCOSE SERPL-MCNC: 191 MG/DL (ref 70–99)
HBA1C MFR BLD: 7.4 % (ref 4.8–5.6)
HDLC SERPL-MCNC: 54 MG/DL
LDLC SERPL CALC-MCNC: 45 MG/DL (ref 0–99)
POTASSIUM SERPL-SCNC: 3.8 MMOL/L (ref 3.5–5.2)
PROT SERPL-MCNC: 5.9 G/DL (ref 6–8.5)
SODIUM SERPL-SCNC: 142 MMOL/L (ref 134–144)
SPECIMEN STATUS REPORT: NORMAL
TRIGL SERPL-MCNC: 56 MG/DL (ref 0–149)
VLDLC SERPL CALC-MCNC: 13 MG/DL (ref 5–40)

## 2024-01-19 ENCOUNTER — TELEPHONE (OUTPATIENT)
Age: 74
End: 2024-01-19

## 2024-01-19 RX ORDER — GLUCOSAMINE HCL/CHONDROITIN SU 500-400 MG
CAPSULE ORAL
Qty: 100 STRIP | Refills: 3 | Status: SHIPPED | OUTPATIENT
Start: 2024-01-19

## 2024-01-19 RX ORDER — LANCETS 30 GAUGE
EACH MISCELLANEOUS
Qty: 100 EACH | Refills: 5 | Status: SHIPPED | OUTPATIENT
Start: 2024-01-19

## 2024-01-19 RX ORDER — BLOOD-GLUCOSE METER
KIT MISCELLANEOUS
Qty: 1 KIT | Refills: 0 | Status: SHIPPED | OUTPATIENT
Start: 2024-01-19

## 2024-01-19 NOTE — TELEPHONE ENCOUNTER
Pt said his insurance will not cover    Glucose Monitoring kit pt is asking for Dr to send another kit his insurance will cover   To his pharmacy

## 2024-01-22 ENCOUNTER — OFFICE VISIT (OUTPATIENT)
Age: 74
End: 2024-01-22
Payer: MEDICAID

## 2024-01-22 VITALS
HEIGHT: 66 IN | OXYGEN SATURATION: 97 % | WEIGHT: 199 LBS | HEART RATE: 85 BPM | DIASTOLIC BLOOD PRESSURE: 67 MMHG | TEMPERATURE: 97.8 F | SYSTOLIC BLOOD PRESSURE: 111 MMHG | BODY MASS INDEX: 31.98 KG/M2 | RESPIRATION RATE: 20 BRPM

## 2024-01-22 DIAGNOSIS — G63 POLYNEUROPATHY IN DISEASES CLASSIFIED ELSEWHERE (HCC): ICD-10-CM

## 2024-01-22 DIAGNOSIS — E11.65 TYPE 2 DIABETES MELLITUS WITH HYPERGLYCEMIA, WITH LONG-TERM CURRENT USE OF INSULIN (HCC): Primary | ICD-10-CM

## 2024-01-22 DIAGNOSIS — S16.1XXD STRAIN OF NECK MUSCLE, SUBSEQUENT ENCOUNTER: ICD-10-CM

## 2024-01-22 DIAGNOSIS — E78.00 PURE HYPERCHOLESTEROLEMIA, UNSPECIFIED: ICD-10-CM

## 2024-01-22 DIAGNOSIS — C61 PROSTATE CANCER (HCC): ICD-10-CM

## 2024-01-22 DIAGNOSIS — Z79.4 TYPE 2 DIABETES MELLITUS WITH HYPERGLYCEMIA, WITH LONG-TERM CURRENT USE OF INSULIN (HCC): Primary | ICD-10-CM

## 2024-01-22 DIAGNOSIS — F33.41 MAJOR DEPRESSIVE DISORDER, RECURRENT, IN PARTIAL REMISSION (HCC): ICD-10-CM

## 2024-01-22 DIAGNOSIS — C49.A2 GASTROINTESTINAL STROMAL TUMOR OF STOMACH (HCC): ICD-10-CM

## 2024-01-22 DIAGNOSIS — J43.1 PANLOBULAR EMPHYSEMA (HCC): ICD-10-CM

## 2024-01-22 DIAGNOSIS — I10 ESSENTIAL (PRIMARY) HYPERTENSION: ICD-10-CM

## 2024-01-22 PROBLEM — E11.40 TYPE 2 DIABETES MELLITUS WITH DIABETIC NEUROPATHY (HCC): Status: RESOLVED | Noted: 2018-06-11 | Resolved: 2024-01-22

## 2024-01-22 PROBLEM — E11.21 TYPE 2 DIABETES WITH NEPHROPATHY (HCC): Status: RESOLVED | Noted: 2019-06-25 | Resolved: 2024-01-22

## 2024-01-22 PROCEDURE — 3074F SYST BP LT 130 MM HG: CPT | Performed by: INTERNAL MEDICINE

## 2024-01-22 PROCEDURE — 99214 OFFICE O/P EST MOD 30 MIN: CPT | Performed by: INTERNAL MEDICINE

## 2024-01-22 PROCEDURE — 1123F ACP DISCUSS/DSCN MKR DOCD: CPT | Performed by: INTERNAL MEDICINE

## 2024-01-22 PROCEDURE — 3078F DIAST BP <80 MM HG: CPT | Performed by: INTERNAL MEDICINE

## 2024-01-22 PROCEDURE — 3051F HG A1C>EQUAL 7.0%<8.0%: CPT | Performed by: INTERNAL MEDICINE

## 2024-01-22 RX ORDER — CALCIUM CITRATE/VITAMIN D3 200MG-6.25
1 TABLET ORAL DAILY
Qty: 100 EACH | Refills: 3 | Status: SHIPPED | OUTPATIENT
Start: 2024-01-22 | End: 2024-01-26

## 2024-01-22 RX ORDER — GABAPENTIN 300 MG/1
300 CAPSULE ORAL 3 TIMES DAILY
Qty: 90 CAPSULE | Refills: 3 | COMMUNITY
Start: 2024-01-22

## 2024-01-22 RX ORDER — LANCETS 30 GAUGE
EACH MISCELLANEOUS
Qty: 100 EACH | Refills: 5 | Status: SHIPPED | OUTPATIENT
Start: 2024-01-22

## 2024-01-22 RX ORDER — TIZANIDINE 4 MG/1
4 TABLET ORAL EVERY 6 HOURS PRN
Qty: 120 TABLET | Refills: 1 | Status: SHIPPED | OUTPATIENT
Start: 2024-01-22

## 2024-01-22 RX ORDER — BLOOD-GLUCOSE METER
EACH MISCELLANEOUS
Qty: 1 EACH | Refills: 0 | Status: SHIPPED | OUTPATIENT
Start: 2024-01-22
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
## 2024-01-22 NOTE — PROGRESS NOTES
Maximino Corrigan presents today for   Chief Complaint   Patient presents with    Diabetes    Hypertension    Cholesterol Problem     3 month follow up              1. \"Have you been to the ER, urgent care clinic since your last visit?  Hospitalized since your last visit?\" no    2. \"Have you seen or consulted any other health care providers outside of the [facility] since your last visit?\" no     3. For patients aged 45-75: Has the patient had a colonoscopy / FIT/ Cologuard? Yes - no Care Gap present      If the patient is female:    4. For patients aged 40-74: Has the patient had a mammogram within the past 2 years? NA - based on age or sex      5. For patients aged 21-65: Has the patient had a pap smear? NA - based on age or sex

## 2024-01-26 RX ORDER — GLUCOSAMINE HCL/CHONDROITIN SU 500-400 MG
CAPSULE ORAL
Qty: 100 STRIP | Refills: 0 | Status: SHIPPED | OUTPATIENT
Start: 2024-01-26

## 2024-01-26 RX ORDER — BLOOD-GLUCOSE METER
1 KIT MISCELLANEOUS DAILY
Qty: 1 KIT | Refills: 0 | Status: SHIPPED | OUTPATIENT
Start: 2024-01-26

## 2024-01-26 NOTE — TELEPHONE ENCOUNTER
Insurance denied Freestyle lite test strips  and True metrix glucose machine.     Formulary alternative are Ankota and Roche products.

## 2024-01-31 RX ORDER — GLUCOSAMINE HCL/CHONDROITIN SU 500-400 MG
CAPSULE ORAL
Qty: 100 STRIP | Refills: 3 | Status: SHIPPED | OUTPATIENT
Start: 2024-01-31

## 2024-01-31 RX ORDER — BLOOD-GLUCOSE METER
EACH MISCELLANEOUS
Qty: 1 KIT | Refills: 0 | Status: SHIPPED | OUTPATIENT
Start: 2024-01-31

## 2024-02-07 ENCOUNTER — HOSPITAL ENCOUNTER (OUTPATIENT)
Facility: HOSPITAL | Age: 74
Setting detail: RECURRING SERIES
Discharge: HOME | End: 2024-02-10
Payer: MEDICARE

## 2024-02-07 PROCEDURE — 97162 PT EVAL MOD COMPLEX 30 MIN: CPT
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
## 2024-02-09 ENCOUNTER — HOSPITAL ENCOUNTER (OUTPATIENT)
Facility: HOSPITAL | Age: 74
Setting detail: RECURRING SERIES
Discharge: HOME | End: 2024-02-12
Payer: MEDICARE

## 2024-02-09 PROCEDURE — 97110 THERAPEUTIC EXERCISES: CPT

## 2024-02-09 PROCEDURE — 97112 NEUROMUSCULAR REEDUCATION: CPT

## 2024-02-09 PROCEDURE — 97140 MANUAL THERAPY 1/> REGIONS: CPT

## 2024-02-09 NOTE — PROGRESS NOTES
PHYSICAL / OCCUPATIONAL THERAPY - DAILY TREATMENT NOTE    Patient Name: Maximino Corrigan    Date: 2024    : 1950  Insurance: Payor: [employer] MEDICARE / Plan: JAD[facility] HEALTHKEEPERS MEDIBLUE PLUS / Product Type: *No Product type* /      Patient  verified Yes     Visit #   Current / Total 2 10   Time   In / Out 1257 140   Pain   In / Out 5 7   Subjective Functional Status/Changes: Pt noted pain and stiffness in neck upon arrival     TREATMENT AREA =  Neck pain [M54.2]  Other low back pain [M54.59]    OBJECTIVE         Therapeutic Procedures:    Tx Min Billable or 1:1 Min (if diff from Tx Min) Procedure, Rationale, Specifics   20  53882 Therapeutic Exercise (timed):  increase ROM, strength, coordination, balance, and proprioception to improve patient's ability to progress to PLOF and address remaining functional goals. (see flow sheet as applicable)     Details if applicable:       15  99596 Neuromuscular Re-Education (timed):  improve balance, coordination, kinesthetic sense, posture, core stability and proprioception to improve patient's ability to develop conscious control of individual muscles and awareness of position of extremities in order to progress to PLOF and address remaining functional goals. (see flow sheet as applicable)     Details if applicable:     8  39897 Manual Therapy (timed):  decrease pain, increase ROM, and increase tissue extensibility to improve patient's ability to progress to PLOF and address remaining functional goals.  The manual therapy interventions were performed at a separate and distinct time from the therapeutic activities interventions . (see flow sheet as applicable)     Details if applicable:  SOR, manual UT stretch, gentle rib mobs and TPR cervical paraspinals and UT/LS          Details if applicable:            Details if applicable:     43  [facility] Totals Reminder: bill using total billable min of TIMED therapeutic procedures (example: do not include dry needle

## 2024-02-12 ENCOUNTER — HOSPITAL ENCOUNTER (OUTPATIENT)
Facility: HOSPITAL | Age: 74
Discharge: HOME | End: 2024-02-15
Attending: ORTHOPAEDIC SURGERY

## 2024-02-12 DIAGNOSIS — M48.02 CERVICAL STENOSIS OF SPINE: ICD-10-CM

## 2024-02-12 DIAGNOSIS — M47.9 SPONDYLOSIS: ICD-10-CM

## 2024-02-13 ENCOUNTER — TELEPHONE (OUTPATIENT)
Age: 74
End: 2024-02-13

## 2024-02-13 RX ORDER — LANCETS 28 GAUGE
1 EACH MISCELLANEOUS DAILY
Qty: 100 EACH | Refills: 3 | Status: SHIPPED | OUTPATIENT
Start: 2024-02-13

## 2024-02-13 RX ORDER — BLOOD-GLUCOSE METER
1 KIT MISCELLANEOUS DAILY
Qty: 100 EACH | Refills: 3 | Status: SHIPPED | OUTPATIENT
Start: 2024-02-13

## 2024-02-13 NOTE — TELEPHONE ENCOUNTER
----- Message from Nataliya Clyde sent at 2/13/2024  3:01 PM EST -----  Subject: Message to Provider    QUESTIONS  Information for Provider? Maximino needs a new script for lancets & test   strips for his Free Style Freedom Light glucose meter to be sent to [facility] on   [address] Per his plan he is able to get 100 test strips & 100   lancets per month. Please send a new script for him.  ---------------------------------------------------------------------------  --------------  CALL BACK INFO  [phone]; OK to leave message on voicemail  ---------------------------------------------------------------------------  --------------  SCRIPT ANSWERS  Relationship to Patient? Covered Entity  Covered Entity Type? Health Insurance?  Representative Name? Sarai Awan BCFRANK

## 2024-02-15 ENCOUNTER — HOSPITAL ENCOUNTER (OUTPATIENT)
Facility: HOSPITAL | Age: 74
Setting detail: RECURRING SERIES
Discharge: HOME | End: 2024-02-18
Payer: MEDICARE

## 2024-02-15 PROCEDURE — 97110 THERAPEUTIC EXERCISES: CPT

## 2024-02-15 PROCEDURE — 97112 NEUROMUSCULAR REEDUCATION: CPT

## 2024-02-15 PROCEDURE — 97140 MANUAL THERAPY 1/> REGIONS: CPT

## 2024-02-15 NOTE — PROGRESS NOTES
PHYSICAL / OCCUPATIONAL THERAPY - DAILY TREATMENT NOTE    Patient Name: Maximino Corrigan    Date: 2/15/2024    : 1950  Insurance: Payor: [employer] MEDICARE / Plan: JADEN [facility] HEALTHKEEPERS MEDIBLUE PLUS / Product Type: *No Product type* /      Patient  verified Yes     Visit #   Current / Total 3 10   Time   In / Out 8:10 8:50   Pain   In / Out 7 5   Subjective Functional Status/Changes: \"I'm feeling tight today, and my pain is up there. I just feel like when I go to do this at home it doesn't go as well\"     TREATMENT AREA =  Neck pain [M54.2]  Other low back pain [M54.59]    OBJECTIVE         Therapeutic Procedures:    Tx Min Billable or 1:1 Min (if diff from Tx Min) Procedure, Rationale, Specifics   10 10 34918 Manual Therapy (timed):  decrease pain, increase ROM, and increase tissue extensibility to improve patient's ability to progress to PLOF and address remaining functional goals.  The manual therapy interventions were performed at a separate and distinct time from the therapeutic activities interventions . (see flow sheet as applicable)     Details if applicable:  SOR, manual UT stretch, gentle rib mobs and TPR cervical paraspinals and UT/LS      18 18 54381 Therapeutic Exercise (timed):  increase ROM, strength, coordination, balance, and proprioception to improve patient's ability to progress to PLOF and address remaining functional goals. (see flow sheet as applicable)     Details if applicable:     12 12 40519 Neuromuscular Re-Education (timed):  improve balance, coordination, kinesthetic sense, posture, core stability and proprioception to improve patient's ability to develop conscious control of individual muscles and awareness of position of extremities in order to progress to PLOF and address remaining functional goals. (see flow sheet as applicable)     Details if applicable:  Median nerve glides   40 40 [facility] Totals Reminder: bill using total billable min of TIMED therapeutic procedures

## 2024-02-16 ENCOUNTER — APPOINTMENT (OUTPATIENT)
Facility: HOSPITAL | Age: 74
End: 2024-02-16
Payer: MEDICARE

## 2024-02-19 ENCOUNTER — HOSPITAL ENCOUNTER (OUTPATIENT)
Facility: HOSPITAL | Age: 74
Setting detail: RECURRING SERIES
Discharge: HOME | End: 2024-02-22
Payer: MEDICARE

## 2024-02-19 PROCEDURE — 97112 NEUROMUSCULAR REEDUCATION: CPT

## 2024-02-19 PROCEDURE — 97140 MANUAL THERAPY 1/> REGIONS: CPT

## 2024-02-19 PROCEDURE — 97110 THERAPEUTIC EXERCISES: CPT

## 2024-02-19 NOTE — PROGRESS NOTES
PHYSICAL / OCCUPATIONAL THERAPY - DAILY TREATMENT NOTE    Patient Name: Maximino Corrigan    Date: 2024    : 1950  Insurance: Payor: [employer] MEDICARE / Plan: JADEN [facility] HEALTHKEEPERS MEDIBLUE PLUS / Product Type: *No Product type* /      Patient  verified Yes     Visit #   Current / Total 4 10   Time   In / Out 206 251   Pain   In / Out 6 6   Subjective Functional Status/Changes: \"I have pain right now.\"     TREATMENT AREA =  Neck pain [M54.2]  Other low back pain [M54.59]    OBJECTIVE         Therapeutic Procedures:    Tx Min Billable or 1:1 Min (if diff from Tx Min) Procedure, Rationale, Specifics   25 24 51987 Therapeutic Exercise (timed):  increase ROM, strength, coordination, balance, and proprioception to improve patient's ability to progress to PLOF and address remaining functional goals. (see flow sheet as applicable)     Details if applicable:       10 10 22304 Neuromuscular Re-Education (timed):  improve balance, coordination, kinesthetic sense, posture, core stability and proprioception to improve patient's ability to develop conscious control of individual muscles and awareness of position of extremities in order to progress to PLOF and address remaining functional goals. (see flow sheet as applicable)     Details if applicable:     10 10 80150 Manual Therapy (timed):  decrease pain, increase ROM, increase tissue extensibility, decrease trigger points, and increase postural awareness to improve patient's ability to progress to PLOF and address remaining functional goals.  The manual therapy interventions were performed at a separate and distinct time from the therapeutic activities interventions . (see flow sheet as applicable)     Details if applicable:  SOR, STM to c/s paraspinals, and B UT/LS   45 44 [facility] Totals Reminder: bill using total billable min of TIMED therapeutic procedures (example: do not include dry needle or estim unattended, both untimed codes, in totals to left)  8-

## 2024-02-21 ENCOUNTER — HOSPITAL ENCOUNTER (OUTPATIENT)
Facility: HOSPITAL | Age: 74
Setting detail: RECURRING SERIES
Discharge: HOME | End: 2024-02-24
Payer: MEDICARE

## 2024-02-21 PROCEDURE — 97110 THERAPEUTIC EXERCISES: CPT | Performed by: PHYSICAL THERAPIST

## 2024-02-21 PROCEDURE — 97112 NEUROMUSCULAR REEDUCATION: CPT | Performed by: PHYSICAL THERAPIST

## 2024-02-21 PROCEDURE — 97140 MANUAL THERAPY 1/> REGIONS: CPT | Performed by: PHYSICAL THERAPIST

## 2024-02-21 NOTE — PROGRESS NOTES
PHYSICAL / OCCUPATIONAL THERAPY - DAILY TREATMENT NOTE    Patient Name: Maximino Corrigan    Date: 2024    : 1950  Insurance: Payor: [employer] MEDICARE / Plan: JADEN [facility] HEALTHKEEPERS MEDIBLUE PLUS / Product Type: *No Product type* /      Patient  verified Yes     Visit #   Current / Total 5 10   Time   In / Out 1130 am 1225pm   Pain   In / Out 5 4   Subjective Functional Status/Changes: I have noticed my fingertips get cold lying on my back      TREATMENT AREA =  Neck pain [M54.2]  Other low back pain [M54.59]    OBJECTIVE      Therapeutic Procedures:    Tx Min Billable or 1:1 Min (if diff from Tx Min) Procedure, Rationale, Specifics   30 30 71172 Therapeutic Exercise (timed):  increase ROM, strength, coordination, balance, and proprioception to improve patient's ability to progress to PLOF and address remaining functional goals. (see flow sheet as applicable)     Details if applicable:       10 10 17374 Neuromuscular Re-Education (timed):  improve balance, coordination, kinesthetic sense, posture, core stability and proprioception to improve patient's ability to develop conscious control of individual muscles and awareness of position of extremities in order to progress to PLOF and address remaining functional goals. (see flow sheet as applicable)     Details if applicable:     15 15 26728 Manual Therapy (timed):  decrease pain, increase ROM, increase tissue extensibility, decrease trigger points, and increase postural awareness to improve patient's ability to progress to PLOF and address remaining functional goals.  The manual therapy interventions were performed at a separate and distinct time from the therapeutic activities interventions . (see flow sheet as applicable)     Details if applicable:  SOR, STM to c/s paraspinals, and B UT/LS, lateral flexion after lateral isometric    55 55 [facility] Totals Reminder: bill using total billable min of TIMED therapeutic procedures (example: do not include

## 2024-02-26 ENCOUNTER — HOSPITAL ENCOUNTER (OUTPATIENT)
Facility: HOSPITAL | Age: 74
Setting detail: RECURRING SERIES
Discharge: HOME | End: 2024-02-29
Payer: MEDICARE

## 2024-02-26 PROCEDURE — 97110 THERAPEUTIC EXERCISES: CPT

## 2024-02-26 PROCEDURE — 97112 NEUROMUSCULAR REEDUCATION: CPT

## 2024-02-26 PROCEDURE — 97140 MANUAL THERAPY 1/> REGIONS: CPT

## 2024-02-26 NOTE — PERIOP NOTE
Instructions for your surgery at [facility]      Today's Date:  2/26/2024      Patient's Name:  Maximino Corrigan           Surgery Date:  3/8              Please enter the main entrance of the hospital and check-in at the  located in the lobby. Once checked in at the , you will take the elevators to the second floor, and report to the waiting room on the left. The room will say Procedure Registration.    Do NOT eat or drink anything, including candy, gum, or ice chips after midnight prior to your surgery, unless you have specific instructions from your surgeon or anesthesia provider to do so.  Brush your teeth before coming to the hospital. You may swish with water, but do not swallow.  No smoking/Vaping/E-Cigarettes 24 hours prior to the day of surgery.  No alcohol 24 hours prior to the day of surgery.  No recreational drugs for one week prior to the day of surgery.  Bring Photo ID, Insurance information, and Co-pay if required on day of surgery.  Bring in pertinent legal documents, such as, Medical Power of , DNR, Advance Directive, etc.  Leave all valuables, including money/purse, at home.  Remove all jewelry, including ALL body piercings, nail polish, acrylic nails, and makeup (including mascara); no lotions, powders, deodorant, or perfume/cologne/after shave on the skin.  Follow instruction for Hibiclens washes and CHG wipes from surgeon's office.   Glasses and dentures may be worn to the hospital. They must be removed prior to surgery. Please bring case/container for glasses or dentures.   Contact lenses should not be worn on day of surgery.   Call your doctor's office if symptoms of a cold or illness develop within 24-48 hours prior to your surgery.  Call your doctor's office if you have any questions concerning insurance or co-pays.  15. AN ADULT (relative or friend 18 years or older) MUST DRIVE YOU HOME AFTER YOUR SURGERY.  16. Please make arrangements for  a responsible adult (18 years or older) to be with you for 24 hours after your surgery.   17. ONE VISITOR will be allowed in the waiting area during your surgery.  Exceptions may be made for surgical admissions, per nursing unit guidelines      Special Instructions:      Bring a list of CURRENT medications.  Follow instructions from the office regarding Blood Thinners and/or Insulin  Follow instructions from the office regarding medications to take the morning of surgery.   Bring inhaler.  Bring CPAP machine.  Complete bowel prep per MD instructions.     On day of surgery if you are running late, unable to make procedure time, or sick, please call the Pre-op department at [phone]    These surgical instructions were reviewed with dashawn frank during the PAT phone call.

## 2024-02-26 NOTE — PROGRESS NOTES
PHYSICAL / OCCUPATIONAL THERAPY - DAILY TREATMENT NOTE    Patient Name: Maximino Corrigan    Date: 2024    : 1950  Insurance: Payor: [facility] MEDICARE / Plan: JADEN [facility] HEALTHKEEPERS MEDIBLUE PLUS / Product Type: *No Product type* /      Patient  verified Yes     Visit #   Current / Total 6 10   Time   In / Out 1135 1215   Pain   In / Out 4 3-4   Subjective Functional Status/Changes: Patient reports he had some mid-back pain after his last visit but it has resolved with rest and exercise.     TREATMENT AREA =  Neck pain [M54.2]  Other low back pain [M54.59]    OBJECTIVE      Therapeutic Procedures:    Tx Min Billable or 1:1 Min (if diff from Tx Min) Procedure, Rationale, Specifics   12  45659 Therapeutic Exercise (timed):  increase ROM, strength, coordination, balance, and proprioception to improve patient's ability to progress to PLOF and address remaining functional goals. (see flow sheet as applicable)     Details if applicable:       15  02012 Neuromuscular Re-Education (timed):  improve balance, coordination, kinesthetic sense, posture, core stability and proprioception to improve patient's ability to develop conscious control of individual muscles and awareness of position of extremities in order to progress to PLOF and address remaining functional goals. (see flow sheet as applicable)     Details if applicable:  cervical/scapular re-ed   13  45169 Manual Therapy (timed):  decrease pain, increase ROM, and increase tissue extensibility to improve patient's ability to progress to PLOF and address remaining functional goals.  The manual therapy interventions were performed at a separate and distinct time from the therapeutic activities interventions . (see flow sheet as applicable)     Details if applicable:  SOR, gentle c/s traction, STM/TPR to B UT   40  [facility] Totals Reminder: bill using total billable min of TIMED therapeutic procedures (example: do not include dry needle or estim unattended,

## 2024-02-27 ENCOUNTER — HOSPITAL ENCOUNTER (OUTPATIENT)
Facility: HOSPITAL | Age: 74
Discharge: HOME | End: 2024-03-01
Attending: ORTHOPAEDIC SURGERY
Payer: MEDICARE

## 2024-02-27 PROCEDURE — 72141 MRI NECK SPINE W/O DYE: CPT

## 2024-02-29 ENCOUNTER — HOSPITAL ENCOUNTER (OUTPATIENT)
Facility: HOSPITAL | Age: 74
Setting detail: RECURRING SERIES
End: 2024-02-29
Payer: MEDICARE

## 2024-02-29 PROCEDURE — 97530 THERAPEUTIC ACTIVITIES: CPT

## 2024-02-29 NOTE — PROGRESS NOTES
PHYSICAL / OCCUPATIONAL THERAPY - DAILY TREATMENT NOTE    Patient Name: Maximino Corrigan    Date: 2024    : 1950  Insurance: Payor: [employer] / Plan: JADEN [facility] [employer] MEDIBLUE PLUS / Product Type: *No Product type* /      Patient  verified Yes     Visit #   Current / Total 7 10   Time   In / Out 1131 1204   Pain   In / Out 4-5 4-5   Subjective Functional Status/Changes: Pt reports he's still recovering from the meds he was given to get the MRI.      TREATMENT AREA =  Neck pain [M54.2]  Other low back pain [M54.59]    OBJECTIVE    Therapeutic Procedures:    Tx Min Billable or 1:1 Min (if diff from Tx Min) Procedure, Rationale, Specifics   33  71428 Therapeutic Activity (timed):  use of dynamic activities replicating functional movements to increase ROM, strength, coordination, balance, and proprioception in order to improve patient's ability to progress to PLOF and address remaining functional goals.  (see flow sheet as applicable)     Details if applicable:     33  [facility] Totals Reminder: bill using total billable min of TIMED therapeutic procedures (example: do not include dry needle or estim unattended, both untimed codes, in totals to left)  8-22 min = 1 unit; 23-37 min = 2 units; 38-52 min = 3 units; 53-67 min = 4 units; 68-82 min = 5 units   Total Total     [x]  Patient Education billed concurrently with other procedures   [x] Review HEP    [] Progressed/Changed HEP, detail:    [] Other detail:       Objective Information/Functional Measures/Assessment    Mr. Corrigan reports 80% improvement since starting therapy stating he is sleeping better, he is able to sit up in a chair for longer, neck motion better, improved strength in arms, is able to do heavier activities around the home, and has stopped taking pain meds. He continues to have difficulty with tightness in neck, pins and needles in arms, fingers, and feet, and feeling off balance. He has made progress with UE strength and  some limited progress with cervical ROM however he continues to have limitations in strength and ROM and fluctuating pain. He will benefit from continued skilled therapy to address remaining ROM and strength deficits to improve his ability to perform heavier chores and projects around his home.     Patient will continue to benefit from skilled PT / OT services to modify and progress therapeutic interventions, analyze and address functional mobility deficits, analyze and address ROM deficits, analyze and address strength deficits, analyze and address soft tissue restrictions, analyze and cue for proper movement patterns, analyze and modify for postural abnormalities, analyze and address imbalance/dizziness, and instruct in home and community integration to address functional deficits and attain remaining goals.    Progress toward goals / Updated goals:  []  See Progress Note/Recertification    Short Term Goals: To be accomplished in 5 treatments   Patient to demonstrate independence with HEP, in order to demonstrate PT compliance   Status at IE : HEP initiated on IE   MET: reports daily compliance, will update as appropriate     Long Term Goals: To be accomplished in 10 treatments   Patient to improve cervical side bend ROM by 5 degrees or more to improve ability to turn head during ambulation while crossing the street   Status at IE Cervical AROM:                                           AROM (deg)                  Right Left   Flexion 31   Extension 17   Side Bend 11 22   Rotation 32 35                  Progressing, regressing: flex= 26, ext = 28, SB = right 11, left 17; rot = right 30, left 40     2.  Patient to improve UE strength to 4+/5 without increased pain to improve ability for patient to perform ADLs such as lifting tasks  Status at IE Strength:    Right (/5) Left (/5)   GHJ   Flexion 4+  4+ p!             Abduction 4+ 4+ p!              Extension 4- 4- p!             ER 4-  3+ p!      Progressing: flex =

## 2024-02-29 NOTE — PROGRESS NOTES
[facility] [address]  [address]  [place] [address]  [phone] fax    CONTINUED PLAN OF CARE/RECERTIFICATION FOR PHYSICAL THERAPY          Patient Name: Maximino Corrigan : 1950   Treatment/Medical Diagnosis: Neck pain [M54.2]  Other low back pain [M54.59]   Onset Date: 24    Referral Source: Ramirez Everett MD Start of Care (SOC): 24   Prior Hospitalization: See Medical History Provider #: [id number]   Prior Level of Function: Functionally independent with all ADLs and self care    Comorbidities: Prior neck surgery, Stomach tumor surgery, DM2, HTN      Visits from SOC: 7 Missed Visits: 0     Progress to Goals:  Short Term Goals: To be accomplished in 5 treatments   Patient to demonstrate independence with HEP, in order to demonstrate PT compliance   Status at IE : HEP initiated on IE   MET: reports daily compliance, will update as appropriate     Long Term Goals: To be accomplished in 10 treatments   Patient to improve cervical side bend ROM by 5 degrees or more to improve ability to turn head during ambulation while crossing the street   Status at IE Cervical AROM:                                           AROM (deg)                  Right Left   Flexion 31   Extension 17   Side Bend 11 22   Rotation 32 35                  Progressing, regressing: flex= 26, ext = 28, SB = right 11, left 17; rot = right 30, left 40     2.  Patient to improve UE strength to 4+/5 without increased pain to improve ability for patient to perform ADLs such as lifting tasks  Status at IE Strength:    Right (/5) Left (/5)   GHJ   Flexion 4+  4+ p!             Abduction 4+ 4+ p!              Extension 4- 4- p!             ER 4-  3+ p!      Progressing: flex = right 4+, left 4+; abd = right 4+, left 4+ with pain; ext = right 4, left 4; ER = right 4-, left 4     3.  Patient to report worst pain as 5/10 or less to improve pain modulation and improve patient's ability to  ADLs such as lifting tasks  Status at IE Strength:    Right (/5) Left (/5)   GHJ   Flexion 4+  4+ p!             Abduction 4+ 4+ p!              Extension 4- 4- p!             ER 4-  3+ p!      Progressing:     Right (/5) Left (/5)   GHJ   Flexion 4+  4+              Abduction 4+ 4+ p!              Extension 4- 4-              ER 4-  4        3.  Patient to report worst pain as 5/10 or less to improve pain modulation and improve patient's ability to perform ADLs  Status at IE : Worst pain = 8/10  Regressed: had an instance in the last week of 12/10 pain      4.  FOTO score to increase to 58/100 points or greater to demonstrate improvement in functional mobility and QoL  Status at IE : Initial FOTO= 52/100  Regressin- due to avoiding heavy lifting at the moment       Frequency / Duration:   Patient to be seen   2   times per week for   10    treatments:    Assessments/Recommendations: Mr. Corrigan reports 80% improvement since starting therapy stating he is sleeping better, he is able to sit up in a chair for longer, neck motion better, improved strength in arms, is able to do heavier activities around the home, and has stopped taking pain meds. He continues to have difficulty with tightness in neck, pins and needles in arms, fingers, and feet, and feeling off balance. He has made progress with UE strength and some limited progress with cervical ROM however he continues to have limitations in strength and ROM and fluctuating pain. He will benefit from continued skilled therapy to address remaining ROM and strength deficits to improve his ability to perform heavier chores and projects around his home.      If you have any questions/comments please contact us directly at [phone].   Thank you for allowing us to assist in the care of your patient.    Certification Period: 24-3/29/24  Reporting Period (date from last assessment to current assessment): 24-24    Mitzy Ricardo, PT       2024

## 2024-03-07 ENCOUNTER — ANESTHESIA EVENT (OUTPATIENT)
Facility: HOSPITAL | Age: 74
End: 2024-03-07
Payer: MEDICARE

## 2024-03-07 RX ORDER — INSULIN LISPRO 100 [IU]/ML
0-15 INJECTION, SOLUTION INTRAVENOUS; SUBCUTANEOUS ONCE
Status: DISCONTINUED | OUTPATIENT
Start: 2024-03-07 | End: 2024-03-07

## 2024-03-07 RX ORDER — DEXTROSE MONOHYDRATE 100 MG/ML
INJECTION, SOLUTION INTRAVENOUS CONTINUOUS PRN
Status: DISCONTINUED | OUTPATIENT
Start: 2024-03-07 | End: 2024-03-07

## 2024-03-07 RX ORDER — SODIUM CHLORIDE, SODIUM LACTATE, POTASSIUM CHLORIDE, CALCIUM CHLORIDE 600; 310; 30; 20 MG/100ML; MG/100ML; MG/100ML; MG/100ML
INJECTION, SOLUTION INTRAVENOUS CONTINUOUS
Status: DISCONTINUED | OUTPATIENT
Start: 2024-03-07 | End: 2024-03-07

## 2024-03-07 RX ORDER — LIDOCAINE HYDROCHLORIDE 10 MG/ML
1 INJECTION, SOLUTION EPIDURAL; INFILTRATION; INTRACAUDAL; PERINEURAL
Status: DISCONTINUED | OUTPATIENT
Start: 2024-03-07 | End: 2024-03-07

## 2024-03-08 ENCOUNTER — HOSPITAL ENCOUNTER (OUTPATIENT)
Facility: HOSPITAL | Age: 74
Setting detail: OUTPATIENT SURGERY
Discharge: HOME | End: 2024-03-08
Attending: INTERNAL MEDICINE | Admitting: INTERNAL MEDICINE
Payer: MEDICARE

## 2024-03-08 ENCOUNTER — ANESTHESIA (OUTPATIENT)
Facility: HOSPITAL | Age: 74
End: 2024-03-08
Payer: MEDICARE

## 2024-03-08 VITALS
BODY MASS INDEX: 31.82 KG/M2 | DIASTOLIC BLOOD PRESSURE: 82 MMHG | HEART RATE: 86 BPM | RESPIRATION RATE: 16 BRPM | WEIGHT: 198 LBS | HEIGHT: 66 IN | SYSTOLIC BLOOD PRESSURE: 136 MMHG | OXYGEN SATURATION: 100 % | TEMPERATURE: 97.8 F

## 2024-03-08 LAB
GLUCOSE BLD STRIP.AUTO-MCNC: 160 MG/DL (ref 70–110)
GLUCOSE BLD STRIP.AUTO-MCNC: 169 MG/DL (ref 70–110)

## 2024-03-08 PROCEDURE — 7100000010 HC PHASE II RECOVERY - FIRST 15 MIN: Performed by: INTERNAL MEDICINE

## 2024-03-08 PROCEDURE — 3600007502: Performed by: INTERNAL MEDICINE

## 2024-03-08 PROCEDURE — 2709999900 HC NON-CHARGEABLE SUPPLY: Performed by: INTERNAL MEDICINE

## 2024-03-08 PROCEDURE — 7100000000 HC PACU RECOVERY - FIRST 15 MIN: Performed by: INTERNAL MEDICINE

## 2024-03-08 PROCEDURE — 2580000003 HC RX 258: Performed by: NURSE ANESTHETIST, CERTIFIED REGISTERED

## 2024-03-08 PROCEDURE — 82962 GLUCOSE BLOOD TEST: CPT

## 2024-03-08 PROCEDURE — 88305 TISSUE EXAM BY PATHOLOGIST: CPT

## 2024-03-08 PROCEDURE — 6360000002 HC RX W HCPCS: Performed by: NURSE ANESTHETIST, CERTIFIED REGISTERED

## 2024-03-08 PROCEDURE — 2500000003 HC RX 250 WO HCPCS: Performed by: NURSE ANESTHETIST, CERTIFIED REGISTERED

## 2024-03-08 PROCEDURE — 3700000000 HC ANESTHESIA ATTENDED CARE: Performed by: INTERNAL MEDICINE

## 2024-03-08 PROCEDURE — 7100000011 HC PHASE II RECOVERY - ADDTL 15 MIN: Performed by: INTERNAL MEDICINE

## 2024-03-08 RX ORDER — SODIUM CHLORIDE 9 MG/ML
INJECTION, SOLUTION INTRAVENOUS PRN
Status: DISCONTINUED | OUTPATIENT
Start: 2024-03-08 | End: 2024-03-08 | Stop reason: HOSPADM

## 2024-03-08 RX ORDER — PROPOFOL 10 MG/ML
INJECTION, EMULSION INTRAVENOUS PRN
Status: DISCONTINUED | OUTPATIENT
Start: 2024-03-08 | End: 2024-03-08 | Stop reason: SDUPTHER

## 2024-03-08 RX ORDER — INSULIN LISPRO 100 [IU]/ML
0-4 INJECTION, SOLUTION INTRAVENOUS; SUBCUTANEOUS
Status: DISCONTINUED | OUTPATIENT
Start: 2024-03-08 | End: 2024-03-08 | Stop reason: HOSPADM

## 2024-03-08 RX ORDER — ONDANSETRON 2 MG/ML
4 INJECTION INTRAMUSCULAR; INTRAVENOUS
Status: CANCELLED | OUTPATIENT
Start: 2024-03-08 | End: 2024-03-09

## 2024-03-08 RX ORDER — INSULIN LISPRO 100 [IU]/ML
0-4 INJECTION, SOLUTION INTRAVENOUS; SUBCUTANEOUS NIGHTLY
Status: DISCONTINUED | OUTPATIENT
Start: 2024-03-08 | End: 2024-03-08 | Stop reason: HOSPADM

## 2024-03-08 RX ORDER — INSULIN LISPRO 100 [IU]/ML
0-4 INJECTION, SOLUTION INTRAVENOUS; SUBCUTANEOUS EVERY 4 HOURS
Status: DISCONTINUED | OUTPATIENT
Start: 2024-03-08 | End: 2024-03-08 | Stop reason: HOSPADM

## 2024-03-08 RX ORDER — LIDOCAINE HYDROCHLORIDE 20 MG/ML
INJECTION, SOLUTION EPIDURAL; INFILTRATION; INTRACAUDAL; PERINEURAL PRN
Status: DISCONTINUED | OUTPATIENT
Start: 2024-03-08 | End: 2024-03-08 | Stop reason: SDUPTHER

## 2024-03-08 RX ORDER — SODIUM CHLORIDE 0.9 % (FLUSH) 0.9 %
5-40 SYRINGE (ML) INJECTION PRN
Status: DISCONTINUED | OUTPATIENT
Start: 2024-03-08 | End: 2024-03-08 | Stop reason: HOSPADM

## 2024-03-08 RX ORDER — SODIUM CHLORIDE, SODIUM LACTATE, POTASSIUM CHLORIDE, CALCIUM CHLORIDE 600; 310; 30; 20 MG/100ML; MG/100ML; MG/100ML; MG/100ML
INJECTION, SOLUTION INTRAVENOUS CONTINUOUS
Status: DISCONTINUED | OUTPATIENT
Start: 2024-03-08 | End: 2024-03-08 | Stop reason: HOSPADM

## 2024-03-08 RX ORDER — DEXTROSE MONOHYDRATE 100 MG/ML
INJECTION, SOLUTION INTRAVENOUS CONTINUOUS PRN
Status: DISCONTINUED | OUTPATIENT
Start: 2024-03-08 | End: 2024-03-08 | Stop reason: HOSPADM

## 2024-03-08 RX ORDER — SODIUM CHLORIDE 0.9 % (FLUSH) 0.9 %
5-40 SYRINGE (ML) INJECTION EVERY 12 HOURS SCHEDULED
Status: DISCONTINUED | OUTPATIENT
Start: 2024-03-08 | End: 2024-03-08 | Stop reason: HOSPADM

## 2024-03-08 RX ADMIN — SODIUM CHLORIDE, POTASSIUM CHLORIDE, SODIUM LACTATE AND CALCIUM CHLORIDE: 600; 310; 30; 20 INJECTION, SOLUTION INTRAVENOUS at 08:58

## 2024-03-08 RX ADMIN — PROPOFOL 50 MG: 10 INJECTION, EMULSION INTRAVENOUS at 09:18

## 2024-03-08 RX ADMIN — PROPOFOL 10 MG: 10 INJECTION, EMULSION INTRAVENOUS at 09:19

## 2024-03-08 RX ADMIN — LIDOCAINE HYDROCHLORIDE 20 MG: 20 INJECTION, SOLUTION EPIDURAL; INFILTRATION; INTRACAUDAL; PERINEURAL at 09:18

## 2024-03-08 ASSESSMENT — PAIN SCALES - GENERAL
PAINLEVEL_OUTOF10: 0

## 2024-03-08 ASSESSMENT — PAIN - FUNCTIONAL ASSESSMENT
PAIN_FUNCTIONAL_ASSESSMENT: 0-10
PAIN_FUNCTIONAL_ASSESSMENT: 0-10

## 2024-03-08 ASSESSMENT — COPD QUESTIONNAIRES: CAT_SEVERITY: MODERATE

## 2024-03-08 NOTE — BRIEF OP NOTE
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 Matthew Nguyễn MD    Assistant(s): Circulator: Pavithra Ji RN; Keke Nair RN  Endoscopy Technician: Letty Laws    Devices/implants/grafts/tissues/prosthesis: None    EBL:None    Specimens:   ID Type Source Tests Collected by Time Destination   A : gastric bxs Tissue Gastric SURGICAL PATHOLOGY Matthew Nguyễn MD 3/8/2024 0923        Findings: See printed and scanned procedure note    Complications: None    Dr. Matthew Nguyễn MD  3/8/2024  9:37 AM

## 2024-03-08 NOTE — ANESTHESIA PRE PROCEDURE
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818       Past Medical History:        Diagnosis Date   • Benign gastrointestinal stromal tumor (GIST)    • COPD (chronic obstructive pulmonary disease) (HCC)    • Diabetes (HCC)    • 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 Duarte   • GASTRECTOMY  07/13/2021   • GASTRECTOMY  7/13/202   • OTHER SURGICAL HISTORY      gallstones removed   • UROLOGICAL SURGERY  10/19/2022    TRANSRECTAL ULTRASOUND AND BIOPSY OF PROSTATE Dr. Ball   • UROLOGICAL SURGERY  02/01/2024    PNBx Brayan 6, PSA 6.9,Dr. Blackmon, [facility]       Social History:    Social History

## 2024-03-08 NOTE — H&P
Chief Complaint: Burping nausea     History of present illness: Has had upper abdominal pain burping and nausea. History of GIST in past.    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)     GERD (gastroesophageal reflux disease)     Hx of colonic polyps     Hx of gallstones     Hypercholesterolemia     Hypertension     Restless leg syndrome     Wears dentures      Allergies:   Allergies   Allergen Reactions    Latex      Other reaction(s): Sneezing    Neosporin [Bacitracin-Polymyxin B] Hives    Benzalkonium Chloride Hives     Pt denies    Hydrocortisone Rash     Medications:   Current Facility-Administered Medications:     sodium chloride flush 0.9 % injection 5-40 mL, 5-40 mL, IntraVENous, 2 times per day, Dale Au APRN - CRNA    sodium chloride flush 0.9 % injection 5-40 mL, 5-40 mL, IntraVENous, PRN, Dale Au APRN - CRNA    0.9 % sodium chloride infusion, , IntraVENous, PRN, Dale Au APRN - CRNA    lactated ringers IV soln infusion, , IntraVENous, Continuous, Dale Au APRN - CRNA, Last Rate: 100 mL/hr at 03/08/24 0858, New Bag at 03/08/24 0858    glucose chewable tablet 16 g, 4 tablet, Oral, PRN, Dale Au APRN - CRNA    dextrose bolus 10% 125 mL, 125 mL, IntraVENous, PRN **OR** dextrose bolus 10% 250 mL, 250 mL, IntraVENous, PRN, Dale Au APRN - CRNA    glucagon injection 1 mg, 1 mg, SubCUTAneous, PRN, Dale Au APRN - CRNA    dextrose 10 % infusion, , IntraVENous, Continuous PRN, Dale Au APRN - CRNA    famotidine (PEPCID) 20 mg in sodium chloride (PF) 0.9 % 10 mL injection, 20 mg, IntraVENous, Once, Dale Au APRN - CRNA    insulin lispro (HUMALOG) injection vial 0-4 Units, 0-4 Units, SubCUTAneous, Q4H, Dale Au APRN - CRNA    insulin  UVA       ROS: positive for abdominal pain, dyspepsia, nausea, and reflux symptoms    Physical Exam: BP (!) 140/69   Pulse 61   Temp 98.2 °F (36.8 °C) (Oral)   Resp 20   Ht 1.676 m (5' 6\")   Wt 89.8 kg (198 lb)   SpO2 97%   BMI 31.96 kg/m²   General appearance: alert, no distress  Eyes: pupils equal and reactive, extraocular eye movements intact  Nodes: No gross adenopathy in neck.  Skin: no spider angiomata, jaundice, palmar erythema   Respiratory: clear to auscultation bilaterally  Cardiovascular: regular heart rate, no murmurs, no JVD, normal rate and regular rhythm  Abdomen: soft, non-tender, liver not enlarged, spleen not palpable, no obvious ascites  Extremities: no muscle wasting, no gross arthritic changes  Neurologic: alert and oriented, cranial nerves grossly intact, no asterixis    Labs:   Recent Results (from the past 24 hour(s))   POCT Glucose    Collection Time: 03/08/24  8:56 AM   Result Value Ref Range    POC Glucose 169 (H) 70 - 110 mg/dL         Imp/ Plan: Will proceed with EGD as planned. Risk benefits alternative including but not limited to infection, bleeding, perforation of viscous, allergic reaction and resultant morbidity and mortality was discussed. Chance of missing a significant lesion due to various reasons were discussed.      Matthew Nguyễn MD  [facility]

## 2024-03-08 NOTE — ANESTHESIA POSTPROCEDURE EVALUATION
Department of Anesthesiology  Postprocedure Note    Patient: Maximino Corrigan  MRN: [id number]  YOB: 1950  Date of evaluation: 3/8/2024    Procedure Summary       Date: 03/08/24 Room / Location: [facility] ENDO 02 / [facility]    Anesthesia Start: 0913 Anesthesia Stop: 0931    Procedure: ESOPHAGOGASTRODUODENOSCOPY with bxs (Upper GI Region) Diagnosis:       Abnormal LFTs      Obesity (BMI 30-39.9)      Gastrointestinal stromal tumor (GIST) (HCC)      Type 2 diabetes mellitus without complication, unspecified whether long term insulin use (HCC)      (Abnormal LFTs [R79.89])      (Obesity (BMI 30-39.9) [E66.9])      (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])      (Type 2 diabetes mellitus without complication, unspecified whether long term insulin use (HCC) [E11.9])    Surgeons: Matthew Nguyễn MD Responsible Provider: David Reyes MD    Anesthesia Type: MAC ASA Status: 3            Anesthesia Type: MAC    Darlene Phase I: Darlene Score: 10    Darlene Phase II: Darlene Score: 10    Anesthesia Post Evaluation    Patient location during evaluation: bedside  Patient participation: complete - patient participated  Level of consciousness: responsive to verbal stimuli  Airway patency: patent  Nausea & Vomiting: no nausea  Respiratory status: acceptable  Hydration status: euvolemic    No notable events documented.

## 2024-03-13 ENCOUNTER — HOSPITAL ENCOUNTER (OUTPATIENT)
Facility: HOSPITAL | Age: 74
Setting detail: RECURRING SERIES
Discharge: HOME | End: 2024-03-16
Payer: MEDICARE

## 2024-03-13 PROCEDURE — 97112 NEUROMUSCULAR REEDUCATION: CPT

## 2024-03-13 PROCEDURE — 97140 MANUAL THERAPY 1/> REGIONS: CPT

## 2024-03-13 PROCEDURE — 97110 THERAPEUTIC EXERCISES: CPT

## 2024-03-13 NOTE — PROGRESS NOTES
PHYSICAL / OCCUPATIONAL THERAPY - DAILY TREATMENT NOTE    Patient Name: Maximino Corrigan    Date: 3/13/2024    : 1950  Insurance: Payor: [employer] MEDICARE / Plan: JADEN [facility] [employer] MEDIBLUE PLUS / Product Type: *No Product type* /      Patient  verified Yes     Visit #   Current / Total 7 10   Time   In / Out 932 1013   Pain   In / Out 6 5-6   Subjective Functional Status/Changes: Pt reporting his MRI showed spondylosis      TREATMENT AREA =  Neck pain [M54.2]  Other low back pain [M54.59]    OBJECTIVE    Therapeutic Procedures:    Tx Min Billable or 1:1 Min (if diff from Tx Min) Procedure, Rationale, Specifics   15 15 47170 Therapeutic Exercise (timed):  increase ROM, strength, coordination, balance, and proprioception to improve patient's ability to progress to PLOF and address remaining functional goals. (see flow sheet as applicable)     Details if applicable:       15 15 81797 Neuromuscular Re-Education (timed):  improve balance, coordination, kinesthetic sense, posture, core stability and proprioception to improve patient's ability to develop conscious control of individual muscles and awareness of position of extremities in order to progress to PLOF and address remaining functional goals. (see flow sheet as applicable)     Details if applicable:     10 10 31991 Manual Therapy (timed):  decrease pain, increase ROM, increase tissue extensibility, and decrease trigger points to improve patient's ability to progress to PLOF and address remaining functional goals.  The manual therapy interventions were performed at a separate and distinct time from the therapeutic activities interventions . (see flow sheet as applicable)     Details if applicable:  SOR, gentle c/s traction, STM/TPR to B UT    40 40 [facility] Totals Reminder: bill using total billable min of TIMED therapeutic procedures (example: do not include dry needle or estim unattended, both untimed codes, in totals to left)  8-22 min = 1 unit;

## 2024-03-18 ENCOUNTER — HOSPITAL ENCOUNTER (OUTPATIENT)
Facility: HOSPITAL | Age: 74
Setting detail: RECURRING SERIES
Discharge: HOME | End: 2024-03-21
Payer: MEDICARE

## 2024-03-18 PROCEDURE — 97110 THERAPEUTIC EXERCISES: CPT

## 2024-03-18 PROCEDURE — 97112 NEUROMUSCULAR REEDUCATION: CPT

## 2024-03-18 PROCEDURE — 97140 MANUAL THERAPY 1/> REGIONS: CPT

## 2024-03-18 NOTE — PROGRESS NOTES
PHYSICAL / OCCUPATIONAL THERAPY - DAILY TREATMENT NOTE    Patient Name: Maximino Corrigan    Date: 3/18/2024    : 1950  Insurance: Payor: [employer] MEDICARE / Plan: JADEN [facility] [employer] MEDIBLUE PLUS / Product Type: *No Product type* /      Patient  verified Yes     Visit #   Current / Total 2 10   Time   In / Out 931 1014   Pain   In / Out 4 2-3   Subjective Functional Status/Changes: Patient reporting that he was fighting with his pillows all weekend and it was increasing his neck pain.      TREATMENT AREA =  Neck pain [M54.2]  Other low back pain [M54.59]    OBJECTIVE    Therapeutic Procedures:    Tx Min Billable or 1:1 Min (if diff from Tx Min) Procedure, Rationale, Specifics   15 15 42325 Therapeutic Exercise (timed):  increase ROM, strength, coordination, balance, and proprioception to improve patient's ability to progress to PLOF and address remaining functional goals. (see flow sheet as applicable)     Details if applicable:       15 15 11593 Neuromuscular Re-Education (timed):  improve balance, coordination, kinesthetic sense, posture, core stability and proprioception to improve patient's ability to develop conscious control of individual muscles and awareness of position of extremities in order to progress to PLOF and address remaining functional goals. (see flow sheet as applicable)     Details if applicable:      54358 Manual Therapy (timed):  decrease pain, increase ROM, increase tissue extensibility, and decrease trigger points to improve patient's ability to progress to PLOF and address remaining functional goals.  The manual therapy interventions were performed at a separate and distinct time from the therapeutic activities interventions . (see flow sheet as applicable)     Details if applicable:  SOR, gentle c/s traction, STM/TPR to B UT    43 43 [facility] Totals Reminder: bill using total billable min of TIMED therapeutic procedures (example: do not include dry needle or estim

## 2024-03-21 ENCOUNTER — HOSPITAL ENCOUNTER (OUTPATIENT)
Facility: HOSPITAL | Age: 74
Setting detail: RECURRING SERIES
Discharge: HOME | End: 2024-03-24
Payer: MEDICARE

## 2024-03-21 PROCEDURE — 97112 NEUROMUSCULAR REEDUCATION: CPT

## 2024-03-21 PROCEDURE — 97140 MANUAL THERAPY 1/> REGIONS: CPT

## 2024-03-21 PROCEDURE — 97110 THERAPEUTIC EXERCISES: CPT

## 2024-03-21 NOTE — PROGRESS NOTES
= 1 unit; 23-37 min = 2 units; 38-52 min = 3 units; 53-67 min = 4 units; 68-82 min = 5 units   Total Total     [x]  Patient Education billed concurrently with other procedures   [x] Review HEP    [] Progressed/Changed HEP, detail:    [] Other detail:       Objective Information/Functional Measures/Assessment    Pt arrives with reports of decreased overall pain, but still has pain at night. Reports still having difficulty with quick rotational movements. Now that pain has decreased we will likely continue with PT to address postural awareness and strength. Pt due for a reassessment at her NV.     Patient will continue to benefit from skilled PT / OT services to modify and progress therapeutic interventions, analyze and address functional mobility deficits, analyze and address ROM deficits, analyze and address strength deficits, analyze and address soft tissue restrictions, analyze and cue for proper movement patterns, analyze and modify for postural abnormalities, analyze and address imbalance/dizziness, and instruct in home and community integration to address functional deficits and attain remaining goals.    Progress toward goals / Updated goals:  []  See Progress Note/Recertification      1. Patient to improve cervical side bend ROM by 5 degrees or more to improve ability to turn head during ambulation while crossing the street   Status at IE Cervical AROM:                                           AROM (deg)                  Right Left   Flexion 31   Extension 17   Side Bend 11 22   Rotation 32 35                  Status at PN :Progressing, regressing:     Right Left   Flexion 26   Extension 28   Side Bend 11 17   Rotation 30 40     Reassess NV [Date assessed: 03/21/2024]       2.  Patient to improve UE strength to 4+/5 without increased pain to improve ability for patient to perform ADLs such as lifting tasks  Status at IE Strength:    Right (/5) Left (/5)   GHJ   Flexion 4+  4+ p!             Abduction 4+ 4+

## 2024-03-25 ENCOUNTER — HOSPITAL ENCOUNTER (OUTPATIENT)
Facility: HOSPITAL | Age: 74
Setting detail: RECURRING SERIES
Discharge: HOME | End: 2024-03-28
Payer: MEDICARE

## 2024-03-25 PROCEDURE — 97110 THERAPEUTIC EXERCISES: CPT

## 2024-03-25 PROCEDURE — 97112 NEUROMUSCULAR REEDUCATION: CPT

## 2024-03-25 PROCEDURE — 97535 SELF CARE MNGMENT TRAINING: CPT

## 2024-03-25 PROCEDURE — 97530 THERAPEUTIC ACTIVITIES: CPT

## 2024-03-27 ENCOUNTER — HOSPITAL ENCOUNTER (OUTPATIENT)
Facility: HOSPITAL | Age: 74
Setting detail: RECURRING SERIES
Discharge: HOME | End: 2024-03-30
Payer: MEDICARE

## 2024-03-27 PROCEDURE — 97112 NEUROMUSCULAR REEDUCATION: CPT

## 2024-03-27 PROCEDURE — 97110 THERAPEUTIC EXERCISES: CPT

## 2024-03-27 PROCEDURE — 97140 MANUAL THERAPY 1/> REGIONS: CPT

## 2024-03-27 NOTE — PROGRESS NOTES
PHYSICAL / OCCUPATIONAL THERAPY - DAILY TREATMENT NOTE    Patient Name: Maximino Corrigan    Date: 3/27/2024    : 1950  Insurance: Payor: DAVE MEDICARE / Plan: JADEN [facility] [employer] MEDIBLUE PLUS / Product Type: *No Product type* /      Patient  verified Yes     Visit #   Current / Total 4 10   Time   In / Out 930 1014   Pain   In / Out 3 4   Subjective Functional Status/Changes: Pt reports he still gets tingling in his hands when he wakes up in the morning.     TREATMENT AREA =  Neck pain [M54.2]  Other low back pain [M54.59]    OBJECTIVE      Therapeutic Procedures:    Tx Min Billable or 1:1 Min (if diff from Tx Min) Procedure, Rationale, Specifics   18  68265 Therapeutic Exercise (timed):  increase ROM, strength, coordination, balance, and proprioception to improve patient's ability to progress to PLOF and address remaining functional goals. (see flow sheet as applicable)     Details if applicable:       16  16739 Neuromuscular Re-Education (timed):  improve balance, coordination, kinesthetic sense, posture, core stability and proprioception to improve patient's ability to develop conscious control of individual muscles and awareness of position of extremities in order to progress to PLOF and address remaining functional goals. (see flow sheet as applicable)     Details if applicable:  scapular/cervical re-ed   10  04263 Manual Therapy (timed):  decrease pain, increase ROM, and decrease trigger points to improve patient's ability to progress to PLOF and address remaining functional goals.  The manual therapy interventions were performed at a separate and distinct time from the therapeutic activities interventions . (see flow sheet as applicable)     Details if applicable:  SOR, STM to c/s paraspinals and B UT/LS    44  [facility] Totals Reminder: bill using total billable min of TIMED therapeutic procedures (example: do not include dry needle or estim unattended, both untimed codes, in totals to

## 2024-04-04 ENCOUNTER — HOSPITAL ENCOUNTER (OUTPATIENT)
Facility: HOSPITAL | Age: 74
Setting detail: RECURRING SERIES
Discharge: HOME | End: 2024-04-07
Payer: MEDICARE

## 2024-04-04 PROCEDURE — 97110 THERAPEUTIC EXERCISES: CPT

## 2024-04-04 PROCEDURE — 97112 NEUROMUSCULAR REEDUCATION: CPT

## 2024-04-04 PROCEDURE — 97140 MANUAL THERAPY 1/> REGIONS: CPT

## 2024-04-04 NOTE — PROGRESS NOTES
PHYSICAL / OCCUPATIONAL THERAPY - DAILY TREATMENT NOTE    Patient Name: Maximino Corrigan    Date: 2024    : 1950  Insurance: Payor: [employer]ICARE / Plan: JADEN [facility] [employer] MEDIBLUE PLUS / Product Type: *No Product type* /      Patient  verified Yes     Visit #   Current / Total 2 10   Time   In / Out 810 852   Pain   In / Out 3 0   Subjective Functional Status/Changes: Pt reporting he feels his neck is improving      TREATMENT AREA =  Neck pain [M54.2]  Other low back pain [M54.59]    OBJECTIVE      Therapeutic Procedures:    Tx Min Billable or 1:1 Min (if diff from Tx Min) Procedure, Rationale, Specifics   14  75000 Therapeutic Exercise (timed):  increase ROM, strength, coordination, balance, and proprioception to improve patient's ability to progress to PLOF and address remaining functional goals. (see flow sheet as applicable)     Details if applicable:       16  02176 Neuromuscular Re-Education (timed):  improve balance, coordination, kinesthetic sense, posture, core stability and proprioception to improve patient's ability to develop conscious control of individual muscles and awareness of position of extremities in order to progress to PLOF and address remaining functional goals. (see flow sheet as applicable)     Details if applicable:  scapular/cervical re-ed   12  34166 Manual Therapy (timed):  decrease pain, increase ROM, and decrease trigger points to improve patient's ability to progress to PLOF and address remaining functional goals.  The manual therapy interventions were performed at a separate and distinct time from the therapeutic activities interventions . (see flow sheet as applicable)     Details if applicable:  SOR, STM to c/s paraspinals and B UT/LS    42  [facility] Totals Reminder: bill using total billable min of TIMED therapeutic procedures (example: do not include dry needle or estim unattended, both untimed codes, in totals to left)  8-22 min = 1 unit; 23-37 min = 2

## 2024-04-05 ENCOUNTER — HOSPITAL ENCOUNTER (OUTPATIENT)
Facility: HOSPITAL | Age: 74
Setting detail: RECURRING SERIES
Discharge: HOME | End: 2024-04-08
Payer: MEDICARE

## 2024-04-05 PROCEDURE — 97112 NEUROMUSCULAR REEDUCATION: CPT

## 2024-04-05 PROCEDURE — 97110 THERAPEUTIC EXERCISES: CPT

## 2024-04-05 PROCEDURE — 97140 MANUAL THERAPY 1/> REGIONS: CPT

## 2024-04-05 NOTE — PROGRESS NOTES
in totals to left)  8-22 min = 1 unit; 23-37 min = 2 units; 38-52 min = 3 units; 53-67 min = 4 units; 68-82 min = 5 units   Total Total     [x]  Patient Education billed concurrently with other procedures   [x] Review HEP    [] Progressed/Changed HEP, detail:    [] Other detail:       Objective Information/Functional Measures/Assessment    Patient reports overall improvement in pain recently though he still demonstrates limitations in cervical mobility. Discussed potentially discharging at [facility], however he feels he is not completely independent with self-management and would like to continue therapy with a brief recess for a week to see how he responds. He is due for reassessment at [facility]; will likely request more visits and consider reducing to 1x/week to prepare for independent symptom management.    Patient will continue to benefit from skilled PT / OT services to modify and progress therapeutic interventions, analyze and address functional mobility deficits, analyze and address ROM deficits, analyze and address strength deficits, analyze and address soft tissue restrictions, analyze and cue for proper movement patterns, analyze and modify for postural abnormalities, analyze and address imbalance/dizziness, and instruct in home and community integration to address functional deficits and attain remaining goals.    Progress toward goals / Updated goals:  []  See Progress Note/Recertification    1. Patient to improve cervical side bend ROM by 5 degrees or more to improve ability to turn head during ambulation while crossing the street            Status at last recert  (3/25/24)   Progressing/ no change*:     Right Left   Flexion 30*   Extension 37   Side Bend 15 25   Rotation 32* 45   Side bend and rotation remain fairly limited per visual assessment, measure [facility] [Date assessed: 04/05/24]     2.  Patient to improve UE strength to 4+/5 without increased pain to improve ability for patient to perform ADLs such as lifting

## 2024-04-11 ENCOUNTER — APPOINTMENT (OUTPATIENT)
Facility: HOSPITAL | Age: 74
End: 2024-04-11
Payer: MEDICARE

## 2024-04-17 ENCOUNTER — HOSPITAL ENCOUNTER (OUTPATIENT)
Facility: HOSPITAL | Age: 74
Setting detail: RECURRING SERIES
Discharge: HOME | End: 2024-04-20
Payer: MEDICARE

## 2024-04-17 PROCEDURE — 97140 MANUAL THERAPY 1/> REGIONS: CPT

## 2024-04-17 PROCEDURE — 97530 THERAPEUTIC ACTIVITIES: CPT

## 2024-04-17 NOTE — PROGRESS NOTES
PHYSICAL / OCCUPATIONAL THERAPY - DAILY TREATMENT NOTE    Patient Name: Maximino Corrigan    Date: 2024    : 1950  Insurance: Payor: [employer]ICARE / Plan: JADEN [facility] [employer] MEDIBLUE PLUS / Product Type: *No Product type* /      Patient  verified Yes     Visit #   Current / Total 4 10   Time   In / Out 12:10 12:49   Pain   In / Out 3 0   Subjective Functional Status/Changes: See recert.     TREATMENT AREA =  Neck pain [M54.2]  Other low back pain [M54.59]    OBJECTIVE      Therapeutic Procedures:    Tx Min Billable or 1:1 Min (if diff from Tx Min) Procedure, Rationale, Specifics   - - 15930 Therapeutic Exercise (timed):  increase ROM, strength, coordination, balance, and proprioception to improve patient's ability to progress to PLOF and address remaining functional goals. (see flow sheet as applicable)     Details if applicable:        43499 Therapeutic Activity (timed):  use of dynamic activities replicating functional movements to increase ROM, strength, coordination, balance, and proprioception in order to improve patient's ability to progress to PLOF and address remaining functional goals.  (see flow sheet as applicable)     Details if applicable: recert    70583 Manual Therapy (timed):  decrease pain, increase ROM, and increase tissue extensibility to improve patient's ability to progress to PLOF and address remaining functional goals.  The manual therapy interventions were performed at a separate and distinct time from the therapeutic activities interventions . (see flow sheet as applicable)     Details if applicable:  Progressive median and radial nerve glides on LUE in supine with movement point at wrist, movement point changed to C/S at end range with slide board   39 39 [facility] Totals Reminder: bill using total billable min of TIMED therapeutic procedures (example: do not include dry needle or estim unattended, both untimed codes, in totals to left)  8-22 min = 1 unit; 23-37

## 2024-04-17 NOTE — PROGRESS NOTES
[facility] [address]  [address]  [place] [address]  [phone] fax    CONTINUED PLAN OF CARE/RECERTIFICATION FOR PHYSICAL THERAPY          Patient Name: Maximino Corrigan : 1950   Treatment/Medical Diagnosis: Neck pain [M54.2]  Other low back pain [M54.59]   Onset Date: 24    Referral Source: Ramirez Everett MD Start of Care (SOC): 24   Prior Hospitalization: See Medical History Provider #: [id number]   Prior Level of Function: Functionally independent with all ADLs and self care   Comorbidities: Prior neck surgery, stomach tumor surgery, DM2, HTN   Visits from SOC: 14 Missed Visits: 0     Subjective:  - % PLOF: Pt reports he feels he has improved a lot since beginning therapy, notes he feels 50% his PLOF.    - Improvements: Pt notes reduced tightness in his C/S paraspinals. Pt notes any flare ups are shorter and he is able to manage them better with stretches and a hot pack.    - Deficits: Pt notes he continues to have \"shooting pains\" down his LUE when reaching outwards. Pt notes the pain also occurs randomly and is worse at night. Pt notes occasional \"coldness in his hand\".    Objective:      Right Left   Flexion 27   Extension 35*   Side Bend 20 25   Rotation 40* 45       Right (/5) Left (/5)   GHJ   Flexion  5 5             Abduction 5 5             Extension 5 5             ER 4+ 5     ULTT1: + L, - R, notes pulling \"up to his ear\" on L  ULTT 3: + L, - R, notes pulling \"up to his ear\" on L      Progress to Goals:    1. Patient to improve cervical side bend ROM by 5 degrees or more to improve ability to turn head during ambulation while crossing the street            Status at last recert  (3/25/24)   Progressing/ no change*:     Right Left   Flexion 30*   Extension 37   Side Bend 15 25   Rotation 32* 45     Today:      Right Left   Flexion 27   Extension 35*   Side Bend 20 25   Rotation 40* 45        2.  Patient to improve UE strength to 4+/5

## 2024-04-25 DIAGNOSIS — E11.65 TYPE 2 DIABETES MELLITUS WITH HYPERGLYCEMIA, WITH LONG-TERM CURRENT USE OF INSULIN (HCC): ICD-10-CM

## 2024-04-25 DIAGNOSIS — Z79.4 TYPE 2 DIABETES MELLITUS WITH HYPERGLYCEMIA, WITH LONG-TERM CURRENT USE OF INSULIN (HCC): ICD-10-CM

## 2024-04-25 DIAGNOSIS — J41.1 MUCOPURULENT CHRONIC BRONCHITIS (HCC): ICD-10-CM

## 2024-04-25 RX ORDER — UMECLIDINIUM BROMIDE AND VILANTEROL TRIFENATATE 62.5; 25 UG/1; UG/1
POWDER RESPIRATORY (INHALATION)
Qty: 60 EACH | Refills: 3 | Status: SHIPPED | OUTPATIENT
Start: 2024-04-25

## 2024-04-25 RX ORDER — PRAVASTATIN SODIUM 40 MG
TABLET ORAL
Qty: 90 TABLET | Refills: 1 | Status: SHIPPED | OUTPATIENT
Start: 2024-04-25

## 2024-04-25 RX ORDER — DULAGLUTIDE 0.75 MG/.5ML
INJECTION, SOLUTION SUBCUTANEOUS
Qty: 4 ADJUSTABLE DOSE PRE-FILLED PEN SYRINGE | Refills: 5 | Status: SHIPPED | OUTPATIENT
Start: 2024-04-25

## 2024-05-09 ENCOUNTER — HOSPITAL ENCOUNTER (OUTPATIENT)
Facility: HOSPITAL | Age: 74
Setting detail: RECURRING SERIES
Discharge: HOME | End: 2024-05-12
Payer: MEDICARE

## 2024-05-09 PROCEDURE — 97110 THERAPEUTIC EXERCISES: CPT

## 2024-05-09 PROCEDURE — 97530 THERAPEUTIC ACTIVITIES: CPT

## 2024-05-09 PROCEDURE — 97112 NEUROMUSCULAR REEDUCATION: CPT

## 2024-05-09 NOTE — PROGRESS NOTES
procedures (example: do not include dry needle or estim unattended, both untimed codes, in totals to left)  8-22 min = 1 unit; 23-37 min = 2 units; 38-52 min = 3 units; 53-67 min = 4 units; 68-82 min = 5 units   Total Total     [x]  Patient Education billed concurrently with other procedures   [x] Review HEP    [] Progressed/Changed HEP, detail:    [] Other detail:       Objective Information/Functional Measures/Assessment    Patient participated in today's session without adverse effect; moves slowly through all exercises, but good carryover/form throughout session. Relief of sx into LUE reported primarily with medial nerve glide performed by therapist in supine. Initiated 3 finger rotations for cervical mobility, with mild discomfort. Will continue to progress per POC as tolerated.     Patient will continue to benefit from skilled PT / OT services to modify and progress therapeutic interventions, analyze and address functional mobility deficits, analyze and address ROM deficits, analyze and address strength deficits, analyze and address soft tissue restrictions, analyze and cue for proper movement patterns, analyze and modify for postural abnormalities, and instruct in home and community integration to address functional deficits and attain remaining goals.    Progress toward goals / Updated goals:  []  See Progress Note/Recertification    Pt will have a - b/l ULTT1 and ULTT3 to highlight resolved neural tension and reduce neuropathic pain.  RC: + L, - R  Current: + L remains, decreased with median N glides (05/09/24)     Next PN/ RC due 05/22/24  Auth due (visit number/ date) 3 more authorized visits (05/28/24)     PLAN  - Continue Plan of Care    Xenia Ngo PT    5/9/2024    8:16 AM    Future Appointments   Date Time Provider Department Center   5/9/2024  9:45 AM [facility] LAB VISIT [facility]   5/13/2024  9:40 AM Ramirez Everett MD [facility]   5/15/2024  8:10 AM Xenia Ngo PT [facility]   5/22/2024  8:10 AM

## 2024-05-10 LAB
ALBUMIN SERPL-MCNC: 4.1 G/DL (ref 3.8–4.8)
ALBUMIN/GLOB SERPL: 2 {RATIO} (ref 1.2–2.2)
ALP SERPL-CCNC: 102 IU/L (ref 44–121)
ALT SERPL-CCNC: 15 IU/L (ref 0–44)
AST SERPL-CCNC: 19 IU/L (ref 0–40)
BILIRUB SERPL-MCNC: 0.4 MG/DL (ref 0–1.2)
BUN SERPL-MCNC: 9 MG/DL (ref 8–27)
BUN/CREAT SERPL: 9 (ref 10–24)
CALCIUM SERPL-MCNC: 9 MG/DL (ref 8.6–10.2)
CHLORIDE SERPL-SCNC: 105 MMOL/L (ref 96–106)
CHOLEST SERPL-MCNC: 102 MG/DL (ref 100–199)
CO2 SERPL-SCNC: 20 MMOL/L (ref 20–29)
CREAT SERPL-MCNC: 1.04 MG/DL (ref 0.76–1.27)
EGFRCR SERPLBLD CKD-EPI 2021: 75 ML/MIN/1.73
GLOBULIN SER CALC-MCNC: 2.1 G/DL (ref 1.5–4.5)
GLUCOSE SERPL-MCNC: 131 MG/DL (ref 70–99)
HBA1C MFR BLD: 7 % (ref 4.8–5.6)
HDLC SERPL-MCNC: 44 MG/DL
LDLC SERPL CALC-MCNC: 45 MG/DL (ref 0–99)
POTASSIUM SERPL-SCNC: 4 MMOL/L (ref 3.5–5.2)
PROT SERPL-MCNC: 6.2 G/DL (ref 6–8.5)
SODIUM SERPL-SCNC: 143 MMOL/L (ref 134–144)
TRIGL SERPL-MCNC: 53 MG/DL (ref 0–149)
VLDLC SERPL CALC-MCNC: 13 MG/DL (ref 5–40)

## 2024-05-13 ENCOUNTER — OFFICE VISIT (OUTPATIENT)
Age: 74
End: 2024-05-13
Payer: MEDICARE

## 2024-05-13 VITALS
HEART RATE: 66 BPM | SYSTOLIC BLOOD PRESSURE: 132 MMHG | OXYGEN SATURATION: 99 % | WEIGHT: 195 LBS | HEIGHT: 66 IN | DIASTOLIC BLOOD PRESSURE: 81 MMHG | TEMPERATURE: 98.4 F | BODY MASS INDEX: 31.34 KG/M2

## 2024-05-13 DIAGNOSIS — E11.65 TYPE 2 DIABETES MELLITUS WITH HYPERGLYCEMIA, WITH LONG-TERM CURRENT USE OF INSULIN (HCC): ICD-10-CM

## 2024-05-13 DIAGNOSIS — C49.A2 GASTROINTESTINAL STROMAL TUMOR OF STOMACH (HCC): ICD-10-CM

## 2024-05-13 DIAGNOSIS — I10 ESSENTIAL (PRIMARY) HYPERTENSION: ICD-10-CM

## 2024-05-13 DIAGNOSIS — E78.00 PURE HYPERCHOLESTEROLEMIA, UNSPECIFIED: ICD-10-CM

## 2024-05-13 DIAGNOSIS — Z12.11 COLON CANCER SCREENING: ICD-10-CM

## 2024-05-13 DIAGNOSIS — Z79.4 TYPE 2 DIABETES MELLITUS WITH HYPERGLYCEMIA, WITH LONG-TERM CURRENT USE OF INSULIN (HCC): ICD-10-CM

## 2024-05-13 DIAGNOSIS — Z00.00 MEDICARE ANNUAL WELLNESS VISIT, SUBSEQUENT: Primary | ICD-10-CM

## 2024-05-13 DIAGNOSIS — J41.1 MUCOPURULENT CHRONIC BRONCHITIS (HCC): ICD-10-CM

## 2024-05-13 DIAGNOSIS — Z87.891 PERSONAL HISTORY OF TOBACCO USE: ICD-10-CM

## 2024-05-13 PROCEDURE — 3051F HG A1C>EQUAL 7.0%<8.0%: CPT | Performed by: INTERNAL MEDICINE

## 2024-05-13 PROCEDURE — 1123F ACP DISCUSS/DSCN MKR DOCD: CPT | Performed by: INTERNAL MEDICINE

## 2024-05-13 PROCEDURE — G0439 PPPS, SUBSEQ VISIT: HCPCS | Performed by: INTERNAL MEDICINE

## 2024-05-13 PROCEDURE — 3079F DIAST BP 80-89 MM HG: CPT | Performed by: INTERNAL MEDICINE

## 2024-05-13 PROCEDURE — G0296 VISIT TO DETERM LDCT ELIG: HCPCS | Performed by: INTERNAL MEDICINE

## 2024-05-13 PROCEDURE — 3075F SYST BP GE 130 - 139MM HG: CPT | Performed by: INTERNAL MEDICINE

## 2024-05-13 PROCEDURE — 99214 OFFICE O/P EST MOD 30 MIN: CPT | Performed by: INTERNAL MEDICINE

## 2024-05-13 SDOH — ECONOMIC STABILITY: FOOD INSECURITY: WITHIN THE PAST 12 MONTHS, YOU WORRIED THAT YOUR FOOD WOULD RUN OUT BEFORE YOU GOT MONEY TO BUY MORE.: NEVER TRUE

## 2024-05-13 SDOH — ECONOMIC STABILITY: HOUSING INSECURITY
IN THE LAST 12 MONTHS, WAS THERE A TIME WHEN YOU DID NOT HAVE A STEADY PLACE TO SLEEP OR SLEPT IN A SHELTER (INCLUDING NOW)?: NO

## 2024-05-13 SDOH — ECONOMIC STABILITY: FOOD INSECURITY: WITHIN THE PAST 12 MONTHS, THE FOOD YOU BOUGHT JUST DIDN'T LAST AND YOU DIDN'T HAVE MONEY TO GET MORE.: NEVER TRUE

## 2024-05-13 SDOH — ECONOMIC STABILITY: INCOME INSECURITY: HOW HARD IS IT FOR YOU TO PAY FOR THE VERY BASICS LIKE FOOD, HOUSING, MEDICAL CARE, AND HEATING?: NOT HARD AT ALL

## 2024-05-13 ASSESSMENT — PATIENT HEALTH QUESTIONNAIRE - PHQ9
SUM OF ALL RESPONSES TO PHQ QUESTIONS 1-9: 1
9. THOUGHTS THAT YOU WOULD BE BETTER OFF DEAD, OR OF HURTING YOURSELF: NOT AT ALL
SUM OF ALL RESPONSES TO PHQ9 QUESTIONS 1 & 2: 1
5. POOR APPETITE OR OVEREATING: NOT AT ALL
SUM OF ALL RESPONSES TO PHQ QUESTIONS 1-9: 1
SUM OF ALL RESPONSES TO PHQ QUESTIONS 1-9: 1
8. MOVING OR SPEAKING SO SLOWLY THAT OTHER PEOPLE COULD HAVE NOTICED. OR THE OPPOSITE, BEING SO FIGETY OR RESTLESS THAT YOU HAVE BEEN MOVING AROUND A LOT MORE THAN USUAL: NOT AT ALL
6. FEELING BAD ABOUT YOURSELF - OR THAT YOU ARE A FAILURE OR HAVE LET YOURSELF OR YOUR FAMILY DOWN: NOT AT ALL
7. TROUBLE CONCENTRATING ON THINGS, SUCH AS READING THE NEWSPAPER OR WATCHING TELEVISION: NOT AT ALL
1. LITTLE INTEREST OR PLEASURE IN DOING THINGS: NOT AT ALL
SUM OF ALL RESPONSES TO PHQ QUESTIONS 1-9: 1
2. FEELING DOWN, DEPRESSED OR HOPELESS: SEVERAL DAYS
3. TROUBLE FALLING OR STAYING ASLEEP: NOT AT ALL
4. FEELING TIRED OR HAVING LITTLE ENERGY: NOT AT ALL
10. IF YOU CHECKED OFF ANY PROBLEMS, HOW DIFFICULT HAVE THESE PROBLEMS MADE IT FOR YOU TO DO YOUR WORK, TAKE CARE OF THINGS AT HOME, OR GET ALONG WITH OTHER PEOPLE: NOT DIFFICULT AT ALL

## 2024-05-13 ASSESSMENT — LIFESTYLE VARIABLES
HOW MANY STANDARD DRINKS CONTAINING ALCOHOL DO YOU HAVE ON A TYPICAL DAY: PATIENT DOES NOT DRINK
HOW OFTEN DO YOU HAVE A DRINK CONTAINING ALCOHOL: NEVER

## 2024-05-13 NOTE — PATIENT INSTRUCTIONS
with alcohol or drug use, talk to your doctor.   Medicines    Take your medicines exactly as prescribed. Call your doctor if you think you are having a problem with your medicine.     If your doctor recommends aspirin, take the amount directed each day. Make sure you take aspirin and not another kind of pain reliever, such as acetaminophen (Tylenol).   When should you call for help?   Call 911 if you have symptoms of a heart attack. These may include:    Chest pain or pressure, or a strange feeling in the chest.     Sweating.     Shortness of breath.     Pain, pressure, or a strange feeling in the back, neck, jaw, or upper belly or in one or both shoulders or arms.     Lightheadedness or sudden weakness.     A fast or irregular heartbeat.   After you call 911, the  may tell you to chew 1 adult-strength or 2 to 4 low-dose aspirin. Wait for an ambulance. Do not try to drive yourself.  Watch closely for changes in your health, and be sure to contact your doctor if you have any problems.  Where can you learn more?  Go to https://www.[employer].net/patientEd and enter F075 to learn more about \"A Healthy Heart: Care Instructions.\"  Current as of: June 24, 2023               Content Version: 14.0  © 2501-8049 [employer]   Care instructions adapted under license by [email or web address]. If you have questions about a medical condition or this instruction, always ask your healthcare professional. [employer] disclaims any warranty or liability for your use of this information.      Personalized Preventive Plan for Maximino Corrigan - 5/13/2024  Medicare offers a range of preventive health benefits. Some of the tests and screenings are paid in full while other may be subject to a deductible, co-insurance, and/or copay.    Some of these benefits include a comprehensive review of your medical history including lifestyle, illnesses that may run in your family, and various assessments and screenings as

## 2024-05-13 NOTE — PROGRESS NOTES
Maximino Corrigan presents today for   Chief Complaint   Patient presents with    Medicare AWV    Neck Pain                 1. \"Have you been to the ER, urgent care clinic since your last visit?  Hospitalized since your last visit?\" no    2. \"Have you seen or consulted any other health care providers outside of the [facility] since your last visit?\" no     3. For patients aged 45-75: Has the patient had a colonoscopy / FIT/ Cologuard? Yes - no Care Gap present      If the patient is female:    4. For patients aged 40-74: Has the patient had a mammogram within the past 2 years? NA - based on age or sex      5. For patients aged 21-65: Has the patient had a pap smear? NA - based on age or sex    
Medicare Annual Wellness Visit    Maximino Corrigan is here for Medicare AWV and Neck Pain    Assessment & Plan   Medicare annual wellness visit, subsequent  Type 2 diabetes mellitus with hyperglycemia, with long-term current use of insulin (HCC)  -     Hemoglobin A1C; Future  Essential (primary) hypertension  -     Comprehensive Metabolic Panel; Future  Pure hypercholesterolemia, unspecified  -     Lipid Panel; Future  Gastrointestinal stromal tumor of stomach (HCC)  Mucopurulent chronic bronchitis (HCC)  Personal history of tobacco use  -     MO VISIT TO DISCUSS LUNG CA SCREEN W LDCT  -     CT Lung Screen (Initial/Annual/Baseline); Future  Colon cancer screening  -     Amb External Referral To Gastroenterology  Recommendations for Preventive Services Due: see orders and patient instructions/AVS.  Recommended screening schedule for the next 5-10 years is provided to the patient in written form: see Patient Instructions/AVS.     Return in about 4 months (around 9/13/2024) for labs 1 week before.     Subjective       Patient's complete Health Risk Assessment and screening values have been reviewed and are found in Flowsheets. The following problems were reviewed today and where indicated follow up appointments were made and/or referrals ordered.    Positive Risk Factor Screenings with Interventions:                Activity, Diet, and Weight:  On average, how many days per week do you engage in moderate to strenuous exercise (like a brisk walk)?: 2 days  On average, how many minutes do you engage in exercise at this level?: 30 min    Do you eat balanced/healthy meals regularly?: Yes    Body mass index is 31.47 kg/m². (!) Abnormal    Obesity Interventions:  low carbohydrate diet            Dentist Screen:  Have you seen the dentist within the past year?: (!) No    Intervention:  Advised to schedule with their dentist      Safety:  Do you have non-slip mats or non-slip surfaces or shower bars or grab bars in your shower or 
MG/0.5ML SOPN SC injection INJECT 0.75 MG SUBCUTANEOUSLY ONE TIME PER WEEK    pravastatin (PRAVACHOL) 40 MG tablet TAKE 1 TAB BY MOUTH NIGHTLY. INDICATIONS: HIGH CHOLESTEROL    mirabegron (MYRBETRIQ) 25 MG TB24 Take 1 tablet by mouth daily    blood glucose test strips (FREESTYLE LITE) strip 1 each by In Vitro route daily As needed.    FreeStyle Lancets MISC 1 each by Does not apply route daily    cefTRIAXone (ROCEPHIN) 1 g injection Inject 1,000 mg into the muscle every 24 hours    Blood Glucose Monitoring Suppl (ACCU-CHEK ALEX PLUS) w/Device KIT Check blood sugar daily. Dx E11.29.    glucose monitoring kit 1 kit by Does not apply route daily DX E11.29. please dispense Accu-check glucometer    Blood Glucose Monitoring Suppl (TRUE METRIX METER) KAYKAY Check blood sugar daily. DX E11.29    Lancets MISC Check BS 1x/day E11.29    tiZANidine (ZANAFLEX) 4 MG tablet Take 1 tablet by mouth every 6 hours as needed (neck pain)    gabapentin (NEURONTIN) 300 MG capsule Take 1 capsule by mouth 3 times daily.    predniSONE 10 MG (21) TBPK Use as directed    acetaminophen (TYLENOL) 500 MG tablet Take 2 tablets by mouth every 6 hours as needed for Pain    lidocaine (LIDODERM) 5 % Place 1 patch onto the skin daily 12 hours on, 12 hours off.    lisinopril (PRINIVIL;ZESTRIL) 40 MG tablet TAKE 1 TABLET BY MOUTH EVERY DAY    fluticasone (FLONASE) 50 MCG/ACT nasal spray SPRAY 2 SPRAYS INTO EACH NOSTRIL EVERY DAY    Continuous Blood Gluc Sensor (FREESTYLE RAKESH 14 DAY SENSOR) MIS USE ONE EVERY 14 DAYS  Dx E11.65    amLODIPine (NORVASC) 10 MG tablet Take 1 tablet by mouth daily    metoclopramide (REGLAN) 10 MG tablet TAKE 1 TABLET BY MOUTH BEFORE BREAKFAST, LUNCH, DINNER AND AT BEDTIME FOR REFLUX DISEASE    ferrous sulfate (IRON 325) 325 (65 Fe) MG tablet Take 1 tablet by mouth Every other day    pantoprazole (PROTONIX) 40 MG tablet TAKE 1 TABLET BY MOUTH EVERY DAY    ibuprofen (ADVIL;MOTRIN) 200 MG tablet Take 1 tablet by mouth every 6

## 2024-05-15 ENCOUNTER — APPOINTMENT (OUTPATIENT)
Facility: HOSPITAL | Age: 74
End: 2024-05-15
Payer: MEDICARE

## 2024-05-22 ENCOUNTER — HOSPITAL ENCOUNTER (OUTPATIENT)
Facility: HOSPITAL | Age: 74
Setting detail: RECURRING SERIES
Discharge: HOME | End: 2024-05-25
Payer: MEDICARE

## 2024-05-22 PROCEDURE — 97110 THERAPEUTIC EXERCISES: CPT

## 2024-05-22 PROCEDURE — 97140 MANUAL THERAPY 1/> REGIONS: CPT

## 2024-05-22 PROCEDURE — 97530 THERAPEUTIC ACTIVITIES: CPT

## 2024-05-22 NOTE — PROGRESS NOTES
PHYSICAL / OCCUPATIONAL THERAPY - DAILY TREATMENT NOTE    Patient Name: Maximino Corrigan    Date: 2024    : 1950  Insurance: Payor: [facility] MEDICARE / Plan: JADEN [facility] [employer]EEPERS MEDIBLUE PLUS / Product Type: *No Product type* /      Patient  verified Yes     Visit #   Current / Total 2 10   Time   In / Out 0812 0850   Pain   In / Out 2/10 2/10   Subjective Functional Status/Changes: Patient reports pain/stiffness when he walks into a room that has air conditioning.      TREATMENT AREA =  Neck pain [M54.2]  Other low back pain [M54.59]     OBJECTIVE    Therapeutic Procedures:    Tx Min Billable or 1:1 Min (if diff from Tx Min) Procedure, Rationale, Specifics   12  44142 Therapeutic Exercise (timed):  increase ROM, strength, coordination, balance, and proprioception to improve patient's ability to progress to PLOF and address remaining functional goals. (see flow sheet as applicable)     Details if applicable:        34978 Manual Therapy (timed):  decrease pain, increase ROM, and increase tissue extensibility to improve patient's ability to progress to PLOF and address remaining functional goals.  The manual therapy interventions were performed at a separate and distinct time from the therapeutic activities interventions . (see flow sheet as applicable)     Details if applicable:  gentle c/s distraction, L 1st rib mob grades 3-4, ULTT 1 and 3    18 18 55207 Therapeutic Activity (timed):  use of dynamic activities replicating functional movements to increase ROM, strength, coordination, balance, and proprioception in order to improve patient's ability to progress to PLOF and address remaining functional goals.  (see flow sheet as applicable)     Details if applicable:  recertification (RC)/reassessment   38 38 [facility] Totals Reminder: bill using total billable min of TIMED therapeutic procedures (example: do not include dry needle or estim unattended, both untimed codes, in totals to left)  8

## 2024-05-22 NOTE — PROGRESS NOTES
[facility] [address]  [address]  [place] [address]  [phone] fax    CONTINUED PLAN OF CARE/RECERTIFICATION FOR PHYSICAL THERAPY          Patient Name: Maximino Corrigan : 1950   Treatment/Medical Diagnosis: Neck pain [M54.2]  Other low back pain [M54.59]   Onset Date: 24    Referral Source: Ramirez Everett MD Start of Care (SOC): 24   Prior Hospitalization: See Medical History Provider #: [id number]   Prior Level of Function: Functionally independent with all ADLs and self care    Comorbidities: Prior neck surgery, Stomach tumor surgery, DM2, HTN      Visits from SOC: 17 Missed Visits: 0     Progress to Goals:  Pt will have a - b/l ULTT1 and ULTT3 to highlight resolved neural tension and reduce neuropathic pain.  RC: + L, - R  Current: + L remains, decreased with median N glides (24)  RC: + L ULTT1 remains, slight + ULTT3 (but reduced) (24)   GOAL IN PROGRESS    Key Functional Changes/Progress:   Functional Gains: Turning neck further than he used to, less apprehensive with neck movements, improved flexibility  Functional Deficits: Laying down/waking up in the morning experiences stiffness in neck, still getting N/T into LUE -- the nerve glide exercises help  % improvement: 75-80% improvements  Pain   Average: 2/10       Best: 0/10     Worst: 3-4/10 00 in air conditioning and on rainy days  Patient Goal: \"Get as close as back to 100% as possible, so that I'm not concerned or conscious about it\"    Problem List: pain affecting function, decrease ROM, decrease strength, decrease ADL/functional abilities, decrease activity tolerance, and decrease flexibility/joint mobility   Treatment Plan may include any combination of the followin Therapeutic Exercise, 03624 Neuromuscular Re-Education, 69674 Manual Therapy, 65690 Therapeutic Activity, 03512 Self Care/Home Management, 84527 Electrical Stim unattended, 38125 Electrical Stim

## 2024-05-24 ENCOUNTER — HOSPITAL ENCOUNTER (OUTPATIENT)
Facility: HOSPITAL | Age: 74
Setting detail: RECURRING SERIES
Discharge: HOME | End: 2024-05-27
Payer: MEDICARE

## 2024-05-24 PROCEDURE — 97110 THERAPEUTIC EXERCISES: CPT

## 2024-05-24 PROCEDURE — 97140 MANUAL THERAPY 1/> REGIONS: CPT

## 2024-05-24 NOTE — PROGRESS NOTES
PHYSICAL / OCCUPATIONAL THERAPY - DAILY TREATMENT NOTE    Patient Name: Maximino Corrigan    Date: 2024    : 1950  Insurance: Payor: [employer]IC[employer] / Plan: JADEN [facility] HEALTHKEEPERS MEDIBLUE PLUS / Product Type: *No Product type* /      Patient  verified Yes     Visit #   Current / Total 3 10   Time   In / Out 0812 0839   Pain   In / Out 8/10 5-6/10   Subjective Functional Status/Changes: \"I'm not doing so good today, I pulled my back picking up a bag of dirt yesterday. I woke up with my neck sore.\" Reports neck and back pain currently as 8/10.      TREATMENT AREA =  Neck pain [M54.2]  Other low back pain [M54.59]     OBJECTIVE    Therapeutic Procedures:    Tx Min Billable or 1:1 Min (if diff from Tx Min) Procedure, Rationale, Specifics   10 10 86286 Manual Therapy (timed):  decrease pain, increase ROM, and increase tissue extensibility to improve patient's ability to progress to PLOF and address remaining functional goals.  The manual therapy interventions were performed at a separate and distinct time from the therapeutic activities interventions . (see flow sheet as applicable)     Details if applicable:  gentle c/s distraction grades 1-2, SOR, STM B c/s paraspinals and UT/LS   17 17 47373 Therapeutic Exercise (timed):  increase ROM, strength, coordination, balance, and proprioception to improve patient's ability to progress to PLOF and address remaining functional goals. (see flow sheet as applicable)     Details if applicable:     27 27 [facility] Totals Reminder: bill using total billable min of TIMED therapeutic procedures (example: do not include dry needle or estim unattended, both untimed codes, in totals to left)  8-22 min = 1 unit; 23-37 min = 2 units; 38-52 min = 3 units; 53-67 min = 4 units; 68-82 min = 5 units   Total Total     [x]  Patient Education billed concurrently with other procedures   [x] Review HEP    [] Progressed/Changed HEP, detail:    [] Other detail:       Objective

## 2024-05-27 DIAGNOSIS — E11.65 TYPE 2 DIABETES MELLITUS WITH HYPERGLYCEMIA, WITH LONG-TERM CURRENT USE OF INSULIN (HCC): ICD-10-CM

## 2024-05-27 DIAGNOSIS — Z79.4 TYPE 2 DIABETES MELLITUS WITH HYPERGLYCEMIA, WITH LONG-TERM CURRENT USE OF INSULIN (HCC): ICD-10-CM

## 2024-05-27 RX ORDER — AMLODIPINE BESYLATE 10 MG/1
10 TABLET ORAL DAILY
Qty: 90 TABLET | Refills: 2 | Status: SHIPPED | OUTPATIENT
Start: 2024-05-27

## 2024-05-27 RX ORDER — INSULIN GLARGINE 100 [IU]/ML
INJECTION, SOLUTION SUBCUTANEOUS
Refills: 2 | OUTPATIENT
Start: 2024-05-27

## 2024-05-30 PROBLEM — G62.0 PERIPHERAL NEUROPATHY DUE TO AND NOT CONCURRENT WITH CHEMOTHERAPY (HCC): Status: ACTIVE | Noted: 2024-05-30

## 2024-05-30 PROBLEM — T45.1X5S PERIPHERAL NEUROPATHY DUE TO AND NOT CONCURRENT WITH CHEMOTHERAPY (HCC): Status: ACTIVE | Noted: 2024-05-30

## 2024-05-31 ENCOUNTER — HOSPITAL ENCOUNTER (OUTPATIENT)
Facility: HOSPITAL | Age: 74
Setting detail: RECURRING SERIES
End: 2024-05-31
Payer: MEDICARE

## 2024-05-31 PROCEDURE — 97530 THERAPEUTIC ACTIVITIES: CPT

## 2024-05-31 PROCEDURE — 97112 NEUROMUSCULAR REEDUCATION: CPT

## 2024-05-31 PROCEDURE — 97110 THERAPEUTIC EXERCISES: CPT

## 2024-05-31 NOTE — PROGRESS NOTES
PHYSICAL / OCCUPATIONAL THERAPY - DAILY TREATMENT NOTE    Patient Name: Maximino Corrigan    Date: 2024    : 1950  Insurance: Payor: [employer] / Plan: [facility] [employer] MEDIBLUE PLUS / Product Type: *No Product type* /      Patient  verified Yes     Visit #   Current / Total 4 10   Time   In / Out 853 934   Pain   In / Out 2-3 3   Subjective Functional Status/Changes: \"I still get stiffness, but I'm not in as much pain today.\"     TREATMENT AREA =  Neck pain [M54.2]  Other low back pain [M54.59]     OBJECTIVE         Therapeutic Procedures:    Tx Min Billable or 1:1 Min (if diff from Tx Min) Procedure, Rationale, Specifics   11 11 87890 Therapeutic Exercise (timed):  increase ROM, strength, coordination, balance, and proprioception to improve patient's ability to progress to PLOF and address remaining functional goals. (see flow sheet as applicable)     Details if applicable:        82412 Neuromuscular Re-Education (timed):  improve balance, coordination, kinesthetic sense, posture, core stability and proprioception to improve patient's ability to develop conscious control of individual muscles and awareness of position of extremities in order to progress to PLOF and address remaining functional goals. (see flow sheet as applicable)     Details if applicable:     10 10 82494 Therapeutic Activity (timed):  use of dynamic activities replicating functional movements to increase ROM, strength, coordination, balance, and proprioception in order to improve patient's ability to progress to PLOF and address remaining functional goals.  (see flow sheet as applicable)     Details if applicable:     41 41 [facility] Totals Reminder: bill using total billable min of TIMED therapeutic procedures (example: do not include dry needle or estim unattended, both untimed codes, in totals to left)  8-22 min = 1 unit; 23-37 min = 2 units; 38-52 min = 3 units; 53-67 min = 4 units; 68-82 min = 5 units   Total  Updated HEP and provided printout (05/22/24)    Reports daily compliance [Date assessed: 05/31/2024]    Next PN/ RC due PN - 06/20/2024, RC - 07/21/2024  Auth due (visit number/ date) pending    PLAN  - Continue Plan of Care    Roberth Goyal PTA, [facility] [place]    5/31/2024    8:40 AM    Future Appointments   Date Time Provider Department Center   5/31/2024  8:50 AM [facility]   6/21/2024 10:30 AM Jeff Preciado DO [facility] BS AMB   9/11/2024  9:15 AM IOC LAB VISIT [facility] BS AMB   9/18/2024  9:00 AM Ramirez Everett MD [facility] BS AMB   1/9/2025  2:30 PM Juvenal Blackmon MD [facility] Sched

## 2024-06-21 ENCOUNTER — OFFICE VISIT (OUTPATIENT)
Age: 74
End: 2024-06-21
Payer: MEDICARE

## 2024-06-21 VITALS
RESPIRATION RATE: 18 BRPM | BODY MASS INDEX: 29.7 KG/M2 | DIASTOLIC BLOOD PRESSURE: 74 MMHG | WEIGHT: 184.8 LBS | TEMPERATURE: 97.2 F | HEART RATE: 78 BPM | HEIGHT: 66 IN | OXYGEN SATURATION: 100 % | SYSTOLIC BLOOD PRESSURE: 110 MMHG

## 2024-06-21 DIAGNOSIS — J44.9 CHRONIC OBSTRUCTIVE PULMONARY DISEASE, UNSPECIFIED COPD TYPE (HCC): ICD-10-CM

## 2024-06-21 DIAGNOSIS — Z72.0 TOBACCO ABUSE: ICD-10-CM

## 2024-06-21 DIAGNOSIS — J43.1 PANLOBULAR EMPHYSEMA (HCC): Primary | ICD-10-CM

## 2024-06-21 DIAGNOSIS — R29.818 SUSPECTED SLEEP APNEA: ICD-10-CM

## 2024-06-21 PROCEDURE — 99214 OFFICE O/P EST MOD 30 MIN: CPT | Performed by: INTERNAL MEDICINE

## 2024-06-21 PROCEDURE — 99406 BEHAV CHNG SMOKING 3-10 MIN: CPT | Performed by: INTERNAL MEDICINE

## 2024-06-21 PROCEDURE — 1123F ACP DISCUSS/DSCN MKR DOCD: CPT | Performed by: INTERNAL MEDICINE

## 2024-06-21 PROCEDURE — 3078F DIAST BP <80 MM HG: CPT | Performed by: INTERNAL MEDICINE

## 2024-06-21 PROCEDURE — 3074F SYST BP LT 130 MM HG: CPT | Performed by: INTERNAL MEDICINE

## 2024-06-21 NOTE — PROGRESS NOTES
[facility]                                                       Pulmonary, Critical Care, and Sleep Medicine      Pulmonary Office Follow-up.    Name: Maximino Corrigan     : 1950     Date: 2024        Subjective:   Chief complaint: COPD, SOB  Patient is a 74 y.o. male with a PMHx of COPD, HTN, HLD, GERD, Cholelithiasis, GIST, RLS, and DM.    HPI(24):  He is a former patient of Dr. CHICO Worthington and was last seen in the clinic on 23.    Today in clinic, he states that he is doing okay.   No dyspnea with exertion.   Admits to intermittent cough which is sometimes productive of clear phlegm.   No fever or chills. No night sweats, chest pain, tightness or wheezing.  Admits to weight loss of ~10 Lbs, unintentional and is unsure of why. States he has lost this weight over the past 3-4 months. Notes his appetite has not changed and he continues to eat quite a bit of food.  Hemoptysis: none  Tobacco - smoking 1/2 ppd since age 12 years old; tried patches without effect.    Current inhalers and/or respiratory treatments:  -Anoro ellipta - taking 1 puff daily  -Albuterol rescue inhaler - he does need to use it.    Sleep - completed PSG and has not received results.Complains of continued snoring.    Of note, he is here today with his fiance. Both are in agreement with plan of care.    Past Surgical History:   Procedure Laterality Date    COLONOSCOPY FLX DX W/COLLJ SPEC WHEN PFRMD  2016    Dr. Duarte    GASTRECTOMY  2021    GASTRECTOMY      OTHER SURGICAL HISTORY      gallstones removed    UPPER GASTROINTESTINAL ENDOSCOPY N/A 3/8/2024    ESOPHAGOGASTRODUODENOSCOPY with bxs performed by Matthew Nguyễn MD at [facility] ENDOSCOPY    UROLOGICAL SURGERY  10/19/2022    TRANSRECTAL ULTRASOUND AND BIOPSY OF PROSTATE Dr. Ball    UROLOGICAL SURGERY  2024    PNBx Brayan 6, PSA 6.9,Dr. Blackmon, [facility]        Social

## 2024-06-21 NOTE — PROGRESS NOTES
Maximino Corrigan presents today for   Chief Complaint   Patient presents with    Panlobular emphysema       Is someone accompanying this pt? Wife    Is the patient using any DME equipment during OV? No    -DME Company No    Depression Screenin/13/2024     9:52 AM   PHQ-9 Questionaire   Little interest or pleasure in doing things 0   Feeling down, depressed, or hopeless 1   Trouble falling or staying asleep, or sleeping too much 0   Feeling tired or having little energy 0   Poor appetite or overeating 0   Feeling bad about yourself - or that you are a failure or have let yourself or your family down 0   Trouble concentrating on things, such as reading the newspaper or watching television 0   Moving or speaking so slowly that other people could have noticed. Or the opposite - being so fidgety or restless that you have been moving around a lot more than usual 0   Thoughts that you would be better off dead, or of hurting yourself in some way 0   PHQ-9 Total Score 1   If you checked off any problems, how difficult have these problems made it for you to do your work, take care of things at home, or get along with other people? 0       Learning Assessment:    Failed to redirect to the Timeline version of the [employer] SmartLink.    Abuse Screening:         No data to display                Fall Risk    Failed to redirect to the Timeline version of the [employer] SmartLink.    Coordination of Care:    1. Have you been to the ER, urgent care clinic since your last visit? Hospitalized since your last visit? No    2. Have you seen or consulted any other health care providers outside of the [facility] since your last visit? Include any pap smears or colon screening. No    Medication list has been update per patient.

## 2024-07-16 DIAGNOSIS — M54.2 NECK PAIN: Primary | ICD-10-CM

## 2024-07-16 DIAGNOSIS — Z79.4 TYPE 2 DIABETES MELLITUS WITH HYPERGLYCEMIA, WITH LONG-TERM CURRENT USE OF INSULIN (HCC): ICD-10-CM

## 2024-07-16 DIAGNOSIS — E11.65 TYPE 2 DIABETES MELLITUS WITH HYPERGLYCEMIA, WITH LONG-TERM CURRENT USE OF INSULIN (HCC): ICD-10-CM

## 2024-07-16 DIAGNOSIS — M54.50 CHRONIC MIDLINE LOW BACK PAIN, UNSPECIFIED WHETHER SCIATICA PRESENT: ICD-10-CM

## 2024-07-16 DIAGNOSIS — G89.29 CHRONIC MIDLINE LOW BACK PAIN, UNSPECIFIED WHETHER SCIATICA PRESENT: ICD-10-CM

## 2024-07-16 RX ORDER — INSULIN GLARGINE 100 [IU]/ML
INJECTION, SOLUTION SUBCUTANEOUS
Refills: 2 | OUTPATIENT
Start: 2024-07-16

## 2024-07-16 RX ORDER — BLOOD-GLUCOSE METER
1 KIT MISCELLANEOUS DAILY
Qty: 100 EACH | Refills: 3 | Status: SHIPPED | OUTPATIENT
Start: 2024-07-16

## 2024-07-19 ENCOUNTER — PROCEDURE VISIT (OUTPATIENT)
Age: 74
End: 2024-07-19

## 2024-07-19 VITALS
OXYGEN SATURATION: 99 % | HEIGHT: 66 IN | SYSTOLIC BLOOD PRESSURE: 126 MMHG | HEART RATE: 68 BPM | WEIGHT: 189 LBS | BODY MASS INDEX: 30.37 KG/M2 | DIASTOLIC BLOOD PRESSURE: 72 MMHG

## 2024-07-19 DIAGNOSIS — G56.22 CUBITAL TUNNEL SYNDROME ON LEFT: ICD-10-CM

## 2024-07-19 DIAGNOSIS — R20.0 NUMBNESS AND TINGLING IN LEFT HAND: Primary | ICD-10-CM

## 2024-07-19 DIAGNOSIS — R94.131 ABNORMAL EMG: ICD-10-CM

## 2024-07-19 DIAGNOSIS — R20.2 NUMBNESS AND TINGLING IN LEFT HAND: Primary | ICD-10-CM

## 2024-07-19 DIAGNOSIS — G62.9 NEUROPATHY: ICD-10-CM

## 2024-07-19 NOTE — PROGRESS NOTES
[facility]  [facility] [address], [address]  [place] [address]  Phone: [phone]  Fax: [phone]    Maximino Corrigan  : 1950  PCP: Ramirez Everett MD  2024    ELECTROMYOGRAPHY AND NERVE CONDUCTION STUDIES    Maximino Corrigan was referred by Cole Junior MD for electrodiagnostic evaluation of numbness/tingling of the left arm and hand.    NCV & EMG Findings:  Evaluation of the left median (APB) motor nerve showed prolonged distal onset latency (4.8 ms).  The left ulnar (ADM) motor nerve showed prolonged distal onset latency (3.9 ms), reduced amplitude (2.4 mV), decreased conduction velocity (Bel Elbow-Wrist, 49 m/s), and decreased conduction velocity (Abv Elbow-Bel Elbow, 28 m/s).  The left ulnar sensory nerve showed prolonged distal onset latency (3.5 ms), prolonged distal peak latency (4.1 ms), and reduced amplitude (7 µV).  All remaining nerves (as indicated in the following tables) were within normal limits.    INTERPRETATION  This is an abnormal electrodiagnostic examination. 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        CLINICAL INTERPRETATION  His electrodiagnostic findings of ulnar mononeuropathy at the elbow and peripheral neuropathy appear consistent with his left arm symptoms.       HISTORY OF PRESENT ILLNESS  Maximino Corrigan is a 74 y.o. male.    Pt presents today with LUE EMG evaluation for numbness/tingling of left arm and hand, consistent with ulnar nerve symptoms.    PAST MEDICAL HISTORY   Past Medical History:   Diagnosis Date    Benign gastrointestinal stromal tumor (GIST)     COPD (chronic obstructive pulmonary disease) (HCC)     Diabetes (HCC)     Gastrointestinal

## 2024-07-23 ENCOUNTER — TELEPHONE (OUTPATIENT)
Facility: CLINIC | Age: 74
End: 2024-07-23

## 2024-07-23 NOTE — TELEPHONE ENCOUNTER
Pt called stating that his insurance company denied blood glucose test strips (FREESTYLE LITE) strip pt stated that he is putting in a appeal pt would to know what should he do?    Please advise

## 2024-08-21 ENCOUNTER — HOSPITAL ENCOUNTER (OUTPATIENT)
Facility: HOSPITAL | Age: 74
Setting detail: RECURRING SERIES
Discharge: HOME | End: 2024-08-24
Payer: MEDICARE

## 2024-08-21 PROCEDURE — 97161 PT EVAL LOW COMPLEX 20 MIN: CPT
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
## 2024-08-27 ENCOUNTER — HOSPITAL ENCOUNTER (OUTPATIENT)
Facility: HOSPITAL | Age: 74
Setting detail: RECURRING SERIES
Discharge: HOME | End: 2024-08-30
Payer: MEDICARE

## 2024-08-27 PROCEDURE — 97110 THERAPEUTIC EXERCISES: CPT

## 2024-08-27 PROCEDURE — 97112 NEUROMUSCULAR REEDUCATION: CPT

## 2024-08-27 PROCEDURE — 97140 MANUAL THERAPY 1/> REGIONS: CPT

## 2024-08-27 NOTE — PROGRESS NOTES
PHYSICAL / OCCUPATIONAL THERAPY - DAILY TREATMENT NOTE    Patient Name: Maximino Corrigan    Date: 2024    : 1950  Insurance: Payor: [employer] / Plan: [facility] HEALTHKEEPERS MEDIBLUE PLUS / Product Type: *No Product type* /      Patient  verified Yes     Visit #   Current / Total 2 10   Time   In / Out 730 816   Pain   In / Out 0 0   Subjective Functional Status/Changes: \"No pain this morning, but I have tightness.\"     TREATMENT AREA =  Neck pain [M54.2]     OBJECTIVE         Therapeutic Procedures:    Tx Min Billable or 1:1 Min (if diff from Tx Min) Procedure, Rationale, Specifics   13 13 72411 Therapeutic Exercise (timed):  increase ROM, strength, coordination, balance, and proprioception to improve patient's ability to progress to PLOF and address remaining functional goals. (see flow sheet as applicable)     Details if applicable:        02048 Neuromuscular Re-Education (timed):  improve balance, coordination, kinesthetic sense, posture, core stability and proprioception to improve patient's ability to develop conscious control of individual muscles and awareness of position of extremities in order to progress to PLOF and address remaining functional goals. (see flow sheet as applicable)     Details if applicable:      25122 Manual Therapy (timed):  decrease pain, increase ROM, increase tissue extensibility, decrease trigger points, and increase postural awareness to improve patient's ability to progress to PLOF and address remaining functional goals.  The manual therapy interventions were performed at a separate and distinct time from the therapeutic activities interventions . (see flow sheet as applicable)     Details if applicable:  SOR, STM to c/s paraspinals and B UT/LS   46 46 [facility] Totals Reminder: bill using total billable min of TIMED therapeutic procedures (example: do not include dry needle or estim unattended, both untimed codes, in totals to left)  8-22 min = 1 unit;  Eval: Shoulder Flexion: Right 4/5, Left: 4+/5; Shoulder ER: 4+/5 bilaterally     2. Pt will improve NDI score to 34% to demonstrate improvement in patient's ability to perform unrestricted ADLs/IADLs at home & community.              Eval:  44%     3. Pt will report compliance with home HEP at end of care to enhance carry over of fuctional gains made with skilled therapy services in order to improve quality of life.               Eval: Established first HEP at [facility]     4. Pt will improve C/S rotation AROM to at least 60 degrees to improve their ability to perform turn head before crossing street.  Eval: 50 degree at right, 52 degree at left      5. Pt will improve C/S lateral flexion AROM to at least 20 degrees to improve their ability to sleep with improved comfort.  Eval: 10 degrees into right, 12 degree into left     Next PN/ RC due 09/19/2024  Auth due (visit number/ date) 5 remaining by 10/19/2024    PLAN  - Continue Plan of Care    Roberth Goyal PTA, [facility]    8/27/2024    7:56 AM  If an interpreting service was utilized for treatment of this patient, the contents of this document represent the material reviewed with the patient via the .     Future Appointments   Date Time Provider Department Center   8/29/2024  7:30 AM Roberth Goyal, PT [facility]   9/5/2024  7:30 AM Lourdes Ferrell, PTA [facility]   9/6/2024  7:30 AM Xenia Ngo, PT [facility]   9/9/2024  7:30 AM Roberth Goyal, PT [facility]   9/11/2024  9:15 AM IOC LAB VISIT [facility]   9/12/2024  7:30 AM Ian Patel, PT [facility]   9/18/2024  9:00 AM Ramirez Everett MD [facility]   9/27/2024  8:45 AM Raza Sanford MD [facility]   12/19/2024 10:00 AM Jeff Preciado DO [facility]   1/9/2025  2:30 PM Juvenal Blackmon MD [facility] Sched

## 2024-08-29 ENCOUNTER — HOSPITAL ENCOUNTER (OUTPATIENT)
Facility: HOSPITAL | Age: 74
Setting detail: RECURRING SERIES
End: 2024-08-29
Payer: MEDICARE

## 2024-08-29 PROCEDURE — 97110 THERAPEUTIC EXERCISES: CPT

## 2024-08-29 PROCEDURE — 97140 MANUAL THERAPY 1/> REGIONS: CPT

## 2024-08-29 PROCEDURE — 97112 NEUROMUSCULAR REEDUCATION: CPT

## 2024-08-29 NOTE — PROGRESS NOTES
PHYSICAL / OCCUPATIONAL THERAPY - DAILY TREATMENT NOTE    Patient Name: Maximino Corrigan    Date: 2024    : 1950  Insurance: Payor: DAVE [employer]ICARE / Plan: JADEN [facility] [employer] MEDIBLUE PLUS / Product Type: *No Product type* /      Patient  verified Yes     Visit #   Current / Total 3 10   Time   In / Out 730 816   Pain   In / Out 0 0   Subjective Functional Status/Changes: \"No pain.\"     TREATMENT AREA =  Neck pain [M54.2]     OBJECTIVE         Therapeutic Procedures:    Tx Min Billable or 1:1 Min (if diff from Tx Min) Procedure, Rationale, Specifics   15 15 55906 Therapeutic Exercise (timed):  increase ROM, strength, coordination, balance, and proprioception to improve patient's ability to progress to PLOF and address remaining functional goals. (see flow sheet as applicable)     Details if applicable:        56822 Neuromuscular Re-Education (timed):  improve balance, coordination, kinesthetic sense, posture, core stability and proprioception to improve patient's ability to develop conscious control of individual muscles and awareness of position of extremities in order to progress to PLOF and address remaining functional goals. (see flow sheet as applicable)     Details if applicable:      78333 Manual Therapy (timed):  decrease pain, increase ROM, increase tissue extensibility, decrease trigger points, and increase postural awareness to improve patient's ability to progress to PLOF and address remaining functional goals.  The manual therapy interventions were performed at a separate and distinct time from the therapeutic activities interventions . (see flow sheet as applicable)     Details if applicable:  SOR, STM to c/s paraspinals and B UT/LS      46 46 [facility] Totals Reminder: bill using total billable min of TIMED therapeutic procedures (example: do not include dry needle or estim unattended, both untimed codes, in totals to left)  8-22 min = 1 unit; 23-37 min = 2 units; 38-52 min =  3 units; 53-67 min = 4 units; 68-82 min = 5 units   Total Total     [x]  Patient Education billed concurrently with other procedures   [x] Review HEP    [] Progressed/Changed HEP, detail:    [] Other detail:       Objective Information/Functional Measures/Assessment    Pt's pain remains decreased today upon entry and is more fluid with movements. He states that he continues to have tightness as occiput/upper cervical region. Occasional cuing to prevent forward head with theraband exercises and to improve ulnar nerve glide mechanics. Overall making good progress.      Patient will continue to benefit from skilled PT / OT services to modify and progress therapeutic interventions, analyze and address functional mobility deficits, analyze and address ROM deficits, analyze and address strength deficits, analyze and address soft tissue restrictions, analyze and cue for proper movement patterns, analyze and modify for postural abnormalities, analyze and address imbalance/dizziness, and instruct in home and community integration to address functional deficits and attain remaining goals.    Progress toward goals / Updated goals:  []  See Progress Note/Recertification    Short Term Goals: To be accomplished in 2 weeks     Pt will be able to report a 6/10 pain rating at worst to improve patient's ability to tolerate prolonged functional activities at home.               Eval: 8-9/10 at worst               Pain waxes and wanes currently [Date assessed: 08/27/2024]     Pt will be independent with HEP to facilitate carry-over of functional gains made in PT at home & community.               Eval:Established at [facility]               Reports initial compliance [Date assessed: 08/27/2024]     Long Term Goals: To be accomplished in 5 weeks     Pt will be able to improve strength in bilateral shoulder flexion, shoulder ER to at least 5/5 to improve patient's ability to tolerate prolonged lifting at home & community.               Eval:

## 2024-09-03 ENCOUNTER — TELEPHONE (OUTPATIENT)
Facility: CLINIC | Age: 74
End: 2024-09-03

## 2024-09-03 RX ORDER — PREDNISONE 10 MG/1
TABLET ORAL
Qty: 1 EACH | Refills: 0 | Status: SHIPPED | OUTPATIENT
Start: 2024-09-03

## 2024-09-03 NOTE — TELEPHONE ENCOUNTER
Pt called in and verified identification with . He handed the phone to Sherry Frausto to continue with the call.     Pt was in the emergency room over the weekend because he pulled a muscle in his back. Pt was given prescriptions for  Flexeril and Lidocaine patches to [facility] on [address].     Pt was unable to  prescriptions because Insurance will not cover medications. Pt has not called insurance to find out why the medication was denied.     Pt is requesting a new script for a medication that will help with back pain, and will be covered.    Please Advise

## 2024-09-04 RX ORDER — LIDOCAINE 50 MG/G
1 PATCH TOPICAL DAILY
Qty: 30 PATCH | Refills: 0 | Status: SHIPPED | OUTPATIENT
Start: 2024-09-04

## 2024-09-04 NOTE — TELEPHONE ENCOUNTER
Sherry called back and said they had wanted lidocaine  patches sent in as well, they were prescribed from Emergency Room but not approved by the pharmacy per Sherry.

## 2024-09-05 ENCOUNTER — APPOINTMENT (OUTPATIENT)
Facility: HOSPITAL | Age: 74
End: 2024-09-05
Payer: MEDICARE

## 2024-09-06 ENCOUNTER — APPOINTMENT (OUTPATIENT)
Facility: HOSPITAL | Age: 74
End: 2024-09-06
Payer: MEDICARE

## 2024-09-09 ENCOUNTER — APPOINTMENT (OUTPATIENT)
Facility: HOSPITAL | Age: 74
End: 2024-09-09
Payer: MEDICARE

## 2024-09-12 ENCOUNTER — HOSPITAL ENCOUNTER (OUTPATIENT)
Facility: HOSPITAL | Age: 74
Setting detail: RECURRING SERIES
Discharge: HOME | End: 2024-09-15
Payer: MEDICARE

## 2024-09-12 LAB
ALBUMIN SERPL-MCNC: 3.6 G/DL (ref 3.8–4.8)
ALP SERPL-CCNC: 116 IU/L (ref 44–121)
ALT SERPL-CCNC: 27 IU/L (ref 0–44)
AST SERPL-CCNC: 22 IU/L (ref 0–40)
BILIRUB SERPL-MCNC: 0.2 MG/DL (ref 0–1.2)
BUN SERPL-MCNC: 12 MG/DL (ref 8–27)
BUN/CREAT SERPL: 14 (ref 10–24)
CALCIUM SERPL-MCNC: 9.3 MG/DL (ref 8.6–10.2)
CHLORIDE SERPL-SCNC: 99 MMOL/L (ref 96–106)
CHOLEST SERPL-MCNC: 126 MG/DL (ref 100–199)
CO2 SERPL-SCNC: 26 MMOL/L (ref 20–29)
CREAT SERPL-MCNC: 0.84 MG/DL (ref 0.76–1.27)
EGFRCR SERPLBLD CKD-EPI 2021: 92 ML/MIN/1.73
GLOBULIN SER CALC-MCNC: 2.4 G/DL (ref 1.5–4.5)
GLUCOSE SERPL-MCNC: 218 MG/DL (ref 70–99)
HBA1C MFR BLD: 8 % (ref 4.8–5.6)
HDLC SERPL-MCNC: 45 MG/DL
LDLC SERPL CALC-MCNC: 62 MG/DL (ref 0–99)
POTASSIUM SERPL-SCNC: 4.4 MMOL/L (ref 3.5–5.2)
PROT SERPL-MCNC: 6 G/DL (ref 6–8.5)
SODIUM SERPL-SCNC: 138 MMOL/L (ref 134–144)
TRIGL SERPL-MCNC: 105 MG/DL (ref 0–149)
VLDLC SERPL CALC-MCNC: 19 MG/DL (ref 5–40)

## 2024-09-12 PROCEDURE — 97530 THERAPEUTIC ACTIVITIES: CPT

## 2024-09-12 PROCEDURE — 97112 NEUROMUSCULAR REEDUCATION: CPT

## 2024-09-13 ENCOUNTER — HOSPITAL ENCOUNTER (OUTPATIENT)
Facility: HOSPITAL | Age: 74
Setting detail: RECURRING SERIES
Discharge: HOME | End: 2024-09-16
Payer: MEDICARE

## 2024-09-13 DIAGNOSIS — E78.00 PURE HYPERCHOLESTEROLEMIA, UNSPECIFIED: ICD-10-CM

## 2024-09-13 DIAGNOSIS — I10 ESSENTIAL (PRIMARY) HYPERTENSION: ICD-10-CM

## 2024-09-13 DIAGNOSIS — Z79.4 TYPE 2 DIABETES MELLITUS WITH HYPERGLYCEMIA, WITH LONG-TERM CURRENT USE OF INSULIN (HCC): ICD-10-CM

## 2024-09-13 DIAGNOSIS — E11.65 TYPE 2 DIABETES MELLITUS WITH HYPERGLYCEMIA, WITH LONG-TERM CURRENT USE OF INSULIN (HCC): ICD-10-CM

## 2024-09-13 PROCEDURE — 97112 NEUROMUSCULAR REEDUCATION: CPT

## 2024-09-13 PROCEDURE — 97110 THERAPEUTIC EXERCISES: CPT

## 2024-09-13 PROCEDURE — 97140 MANUAL THERAPY 1/> REGIONS: CPT

## 2024-09-18 ENCOUNTER — OFFICE VISIT (OUTPATIENT)
Facility: CLINIC | Age: 74
End: 2024-09-18
Payer: MEDICARE

## 2024-09-18 VITALS
SYSTOLIC BLOOD PRESSURE: 118 MMHG | DIASTOLIC BLOOD PRESSURE: 69 MMHG | WEIGHT: 188 LBS | BODY MASS INDEX: 30.22 KG/M2 | OXYGEN SATURATION: 98 % | RESPIRATION RATE: 18 BRPM | HEART RATE: 78 BPM | HEIGHT: 66 IN | TEMPERATURE: 98.9 F

## 2024-09-18 DIAGNOSIS — E11.65 TYPE 2 DIABETES MELLITUS WITH HYPERGLYCEMIA, WITH LONG-TERM CURRENT USE OF INSULIN (HCC): Primary | ICD-10-CM

## 2024-09-18 DIAGNOSIS — E55.9 VITAMIN D DEFICIENCY: ICD-10-CM

## 2024-09-18 DIAGNOSIS — I10 ESSENTIAL (PRIMARY) HYPERTENSION: ICD-10-CM

## 2024-09-18 DIAGNOSIS — Z79.4 TYPE 2 DIABETES MELLITUS WITH HYPERGLYCEMIA, WITH LONG-TERM CURRENT USE OF INSULIN (HCC): Primary | ICD-10-CM

## 2024-09-18 DIAGNOSIS — E78.00 PURE HYPERCHOLESTEROLEMIA, UNSPECIFIED: ICD-10-CM

## 2024-09-18 PROCEDURE — 90653 IIV ADJUVANT VACCINE IM: CPT | Performed by: INTERNAL MEDICINE

## 2024-09-18 PROCEDURE — 1123F ACP DISCUSS/DSCN MKR DOCD: CPT | Performed by: INTERNAL MEDICINE

## 2024-09-18 PROCEDURE — 3074F SYST BP LT 130 MM HG: CPT | Performed by: INTERNAL MEDICINE

## 2024-09-18 PROCEDURE — 3078F DIAST BP <80 MM HG: CPT | Performed by: INTERNAL MEDICINE

## 2024-09-18 PROCEDURE — 99214 OFFICE O/P EST MOD 30 MIN: CPT | Performed by: INTERNAL MEDICINE

## 2024-09-18 PROCEDURE — G0008 ADMIN INFLUENZA VIRUS VAC: HCPCS | Performed by: INTERNAL MEDICINE

## 2024-09-18 PROCEDURE — 3052F HG A1C>EQUAL 8.0%<EQUAL 9.0%: CPT | Performed by: INTERNAL MEDICINE

## 2024-09-18 RX ORDER — TAMSULOSIN HYDROCHLORIDE 0.4 MG/1
0.4 CAPSULE ORAL DAILY
COMMUNITY

## 2024-09-25 ENCOUNTER — HOSPITAL ENCOUNTER (OUTPATIENT)
Facility: HOSPITAL | Age: 74
Setting detail: RECURRING SERIES
Discharge: HOME | End: 2024-09-28
Payer: MEDICARE

## 2024-09-25 PROCEDURE — 97112 NEUROMUSCULAR REEDUCATION: CPT

## 2024-09-25 PROCEDURE — 97110 THERAPEUTIC EXERCISES: CPT

## 2024-09-25 PROCEDURE — 97140 MANUAL THERAPY 1/> REGIONS: CPT

## 2024-09-27 ENCOUNTER — OFFICE VISIT (OUTPATIENT)
Age: 74
End: 2024-09-27
Payer: MEDICARE

## 2024-09-27 DIAGNOSIS — G56.22 CUBITAL TUNNEL SYNDROME ON LEFT: Primary | ICD-10-CM

## 2024-09-27 PROCEDURE — 99214 OFFICE O/P EST MOD 30 MIN: CPT | Performed by: ORTHOPAEDIC SURGERY

## 2024-09-27 PROCEDURE — 1123F ACP DISCUSS/DSCN MKR DOCD: CPT | Performed by: ORTHOPAEDIC SURGERY

## 2024-10-01 ENCOUNTER — HOSPITAL ENCOUNTER (OUTPATIENT)
Facility: HOSPITAL | Age: 74
Setting detail: RECURRING SERIES
Discharge: HOME | End: 2024-10-04
Payer: MEDICARE

## 2024-10-01 PROCEDURE — 97110 THERAPEUTIC EXERCISES: CPT

## 2024-10-01 PROCEDURE — 97140 MANUAL THERAPY 1/> REGIONS: CPT

## 2024-10-01 PROCEDURE — 97530 THERAPEUTIC ACTIVITIES: CPT

## 2024-10-01 NOTE — PROGRESS NOTES
PHYSICAL / OCCUPATIONAL THERAPY - DAILY TREATMENT NOTE    Patient Name: Maximino Corrigan    Date: 10/1/2024    : 1950  Insurance: Payor: DAVE MEDICARE / Plan: JADEN [facility] [employer] MEDIBLUE PLUS / Product Type: *No Product type* /      Patient  verified Yes     Visit #   Current / Total 3 10   Time   In / Out 7:33 8:11   Pain   In / Out 0 0   Subjective Functional Status/Changes: See PN.     TREATMENT AREA =  Neck pain [M54.2]     OBJECTIVE         Therapeutic Procedures:    Tx Min Billable or 1:1 Min (if diff from Tx Min) Procedure, Rationale, Specifics   10  21719 Therapeutic Exercise (timed):  increase ROM, strength, coordination, balance, and proprioception to improve patient's ability to progress to PLOF and address remaining functional goals. (see flow sheet as applicable)     Details if applicable:       20  11166 Therapeutic Activity (timed):  use of dynamic activities replicating functional movements to increase ROM, strength, coordination, balance, and proprioception in order to improve patient's ability to progress to PLOF and address remaining functional goals.  (see flow sheet as applicable)     Details if applicable:     8  67467 Manual Therapy (timed):  decrease pain, increase ROM, increase tissue extensibility, decrease trigger points, and increase postural awareness to improve patient's ability to progress to PLOF and address remaining functional goals.  The manual therapy interventions were performed at a separate and distinct time from the therapeutic activities interventions . (see flow sheet as applicable)     Details if applicable:  SOR, STM to c/s paraspinals and B UT/LS L>R     38  [facility] Totals Reminder: bill using total billable min of TIMED therapeutic procedures (example: do not include dry needle or estim unattended, both untimed codes, in totals to left)  8-22 min = 1 unit; 23-37 min = 2 units; 38-52 min = 3 units; 53-67 min = 4 units; 68-82 min = 5 units   Total Total 
have read the above report and request that my patient continue therapy with the following changes/special instructions: ________________________________________________   ___ I have read the above report and request that my patient be discharged from therapy.     Physician's Signature:_________________________   DATE:_________   TIME:________                           Cole Junior MD    ** Signature, Date and Time must be completed for valid certification **  [facility] or you may fax the signed copy to [phone]  Thank you.

## 2024-10-03 DIAGNOSIS — E11.9 DIABETES MELLITUS WITH NO COMPLICATION (HCC): ICD-10-CM

## 2024-10-03 DIAGNOSIS — Z01.818 PREOPERATIVE TESTING: ICD-10-CM

## 2024-10-03 DIAGNOSIS — G56.22 CUBITAL TUNNEL SYNDROME ON LEFT: Primary | ICD-10-CM

## 2024-10-03 DIAGNOSIS — Z01.810 PREOP CARDIOVASCULAR EXAM: ICD-10-CM

## 2024-10-10 ENCOUNTER — HOSPITAL ENCOUNTER (OUTPATIENT)
Facility: HOSPITAL | Age: 74
Setting detail: RECURRING SERIES
Discharge: HOME | End: 2024-10-13
Payer: MEDICARE

## 2024-10-10 DIAGNOSIS — J41.1 MUCOPURULENT CHRONIC BRONCHITIS (HCC): ICD-10-CM

## 2024-10-10 PROCEDURE — 97140 MANUAL THERAPY 1/> REGIONS: CPT

## 2024-10-10 PROCEDURE — 97110 THERAPEUTIC EXERCISES: CPT

## 2024-10-10 PROCEDURE — 97112 NEUROMUSCULAR REEDUCATION: CPT

## 2024-10-10 RX ORDER — UMECLIDINIUM BROMIDE AND VILANTEROL TRIFENATATE 62.5; 25 UG/1; UG/1
1 POWDER RESPIRATORY (INHALATION) DAILY
Qty: 60 EACH | Refills: 3 | Status: SHIPPED | OUTPATIENT
Start: 2024-10-10

## 2024-10-10 NOTE — PROGRESS NOTES
PHYSICAL / OCCUPATIONAL THERAPY - DAILY TREATMENT NOTE    Patient Name: Maximino Corrigan    Date: 10/10/2024    : 1950  Insurance: Payor: [employer]AVE MEDICARE / Plan: JADEN [facility] [employer] MEDIBLUE PLUS / Product Type: *No Product type* /      Patient  verified Yes     Visit #   Current / Total 1 8   Time   In / Out 730 810   Pain   In / Out 0 0   Subjective Functional Status/Changes: Reports that he's has been able to relax better at home which has helped with his neck tightness overall; still most pain is apparent in the mornings after waking up.      TREATMENT AREA =  Neck pain [M54.2]     OBJECTIVE         Therapeutic Procedures:    Tx Min Billable or 1:1 Min (if diff from Tx Min) Procedure, Rationale, Specifics   17 17 10892 Therapeutic Exercise (timed):  increase ROM, strength, coordination, balance, and proprioception to improve patient's ability to progress to PLOF and address remaining functional goals. (see flow sheet as applicable)     Details if applicable:       15 15 13430 Neuromuscular Re-Education (timed):  improve balance, coordination, kinesthetic sense, posture, core stability and proprioception to improve patient's ability to develop conscious control of individual muscles and awareness of position of extremities in order to progress to PLOF and address remaining functional goals. (see flow sheet as applicable)     Details if applicable:     8 8 93326 Manual Therapy (timed):  increase ROM, increase tissue extensibility, and increase postural awareness to improve patient's ability to progress to PLOF and address remaining functional goals.  The manual therapy interventions were performed at a separate and distinct time from the therapeutic activities interventions . (see flow sheet as applicable)     Details if applicable:     40 40 [facility] Totals Reminder: bill using total billable min of TIMED therapeutic procedures (example: do not include dry needle or estim unattended, both untimed

## 2024-10-15 ENCOUNTER — HOSPITAL ENCOUNTER (OUTPATIENT)
Facility: HOSPITAL | Age: 74
Setting detail: RECURRING SERIES
Discharge: HOME | End: 2024-10-18
Payer: MEDICARE

## 2024-10-15 PROCEDURE — 97140 MANUAL THERAPY 1/> REGIONS: CPT

## 2024-10-15 PROCEDURE — 97110 THERAPEUTIC EXERCISES: CPT

## 2024-10-15 PROCEDURE — 97112 NEUROMUSCULAR REEDUCATION: CPT

## 2024-10-15 NOTE — PROGRESS NOTES
their ability to sleep with improved comfort.              RC: Lateral flexion: Right = 20, Left = 20               Update: Pt will improve C/S lateral flexion AROM to at least 25 degrees to improve their ability to sleep with improved comfort.    Next PN/ RC due 11/02/24  Auth due (visit number/ date) 4 more til 12/05/24     PLAN  - Continue Plan of Care    Xenia Ngo, PT    10/15/2024    8:53 AM  If an interpreting service was utilized for treatment of this patient, the contents of this document represent the material reviewed with the patient via the .     Future Appointments   Date Time Provider Department [place]   10/22/2024  7:30 AM Lourdes Ferrell PTA [facility]   10/25/2024  7:30 AM [facility] PT [address] [facility]   11/5/2024  9:30 AM Raza Sanford MD [facility]   12/19/2024  9:00 AM [facility] LAB VISIT [facility] DEP   12/19/2024 10:00 AM Jeff Preciado DO [facility] AMB   1/2/2025 10:40 AM Ramirez Everett MD [facility] DEP   1/9/2025  2:30 PM Juvenal Blackmon MD [facility] Sched

## 2024-10-22 ENCOUNTER — HOSPITAL ENCOUNTER (OUTPATIENT)
Facility: HOSPITAL | Age: 74
Setting detail: RECURRING SERIES
Discharge: HOME | End: 2024-10-25
Payer: MEDICARE

## 2024-10-22 PROCEDURE — 97112 NEUROMUSCULAR REEDUCATION: CPT

## 2024-10-22 PROCEDURE — 97140 MANUAL THERAPY 1/> REGIONS: CPT

## 2024-10-22 PROCEDURE — 97110 THERAPEUTIC EXERCISES: CPT

## 2024-10-22 NOTE — PROGRESS NOTES
PHYSICAL / OCCUPATIONAL THERAPY - DAILY TREATMENT NOTE    Patient Name: Maximino Corrigan    Date: 10/22/2024    : 1950  Insurance: Payor: DAVE MEDICARE / Plan: JADEN [facility] [employer] MEDIBLUE PLUS / Product Type: *No Product type* /      Patient  verified Yes     Visit #   Current / Total 3 8   Time   In / Out 730 810   Pain   In / Out 0 0   Subjective Functional Status/Changes: Patient reports no pain just stiff     TREATMENT AREA =  Neck pain [M54.2]     OBJECTIVE    Therapeutic Procedures:    Tx Min Billable or 1:1 Min (if diff from Tx Min) Procedure, Rationale, Specifics   16 16 15048 Therapeutic Exercise (timed):  increase ROM, strength, coordination, balance, and proprioception to improve patient's ability to progress to PLOF and address remaining functional goals. (see flow sheet as applicable)     Details if applicable:        50911 Neuromuscular Re-Education (timed):  improve balance, coordination, kinesthetic sense, posture, core stability and proprioception to improve patient's ability to develop conscious control of individual muscles and awareness of position of extremities in order to progress to PLOF and address remaining functional goals. (see flow sheet as applicable)     Details if applicable:  scap and c/s re-ed   8  99535 Manual Therapy (timed):  decrease pain, increase ROM, and increase tissue extensibility to improve patient's ability to progress to PLOF and address remaining functional goals.  The manual therapy interventions were performed at a separate and distinct time from the therapeutic activities interventions . (see flow sheet as applicable)     Details if applicable:  c/s distraction, STM L c/s paraspinals, stretching into R lateral flex with R shoulder depression    40 40 [facility] Totals Reminder: bill using total billable min of TIMED therapeutic procedures (example: do not include dry needle or estim unattended, both untimed codes, in totals to left)  8-22 min = 1

## 2024-10-24 NOTE — PROGRESS NOTES
Instructions for your procedure at [facility]      Today's Date: 10/24/2024      Patient's Name: Maximino Corrigan      Procedure Date: 10/29/2024        Please enter the main entrance of the hospital and check-in at the  located in the lobby.      Do NOT eat or drink anything, including candy, gum, or ice chips after midnight prior to your procedure, unless it is part of your prep.  Brush your teeth before coming to the hospital.You may swish with water, but do not swallow.  No smoking/Vaping/E-Cigarettes 24 hours prior to the day of procedure.  No alcohol 24 hours prior to the day of procedure.  No recreational drugs for one week prior to the day of procedure.  Bring Photo ID, Insurance information, and Co-pay if required on day of procedure.  Bring in pertinent legal documents, such as, Medical Power of , DNR, Advance Directive, etc.  Leave all other valuables, including money/purse, at home.  Remove jewelry, including ALL body piercings, nail polish, acrylic nails, and makeup (including mascara); no lotions, powders, deodorant, and/or perfume/cologne/after shave on the skin.  Glasses and dentures may be worn to the hospital.  They must be removed prior to procedure. Please bring case/container for glasses or dentures.  11. Contacts should not be worn on day of procedure.   12. Call the office ([phone]) if you have symptoms of a cold or illness within 24-48 hours prior to your procedure.   13. AN ADULT (relative or friend 18 years or older) MUST DRIVE YOU HOME AFTER YOUR PROCEDURE.   14. Please make arrangements for a responsible adult (18 years or older) to be with you for 24 hours after your procedure.   15. TWO VISITORS will be allowed in the waiting area during your procedure.       Special Instructions:      Bring list of CURRENT medications.  Follow instructions from the office regarding Bowel Prep, Vitamins, Iron, Blood Thinners, Insulin, Seizure, and Blood

## 2024-10-25 ENCOUNTER — HOSPITAL ENCOUNTER (OUTPATIENT)
Facility: HOSPITAL | Age: 74
Setting detail: RECURRING SERIES
Discharge: HOME | End: 2024-10-28
Payer: MEDICARE

## 2024-10-25 PROCEDURE — 97110 THERAPEUTIC EXERCISES: CPT

## 2024-10-25 PROCEDURE — 97140 MANUAL THERAPY 1/> REGIONS: CPT

## 2024-10-25 PROCEDURE — 97112 NEUROMUSCULAR REEDUCATION: CPT

## 2024-10-25 NOTE — PROGRESS NOTES
unit; 23-37 min = 2 units; 38-52 min = 3 units; 53-67 min = 4 units; 68-82 min = 5 units   Total Total     [x]  Patient Education billed concurrently with other procedures   [x] Review HEP    [] Progressed/Changed HEP, detail:    [] Other detail:       Objective Information/Functional Measures/Assessment    Verbal or Tactile cues required: for breathing throughout exercises especially during isometrics to limit valsalva   Posture/positioning: good and upright; decreased cervical lordosis which is expected due to post op status   ROM: improving well; thoracic rotation good   Palpation: cervical paraspinal atrophy noted; triggerpoints in B UTs     Therex and NMR as per flow sheet.     Patient will continue to benefit from skilled PT / OT services to modify and progress therapeutic interventions, analyze and address functional mobility deficits, analyze and address ROM deficits, analyze and address strength deficits, analyze and address soft tissue restrictions, analyze and cue for proper movement patterns, and analyze and modify for postural abnormalities to address functional deficits and attain remaining goals.    Progress toward goals / Updated goals:  []  See Progress Note/Recertification    Patient tolerated today's treatment well. Progressed/added therex as follows: no progressions this visit. Discussed HEP with patient. Patient is agreeable. Continue to progress as tolerated. Patient making good progress towards all goals at this time. See below chart of goals. All goals remain applicable. To note - patient is getting arm surgery next week on 10/31/2024. He plans on returning to PT once cleared by MD to finish remaining authorized visits.     Short Term Goals: To be accomplished in 2 weeks   Pt will be able to report a 6/10 pain rating at worst to improve patient's ability to tolerate prolonged functional activities at home.               Eval: 8-9/10 at worst   Current: 0/10 Goal Met   Pt will be independent

## 2024-10-28 ENCOUNTER — ANESTHESIA EVENT (OUTPATIENT)
Facility: HOSPITAL | Age: 74
End: 2024-10-28
Payer: MEDICARE

## 2024-10-29 ENCOUNTER — ANESTHESIA (OUTPATIENT)
Facility: HOSPITAL | Age: 74
End: 2024-10-29
Payer: MEDICARE

## 2024-10-29 ENCOUNTER — HOSPITAL ENCOUNTER (OUTPATIENT)
Facility: HOSPITAL | Age: 74
Setting detail: OUTPATIENT SURGERY
Discharge: HOME | End: 2024-10-29
Attending: INTERNAL MEDICINE | Admitting: INTERNAL MEDICINE
Payer: MEDICARE

## 2024-10-29 VITALS
RESPIRATION RATE: 22 BRPM | TEMPERATURE: 97.8 F | BODY MASS INDEX: 29.73 KG/M2 | HEART RATE: 82 BPM | OXYGEN SATURATION: 100 % | WEIGHT: 185 LBS | SYSTOLIC BLOOD PRESSURE: 99 MMHG | DIASTOLIC BLOOD PRESSURE: 65 MMHG | HEIGHT: 66 IN

## 2024-10-29 LAB
AMPHET UR QL SCN: NEGATIVE
BARBITURATES UR QL SCN: NEGATIVE
BENZODIAZ UR QL: NEGATIVE
CANNABINOIDS UR QL SCN: POSITIVE
COCAINE UR QL SCN: NEGATIVE
EKG ATRIAL RATE: 68 BPM
EKG DIAGNOSIS: NORMAL
EKG P AXIS: 65 DEGREES
EKG P-R INTERVAL: 156 MS
EKG Q-T INTERVAL: 380 MS
EKG QRS DURATION: 86 MS
EKG QTC CALCULATION (BAZETT): 404 MS
EKG R AXIS: 15 DEGREES
EKG T AXIS: 30 DEGREES
EKG VENTRICULAR RATE: 68 BPM
GLUCOSE BLD STRIP.AUTO-MCNC: 128 MG/DL (ref 70–110)
GLUCOSE BLD STRIP.AUTO-MCNC: 136 MG/DL (ref 70–110)
Lab: ABNORMAL
METHADONE UR QL: NEGATIVE
OPIATES UR QL: NEGATIVE
PCP UR QL: NEGATIVE

## 2024-10-29 PROCEDURE — 3600007502: Performed by: INTERNAL MEDICINE

## 2024-10-29 PROCEDURE — 88305 TISSUE EXAM BY PATHOLOGIST: CPT

## 2024-10-29 PROCEDURE — 3600007512: Performed by: INTERNAL MEDICINE

## 2024-10-29 PROCEDURE — 3700000000 HC ANESTHESIA ATTENDED CARE: Performed by: INTERNAL MEDICINE

## 2024-10-29 PROCEDURE — 93010 ELECTROCARDIOGRAM REPORT: CPT | Performed by: INTERNAL MEDICINE

## 2024-10-29 PROCEDURE — 2709999900 HC NON-CHARGEABLE SUPPLY: Performed by: INTERNAL MEDICINE

## 2024-10-29 PROCEDURE — 7100000000 HC PACU RECOVERY - FIRST 15 MIN: Performed by: INTERNAL MEDICINE

## 2024-10-29 PROCEDURE — 6360000002 HC RX W HCPCS: Performed by: ANESTHESIOLOGY

## 2024-10-29 PROCEDURE — 80307 DRUG TEST PRSMV CHEM ANLYZR: CPT

## 2024-10-29 PROCEDURE — 7100000010 HC PHASE II RECOVERY - FIRST 15 MIN: Performed by: INTERNAL MEDICINE

## 2024-10-29 PROCEDURE — 3700000001 HC ADD 15 MINUTES (ANESTHESIA): Performed by: INTERNAL MEDICINE

## 2024-10-29 PROCEDURE — 2500000003 HC RX 250 WO HCPCS: Performed by: ANESTHESIOLOGY

## 2024-10-29 PROCEDURE — 82962 GLUCOSE BLOOD TEST: CPT

## 2024-10-29 PROCEDURE — 93005 ELECTROCARDIOGRAM TRACING: CPT | Performed by: NURSE ANESTHETIST, CERTIFIED REGISTERED

## 2024-10-29 RX ORDER — LIDOCAINE HYDROCHLORIDE 20 MG/ML
INJECTION, SOLUTION EPIDURAL; INFILTRATION; INTRACAUDAL; PERINEURAL
Status: DISCONTINUED | OUTPATIENT
Start: 2024-10-29 | End: 2024-10-29 | Stop reason: SDUPTHER

## 2024-10-29 RX ORDER — LIDOCAINE HYDROCHLORIDE 10 MG/ML
1 INJECTION, SOLUTION EPIDURAL; INFILTRATION; INTRACAUDAL; PERINEURAL
Status: DISCONTINUED | OUTPATIENT
Start: 2024-10-29 | End: 2024-10-29 | Stop reason: HOSPADM

## 2024-10-29 RX ORDER — DEXTROSE MONOHYDRATE 100 MG/ML
INJECTION, SOLUTION INTRAVENOUS CONTINUOUS PRN
Status: DISCONTINUED | OUTPATIENT
Start: 2024-10-29 | End: 2024-10-29 | Stop reason: HOSPADM

## 2024-10-29 RX ORDER — SODIUM CHLORIDE 9 MG/ML
INJECTION, SOLUTION INTRAVENOUS CONTINUOUS
Status: CANCELLED | OUTPATIENT
Start: 2024-10-29

## 2024-10-29 RX ORDER — FAMOTIDINE 20 MG/1
20 TABLET, FILM COATED ORAL ONCE
Status: DISCONTINUED | OUTPATIENT
Start: 2024-10-29 | End: 2024-10-29 | Stop reason: HOSPADM

## 2024-10-29 RX ORDER — PROPOFOL 10 MG/ML
INJECTION, EMULSION INTRAVENOUS
Status: DISCONTINUED | OUTPATIENT
Start: 2024-10-29 | End: 2024-10-29 | Stop reason: SDUPTHER

## 2024-10-29 RX ORDER — INSULIN LISPRO 100 [IU]/ML
0-15 INJECTION, SOLUTION INTRAVENOUS; SUBCUTANEOUS ONCE
Status: DISCONTINUED | OUTPATIENT
Start: 2024-10-29 | End: 2024-10-29 | Stop reason: HOSPADM

## 2024-10-29 RX ORDER — SODIUM CHLORIDE, SODIUM LACTATE, POTASSIUM CHLORIDE, CALCIUM CHLORIDE 600; 310; 30; 20 MG/100ML; MG/100ML; MG/100ML; MG/100ML
INJECTION, SOLUTION INTRAVENOUS CONTINUOUS
Status: DISCONTINUED | OUTPATIENT
Start: 2024-10-29 | End: 2024-10-29 | Stop reason: HOSPADM

## 2024-10-29 RX ADMIN — LIDOCAINE HYDROCHLORIDE 100 MG: 20 INJECTION, SOLUTION EPIDURAL; INFILTRATION; INTRACAUDAL; PERINEURAL at 07:27

## 2024-10-29 RX ADMIN — PROPOFOL 100 MG: 10 INJECTION, EMULSION INTRAVENOUS at 07:38

## 2024-10-29 RX ADMIN — PROPOFOL 200 MG: 10 INJECTION, EMULSION INTRAVENOUS at 07:27

## 2024-10-29 ASSESSMENT — PAIN - FUNCTIONAL ASSESSMENT
PAIN_FUNCTIONAL_ASSESSMENT: 0-10
PAIN_FUNCTIONAL_ASSESSMENT: NONE - DENIES PAIN

## 2024-10-29 ASSESSMENT — LIFESTYLE VARIABLES: SMOKING_STATUS: 1

## 2024-10-29 ASSESSMENT — COPD QUESTIONNAIRES: CAT_SEVERITY: MILD

## 2024-10-29 NOTE — ANESTHESIA POSTPROCEDURE EVALUATION
Department of Anesthesiology  Postprocedure Note    Patient: Maximino Corrigan  MRN: [id number]  YOB: 1950  Date of evaluation: 10/29/2024    Procedure Summary       Date: 10/29/24 Room / Location: [facility] ENDO 02 / [facility] ENDOSCOPY    Anesthesia Start: 0723 Anesthesia Stop: 0748    Procedure: COLONOSCOPY DIAGNOSTIC WITH POLYPECTOMIES (Abdomen) Diagnosis:       Personal history of colon polyps, unspecified      (Personal history of colon polyps, unspecified [Z86.0100])    Surgeons: Matthew Nguyễn MD Responsible Provider: Ricardo Mancia MD    Anesthesia Type: MAC ASA Status: 3            Anesthesia Type: MAC    Darlene Phase I: Darlene Score: 8    Darlene Phase II:      Anesthesia Post Evaluation    Patient location during evaluation: bedside  Patient participation: complete - patient participated  Level of consciousness: awake  Pain score: 0  Airway patency: patent  Nausea & Vomiting: no nausea  Cardiovascular status: hemodynamically stable  Respiratory status: acceptable  Hydration status: euvolemic  Pain management: satisfactory to patient    No notable events documented.

## 2024-10-29 NOTE — DISCHARGE INSTRUCTIONS
Colon Polyps: Care Instructions  Your Care Instructions     Colon polyps are growths in the colon or the rectum. The cause of most colon polyps is not known, and most people who get them do not have any problems. But a certain kind can turn into cancer. For this reason, regular testing for colon polyps is important for people as they get older. It is also important for anyone who has an increased risk for colon cancer.  Polyps are usually found through routine colon cancer screening tests. Although most colon polyps are not cancerous, they are usually removed and then tested for cancer. Screening for colon cancer saves lives because the cancer can usually be cured if it is caught early.  If you have a polyp that is the type that can turn into cancer, you may need more tests to examine your entire colon. The doctor will remove any other polyps that are found, and you will be tested more often.  Follow-up care is a key part of your treatment and safety. Be sure to make and go to all appointments, and call your doctor if you are having problems. It's also a good idea to know your test results and keep a list of the medicines you take.  How can you care for yourself at home?  Regular exams to look for colon polyps are the best way to prevent polyps from turning into colon cancer. These can include stool tests, sigmoidoscopy, colonoscopy, and CT colonography. Talk with your doctor about a testing schedule that is right for you.  To prevent polyps  There is no home treatment that can prevent colon polyps. But these steps may help lower your risk for cancer.  Stay active. Being active can help you get to and stay at a healthy weight. Try to exercise on most days of the week. Walking is a good choice.  Eat well. Choose a variety of vegetables, fruits, legumes (such as peas and beans), fish, poultry, and whole grains.  Do not smoke. If you need help quitting, talk to your doctor about stop-smoking programs and

## 2024-10-29 NOTE — H&P
Chief Complaint: History of polyps.    History of present illness: TA of colon on past.     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)     GERD (gastroesophageal reflux disease)     Hx of colonic polyps     Hx of gallstones     Hypercholesterolemia     Hypertension     Restless leg syndrome     Wears dentures      Allergies:   Allergies   Allergen Reactions    Latex      Other reaction(s): Sneezing    Neosporin [Bacitracin-Polymyxin B] Hives    Benzalkonium Chloride Hives     Pt denies    Hydrocortisone Rash     Medications:   Current Facility-Administered Medications:     lidocaine PF 1 % injection 1 mL, 1 mL, IntraDERmal, Once PRN, Brein, [place], APRN - CRNA    famotidine (PEPCID) tablet 20 mg, 20 mg, Oral, Once, Brein, Josie, APRN - CRNA    lactated ringers IV soln infusion SOLN, , IntraVENous, Continuous, Brein, Josie, APRN - CRNA    insulin lispro (HUMALOG,ADMELOG) injection vial 0-15 Units, 0-15 Units, SubCUTAneous, Once, Brein, Josie, APRN - CRNA    glucose chewable tablet 16 g, 4 tablet, Oral, PRN, Brein, [place], APRN - CRNA    dextrose bolus 10% 125 mL, 125 mL, IntraVENous, PRN **OR** dextrose bolus 10% 250 mL, 250 mL, IntraVENous, PRN, Brein, Josie, APRN - CRNA    glucagon injection 1 mg, 1 mg, SubCUTAneous, PRN, Brein, [place], APRN - CRNA    dextrose 10 % infusion, , IntraVENous, Continuous PRN, Brein, Josie, APRN - CRNA  FH:   Family History   Problem Relation Age of Onset    Stroke Sister     Colon Cancer Father     Prostate Cancer Father     Heart Attack Mother      Social:   Social History     Socioeconomic History    Marital status: Single     Spouse name: None    Number of children: None    Years of education: None    Highest education level: None   Tobacco Use    Smoking status: Every Day     Current packs/day: 0.50     Average

## 2024-10-29 NOTE — ANESTHESIA PRE PROCEDURE
Department of Anesthesiology  Preprocedure Note       Name:  Maximino Corrigan   Age:  74 y.o.  :  1950                                          MRN:  [id number]         Date:  10/29/2024      Surgeon: Surgeon(s):  Matthew Nguyễn MD    Procedure: Procedure(s):  COLONOSCOPY DIAGNOSTIC    Medications prior to admission:   Prior to Admission medications    Medication Sig Start Date End Date Taking? Authorizing Provider   ANORO ELLIPTA 62.5-25 MCG/ACT inhaler INHALE 1 PUFF BY MOUTH EVERY DAY 10/10/24   Ramirez Everett MD   tamsulosin (FLOMAX) 0.4 MG capsule Take 1 capsule by mouth daily  Patient not taking: Reported on 10/24/2024    Provider, MD Ana Rosa   lidocaine (LIDODERM) 5 % Place 1 patch onto the skin daily 12 hours on, 12 hours off.  Patient not taking: Reported on 10/24/2024 9/4/24   Ramirez Everett MD   tiZANidine (ZANAFLEX) 4 MG tablet Take 1 tablet by mouth 3 times daily as needed (back pain) 9/3/24   Ramirez Everett MD   predniSONE 10 MG (21) TBPK Use as directed  Patient not taking: Reported on 10/24/2024 9/3/24   Ramirez Everett MD   blood glucose test strips (FREESTYLE LITE) strip 1 each by In Vitro route daily As needed. 24   Ramirez Everett MD   nicotine polacrilex (NICORETTE) 2 MG gum Take 1 each by mouth as needed for Smoking cessation  Patient not taking: Reported on 10/24/2024 6/21/24   Jeff Preciado TDO   amLODIPine (NORVASC) 10 MG tablet TAKE 1 TABLET BY MOUTH EVERY DAY 24   Ramirez Everett MD   dulaglutide (TRULICITY) 0.75 MG/0.5ML SOPN SC injection INJECT 0.75 MG SUBCUTANEOUSLY ONE TIME PER WEEK 24   Ramirez Everett MD   pravastatin (PRAVACHOL) 40 MG tablet TAKE 1 TAB BY MOUTH NIGHTLY. INDICATIONS: HIGH CHOLESTEROL 24   Ramirez Everett MD   mirabegron (MYRBETRIQ) 25 MG TB24 Take 1 tablet by mouth daily  Patient not taking: Reported on 10/24/2024 2/22/24   Juvenal Blackmon MD   FreeStyle Lancets MISC 1 each by Does not apply route daily 24   Ramirez Everett MD

## 2024-10-29 NOTE — BRIEF OP NOTE
[phone]    [address] [address]      Brief Procedure Note    Maximino Corrigan  1950  [id number]    Date of Procedure: 10/29/2024     Preoperative diagnosis: Personal history of colon polyps, unspecified [Z86.0100]    Postoperative diagnosis: Personal history of colon polyps, unspecified [Z86.0100] Diverticulosis. Colon polyps    Type of Anesthesia: MAC (Monitored anesthesia care)    Description of findings: same as post op dx    Procedure: Procedure(s):  COLONOSCOPY DIAGNOSTIC WITH POLYPECTOMIES    :  Dr. Matthew Nguyễn MD    Assistant(s): Circulator: Micaela Montes, LEOLA; Suad Webber RN; Manfred Jeong RN; Marcello Pineda RN    Devices/implants/grafts/tissues/prosthesis: None    EBL:None    Specimens:   ID Type Source Tests Collected by Time Destination   A : ASCENDING COLON POLYP X1 Tissue Colon-Ascending SURGICAL PATHOLOGY Matthew Nguyễn MD 10/29/2024 0737    B : DESCENDING COLON POLYP X1 Tissue Colon-Descending SURGICAL PATHOLOGY Matthew Nguyễn MD 10/29/2024 0740        Findings/Recommendations: See printed and scanned procedure note (under \"media tab\" in \"chart review\")    Complications: None    Dr. Matthew Nguyễn MD  10/29/2024  7:45 AM

## 2024-10-30 ENCOUNTER — HOSPITAL ENCOUNTER (OUTPATIENT)
Facility: HOSPITAL | Age: 74
Setting detail: SPECIMEN
Discharge: HOME | End: 2024-11-02

## 2024-10-30 LAB — LABCORP SPECIMEN COLLECTION: NORMAL

## 2024-10-30 PROCEDURE — 99001 SPECIMEN HANDLING PT-LAB: CPT

## 2024-10-31 DIAGNOSIS — G56.22 CUBITAL TUNNEL SYNDROME ON LEFT: Primary | ICD-10-CM

## 2024-10-31 LAB
ALBUMIN SERPL-MCNC: 3.9 G/DL (ref 3.8–4.8)
ALP SERPL-CCNC: 92 IU/L (ref 44–121)
ALT SERPL-CCNC: 10 IU/L (ref 0–44)
AST SERPL-CCNC: 14 IU/L (ref 0–40)
BASOPHILS # BLD AUTO: 0 X10E3/UL (ref 0–0.2)
BASOPHILS NFR BLD AUTO: 0 %
BILIRUB SERPL-MCNC: 0.4 MG/DL (ref 0–1.2)
BUN SERPL-MCNC: 11 MG/DL (ref 8–27)
BUN/CREAT SERPL: 11 (ref 10–24)
CALCIUM SERPL-MCNC: 9.2 MG/DL (ref 8.6–10.2)
CHLORIDE SERPL-SCNC: 105 MMOL/L (ref 96–106)
CO2 SERPL-SCNC: 24 MMOL/L (ref 20–29)
CREAT SERPL-MCNC: 0.99 MG/DL (ref 0.76–1.27)
EGFRCR SERPLBLD CKD-EPI 2021: 80 ML/MIN/1.73
EOSINOPHIL # BLD AUTO: 0.6 X10E3/UL (ref 0–0.4)
EOSINOPHIL NFR BLD AUTO: 9 %
ERYTHROCYTE [DISTWIDTH] IN BLOOD BY AUTOMATED COUNT: 13.3 % (ref 11.6–15.4)
GLOBULIN SER CALC-MCNC: 2.3 G/DL (ref 1.5–4.5)
GLUCOSE SERPL-MCNC: 75 MG/DL (ref 70–99)
HCT VFR BLD AUTO: 37.2 % (ref 37.5–51)
HGB BLD-MCNC: 11.6 G/DL (ref 13–17.7)
IMM GRANULOCYTES # BLD AUTO: 0 X10E3/UL (ref 0–0.1)
IMM GRANULOCYTES NFR BLD AUTO: 0 %
LYMPHOCYTES # BLD AUTO: 2 X10E3/UL (ref 0.7–3.1)
LYMPHOCYTES NFR BLD AUTO: 29 %
MCH RBC QN AUTO: 27 PG (ref 26.6–33)
MCHC RBC AUTO-ENTMCNC: 31.2 G/DL (ref 31.5–35.7)
MCV RBC AUTO: 87 FL (ref 79–97)
MONOCYTES # BLD AUTO: 0.5 X10E3/UL (ref 0.1–0.9)
MONOCYTES NFR BLD AUTO: 8 %
NEUTROPHILS # BLD AUTO: 3.6 X10E3/UL (ref 1.4–7)
NEUTROPHILS NFR BLD AUTO: 54 %
PLATELET # BLD AUTO: 192 X10E3/UL (ref 150–450)
POTASSIUM SERPL-SCNC: 4 MMOL/L (ref 3.5–5.2)
PROT SERPL-MCNC: 6.2 G/DL (ref 6–8.5)
RBC # BLD AUTO: 4.29 X10E6/UL (ref 4.14–5.8)
SODIUM SERPL-SCNC: 143 MMOL/L (ref 134–144)
WBC # BLD AUTO: 6.8 X10E3/UL (ref 3.4–10.8)

## 2024-10-31 RX ORDER — OXYCODONE AND ACETAMINOPHEN 5; 325 MG/1; MG/1
1-2 TABLET ORAL EVERY 4 HOURS PRN
Qty: 32 TABLET | Refills: 0 | Status: SHIPPED | OUTPATIENT
Start: 2024-10-31 | End: 2024-11-07

## 2024-11-05 ENCOUNTER — OFFICE VISIT (OUTPATIENT)
Age: 74
End: 2024-11-05

## 2024-11-05 VITALS — HEIGHT: 66 IN | TEMPERATURE: 97 F | WEIGHT: 185 LBS | BODY MASS INDEX: 29.73 KG/M2

## 2024-11-05 DIAGNOSIS — G56.22 CUBITAL TUNNEL SYNDROME ON LEFT: Primary | ICD-10-CM

## 2024-11-05 PROCEDURE — 99024 POSTOP FOLLOW-UP VISIT: CPT | Performed by: ORTHOPAEDIC SURGERY

## 2024-11-05 NOTE — PROGRESS NOTES
[facility] AMBULATORY OFFICE NOTE    Patient: Maximino Corrigan                MRN: [id number]       SSN: [id number]  YOB: 1950        AGE: 74 y.o.        SEX: male  Body mass index is 29.86 kg/m².    PCP: Ramirez Everett MD  11/05/24    Chief Complaint: s/p Left cubital tunnel release    HPI: Maximino Corrigan is a 74 y.o. male patient who is 5 days post op left Cu tunnel release.  He says the coldness in his hand has improved since surgery.  No other complaints.  Taking tylenol.    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 Intended supply: 7 days. Take lowest dose possible to manage pain Max Daily Amount: 12 tablets 32 tablet 0    ANORO ELLIPTA 62.5-25 MCG/ACT inhaler INHALE 1 PUFF BY MOUTH EVERY DAY 60 each 3    tamsulosin (FLOMAX) 0.4 MG capsule Take 1 capsule by mouth daily (Patient not taking: Reported on 10/24/2024)      lidocaine (LIDODERM) 5 % Place 1 patch onto the skin daily 12 hours on, 12 hours off. (Patient not taking: Reported on 10/24/2024) 30 patch 0    tiZANidine (ZANAFLEX) 4 MG tablet Take 1 tablet by mouth 3 times daily as needed (back pain) 30 tablet 0    predniSONE 10 MG (21) TBPK Use as directed (Patient not taking: Reported on 10/24/2024) 1 each 0    blood

## 2024-11-06 ENCOUNTER — TELEPHONE (OUTPATIENT)
Age: 74
End: 2024-11-06

## 2024-11-06 NOTE — TELEPHONE ENCOUNTER
Post op patient called for .    Patient said that before his sx , he was going to [facility].     Patient would like to know when he can resume his activities at [facility].    Patient would like a call back at tel. [phone].    Note : patient see  for Left Cubital Tunnel Syndrome.

## 2024-11-07 NOTE — TELEPHONE ENCOUNTER
Called and spoke to patient. Advised that Dr Sanford would like him to hold PT until 3 weeks post op. Patient verbalized understanding and thanked writer for the call.

## 2024-11-19 RX ORDER — METOCLOPRAMIDE 10 MG/1
TABLET ORAL
Qty: 90 TABLET | Refills: 2 | Status: SHIPPED | OUTPATIENT
Start: 2024-11-19

## 2024-11-19 NOTE — TELEPHONE ENCOUNTER
Refill request via fax     Medication:   metoclopramide   Quantity: 90  Pharmacy:   [facility] #[id number] - [place], [place] - [address] [facility]     Last Fill: 8/29/23    PCP: Ramirez Everett MD    LAST OFFICE VISIT: 9/18/2024      Future Appointments   Date Time Provider Department Center   12/3/2024 10:00 AM Raza Sanford MD [facility] BS AMB   12/19/2024  9:00 AM IOC LAB VISIT [facility] DEP   12/19/2024 10:00 AM Jeff Preciado DO [facility] BS AMB   1/2/2025 10:40 AM Ramirez Everett MD [facility] DEP   1/9/2025  2:30 PM Juvenal Blackmon MD [facility] Sched

## 2024-11-21 ENCOUNTER — TELEPHONE (OUTPATIENT)
Age: 74
End: 2024-11-21

## 2024-12-02 ENCOUNTER — HOSPITAL ENCOUNTER (OUTPATIENT)
Facility: HOSPITAL | Age: 74
Setting detail: RECURRING SERIES
Discharge: HOME | End: 2024-12-05
Payer: MEDICARE

## 2024-12-02 PROCEDURE — 97530 THERAPEUTIC ACTIVITIES: CPT

## 2024-12-02 NOTE — PROGRESS NOTES
Physical Therapy Discharge Instructions      [facility] [address]  [address]  [place] [address]  [phone] fax      Patient: Maximino Corrigan  : 1950      Continue Home Exercise Program 1 times per day for 5 weeks, then decrease to 3 times per week      Continue with    [] Ice  as needed 1-2 times per day     [x] Heat           Follow up with MD:     [] Upon completion of therapy     [x] As needed      Recommendations:     [x]   Return to activity with home program    []   Return to activity with the following modifications:       []Post Rehab Program    []Join Independent aquatic program     []Return to/join local gym        Additional Comments: It was a pleasure treating, good luck with everything! Continue to do your exercises at home to maintain what we have achieved these last couple of months. If you ever need more therapy, feel free to get an updated script from your physician.            Ian Patel, PT 2024 11:19 AM

## 2024-12-02 NOTE — PROGRESS NOTES
PHYSICAL / OCCUPATIONAL THERAPY - DAILY TREATMENT NOTE    Patient Name: Maximino Corrigan    Date: 2024    : 1950  Insurance: Payor: [facility] MEDICAID / Plan: JADEN [facility] HE[employer]KEEPERS PLUS / Product Type: *No Product type* /      Patient  verified Yes     Visit #   Current / Total 1 1   Time   In / Out 1050 1125   Pain   In / Out 0 0   Subjective Functional Status/Changes: No pain in my neck, just my left arm from the surgery     TREATMENT AREA =  Neck pain [M54.2]     OBJECTIVE         Therapeutic Procedures:    Tx Min Billable or 1:1 Min (if diff from Tx Min) Procedure, Rationale, Specifics   35 35 [id number] Therapeutic Activity (timed):  use of dynamic activities replicating functional movements to increase ROM, strength, coordination, balance, and proprioception in order to improve patient's ability to progress to PLOF and address remaining functional goals.  (see flow sheet as applicable)     Details if applicable:  Reassement, goals, pt education, functional biking     35 35 [facility] Totals Reminder: bill using total billable min of TIMED therapeutic procedures (example: do not include dry needle or estim unattended, both untimed codes, in totals to left)  8-22 min = 1 unit; 23-37 min = 2 units; 38-52 min = 3 units; 53-67 min = 4 units; 68-82 min = 5 units   Total Total     [x]  Patient Education billed concurrently with other procedures   [x] Review HEP    [] Progressed/Changed HEP, detail:    [] Other detail:       Objective Information/Functional Measures/Assessment    Pt self-reports 75% improvement since beginning PT with fair progress. Pt reports functional gains that include: no pain with rotation, lateral bending and looking up and down; can do most of his ADLs pain-free specifically at his neck. Pt reports functional deficits that include: while function has improved he states that pain can still occurring during prolonged sitting but is able to manage pain after doing exercises. Pt

## 2024-12-02 NOTE — PROGRESS NOTES
[facility] [address]  [address]  [place] [address]  [phone] fax    DISCHARGE SUMMARY  Patient Name: Maximino Corrigan : 1950   Treatment/Medical Diagnosis: Neck pain [M54.2]   Referral Source: Cole Junior MD     Date of Initial Visit: 24 Attended Visits: 11 Missed Visits: 0     SUMMARY OF TREATMENT  Pt self-reports 75% improvement since beginning PT with fair progress. Pt reports functional gains that include: no pain with rotation, lateral bending and looking up and down; can do most of his ADLs pain-free specifically at his neck. Pt reports functional deficits that include: while function has improved he states that pain can still occurring during prolonged sitting but is able to manage pain after doing exercises. Pt reports a recent average pain of 5-6/10, 2-3/10 at worst, and 0/10 at best. Pt opting to discharge at this time, with plans to return to skilled PT services eventually to address his cubital tunnel surgery with an updated referral from a physician.  CURRENT STATUS    Pt will be able to improve strength in bilateral shoulder flexion, shoulder ER to at least 5/5 to improve patient's ability to tolerate prolonged lifting at home & community.                RC: progressing Shoulder Flexion: Right = 5/5, Left = 5/5; Shoulder ER: Left = 5/5, Right = 4+/5, Shoulder Abduction : Left = 4+/5, Right = 4+/5              DC: MET, bilateral Flexion: 5/5,  bilateral, ER: 5/5; bilateral Abduction: 5/5      2. Pt will improve C/S lateral flexion AROM to at least 20 degrees to improve their ability to sleep with improved comfort.              RC: Lateral flexion: Right = 20, Left = 20               Update: Pt will improve C/S lateral flexion AROM to at least 25 degrees to improve their ability to sleep with improved comfort.              DC: not met, right 22 degrees, Left 22 degrees    Functional Gains: No problem with rotation, looking
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
## 2024-12-03 ENCOUNTER — OFFICE VISIT (OUTPATIENT)
Age: 74
End: 2024-12-03

## 2024-12-03 ENCOUNTER — APPOINTMENT (OUTPATIENT)
Facility: HOSPITAL | Age: 74
End: 2024-12-03
Payer: MEDICARE

## 2024-12-03 VITALS — HEIGHT: 66 IN | BODY MASS INDEX: 30.37 KG/M2 | WEIGHT: 189 LBS | TEMPERATURE: 98.6 F

## 2024-12-03 DIAGNOSIS — G56.22 CUBITAL TUNNEL SYNDROME ON LEFT: Primary | ICD-10-CM

## 2024-12-03 PROCEDURE — 99024 POSTOP FOLLOW-UP VISIT: CPT | Performed by: ORTHOPAEDIC SURGERY

## 2024-12-03 NOTE — PROGRESS NOTES
[facility] AMBULATORY OFFICE NOTE    Patient: Maximino Corrigan                MRN: [id number]       SSN: [id number]  YOB: 1950        AGE: 74 y.o.        SEX: male  Body mass index is 30.51 kg/m².    PCP: Ramirez Everett MD  12/03/24    Chief Complaint: Left elbow follow-up    HPI: Maximino Corrigan is a 74 y.o. male patient who returns today for his left elbow.  He is doing well.  He still has some numbness and tingling specially at night as well as some soreness in the forearm.  He would like to start physical therapy.    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)     GERD (gastroesophageal reflux disease)     Hx of colonic polyps     Hx of gallstones     Hypercholesterolemia     Hypertension     Restless leg syndrome     Wears dentures        Family History   Problem Relation Age of Onset    Stroke Sister     Colon Cancer Father     Prostate Cancer Father     Heart Attack Mother        Current Outpatient Medications   Medication Sig Dispense Refill    metoclopramide (REGLAN) 10 MG tablet TAKE 1 TABLET BY MOUTH BEFORE BREAKFAST, LUNCH, DINNER AND AT BEDTIME FOR REFLUX DISEASE 90 tablet 2    tamsulosin (FLOMAX) 0.4 MG capsule TAKE 1 CAPSULE BY MOUTH EVERY DAY 90 capsule 0    ANORO ELLIPTA 62.5-25 MCG/ACT inhaler INHALE 1 PUFF BY MOUTH EVERY DAY 60 each 3    lidocaine (LIDODERM) 5 % Place 1 patch onto the skin daily 12 hours on, 12 hours off. (Patient not taking: Reported on 10/24/2024) 30 patch 0    tiZANidine (ZANAFLEX) 4 MG tablet Take 1 tablet by mouth 3 times daily as needed (back pain) 30 tablet 0    predniSONE 10 MG (21) TBPK Use as directed (Patient not taking: Reported on 10/24/2024) 1 each 0    blood glucose test strips (FREESTYLE LITE) strip 1 each by In Vitro route daily As needed. 100

## 2024-12-18 DIAGNOSIS — I10 ESSENTIAL (PRIMARY) HYPERTENSION: ICD-10-CM

## 2024-12-18 DIAGNOSIS — E11.65 TYPE 2 DIABETES MELLITUS WITH HYPERGLYCEMIA, WITH LONG-TERM CURRENT USE OF INSULIN (HCC): ICD-10-CM

## 2024-12-18 DIAGNOSIS — Z79.4 TYPE 2 DIABETES MELLITUS WITH HYPERGLYCEMIA, WITH LONG-TERM CURRENT USE OF INSULIN (HCC): ICD-10-CM

## 2024-12-18 DIAGNOSIS — E55.9 VITAMIN D DEFICIENCY: ICD-10-CM

## 2024-12-18 DIAGNOSIS — E78.00 PURE HYPERCHOLESTEROLEMIA, UNSPECIFIED: ICD-10-CM

## 2024-12-19 ENCOUNTER — OFFICE VISIT (OUTPATIENT)
Age: 74
End: 2024-12-19
Payer: MEDICARE

## 2024-12-19 VITALS
WEIGHT: 192.2 LBS | SYSTOLIC BLOOD PRESSURE: 98 MMHG | BODY MASS INDEX: 30.89 KG/M2 | HEART RATE: 89 BPM | TEMPERATURE: 97.3 F | HEIGHT: 66 IN | RESPIRATION RATE: 18 BRPM | DIASTOLIC BLOOD PRESSURE: 79 MMHG | OXYGEN SATURATION: 97 %

## 2024-12-19 DIAGNOSIS — J43.1 PANLOBULAR EMPHYSEMA (HCC): ICD-10-CM

## 2024-12-19 DIAGNOSIS — J44.9 CHRONIC OBSTRUCTIVE PULMONARY DISEASE, UNSPECIFIED COPD TYPE (HCC): Primary | ICD-10-CM

## 2024-12-19 DIAGNOSIS — J30.1 NON-SEASONAL ALLERGIC RHINITIS DUE TO POLLEN: ICD-10-CM

## 2024-12-19 DIAGNOSIS — Z72.0 TOBACCO ABUSE: ICD-10-CM

## 2024-12-19 DIAGNOSIS — J41.1 MUCOPURULENT CHRONIC BRONCHITIS (HCC): ICD-10-CM

## 2024-12-19 PROCEDURE — 3078F DIAST BP <80 MM HG: CPT | Performed by: INTERNAL MEDICINE

## 2024-12-19 PROCEDURE — 99406 BEHAV CHNG SMOKING 3-10 MIN: CPT | Performed by: INTERNAL MEDICINE

## 2024-12-19 PROCEDURE — 3074F SYST BP LT 130 MM HG: CPT | Performed by: INTERNAL MEDICINE

## 2024-12-19 PROCEDURE — 1126F AMNT PAIN NOTED NONE PRSNT: CPT | Performed by: INTERNAL MEDICINE

## 2024-12-19 PROCEDURE — 99214 OFFICE O/P EST MOD 30 MIN: CPT | Performed by: INTERNAL MEDICINE

## 2024-12-19 PROCEDURE — 1123F ACP DISCUSS/DSCN MKR DOCD: CPT | Performed by: INTERNAL MEDICINE

## 2024-12-19 RX ORDER — UMECLIDINIUM BROMIDE AND VILANTEROL TRIFENATATE 62.5; 25 UG/1; UG/1
1 POWDER RESPIRATORY (INHALATION) DAILY
Qty: 60 EACH | Refills: 3 | Status: SHIPPED | OUTPATIENT
Start: 2024-12-19

## 2024-12-19 RX ORDER — NICOTINE 21 MG/24HR
1 PATCH, TRANSDERMAL 24 HOURS TRANSDERMAL DAILY
Qty: 42 PATCH | Refills: 0 | Status: SHIPPED | OUTPATIENT
Start: 2024-12-19 | End: 2024-12-23

## 2024-12-19 RX ORDER — NICOTINE 21 MG/24HR
1 PATCH, TRANSDERMAL 24 HOURS TRANSDERMAL DAILY
Qty: 14 PATCH | Refills: 0 | Status: SHIPPED | OUTPATIENT
Start: 2025-01-30 | End: 2025-02-13

## 2024-12-19 RX ORDER — FLUTICASONE PROPIONATE 50 MCG
2 SPRAY, SUSPENSION (ML) NASAL DAILY
Qty: 3 EACH | Refills: 3 | Status: SHIPPED | OUTPATIENT
Start: 2024-12-19

## 2024-12-19 RX ORDER — ALBUTEROL SULFATE 90 UG/1
2 INHALANT RESPIRATORY (INHALATION) EVERY 6 HOURS PRN
Qty: 18 G | Refills: 3 | Status: SHIPPED | OUTPATIENT
Start: 2024-12-19
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
## 2024-12-19 NOTE — PROGRESS NOTES
Maximino Corrigan presents today for   Chief Complaint   Patient presents with    Panlobular emphysema    copd       Is someone accompanying this pt? No    Is the patient using any DME equipment during OV? No    -DME Company No    Depression Screenin/13/2024     9:52 AM   PHQ-9 Questionaire   Little interest or pleasure in doing things 0   Feeling down, depressed, or hopeless 1   Trouble falling or staying asleep, or sleeping too much 0   Feeling tired or having little energy 0   Poor appetite or overeating 0   Feeling bad about yourself - or that you are a failure or have let yourself or your family down 0   Trouble concentrating on things, such as reading the newspaper or watching television 0   Moving or speaking so slowly that other people could have noticed. Or the opposite - being so fidgety or restless that you have been moving around a lot more than usual 0   Thoughts that you would be better off dead, or of hurting yourself in some way 0   PHQ-9 Total Score 1   If you checked off any problems, how difficult have these problems made it for you to do your work, take care of things at home, or get along with other people? 0       Learning Assessment:    Failed to redirect to the Timeline version of the [email or web address] SmartLink.    Abuse Screening:         No data to display                Fall Risk    Failed to redirect to the Timeline version of the [email or web address] SmartLink.    Coordination of Care:    1. Have you been to the ER, urgent care clinic since your last visit? Hospitalized since your last visit? No    2. Have you seen or consulted any other health care providers outside of the [facility] since your last visit? Include any pap smears or colon screening. No    Medication list has been update per patient.'

## 2024-12-19 NOTE — PATIENT INSTRUCTIONS
What is the plan?  -Continue Anoro ellipta as prescribed  -Use Albuterol rescue inhaler if needed  -Start on Nicotine patches as prescribed  -Stop use of ALL tobacco products

## 2024-12-20 LAB
25(OH)D3+25(OH)D2 SERPL-MCNC: 24.8 NG/ML (ref 30–100)
ALBUMIN SERPL-MCNC: 4.1 G/DL (ref 3.8–4.8)
ALBUMIN/CREAT UR: 9 MG/G CREAT (ref 0–29)
ALP SERPL-CCNC: 94 IU/L (ref 44–121)
ALT SERPL-CCNC: 10 IU/L (ref 0–44)
AST SERPL-CCNC: 18 IU/L (ref 0–40)
BILIRUB SERPL-MCNC: 0.3 MG/DL (ref 0–1.2)
BUN SERPL-MCNC: 15 MG/DL (ref 8–27)
BUN/CREAT SERPL: 11 (ref 10–24)
CALCIUM SERPL-MCNC: 9.7 MG/DL (ref 8.6–10.2)
CHLORIDE SERPL-SCNC: 103 MMOL/L (ref 96–106)
CHOLEST SERPL-MCNC: 118 MG/DL (ref 100–199)
CO2 SERPL-SCNC: 22 MMOL/L (ref 20–29)
CREAT SERPL-MCNC: 1.38 MG/DL (ref 0.76–1.27)
CREAT UR-MCNC: 180.5 MG/DL
EGFRCR SERPLBLD CKD-EPI 2021: 54 ML/MIN/1.73
GLOBULIN SER CALC-MCNC: 2.3 G/DL (ref 1.5–4.5)
GLUCOSE SERPL-MCNC: 135 MG/DL (ref 70–99)
HDLC SERPL-MCNC: 38 MG/DL
LDLC SERPL CALC-MCNC: 65 MG/DL (ref 0–99)
MICROALBUMIN UR-MCNC: 15.7 UG/ML
POTASSIUM SERPL-SCNC: 4.5 MMOL/L (ref 3.5–5.2)
PROT SERPL-MCNC: 6.4 G/DL (ref 6–8.5)
SODIUM SERPL-SCNC: 140 MMOL/L (ref 134–144)
TRIGL SERPL-MCNC: 75 MG/DL (ref 0–149)
VLDLC SERPL CALC-MCNC: 15 MG/DL (ref 5–40)

## 2024-12-23 RX ORDER — NICOTINE 21 MG/24HR
PATCH, TRANSDERMAL 24 HOURS TRANSDERMAL
Qty: 28 PATCH | Refills: 3 | Status: SHIPPED | OUTPATIENT
Start: 2024-12-23

## 2025-01-02 ENCOUNTER — OFFICE VISIT (OUTPATIENT)
Facility: CLINIC | Age: 75
End: 2025-01-02
Payer: MEDICARE

## 2025-01-02 VITALS
HEART RATE: 67 BPM | BODY MASS INDEX: 30.86 KG/M2 | TEMPERATURE: 97.6 F | HEIGHT: 66 IN | SYSTOLIC BLOOD PRESSURE: 122 MMHG | OXYGEN SATURATION: 98 % | RESPIRATION RATE: 18 BRPM | DIASTOLIC BLOOD PRESSURE: 77 MMHG | WEIGHT: 192 LBS

## 2025-01-02 DIAGNOSIS — J43.2 CENTRILOBULAR EMPHYSEMA (HCC): ICD-10-CM

## 2025-01-02 DIAGNOSIS — C49.A2 GASTROINTESTINAL STROMAL TUMOR OF STOMACH (HCC): ICD-10-CM

## 2025-01-02 DIAGNOSIS — R21 SKIN RASH: ICD-10-CM

## 2025-01-02 DIAGNOSIS — I10 ESSENTIAL (PRIMARY) HYPERTENSION: ICD-10-CM

## 2025-01-02 DIAGNOSIS — R79.89 AZOTEMIA: ICD-10-CM

## 2025-01-02 DIAGNOSIS — E78.00 PURE HYPERCHOLESTEROLEMIA, UNSPECIFIED: ICD-10-CM

## 2025-01-02 DIAGNOSIS — E11.65 TYPE 2 DIABETES MELLITUS WITH HYPERGLYCEMIA, WITH LONG-TERM CURRENT USE OF INSULIN (HCC): Primary | ICD-10-CM

## 2025-01-02 DIAGNOSIS — Z79.4 TYPE 2 DIABETES MELLITUS WITH HYPERGLYCEMIA, WITH LONG-TERM CURRENT USE OF INSULIN (HCC): Primary | ICD-10-CM

## 2025-01-02 DIAGNOSIS — E55.9 VITAMIN D DEFICIENCY: ICD-10-CM

## 2025-01-02 LAB — HBA1C MFR BLD: 6.9 %

## 2025-01-02 PROCEDURE — 3074F SYST BP LT 130 MM HG: CPT | Performed by: INTERNAL MEDICINE

## 2025-01-02 PROCEDURE — 3078F DIAST BP <80 MM HG: CPT | Performed by: INTERNAL MEDICINE

## 2025-01-02 PROCEDURE — 1159F MED LIST DOCD IN RCRD: CPT | Performed by: INTERNAL MEDICINE

## 2025-01-02 PROCEDURE — 1160F RVW MEDS BY RX/DR IN RCRD: CPT | Performed by: INTERNAL MEDICINE

## 2025-01-02 PROCEDURE — 83036 HEMOGLOBIN GLYCOSYLATED A1C: CPT | Performed by: INTERNAL MEDICINE

## 2025-01-02 PROCEDURE — 1123F ACP DISCUSS/DSCN MKR DOCD: CPT | Performed by: INTERNAL MEDICINE

## 2025-01-02 PROCEDURE — 1125F AMNT PAIN NOTED PAIN PRSNT: CPT | Performed by: INTERNAL MEDICINE

## 2025-01-02 PROCEDURE — 99214 OFFICE O/P EST MOD 30 MIN: CPT | Performed by: INTERNAL MEDICINE

## 2025-01-02 ASSESSMENT — PATIENT HEALTH QUESTIONNAIRE - PHQ9
3. TROUBLE FALLING OR STAYING ASLEEP: NOT AT ALL
SUM OF ALL RESPONSES TO PHQ QUESTIONS 1-9: 0
5. POOR APPETITE OR OVEREATING: NOT AT ALL
SUM OF ALL RESPONSES TO PHQ QUESTIONS 1-9: 0
6. FEELING BAD ABOUT YOURSELF - OR THAT YOU ARE A FAILURE OR HAVE LET YOURSELF OR YOUR FAMILY DOWN: NOT AT ALL
10. IF YOU CHECKED OFF ANY PROBLEMS, HOW DIFFICULT HAVE THESE PROBLEMS MADE IT FOR YOU TO DO YOUR WORK, TAKE CARE OF THINGS AT HOME, OR GET ALONG WITH OTHER PEOPLE: NOT DIFFICULT AT ALL
SUM OF ALL RESPONSES TO PHQ QUESTIONS 1-9: 0
9. THOUGHTS THAT YOU WOULD BE BETTER OFF DEAD, OR OF HURTING YOURSELF: NOT AT ALL
SUM OF ALL RESPONSES TO PHQ QUESTIONS 1-9: 0
2. FEELING DOWN, DEPRESSED OR HOPELESS: NOT AT ALL
7. TROUBLE CONCENTRATING ON THINGS, SUCH AS READING THE NEWSPAPER OR WATCHING TELEVISION: NOT AT ALL
1. LITTLE INTEREST OR PLEASURE IN DOING THINGS: NOT AT ALL
8. MOVING OR SPEAKING SO SLOWLY THAT OTHER PEOPLE COULD HAVE NOTICED. OR THE OPPOSITE, BEING SO FIGETY OR RESTLESS THAT YOU HAVE BEEN MOVING AROUND A LOT MORE THAN USUAL: NOT AT ALL
SUM OF ALL RESPONSES TO PHQ9 QUESTIONS 1 & 2: 0
4. FEELING TIRED OR HAVING LITTLE ENERGY: NOT AT ALL

## 2025-01-02 NOTE — PROGRESS NOTES
Subjective:       Chief Complaint  The patient presents for follow up of diabetes, hypertension and high cholesterol.         MIK Corrigan is a 74 y.o. male seen for follow up of diabetes.   Healso has hypertension and hyperlipidemia. Diabetes well controlled on Trulicity 0.75 mg,Patient is no longer on insulin.  Pt is s/p gastrectomy with Billroth II reconstruction which most likely resolved his need for insulin.  Hypertension well controlled, no significant medication side effects noted, on lisinopril 40 mg and Norvasc 10 mg,   hyperlipidemia well controlled, no significant medication side effects noted, on Pravachol 40 mg    Diet and Lifestyle: generally follows a low fat low cholesterol diet, follows a diabetic diet regularly, exercises  regularly with walking daily     Home BP Monitoring: is not measured at home.    Diabetic Review of Systems - home glucose monitoring: is  performed once a day.    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  Followed by oncology and is on Gleevec 400 mg. There is concern it is causing some of his skin issues with dryness and skin rashes so it has been stopped with plan to decrease dose to see if any improvement.       Pt recently diagnosed with Prostate Cancer but has decided to do active surveillance and has been followed closely by urology every 6 months.      Patient has COPD that has improved with Anoro Ellipta.  He is now followed by Pulmonary(dr Preciado)    He saw Dr. Queen and does not have Sleep apnea.  For his loud snoring he can use Breathe Right strips.  His restless leg syndrome has improved without medication.    Patient has fatty liver which is confirmed with ultrasound of liver.    Patient's vitamin D level is on the low side so he will start taking vitamin D 2000 units/day.    Patient has some azotemia for unclear reasons his renal function has been normal in the past so

## 2025-01-02 NOTE — PROGRESS NOTES
Maximino Corrigan presents today for   Chief Complaint   Patient presents with    Cholesterol Problem    Diabetes    Follow-up    Hypertension           \"Have you been to the ER, urgent care clinic since your last visit?  Hospitalized since your last visit?\"    NO    “Have you seen or consulted any other health care providers outside of [facility] since your last visit?”    NO

## 2025-01-14 ENCOUNTER — OFFICE VISIT (OUTPATIENT)
Age: 75
End: 2025-01-14

## 2025-01-14 DIAGNOSIS — G56.22 CUBITAL TUNNEL SYNDROME ON LEFT: Primary | ICD-10-CM

## 2025-01-14 PROCEDURE — 99024 POSTOP FOLLOW-UP VISIT: CPT | Performed by: ORTHOPAEDIC SURGERY

## 2025-01-14 NOTE — PROGRESS NOTES
TABLET BY MOUTH BEFORE BREAKFAST, LUNCH, DINNER AND AT BEDTIME FOR REFLUX DISEASE 90 tablet 2    tamsulosin (FLOMAX) 0.4 MG capsule TAKE 1 CAPSULE BY MOUTH EVERY DAY 90 capsule 0    lidocaine (LIDODERM) 5 % Place 1 patch onto the skin daily 12 hours on, 12 hours off. 30 patch 0    tiZANidine (ZANAFLEX) 4 MG tablet Take 1 tablet by mouth 3 times daily as needed (back pain) 30 tablet 0    blood glucose test strips (FREESTYLE LITE) strip 1 each by In Vitro route daily As needed. 100 each 3    amLODIPine (NORVASC) 10 MG tablet TAKE 1 TABLET BY MOUTH EVERY DAY 90 tablet 2    dulaglutide (TRULICITY) 0.75 MG/0.5ML SOPN SC injection INJECT 0.75 MG SUBCUTANEOUSLY ONE TIME PER WEEK 4 Adjustable Dose Pre-filled Pen Syringe 5    pravastatin (PRAVACHOL) 40 MG tablet TAKE 1 TAB BY MOUTH NIGHTLY. INDICATIONS: HIGH CHOLESTEROL 90 tablet 1    mirabegron (MYRBETRIQ) 25 MG TB24 Take 1 tablet by mouth daily 90 tablet 3    FreeStyle Lancets MISC 1 each by Does not apply route daily 100 each 3    Blood Glucose Monitoring Suppl (ACCU-CHEK ALEX PLUS) w/Device KIT Check blood sugar daily. Dx E11.29. 1 kit 0    glucose monitoring kit 1 kit by Does not apply route daily DX E11.29. please dispense Accu-check glucometer 1 kit 0    Blood Glucose Monitoring Suppl (TRUE METRIX METER) KAYKAY Check blood sugar daily. DX E11.29 1 each 0    Lancets MISC Check BS 1x/day E11.29 100 each 5    gabapentin (NEURONTIN) 300 MG capsule Take 1 capsule by mouth 3 times daily. 90 capsule 3    acetaminophen (TYLENOL) 500 MG tablet Take 2 tablets by mouth every 6 hours as needed for Pain 56 tablet 0    lisinopril (PRINIVIL;ZESTRIL) 40 MG tablet TAKE 1 TABLET BY MOUTH EVERY DAY 90 tablet 3    Continuous Blood Gluc Sensor (FREESTYLE RAKESH 14 DAY SENSOR) MISC USE ONE EVERY 14 DAYS  Dx E11.65 2 each 5    ferrous sulfate (IRON 325) 325 (65 Fe) MG tablet Take 1 tablet by mouth Every other day 90 tablet 2    pantoprazole (PROTONIX) 40 MG tablet TAKE 1 TABLET BY MOUTH EVERY

## 2025-01-19 DIAGNOSIS — I10 ESSENTIAL (PRIMARY) HYPERTENSION: ICD-10-CM

## 2025-01-20 RX ORDER — PRAVASTATIN SODIUM 40 MG
TABLET ORAL
Qty: 90 TABLET | Refills: 3 | Status: SHIPPED | OUTPATIENT
Start: 2025-01-20

## 2025-01-20 RX ORDER — LISINOPRIL 40 MG/1
TABLET ORAL
Qty: 90 TABLET | Refills: 3 | Status: SHIPPED | OUTPATIENT
Start: 2025-01-20

## 2025-01-30 ENCOUNTER — OFFICE VISIT (OUTPATIENT)
Age: 75
End: 2025-01-30
Payer: MEDICARE

## 2025-01-30 VITALS — HEIGHT: 66 IN | BODY MASS INDEX: 30.86 KG/M2 | WEIGHT: 192 LBS

## 2025-01-30 DIAGNOSIS — Z98.890 S/P CUBITAL TUNNEL RELEASE: ICD-10-CM

## 2025-01-30 DIAGNOSIS — G56.22 CUBITAL TUNNEL SYNDROME ON LEFT: Primary | ICD-10-CM

## 2025-01-30 PROCEDURE — 1123F ACP DISCUSS/DSCN MKR DOCD: CPT | Performed by: ORTHOPAEDIC SURGERY

## 2025-01-30 PROCEDURE — 1125F AMNT PAIN NOTED PAIN PRSNT: CPT | Performed by: ORTHOPAEDIC SURGERY

## 2025-01-30 PROCEDURE — 99213 OFFICE O/P EST LOW 20 MIN: CPT | Performed by: ORTHOPAEDIC SURGERY

## 2025-01-30 PROCEDURE — 1159F MED LIST DOCD IN RCRD: CPT | Performed by: ORTHOPAEDIC SURGERY

## 2025-01-30 PROCEDURE — 1160F RVW MEDS BY RX/DR IN RCRD: CPT | Performed by: ORTHOPAEDIC SURGERY

## 2025-01-30 NOTE — PROGRESS NOTES
Maximino Corrigan is a 74 y.o. male right handed retiree.  Worker's Compensation and legal considerations: none    Chief Complaint   Patient presents with    Elbow Pain     Left elbow     Pain Score:   7    Subjective:     Initial HPI: Patient presents today more than 90 days status post left cubital tunnel release and anterior transposition with Dr. Sanford.  He reports the numbness he had before surgery has resolved with only occasional tingling very rarely.  He reports sensitivity to light touch about the back of the elbow.  He does report some improvement.    Date of onset: Date of surgery: 10/31/2024  Injury: No  Prior Treatment: Left cubital tunnel release and anterior transposition with Dr. 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 Duarte    GASTRECTOMY  07/13/2021    GASTRECTOMY  7/13/202    OTHER SURGICAL HISTORY      gallstones removed    UPPER GASTROINTESTINAL ENDOSCOPY N/A 3/8/2024    ESOPHAGOGASTRODUODENOSCOPY with bxs performed by Matthew Nguyễn MD at [facility] ENDOSCOPY    UROLOGICAL SURGERY  10/19/2022    TRANSRECTAL ULTRASOUND AND BIOPSY OF PROSTATE Dr. Ball    UROLOGICAL SURGERY  02/01/2024    PNBx Brayan 6, PSA 6.9,Dr. Blackmon, [facility]        Current Outpatient Medications   Medication Sig

## 2025-02-12 ENCOUNTER — HOSPITAL ENCOUNTER (OUTPATIENT)
Facility: HOSPITAL | Age: 75
Setting detail: RECURRING SERIES
Discharge: HOME | End: 2025-02-15
Payer: MEDICARE

## 2025-02-12 PROCEDURE — 97161 PT EVAL LOW COMPLEX 20 MIN: CPT

## 2025-02-12 NOTE — PROGRESS NOTES
PHYSICAL / OCCUPATIONAL THERAPY - DAILY TREATMENT NOTE    Patient Name: Maximino Corrigan    Date: 2025    : 1950  Insurance: Payor: [facility] MEDICARE / Plan: JADEN FULL DUAL ADVANTAGE 2 / Product Type: *No Product type* /      Patient  verified Yes     Visit #   Current / Total 1 10   Time   In / Out 210 250   Pain   In / Out 2-3 2-3   Subjective Functional Status/Changes: See POC     TREATMENT AREA =  Pain in left elbow [M25.522]  S/P cubital tunnel release [Z98.890]      If an interpreting service is utilized for treatment of this patient, the contents of this document represent the material reviewed with the patient via the .     OBJECTIVE    40 min [x]Eval - untimed                             Therapeutic Procedures:    Tx Min Billable or 1:1 Min (if diff from Tx Min) Procedure, Rationale, Specifics          Details if applicable:              Details if applicable:            Details if applicable:            Details if applicable:            Details if applicable:       [facility] Totals Reminder: bill using total billable min of TIMED therapeutic procedures (example: do not include dry needle or estim unattended, both untimed codes, in totals to left)  8-22 min = 1 unit; 23-37 min = 2 units; 38-52 min = 3 units; 53-67 min = 4 units; 68-82 min = 5 units   Total Total     [x]  Patient Education billed concurrently with other procedures   [x] Review HEP    [] Progressed/Changed HEP, detail:    [] Other detail:       Objective Information/Functional Measures/Assessment    See POC    Patient will continue to benefit from skilled PT / OT services to modify and progress therapeutic interventions, analyze and address functional mobility deficits, analyze and address ROM deficits, analyze and address strength deficits, analyze and address soft tissue restrictions, analyze and cue for proper movement patterns, analyze and modify for postural abnormalities, analyze and address imbalance/dizziness, and 
tumor; Hx of posterior cervical decompression and fusion (2019).   Allergies: Latex, neosporin see EMR  Work: retired  Motivation: good  Goals: \"to be comfortable using my left arm for function.\"      Assessment / key information:  Pt is a pleasant 74 y.o. male  who presents to PT with s/p left cubital tunnel release DOS: 10/31/24. Pt demonstrates decreased strength into left wrist flexion, elbow flexion with some tenderness during testing. He c/o intermittent shooting sharp pain down medial elbow and into digits 4-5 but states that pain does not last long. He presents with slight tightness into elbow extension with patient lacking 2 degrees of extension with reports of slight pain when attempting to achieve terminal extension. He is slight TTP to flexor wad at left forearm and is sensitive to pressure at medial malleoli. Pt's impairments have limited their ability to push with his left UE, decreased ability gripping heavy objects with left UE. Overall, he is progressing very well since his surgery. Pt would benefit from skilled PT to address above deficits in order to improve their ability to perfrom pain-free unrestricted ADLs/IADLs in order to enhance the Pt's overall quality of life.       Evaluation Complexity:  History:  MEDIUM  Complexity : 1-2 comorbidities / personal factors will impact the outcome/ POC ; Examination:  LOW Complexity : 1-2 Standardized tests and measures addressing body structure, function, activity limitation and / or participation in recreation  ;Presentation:  LOW Complexity : Stable, uncomplicated  ;Clinical Decision Making:  QuickDASH - did not complete, will assess at next f/u   Overall Complexity Rating: LOW   Problem List: pain affecting function, decrease ROM, decrease strength, edema affection function, impaired gait/balance, decrease ADL/functional abilities, decrease activity tolerance, decrease flexibility/joint mobility, and decrease transfer abilities    Treatment Plan may

## 2025-02-17 ENCOUNTER — APPOINTMENT (OUTPATIENT)
Facility: HOSPITAL | Age: 75
End: 2025-02-17
Payer: MEDICARE

## 2025-03-04 ENCOUNTER — HOSPITAL ENCOUNTER (OUTPATIENT)
Facility: HOSPITAL | Age: 75
Setting detail: RECURRING SERIES
Discharge: HOME | End: 2025-03-07
Payer: MEDICARE

## 2025-03-04 PROCEDURE — 97110 THERAPEUTIC EXERCISES: CPT

## 2025-03-04 PROCEDURE — 97112 NEUROMUSCULAR REEDUCATION: CPT

## 2025-03-04 NOTE — PROGRESS NOTES
at today's visit. Patient participated in today's session without adverse effects. Patient reports 8/10 pain that occurs intermittently and partial HEP compliance demonstrating progress towards STG #1 and #2. Patient reports increased ease with most exercises and can possibly progress with therex NV. Patient completed 15 bicep curls without resistance and an additional 15 with GTB. Patient declined modalities. Continue to progress with patient tolerance.    Patient will continue to benefit from skilled PT / OT services to modify and progress therapeutic interventions, analyze and address functional mobility deficits, analyze and address ROM deficits, analyze and address strength deficits, analyze and address soft tissue restrictions, analyze and cue for proper movement patterns, analyze and modify for postural abnormalities, analyze and address imbalance/dizziness, and instruct in home and community integration to address functional deficits and attain remaining goals.    Progress toward goals / Updated goals:  []  See Progress Note/Recertification    Pt will be able to report a 8/10 pain rating at worst to improve patient's ability to tolerate prolonged functional activities at home. (Initial STG)              Eval: 10/10 at worst; quick intermittent lightning pain down medial elbow and into digits 4-5.    Current: 7-8/10 pain at worst that occurs intermittently down to elbow - reports minimal pain into digits that does not occur every time. [Date Assessed: 3/4/2025]     Pt will be independent with HEP to facilitate carry-over of functional gains made in PT at home & community. (Initial STG)              Eval: established at eval   Current: Reports completing every other day - educated on benefits of performing at least 1x/day [Date Assessed: 3/4/2025]     Long Term Goals: To be accomplished in 5 weeks     Pt will be able to improve strength in left elbow flexion, wrist flexion, digits 4-5 adduction to at least 5/5

## 2025-03-06 ENCOUNTER — HOSPITAL ENCOUNTER (OUTPATIENT)
Facility: HOSPITAL | Age: 75
Setting detail: RECURRING SERIES
End: 2025-03-06
Payer: MEDICARE

## 2025-03-11 ENCOUNTER — APPOINTMENT (OUTPATIENT)
Facility: HOSPITAL | Age: 75
End: 2025-03-11
Payer: MEDICARE

## 2025-03-13 ENCOUNTER — HOSPITAL ENCOUNTER (OUTPATIENT)
Facility: HOSPITAL | Age: 75
Setting detail: RECURRING SERIES
End: 2025-03-13
Payer: MEDICARE

## 2025-03-24 RX ORDER — METOCLOPRAMIDE 10 MG/1
TABLET ORAL
Qty: 90 TABLET | Refills: 2 | Status: SHIPPED | OUTPATIENT
Start: 2025-03-24

## 2025-03-24 NOTE — TELEPHONE ENCOUNTER
PCP: Ramirez Everett MD    LAST OFFICE VISIT: 01/02/2025    LAST REFILL PER CHART:  Medication:metoclopramide (REGLAN) 10 MG tablet   Ordered On:11/19/2024  Instructions:TAKE 1 TABLET BY MOUTH BEFORE BREAKFAST, LUNCH, DINNER AND AT BEDTIME FOR REFLUX DISEASE   Dispense:90 tablets  Refills:2      Future Appointments   Date Time Provider Department Center   4/25/2025  9:00 AM IOC LAB VISIT [facility] DEP   5/1/2025  8:15 AM Ladarius Hernandez DO [facility]   5/2/2025  9:20 AM Ramirez Everett MD [facility] DEP

## 2025-03-25 ENCOUNTER — APPOINTMENT (OUTPATIENT)
Facility: HOSPITAL | Age: 75
End: 2025-03-25
Payer: MEDICARE

## 2025-03-27 ENCOUNTER — APPOINTMENT (OUTPATIENT)
Facility: HOSPITAL | Age: 75
End: 2025-03-27
Payer: MEDICARE

## 2025-03-27 ENCOUNTER — TELEPHONE (OUTPATIENT)
Facility: CLINIC | Age: 75
End: 2025-03-27

## 2025-03-27 RX ORDER — GLUCOSAMINE HCL/CHONDROITIN SU 500-400 MG
CAPSULE ORAL
Qty: 100 STRIP | Refills: 3 | Status: SHIPPED | OUTPATIENT
Start: 2025-03-27

## 2025-03-27 NOTE — TELEPHONE ENCOUNTER
PCP: Ramirez Everett MD    Refill Request     LAST OFFICE VISIT: 1/2/2025      Medication: Accu chek test strips     Dispense:       Pharmacy [facility] #[id number] - [place], [place] - [address] [place] [address] [phone] - F [phone] [[id number]]       Future Appointments   Date Time Provider Department Center   4/25/2025  9:00 AM [facility]IT [facility]   5/1/2025  8:15 AM Ladarius Hrenandez DO [facility]   5/2/2025  9:20 AM Ramirez Everett MD [facility] DEP

## 2025-04-02 DIAGNOSIS — E55.9 VITAMIN D DEFICIENCY: ICD-10-CM

## 2025-04-02 DIAGNOSIS — Z79.4 TYPE 2 DIABETES MELLITUS WITH HYPERGLYCEMIA, WITH LONG-TERM CURRENT USE OF INSULIN (HCC): ICD-10-CM

## 2025-04-02 DIAGNOSIS — E11.65 TYPE 2 DIABETES MELLITUS WITH HYPERGLYCEMIA, WITH LONG-TERM CURRENT USE OF INSULIN (HCC): ICD-10-CM

## 2025-04-02 DIAGNOSIS — R79.89 AZOTEMIA: ICD-10-CM

## 2025-04-02 DIAGNOSIS — I10 ESSENTIAL (PRIMARY) HYPERTENSION: ICD-10-CM

## 2025-04-02 DIAGNOSIS — E78.00 PURE HYPERCHOLESTEROLEMIA, UNSPECIFIED: ICD-10-CM

## 2025-04-26 LAB
25(OH)D3+25(OH)D2 SERPL-MCNC: 15.8 NG/ML (ref 30–100)
ALBUMIN SERPL-MCNC: 3.8 G/DL (ref 3.8–4.8)
ALP SERPL-CCNC: 118 IU/L (ref 44–121)
ALT SERPL-CCNC: 13 IU/L (ref 0–44)
AST SERPL-CCNC: 13 IU/L (ref 0–40)
BASOPHILS # BLD AUTO: 0 X10E3/UL (ref 0–0.2)
BASOPHILS NFR BLD AUTO: 0 %
BILIRUB SERPL-MCNC: 0.4 MG/DL (ref 0–1.2)
BUN SERPL-MCNC: 10 MG/DL (ref 8–27)
BUN/CREAT SERPL: 10 (ref 10–24)
CALCIUM SERPL-MCNC: 9.6 MG/DL (ref 8.6–10.2)
CHLORIDE SERPL-SCNC: 100 MMOL/L (ref 96–106)
CHOLEST SERPL-MCNC: 178 MG/DL (ref 100–199)
CO2 SERPL-SCNC: 26 MMOL/L (ref 20–29)
CREAT SERPL-MCNC: 1.01 MG/DL (ref 0.76–1.27)
EGFRCR SERPLBLD CKD-EPI 2021: 78 ML/MIN/1.73
EOSINOPHIL # BLD AUTO: 0.3 X10E3/UL (ref 0–0.4)
EOSINOPHIL NFR BLD AUTO: 4 %
ERYTHROCYTE [DISTWIDTH] IN BLOOD BY AUTOMATED COUNT: 13 % (ref 11.6–15.4)
GLOBULIN SER CALC-MCNC: 2.4 G/DL (ref 1.5–4.5)
GLUCOSE SERPL-MCNC: 337 MG/DL (ref 70–99)
HBA1C MFR BLD: 10.7 % (ref 4.8–5.6)
HCT VFR BLD AUTO: 43.3 % (ref 37.5–51)
HDLC SERPL-MCNC: 50 MG/DL
HGB BLD-MCNC: 13.8 G/DL (ref 13–17.7)
IMM GRANULOCYTES # BLD AUTO: 0 X10E3/UL (ref 0–0.1)
IMM GRANULOCYTES NFR BLD AUTO: 0 %
LDLC SERPL CALC-MCNC: 113 MG/DL (ref 0–99)
LYMPHOCYTES # BLD AUTO: 2.1 X10E3/UL (ref 0.7–3.1)
LYMPHOCYTES NFR BLD AUTO: 22 %
MCH RBC QN AUTO: 27.3 PG (ref 26.6–33)
MCHC RBC AUTO-ENTMCNC: 31.9 G/DL (ref 31.5–35.7)
MCV RBC AUTO: 86 FL (ref 79–97)
MONOCYTES # BLD AUTO: 0.6 X10E3/UL (ref 0.1–0.9)
MONOCYTES NFR BLD AUTO: 6 %
NEUTROPHILS # BLD AUTO: 6.2 X10E3/UL (ref 1.4–7)
NEUTROPHILS NFR BLD AUTO: 68 %
PLATELET # BLD AUTO: 207 X10E3/UL (ref 150–450)
POTASSIUM SERPL-SCNC: 4.1 MMOL/L (ref 3.5–5.2)
PROT SERPL-MCNC: 6.2 G/DL (ref 6–8.5)
RBC # BLD AUTO: 5.05 X10E6/UL (ref 4.14–5.8)
SODIUM SERPL-SCNC: 140 MMOL/L (ref 134–144)
TRIGL SERPL-MCNC: 83 MG/DL (ref 0–149)
VLDLC SERPL CALC-MCNC: 15 MG/DL (ref 5–40)
WBC # BLD AUTO: 9.2 X10E3/UL (ref 3.4–10.8)

## 2025-04-29 RX ORDER — AMLODIPINE BESYLATE 10 MG/1
10 TABLET ORAL DAILY
Qty: 90 TABLET | Refills: 2 | Status: SHIPPED | OUTPATIENT
Start: 2025-04-29

## 2025-05-06 ENCOUNTER — OFFICE VISIT (OUTPATIENT)
Facility: CLINIC | Age: 75
End: 2025-05-06
Payer: MEDICARE

## 2025-05-06 VITALS
TEMPERATURE: 97.4 F | OXYGEN SATURATION: 98 % | DIASTOLIC BLOOD PRESSURE: 95 MMHG | HEIGHT: 66 IN | BODY MASS INDEX: 27.64 KG/M2 | HEART RATE: 76 BPM | WEIGHT: 172 LBS | RESPIRATION RATE: 18 BRPM | SYSTOLIC BLOOD PRESSURE: 137 MMHG

## 2025-05-06 DIAGNOSIS — I10 ESSENTIAL (PRIMARY) HYPERTENSION: ICD-10-CM

## 2025-05-06 DIAGNOSIS — J43.2 CENTRILOBULAR EMPHYSEMA (HCC): ICD-10-CM

## 2025-05-06 DIAGNOSIS — E11.65 TYPE 2 DIABETES MELLITUS WITH HYPERGLYCEMIA, WITH LONG-TERM CURRENT USE OF INSULIN (HCC): Primary | ICD-10-CM

## 2025-05-06 DIAGNOSIS — C49.A2 GASTROINTESTINAL STROMAL TUMOR OF STOMACH (HCC): ICD-10-CM

## 2025-05-06 DIAGNOSIS — B35.6 TINEA CRURIS: ICD-10-CM

## 2025-05-06 DIAGNOSIS — N62 SUBAREOLAR GYNECOMASTIA IN MALE: ICD-10-CM

## 2025-05-06 DIAGNOSIS — N64.9 LESION OF RIGHT NIPPLE: ICD-10-CM

## 2025-05-06 DIAGNOSIS — L30.9 ECZEMA, UNSPECIFIED TYPE: ICD-10-CM

## 2025-05-06 DIAGNOSIS — L21.9 SEBORRHEIC DERMATITIS: ICD-10-CM

## 2025-05-06 DIAGNOSIS — E55.9 VITAMIN D DEFICIENCY: ICD-10-CM

## 2025-05-06 DIAGNOSIS — E78.00 PURE HYPERCHOLESTEROLEMIA, UNSPECIFIED: ICD-10-CM

## 2025-05-06 DIAGNOSIS — Z79.4 TYPE 2 DIABETES MELLITUS WITH HYPERGLYCEMIA, WITH LONG-TERM CURRENT USE OF INSULIN (HCC): Primary | ICD-10-CM

## 2025-05-06 PROCEDURE — 3080F DIAST BP >= 90 MM HG: CPT | Performed by: INTERNAL MEDICINE

## 2025-05-06 PROCEDURE — 1159F MED LIST DOCD IN RCRD: CPT | Performed by: INTERNAL MEDICINE

## 2025-05-06 PROCEDURE — 1160F RVW MEDS BY RX/DR IN RCRD: CPT | Performed by: INTERNAL MEDICINE

## 2025-05-06 PROCEDURE — 3046F HEMOGLOBIN A1C LEVEL >9.0%: CPT | Performed by: INTERNAL MEDICINE

## 2025-05-06 PROCEDURE — 1123F ACP DISCUSS/DSCN MKR DOCD: CPT | Performed by: INTERNAL MEDICINE

## 2025-05-06 PROCEDURE — 3075F SYST BP GE 130 - 139MM HG: CPT | Performed by: INTERNAL MEDICINE

## 2025-05-06 PROCEDURE — 99214 OFFICE O/P EST MOD 30 MIN: CPT | Performed by: INTERNAL MEDICINE

## 2025-05-06 RX ORDER — INSULIN GLARGINE 100 [IU]/ML
INJECTION, SOLUTION SUBCUTANEOUS
Qty: 5 ADJUSTABLE DOSE PRE-FILLED PEN SYRINGE | Refills: 3 | Status: SHIPPED | OUTPATIENT
Start: 2025-05-06 | End: 2025-05-08

## 2025-05-06 RX ORDER — CEPHALEXIN 500 MG/1
500 CAPSULE ORAL 2 TIMES DAILY
Qty: 14 CAPSULE | Refills: 0 | Status: SHIPPED | OUTPATIENT
Start: 2025-05-06 | End: 2025-05-13

## 2025-05-06 RX ORDER — KETOCONAZOLE 20 MG/ML
SHAMPOO, SUSPENSION TOPICAL
Qty: 120 ML | Refills: 1 | Status: SHIPPED | OUTPATIENT
Start: 2025-05-06

## 2025-05-06 RX ORDER — SYRINGE-NEEDLE,INSULIN,0.5 ML 27GX1/2"
1 SYRINGE, EMPTY DISPOSABLE MISCELLANEOUS DAILY
Qty: 100 EACH | Refills: 3 | Status: SHIPPED | OUTPATIENT
Start: 2025-05-06

## 2025-05-06 RX ORDER — KETOCONAZOLE 20 MG/G
CREAM TOPICAL
Qty: 30 G | Refills: 1 | Status: SHIPPED | OUTPATIENT
Start: 2025-05-06 | End: 2025-05-08

## 2025-05-06 ASSESSMENT — PATIENT HEALTH QUESTIONNAIRE - PHQ9
SUM OF ALL RESPONSES TO PHQ QUESTIONS 1-9: 0
SUM OF ALL RESPONSES TO PHQ QUESTIONS 1-9: 0
2. FEELING DOWN, DEPRESSED OR HOPELESS: NOT AT ALL
SUM OF ALL RESPONSES TO PHQ QUESTIONS 1-9: 0
SUM OF ALL RESPONSES TO PHQ QUESTIONS 1-9: 0
1. LITTLE INTEREST OR PLEASURE IN DOING THINGS: NOT AT ALL

## 2025-05-06 NOTE — PROGRESS NOTES
Maximino Corrigan presents today for   Chief Complaint   Patient presents with    Cholesterol Problem    Diabetes    Hypertension    Follow-up Chronic Condition           \"Have you been to the ER, urgent care clinic since your last visit? yes 03-  Hospitalized since your last visit?\"    NO    “Have you seen or consulted any other health care providers outside of [facility] since your last visit?”    NO

## 2025-05-06 NOTE — PROGRESS NOTES
Subjective:       Chief Complaint  The patient presents for follow up of diabetes, hypertension and high cholesterol.         HPI  Maximino Corrigan is a 75 y.o. male seen for follow up of diabetes.   Healso has hypertension and hyperlipidemia. Diabetes uncontrolled on Trulicity 0.75 mg,Patient is no longer on insulin.  Pt is s/p gastrectomy with Billroth II reconstruction which most likely resolved his need for insulin initially.  The patient was recently placed on prednisone for exacerbation of his severe dermatitis after he saw dermatologist in [facility].  This exacerbated his blood sugars with his A1c now uncontrolled with him having frequent urination and polydipsia, hypertension uncontrolled, no significant medication side effects noted, on lisinopril 40 mg and Norvasc 10 mg,   hyperlipidemia uncontrolled, no significant medication side effects noted, on Pravachol 40 mg.  Since patient has been recently ill over the last 3 weeks with his skin issues concerned that his compliance with his medications may have been decreased.    Diet and Lifestyle: generally follows a low fat low cholesterol diet, follows a diabetic diet regularly, exercises  regularly with walking daily     Home BP Monitoring: is not measured at home.    Diabetic Review of Systems - home glucose monitoring: is  performed once a day.    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  Followed by oncology and is on Gleevec 400 mg. There is concern it is causing some of his skin issues with dryness and skin rashes so it has been stopped with plan to decrease dose to see if any improvement.       Pt recently diagnosed with Prostate Cancer but has decided to do active surveillance and has been followed closely by urology every 6 months.  His last appointment was in June 2024 and patient is to follow-up for his missed appointment.    Patient has COPD that has

## 2025-05-08 DIAGNOSIS — B35.6 TINEA CRURIS: ICD-10-CM

## 2025-05-08 DIAGNOSIS — L21.9 SEBORRHEIC DERMATITIS: ICD-10-CM

## 2025-05-08 RX ORDER — CLOTRIMAZOLE 1 %
CREAM (GRAM) TOPICAL 2 TIMES DAILY
Qty: 45 G | Refills: 0 | Status: SHIPPED | OUTPATIENT
Start: 2025-05-08

## 2025-05-08 RX ORDER — INSULIN DEGLUDEC 100 U/ML
INJECTION, SOLUTION SUBCUTANEOUS
Qty: 9 ML | Refills: 2 | Status: SHIPPED | OUTPATIENT
Start: 2025-05-08

## 2025-05-08 NOTE — TELEPHONE ENCOUNTER
Pt was prescribed a face wash and he said that he is allergic to this. Pt's face broke out in a rash which has spread all the way to his ear. Pt has had rash for almost 2 weeks. Pt went to ED and was given something for the rash, but it hasn't cleared up all the way. Pt would like to know what he can use for the rash in his ear and what to do about the rash on his face. Please advise.

## 2025-05-08 NOTE — TELEPHONE ENCOUNTER
Patient is aware of message and will work on getting an appointment with dermatology.    Patient is aware of the following appointment with dermatology:    JUWAN Herr  Dermatology [phone]  5/9/2025 at 9:15 am  [address]     Patient is aware to confirm appointment and aware of $ 50.00 no show fee.     Patient verbalizes understanding.

## 2025-05-08 NOTE — TELEPHONE ENCOUNTER
Sent in a cream and he can give it a try. Try to see if we can get him appt with [facility] ASAP. Referral was already placed   He did not want to see Dr Barfield's old office who gave him the wash that broke him out or Dr Munroe group since they also have not been able to help him. Another option would be [facility]

## 2025-05-18 DIAGNOSIS — J41.1 MUCOPURULENT CHRONIC BRONCHITIS (HCC): ICD-10-CM

## 2025-05-20 RX ORDER — FLUTICASONE FUROATE, UMECLIDINIUM BROMIDE AND VILANTEROL TRIFENATATE 100; 62.5; 25 UG/1; UG/1; UG/1
1 POWDER RESPIRATORY (INHALATION) DAILY
Qty: 1 EACH | Refills: 4 | Status: SHIPPED | OUTPATIENT
Start: 2025-05-20

## 2025-05-20 RX ORDER — UMECLIDINIUM BROMIDE AND VILANTEROL TRIFENATATE 62.5; 25 UG/1; UG/1
POWDER RESPIRATORY (INHALATION)
Refills: 0 | OUTPATIENT
Start: 2025-05-20

## 2025-05-22 ENCOUNTER — OFFICE VISIT (OUTPATIENT)
Age: 75
End: 2025-05-22
Payer: MEDICARE

## 2025-05-22 VITALS — WEIGHT: 174 LBS | BODY MASS INDEX: 27.97 KG/M2 | HEIGHT: 66 IN

## 2025-05-22 DIAGNOSIS — Z98.890 S/P CUBITAL TUNNEL RELEASE: ICD-10-CM

## 2025-05-22 DIAGNOSIS — G56.22 ULNAR NEURITIS, LEFT: Primary | ICD-10-CM

## 2025-05-22 PROCEDURE — 1160F RVW MEDS BY RX/DR IN RCRD: CPT | Performed by: ORTHOPAEDIC SURGERY

## 2025-05-22 PROCEDURE — 1125F AMNT PAIN NOTED PAIN PRSNT: CPT | Performed by: ORTHOPAEDIC SURGERY

## 2025-05-22 PROCEDURE — 1159F MED LIST DOCD IN RCRD: CPT | Performed by: ORTHOPAEDIC SURGERY

## 2025-05-22 PROCEDURE — 1123F ACP DISCUSS/DSCN MKR DOCD: CPT | Performed by: ORTHOPAEDIC SURGERY

## 2025-05-22 PROCEDURE — 99213 OFFICE O/P EST LOW 20 MIN: CPT | Performed by: ORTHOPAEDIC SURGERY

## 2025-05-22 RX ORDER — METHYLPREDNISOLONE 4 MG/1
TABLET ORAL
Qty: 1 KIT | Refills: 0 | Status: SHIPPED | OUTPATIENT
Start: 2025-05-22 | End: 2025-05-28

## 2025-05-22 NOTE — PROGRESS NOTES
Maximino Corrigan is a 75 y.o. male right handed retiree.  Worker's Compensation and legal considerations: none    Chief Complaint   Patient presents with    Follow-up     Left elbow     Pain Score:   8    Subjective:     5/22/2025 HPI: Patient presents today for follow-up after therapy for his left cubital tunnel release and persistent numbness tingling and pain in the elbow.  He reports it was better in therapy but it started bothering him so he started doing the exercises at home once and started hurting again.    Initial HPI: Patient presents today more than 90 days status post left cubital tunnel release and anterior transposition with Dr. Sanford.  He reports the numbness he had before surgery has resolved with only occasional tingling very rarely.  He reports sensitivity to light touch about the back of the elbow.  He does report some improvement.    Date of onset: Date of surgery: 10/31/2024  Injury: No  Prior Treatment: Left cubital tunnel release and anterior transposition with Dr. 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 Duarte    GASTRECTOMY  07/13/2021    GASTRECTOMY  7/13/202    OTHER SURGICAL HISTORY      gallstones removed    UPPER GASTROINTESTINAL ENDOSCOPY N/A 3/8/2024

## 2025-06-17 DIAGNOSIS — J41.1 MUCOPURULENT CHRONIC BRONCHITIS (HCC): ICD-10-CM

## 2025-06-19 RX ORDER — UMECLIDINIUM BROMIDE AND VILANTEROL TRIFENATATE 62.5; 25 UG/1; UG/1
POWDER RESPIRATORY (INHALATION)
Qty: 1 EACH | Refills: 3 | Status: SHIPPED | OUTPATIENT
Start: 2025-06-19

## 2025-07-18 ENCOUNTER — HOSPITAL ENCOUNTER (EMERGENCY)
Facility: HOSPITAL | Age: 75
Discharge: HOME | End: 2025-07-18
Payer: MEDICARE

## 2025-07-18 VITALS
DIASTOLIC BLOOD PRESSURE: 74 MMHG | RESPIRATION RATE: 17 BRPM | TEMPERATURE: 97.9 F | HEART RATE: 73 BPM | BODY MASS INDEX: 28.08 KG/M2 | SYSTOLIC BLOOD PRESSURE: 136 MMHG | OXYGEN SATURATION: 100 % | WEIGHT: 174 LBS

## 2025-07-18 DIAGNOSIS — R21 RASH AND OTHER NONSPECIFIC SKIN ERUPTION: Primary | ICD-10-CM

## 2025-07-18 DIAGNOSIS — L20.9 ATOPIC DERMATITIS, UNSPECIFIED TYPE: ICD-10-CM

## 2025-07-18 PROCEDURE — 99283 EMERGENCY DEPT VISIT LOW MDM: CPT

## 2025-07-18 PROCEDURE — 6370000000 HC RX 637 (ALT 250 FOR IP)

## 2025-07-18 RX ORDER — HYDROXYZINE HYDROCHLORIDE 25 MG/1
25 TABLET, FILM COATED ORAL
Status: COMPLETED | OUTPATIENT
Start: 2025-07-18 | End: 2025-07-18

## 2025-07-18 RX ORDER — CALAMINE 8% AND ZINC OXIDE 8% 160 MG/ML
5 LOTION TOPICAL DAILY
Qty: 177 ML | Refills: 0 | Status: SHIPPED | OUTPATIENT
Start: 2025-07-18 | End: 2025-07-25

## 2025-07-18 RX ORDER — HYDROXYZINE HYDROCHLORIDE 25 MG/1
25 TABLET, FILM COATED ORAL EVERY 8 HOURS PRN
Qty: 21 TABLET | Refills: 0 | Status: SHIPPED | OUTPATIENT
Start: 2025-07-18 | End: 2025-07-25

## 2025-07-18 RX ADMIN — HYDROXYZINE HYDROCHLORIDE 25 MG: 25 TABLET, FILM COATED ORAL at 12:30

## 2025-07-18 ASSESSMENT — ENCOUNTER SYMPTOMS
EYES NEGATIVE: 1
GASTROINTESTINAL NEGATIVE: 1
RESPIRATORY NEGATIVE: 1

## 2025-07-18 ASSESSMENT — PAIN - FUNCTIONAL ASSESSMENT: PAIN_FUNCTIONAL_ASSESSMENT: NONE - DENIES PAIN

## 2025-07-18 NOTE — ED TRIAGE NOTES
Patient was ambulatory to triage. Patient states he went to go see a dermatologist three months ago and they prescribed him a face wash that he thinks he's allergic to due to a rash and constant itching. Patient denies any pain at this time.

## 2025-07-18 NOTE — DISCHARGE INSTRUCTIONS
You presented to the emergency department for evaluation of rash, likely atopic dermatitis, dry skin.  We discussed supportive care with Vaseline, calamine lotion and hydroxyzine.  I recommend outpatient follow-up with PCP for further allergy testing, management we discussed sun protection and hydrating skin care and avoidance of any new soaps lotions laundry detergents with return to the ER sooner for new or worsening symptoms

## 2025-07-18 NOTE — ED PROVIDER NOTES
[facility] EMERGENCY DEPARTMENT  EMERGENCY DEPARTMENT ENCOUNTER        Pt Name: Maximino Corrigan  MRN: [id number]  Birthdate 1950  Date of evaluation: 7/18/2025  Provider: Vaishali Ramirez PA-C  PCP: Ramirez Everett MD  Note Started: 11:34 AM EDT 7/18/25      ANA. I have evaluated this patient.        CHIEF COMPLAINT       Chief Complaint   Patient presents with    Allergic Reaction       HISTORY OF PRESENT ILLNESS: 1 or more Elements     History From: Patient             Chief Complaint: Patient     Maximino Corrigan is a 75 y.o. male who presents with complaints of rash. He states he has had the rash x 1 month. He states he initially had a rash on his face after using a face wash in March 2025 and he discontinued to wash and was then treated with steroids and antihistamines and had improvement of symptoms but has had intermittent rashes and dry skin on his arms, neck and legs. He denies SOB, difficulty breathing. He has not had any changes to his medications. He denies any other known environmental allergies but has been staying at a hotel for the past 1 week.     Nursing Notes were all reviewed and agreed with or any disagreements were addressed in the HPI.    REVIEW OF SYSTEMS :      Review of Systems   Constitutional: Negative.    HENT: Negative.     Eyes: Negative.    Respiratory: Negative.     Cardiovascular: Negative.    Gastrointestinal: Negative.    Endocrine: Negative.    Genitourinary: Negative.    Musculoskeletal: Negative.    Skin:  Positive for rash.   Neurological: Negative.    Psychiatric/Behavioral: Negative.         Positives and Pertinent negatives as per HPI.     SURGICAL HISTORY     Past Surgical History:   Procedure Laterality Date    COLONOSCOPY N/A 10/29/2024    COLONOSCOPY DIAGNOSTIC WITH POLYPECTOMIES performed by Matthew Nguyễn MD at [facility] ENDOSCOPY    COLONOSCOPY FLX DX W/COLLJ SPEC WHEN PFRMD  02/17/2016    Dr. Duarte    GASTRECTOMY  07/13/2021    GASTRECTOMY

## 2025-07-22 ENCOUNTER — HOSPITAL ENCOUNTER (OUTPATIENT)
Facility: HOSPITAL | Age: 75
Discharge: HOME | End: 2025-07-24
Attending: INTERNAL MEDICINE
Payer: MEDICARE

## 2025-07-22 ENCOUNTER — OFFICE VISIT (OUTPATIENT)
Facility: CLINIC | Age: 75
End: 2025-07-22
Payer: MEDICARE

## 2025-07-22 VITALS
RESPIRATION RATE: 20 BRPM | WEIGHT: 171 LBS | DIASTOLIC BLOOD PRESSURE: 84 MMHG | HEART RATE: 87 BPM | TEMPERATURE: 98.7 F | OXYGEN SATURATION: 99 % | BODY MASS INDEX: 27.48 KG/M2 | SYSTOLIC BLOOD PRESSURE: 139 MMHG | HEIGHT: 66 IN

## 2025-07-22 DIAGNOSIS — M79.662 PAIN OF LEFT CALF: ICD-10-CM

## 2025-07-22 DIAGNOSIS — Z00.00 MEDICARE ANNUAL WELLNESS VISIT, SUBSEQUENT: Primary | ICD-10-CM

## 2025-07-22 DIAGNOSIS — I10 ESSENTIAL (PRIMARY) HYPERTENSION: ICD-10-CM

## 2025-07-22 DIAGNOSIS — C61 PROSTATE CANCER (HCC): ICD-10-CM

## 2025-07-22 DIAGNOSIS — E78.00 PURE HYPERCHOLESTEROLEMIA, UNSPECIFIED: ICD-10-CM

## 2025-07-22 DIAGNOSIS — Z79.4 TYPE 2 DIABETES MELLITUS WITH HYPERGLYCEMIA, WITH LONG-TERM CURRENT USE OF INSULIN (HCC): ICD-10-CM

## 2025-07-22 DIAGNOSIS — Z53.20 LUNG CANCER SCREENING DECLINED BY PATIENT: ICD-10-CM

## 2025-07-22 DIAGNOSIS — E55.9 VITAMIN D DEFICIENCY: ICD-10-CM

## 2025-07-22 DIAGNOSIS — E11.65 TYPE 2 DIABETES MELLITUS WITH HYPERGLYCEMIA, WITH LONG-TERM CURRENT USE OF INSULIN (HCC): ICD-10-CM

## 2025-07-22 LAB — HBA1C MFR BLD: 11.4 %

## 2025-07-22 PROCEDURE — 3046F HEMOGLOBIN A1C LEVEL >9.0%: CPT | Performed by: INTERNAL MEDICINE

## 2025-07-22 PROCEDURE — G0439 PPPS, SUBSEQ VISIT: HCPCS | Performed by: INTERNAL MEDICINE

## 2025-07-22 PROCEDURE — 1160F RVW MEDS BY RX/DR IN RCRD: CPT | Performed by: INTERNAL MEDICINE

## 2025-07-22 PROCEDURE — 1123F ACP DISCUSS/DSCN MKR DOCD: CPT | Performed by: INTERNAL MEDICINE

## 2025-07-22 PROCEDURE — 99214 OFFICE O/P EST MOD 30 MIN: CPT | Performed by: INTERNAL MEDICINE

## 2025-07-22 PROCEDURE — 83036 HEMOGLOBIN GLYCOSYLATED A1C: CPT | Performed by: INTERNAL MEDICINE

## 2025-07-22 PROCEDURE — 1126F AMNT PAIN NOTED NONE PRSNT: CPT | Performed by: INTERNAL MEDICINE

## 2025-07-22 PROCEDURE — 93971 EXTREMITY STUDY: CPT

## 2025-07-22 PROCEDURE — 3079F DIAST BP 80-89 MM HG: CPT | Performed by: INTERNAL MEDICINE

## 2025-07-22 PROCEDURE — 1159F MED LIST DOCD IN RCRD: CPT | Performed by: INTERNAL MEDICINE

## 2025-07-22 PROCEDURE — 3075F SYST BP GE 130 - 139MM HG: CPT | Performed by: INTERNAL MEDICINE

## 2025-07-22 RX ORDER — AMLODIPINE BESYLATE 10 MG/1
10 TABLET ORAL DAILY
Qty: 90 TABLET | Refills: 3 | Status: SHIPPED | OUTPATIENT
Start: 2025-07-22

## 2025-07-22 RX ORDER — PRAVASTATIN SODIUM 40 MG
40 TABLET ORAL DAILY
Qty: 90 TABLET | Refills: 3 | Status: SHIPPED | OUTPATIENT
Start: 2025-07-22

## 2025-07-22 RX ORDER — LISINOPRIL 40 MG/1
40 TABLET ORAL DAILY
Qty: 90 TABLET | Refills: 3 | Status: SHIPPED | OUTPATIENT
Start: 2025-07-22

## 2025-07-22 SDOH — ECONOMIC STABILITY: FOOD INSECURITY: WITHIN THE PAST 12 MONTHS, THE FOOD YOU BOUGHT JUST DIDN'T LAST AND YOU DIDN'T HAVE MONEY TO GET MORE.: NEVER TRUE

## 2025-07-22 SDOH — ECONOMIC STABILITY: FOOD INSECURITY: WITHIN THE PAST 12 MONTHS, YOU WORRIED THAT YOUR FOOD WOULD RUN OUT BEFORE YOU GOT MONEY TO BUY MORE.: NEVER TRUE

## 2025-07-22 ASSESSMENT — PATIENT HEALTH QUESTIONNAIRE - PHQ9
SUM OF ALL RESPONSES TO PHQ QUESTIONS 1-9: 0
7. TROUBLE CONCENTRATING ON THINGS, SUCH AS READING THE NEWSPAPER OR WATCHING TELEVISION: NOT AT ALL
5. POOR APPETITE OR OVEREATING: NOT AT ALL
4. FEELING TIRED OR HAVING LITTLE ENERGY: NOT AT ALL
1. LITTLE INTEREST OR PLEASURE IN DOING THINGS: NOT AT ALL
9. THOUGHTS THAT YOU WOULD BE BETTER OFF DEAD, OR OF HURTING YOURSELF: NOT AT ALL
3. TROUBLE FALLING OR STAYING ASLEEP: NOT AT ALL
SUM OF ALL RESPONSES TO PHQ QUESTIONS 1-9: 0
8. MOVING OR SPEAKING SO SLOWLY THAT OTHER PEOPLE COULD HAVE NOTICED. OR THE OPPOSITE, BEING SO FIGETY OR RESTLESS THAT YOU HAVE BEEN MOVING AROUND A LOT MORE THAN USUAL: NOT AT ALL
SUM OF ALL RESPONSES TO PHQ QUESTIONS 1-9: 0
10. IF YOU CHECKED OFF ANY PROBLEMS, HOW DIFFICULT HAVE THESE PROBLEMS MADE IT FOR YOU TO DO YOUR WORK, TAKE CARE OF THINGS AT HOME, OR GET ALONG WITH OTHER PEOPLE: NOT DIFFICULT AT ALL
SUM OF ALL RESPONSES TO PHQ QUESTIONS 1-9: 0
6. FEELING BAD ABOUT YOURSELF - OR THAT YOU ARE A FAILURE OR HAVE LET YOURSELF OR YOUR FAMILY DOWN: NOT AT ALL
2. FEELING DOWN, DEPRESSED OR HOPELESS: NOT AT ALL

## 2025-07-22 NOTE — PROGRESS NOTES
Maximino Corrigan presents today for   Chief Complaint   Patient presents with    Medicare AWV    Diabetes    Hypertension     6 week follow up     Leg Pain     Left calf pain and swelling.  Patient states both legs hurt but left is worse than the right.            \"Have you been to the ER, urgent care clinic since your last visit?  Hospitalized since your last visit?\"    Yes,  ER for dry skin.    “Have you seen or consulted any other health care providers outside of [facility] since your last visit?”    NO

## 2025-07-22 NOTE — PROGRESS NOTES
Subjective:       Chief Complaint  The patient presents for follow up of diabetes, hypertension and high cholesterol.         HPI  Maximino Corrigan is a 75 y.o. male seen for follow up of diabetes.   Healso has hypertension and hyperlipidemia. Diabetes uncontrolled of Trulicity 0.75 mg and Tresiba insulin 20 units for the last 4 weeks with pt going through a sepration and being in multiple residencies.  Pt is s/p gastrectomy with Billroth II reconstruction which most likely resolved his need for insulin initially.   Patient's A1c is now 11.4 so he will restart his insulin Trulicity.  Hypertension borderline controlled, no significant medication side effects noted, on lisinopril 40 mg and Norvasc 10 mg, patient has not been taking medication consistently for reasons above, hyperlipidemia controlled on statin, no significant medication side effects noted, on Pravachol 40 mg.  Patient to have updated labs done in the next few weeks      Diet and Lifestyle: generally follows a low fat low cholesterol diet, follows a diabetic diet regularly, exercises  regularly with walking daily     Home BP Monitoring: is not measured at home.    Diabetic Review of Systems - home glucose monitoring: is  performed once a day.    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  Followed by oncology and was on Gleevec 400 mg. There is concern it is causing some of his skin issues with dryness and skin rashes so it has been stopped and he will follow-up with oncology in the next several weeks to see if neck medication needs to be restarted.  Patient continues to have the same issues with his skin that he was have been off of the medication with excessive dryness and scaling.  He has a follow-up with [facility] planned.      Pt recently diagnosed with Prostate Cancer but has decided to do active surveillance and has been followed closely by urology every

## 2025-07-22 NOTE — PATIENT INSTRUCTIONS

## 2025-07-22 NOTE — PROGRESS NOTES
Medicare Annual Wellness Visit    Maximino Corrigan is here for Medicare AWV, Diabetes, Hypertension (6 week follow up ), and Leg Pain (Left calf pain and swelling.  Patient states both legs hurt but left is worse than the right. )    Assessment & Plan   Medicare annual wellness visit, subsequent  Type 2 diabetes mellitus with hyperglycemia, with long-term current use of insulin (HCC)  -     AMB POC HEMOGLOBIN A1C  -     CBC with Auto Differential; Future  -     Albumin/Creatinine Ratio, Urine; Future  Essential (primary) hypertension  -     lisinopril (PRINIVIL;ZESTRIL) 40 MG tablet; Take 1 tablet by mouth daily, Disp-90 tablet, R-3Normal  -     Comprehensive Metabolic Panel; Future  Pure hypercholesterolemia, unspecified  -     Lipid Panel; Future  Pain of left calf  -     Vascular duplex lower extremity venous left; Future  Vitamin D deficiency  -     Vitamin D 25 Hydroxy; Future  Prostate cancer (HCC)  -     PSA, Diagnostic; Future  Lung cancer screening declined by patient     Return in about 6 weeks (around 9/2/2025) for labs 1 week before.     Subjective       Patient's complete Health Risk Assessment and screening values have been reviewed and are found in Flowsheets. The following problems were reviewed today and where indicated follow up appointments were made and/or referrals ordered.    Positive Risk Factor Screenings with Interventions:        Drug Use:   Substance and Sexual Activity   Drug Use Yes    Types: Marijuana (Weed)    Comment: Cocaine a long time ago     Interventions:  Patient declined any further intervention or treatment            Dentist Screen:  Have you seen the dentist within the past year?: (!) No    Intervention:  Advised to schedule with their dentist      Safety:  Do you have non-slip mats or non-slip surfaces or shower bars or grab bars in your shower or bathtub?: (!) No  Interventions:  Patient declined any further interventions or treatment      Advanced Directives:  Do you have a

## 2025-07-24 LAB — ECHO BSA: 1.9 M2

## 2025-07-24 PROCEDURE — 93971 EXTREMITY STUDY: CPT | Performed by: SURGERY

## 2025-08-08 ENCOUNTER — TELEPHONE (OUTPATIENT)
Facility: CLINIC | Age: 75
End: 2025-08-08

## 2025-08-08 DIAGNOSIS — Z79.4 TYPE 2 DIABETES MELLITUS WITH HYPERGLYCEMIA, WITH LONG-TERM CURRENT USE OF INSULIN (HCC): ICD-10-CM

## 2025-08-08 DIAGNOSIS — E11.65 TYPE 2 DIABETES MELLITUS WITH HYPERGLYCEMIA, WITH LONG-TERM CURRENT USE OF INSULIN (HCC): ICD-10-CM

## 2025-08-26 ENCOUNTER — LAB (OUTPATIENT)
Facility: CLINIC | Age: 75
End: 2025-08-26

## 2025-08-26 DIAGNOSIS — E55.9 VITAMIN D DEFICIENCY: ICD-10-CM

## 2025-08-26 DIAGNOSIS — Z79.4 TYPE 2 DIABETES MELLITUS WITH HYPERGLYCEMIA, WITH LONG-TERM CURRENT USE OF INSULIN (HCC): ICD-10-CM

## 2025-08-26 DIAGNOSIS — E78.00 PURE HYPERCHOLESTEROLEMIA, UNSPECIFIED: ICD-10-CM

## 2025-08-26 DIAGNOSIS — C61 PROSTATE CANCER (HCC): ICD-10-CM

## 2025-08-26 DIAGNOSIS — I10 ESSENTIAL (PRIMARY) HYPERTENSION: ICD-10-CM

## 2025-08-26 DIAGNOSIS — E11.65 TYPE 2 DIABETES MELLITUS WITH HYPERGLYCEMIA, WITH LONG-TERM CURRENT USE OF INSULIN (HCC): ICD-10-CM

## 2025-08-27 LAB
25(OH)D3+25(OH)D2 SERPL-MCNC: 19.3 NG/ML (ref 30–100)
ALBUMIN SERPL-MCNC: 3.5 G/DL (ref 3.8–4.8)
ALBUMIN/CREAT UR: 10 MG/G CREAT (ref 0–29)
ALP SERPL-CCNC: 109 IU/L (ref 44–121)
ALT SERPL-CCNC: 12 IU/L (ref 0–44)
AST SERPL-CCNC: 15 IU/L (ref 0–40)
BASOPHILS # BLD AUTO: 0 X10E3/UL (ref 0–0.2)
BASOPHILS NFR BLD AUTO: 0 %
BILIRUB SERPL-MCNC: 0.4 MG/DL (ref 0–1.2)
BUN SERPL-MCNC: 19 MG/DL (ref 8–27)
BUN/CREAT SERPL: 18 (ref 10–24)
CALCIUM SERPL-MCNC: 9.7 MG/DL (ref 8.6–10.2)
CHLORIDE SERPL-SCNC: 99 MMOL/L (ref 96–106)
CHOLEST SERPL-MCNC: 160 MG/DL (ref 100–199)
CO2 SERPL-SCNC: 22 MMOL/L (ref 20–29)
CREAT SERPL-MCNC: 1.04 MG/DL (ref 0.76–1.27)
CREAT UR-MCNC: 109.6 MG/DL
EGFRCR SERPLBLD CKD-EPI 2021: 75 ML/MIN/1.73
EOSINOPHIL # BLD AUTO: 0.3 X10E3/UL (ref 0–0.4)
EOSINOPHIL NFR BLD AUTO: 5 %
ERYTHROCYTE [DISTWIDTH] IN BLOOD BY AUTOMATED COUNT: 13.4 % (ref 11.6–15.4)
GLOBULIN SER CALC-MCNC: 2.8 G/DL (ref 1.5–4.5)
GLUCOSE SERPL-MCNC: 350 MG/DL (ref 70–99)
HCT VFR BLD AUTO: 41.3 % (ref 37.5–51)
HDLC SERPL-MCNC: 46 MG/DL
HGB BLD-MCNC: 12.5 G/DL (ref 13–17.7)
IMM GRANULOCYTES # BLD AUTO: 0 X10E3/UL (ref 0–0.1)
IMM GRANULOCYTES NFR BLD AUTO: 0 %
LDLC SERPL CALC-MCNC: 90 MG/DL (ref 0–99)
LYMPHOCYTES # BLD AUTO: 2.1 X10E3/UL (ref 0.7–3.1)
LYMPHOCYTES NFR BLD AUTO: 29 %
MCH RBC QN AUTO: 25.2 PG (ref 26.6–33)
MCHC RBC AUTO-ENTMCNC: 30.3 G/DL (ref 31.5–35.7)
MCV RBC AUTO: 83 FL (ref 79–97)
MICROALBUMIN UR-MCNC: 10.9 UG/ML
MONOCYTES # BLD AUTO: 0.5 X10E3/UL (ref 0.1–0.9)
MONOCYTES NFR BLD AUTO: 7 %
NEUTROPHILS # BLD AUTO: 4.3 X10E3/UL (ref 1.4–7)
NEUTROPHILS NFR BLD AUTO: 59 %
PLATELET # BLD AUTO: 264 X10E3/UL (ref 150–450)
POTASSIUM SERPL-SCNC: 4.6 MMOL/L (ref 3.5–5.2)
PROT SERPL-MCNC: 6.3 G/DL (ref 6–8.5)
PSA SERPL-MCNC: 4 NG/ML (ref 0–4)
RBC # BLD AUTO: 4.97 X10E6/UL (ref 4.14–5.8)
SODIUM SERPL-SCNC: 137 MMOL/L (ref 134–144)
TRIGL SERPL-MCNC: 139 MG/DL (ref 0–149)
VLDLC SERPL CALC-MCNC: 24 MG/DL (ref 5–40)
WBC # BLD AUTO: 7.3 X10E3/UL (ref 3.4–10.8)

## 2025-09-02 ENCOUNTER — OFFICE VISIT (OUTPATIENT)
Facility: CLINIC | Age: 75
End: 2025-09-02
Payer: MEDICARE

## 2025-09-02 VITALS
BODY MASS INDEX: 30.22 KG/M2 | SYSTOLIC BLOOD PRESSURE: 118 MMHG | HEIGHT: 66 IN | DIASTOLIC BLOOD PRESSURE: 72 MMHG | WEIGHT: 188 LBS | TEMPERATURE: 97.5 F | HEART RATE: 74 BPM | RESPIRATION RATE: 20 BRPM | OXYGEN SATURATION: 96 %

## 2025-09-02 DIAGNOSIS — C49.A2 GASTROINTESTINAL STROMAL TUMOR OF STOMACH (HCC): ICD-10-CM

## 2025-09-02 DIAGNOSIS — I10 ESSENTIAL (PRIMARY) HYPERTENSION: ICD-10-CM

## 2025-09-02 DIAGNOSIS — Z79.4 TYPE 2 DIABETES MELLITUS WITH HYPERGLYCEMIA, WITH LONG-TERM CURRENT USE OF INSULIN (HCC): Primary | ICD-10-CM

## 2025-09-02 DIAGNOSIS — E78.00 PURE HYPERCHOLESTEROLEMIA, UNSPECIFIED: ICD-10-CM

## 2025-09-02 DIAGNOSIS — C61 PROSTATE CANCER (HCC): ICD-10-CM

## 2025-09-02 DIAGNOSIS — E55.9 VITAMIN D DEFICIENCY: ICD-10-CM

## 2025-09-02 DIAGNOSIS — E11.65 TYPE 2 DIABETES MELLITUS WITH HYPERGLYCEMIA, WITH LONG-TERM CURRENT USE OF INSULIN (HCC): Primary | ICD-10-CM

## 2025-09-02 PROCEDURE — 3078F DIAST BP <80 MM HG: CPT | Performed by: INTERNAL MEDICINE

## 2025-09-02 PROCEDURE — 1159F MED LIST DOCD IN RCRD: CPT | Performed by: INTERNAL MEDICINE

## 2025-09-02 PROCEDURE — 1126F AMNT PAIN NOTED NONE PRSNT: CPT | Performed by: INTERNAL MEDICINE

## 2025-09-02 PROCEDURE — 99214 OFFICE O/P EST MOD 30 MIN: CPT | Performed by: INTERNAL MEDICINE

## 2025-09-02 PROCEDURE — 1160F RVW MEDS BY RX/DR IN RCRD: CPT | Performed by: INTERNAL MEDICINE

## 2025-09-02 PROCEDURE — 1123F ACP DISCUSS/DSCN MKR DOCD: CPT | Performed by: INTERNAL MEDICINE

## 2025-09-02 PROCEDURE — 3046F HEMOGLOBIN A1C LEVEL >9.0%: CPT | Performed by: INTERNAL MEDICINE

## 2025-09-02 PROCEDURE — 3074F SYST BP LT 130 MM HG: CPT | Performed by: INTERNAL MEDICINE

## 2025-09-04 ENCOUNTER — TRANSCRIBE ORDERS (OUTPATIENT)
Facility: HOSPITAL | Age: 75
End: 2025-09-04

## 2025-09-04 DIAGNOSIS — C16.8 MALIGNANT NEOPLASM OF POSTERIOR WALL OF STOMACH (HCC): Primary | ICD-10-CM

## (undated) DEVICE — (D)PREP SKN CHLRAPRP APPL 26ML -- CONVERT TO ITEM 371833

## (undated) DEVICE — BASIN EMSIS 16OZ GRAPHITE PLAS KID SHP MOLD GRAD FOR ORAL

## (undated) DEVICE — Device

## (undated) DEVICE — SOLUTION IV 1000ML 0.9% SOD CHL

## (undated) DEVICE — CATHETER SUCT TR FL TIP 14FR W/ O CTRL

## (undated) DEVICE — SYRINGE MED 50ML LUERSLIP TIP

## (undated) DEVICE — SUTURE MCRYL SZ 3-0 L27IN ABSRB UD L24MM PS-1 3/8 CIR PRIM Y936H

## (undated) DEVICE — ARGYLE FRAZIER SURGICAL SUCTION INSTRUMENT 10 FR/CH (3.3 MM): Brand: ARGYLE

## (undated) DEVICE — WAX SURG 2.5GM HEMSTAT BNE BEESWAX PARAFFIN ISO PALMITATE

## (undated) DEVICE — FORCEPS BX L240CM JAW DIA2.4MM ORNG L CAP W/ NDL DISP RAD

## (undated) DEVICE — GAUZE,SPONGE,4"X4",16PLY,STRL,LF,10/TRAY: Brand: MEDLINE

## (undated) DEVICE — LINER SUCT CANSTR 3000CC PLAS SFT PRE ASSEMB W/OUT TBNG W/

## (undated) DEVICE — MEDI-VAC NON-CONDUCTIVE SUCTION TUBING: Brand: CARDINAL HEALTH

## (undated) DEVICE — REM POLYHESIVE ADULT PATIENT RETURN ELECTRODE: Brand: VALLEYLAB

## (undated) DEVICE — ENDOSCOPY PUMP TUBING/ CAP SET: Brand: ERBE

## (undated) DEVICE — GARMENT,MEDLINE,DVT,INT,CALF,MED, GEN2: Brand: MEDLINE

## (undated) DEVICE — CANNULA NSL AD TBNG L14FT STD PVC O2 CRV CONN NONFLARED NSL

## (undated) DEVICE — INTENDED FOR TISSUE SEPARATION, AND OTHER PROCEDURES THAT REQUIRE A SHARP SURGICAL BLADE TO PUNCTURE OR CUT.: Brand: BARD-PARKER ® STAINLESS STEEL BLADES

## (undated) DEVICE — YANKAUER,SMOOTH HANDLE,HIGH CAPACITY: Brand: MEDLINE INDUSTRIES, INC.

## (undated) DEVICE — GOWN PLASTIC FILM THMBHKS UNIV BLUE: Brand: CARDINAL HEALTH

## (undated) DEVICE — SOLUTION IRRIG 1000ML H2O STRL BLT

## (undated) DEVICE — 3M™ BAIR PAWS FLEX™ WARMING GOWN, STANDARD, 20 PER CASE 81003: Brand: BAIR PAWS™

## (undated) DEVICE — KIT POS W/ FOAM ARM CRADL SHEARGUARD CHST PD CVR FOR SPNL

## (undated) DEVICE — Z DISCONTINUED USE 2749457 TUBING SAMP AD W12.5XH8.4IN D9.1IN NSL ORAL SMRT CAPNOLINE

## (undated) DEVICE — 3.0MM PRECISION NEURO (MATCH HEAD)

## (undated) DEVICE — STERILE POLYISOPRENE POWDER-FREE SURGICAL GLOVES: Brand: PROTEXIS

## (undated) DEVICE — TRAY CATH OD16FR SIL URIN M STATLOK STBL DEV SURSTP

## (undated) DEVICE — SNARE VASC L240CM LOOP W10MM SHTH DIA2.4MM RND STIFF CLD

## (undated) DEVICE — SYRINGE MED 10ML LUERLOCK TIP W/O SFTY DISP

## (undated) DEVICE — SYRINGE MED 25GA 3ML L5/8IN SUBQ PLAS W/ DETACH NDL SFTY

## (undated) DEVICE — FLEX ADVANTAGE 3000CC: Brand: FLEX ADVANTAGE

## (undated) DEVICE — 3M™ TEGADERM™ TRANSPARENT FILM DRESSING FRAME STYLE, 1626, 4 IN X 4-3/4 IN (10 CM X 12 CM), 50/CT 4CT/CASE: Brand: 3M™ TEGADERM™

## (undated) DEVICE — SPIROMETER INCENT 2500ML W ONE W VLV

## (undated) DEVICE — CANNULA ORIG TL CLR W FOAM CUSHIONS AND 14FT SUPL TB 3 CHN

## (undated) DEVICE — DRESSING FOAM DISK DIA1IN H 7MM HYDRPHLC CHG IMPREG IN SL

## (undated) DEVICE — UNDERPAD INCONT W23XL36IN STD BLU POLYPR BK FLUF SFT

## (undated) DEVICE — DRAIN SURG W7MMXL20CM SIL FULL PERF HUBLESS FLAT RADPQ STRP

## (undated) DEVICE — ADHESIVE SKIN CLOSURE 4X22 CM PREMIERPRO EXOFINFUSION DISP

## (undated) DEVICE — TRAP SPEC POLYP REM STRNR CLN DSGN MAGNIFYING WIND DISP

## (undated) DEVICE — SUTURE STRATAFIX SYMMETRIC PDS + SZ 2-0 L18IN ABSRB VLT SXPP1A403

## (undated) DEVICE — KIT CLN UP BON SECOURS MARYV

## (undated) DEVICE — BITE BLOCK ENDOSCP UNIV AD 6 TO 9.4 MM

## (undated) DEVICE — SYRINGE 20ML LL S/C 50

## (undated) DEVICE — SUTURE STRATAFIX SYMMETRIC PDS + SZ 1 L18IN ABSRB VLT L48MM SXPP1A400

## (undated) DEVICE — BIPOLAR FORCEPS CORD: Brand: VALLEYLAB

## (undated) DEVICE — HEX-LOCKING BLADE ELECTRODE: Brand: EDGE